# Patient Record
Sex: MALE | Race: WHITE | NOT HISPANIC OR LATINO | Employment: FULL TIME | URBAN - METROPOLITAN AREA
[De-identification: names, ages, dates, MRNs, and addresses within clinical notes are randomized per-mention and may not be internally consistent; named-entity substitution may affect disease eponyms.]

---

## 2022-01-01 ENCOUNTER — APPOINTMENT (OUTPATIENT)
Dept: GENERAL RADIOLOGY | Facility: CLINIC | Age: 72
End: 2022-01-01
Attending: ORTHOPAEDIC SURGERY
Payer: COMMERCIAL

## 2022-01-01 ENCOUNTER — APPOINTMENT (OUTPATIENT)
Dept: CT IMAGING | Facility: CLINIC | Age: 72
End: 2022-01-01
Attending: EMERGENCY MEDICINE
Payer: COMMERCIAL

## 2022-01-01 ENCOUNTER — ANESTHESIA EVENT (OUTPATIENT)
Dept: SURGERY | Facility: CLINIC | Age: 72
End: 2022-01-01
Payer: COMMERCIAL

## 2022-01-01 ENCOUNTER — APPOINTMENT (OUTPATIENT)
Dept: GENERAL RADIOLOGY | Facility: CLINIC | Age: 72
End: 2022-01-01
Attending: EMERGENCY MEDICINE
Payer: COMMERCIAL

## 2022-01-01 ENCOUNTER — APPOINTMENT (OUTPATIENT)
Dept: CARDIOLOGY | Facility: CLINIC | Age: 72
End: 2022-01-01
Attending: INTERNAL MEDICINE
Payer: COMMERCIAL

## 2022-01-01 ENCOUNTER — ANESTHESIA (OUTPATIENT)
Dept: SURGERY | Facility: CLINIC | Age: 72
End: 2022-01-01
Payer: COMMERCIAL

## 2022-01-01 ENCOUNTER — PATIENT OUTREACH (OUTPATIENT)
Dept: CARE COORDINATION | Facility: CLINIC | Age: 72
End: 2022-01-01

## 2022-01-01 ENCOUNTER — APPOINTMENT (OUTPATIENT)
Dept: ULTRASOUND IMAGING | Facility: CLINIC | Age: 72
End: 2022-01-01
Attending: HOSPITALIST
Payer: COMMERCIAL

## 2022-01-01 ENCOUNTER — APPOINTMENT (OUTPATIENT)
Dept: OCCUPATIONAL THERAPY | Facility: CLINIC | Age: 72
End: 2022-01-01
Attending: PHYSICIAN ASSISTANT
Payer: COMMERCIAL

## 2022-01-01 ENCOUNTER — TELEPHONE (OUTPATIENT)
Dept: OTHER | Facility: CLINIC | Age: 72
End: 2022-01-01

## 2022-01-01 ENCOUNTER — APPOINTMENT (OUTPATIENT)
Dept: PHYSICAL THERAPY | Facility: CLINIC | Age: 72
End: 2022-01-01
Attending: PHYSICIAN ASSISTANT
Payer: COMMERCIAL

## 2022-01-01 ENCOUNTER — LAB REQUISITION (OUTPATIENT)
Dept: LAB | Facility: CLINIC | Age: 72
End: 2022-01-01
Payer: COMMERCIAL

## 2022-01-01 ENCOUNTER — APPOINTMENT (OUTPATIENT)
Dept: CT IMAGING | Facility: CLINIC | Age: 72
End: 2022-01-01
Attending: NURSE PRACTITIONER
Payer: COMMERCIAL

## 2022-01-01 ENCOUNTER — APPOINTMENT (OUTPATIENT)
Dept: PHYSICAL THERAPY | Facility: CLINIC | Age: 72
End: 2022-01-01
Payer: COMMERCIAL

## 2022-01-01 ENCOUNTER — APPOINTMENT (OUTPATIENT)
Dept: GENERAL RADIOLOGY | Facility: CLINIC | Age: 72
End: 2022-01-01
Attending: HOSPITALIST
Payer: COMMERCIAL

## 2022-01-01 ENCOUNTER — HOSPITAL ENCOUNTER (INPATIENT)
Facility: CLINIC | Age: 72
LOS: 14 days | Discharge: NURSING, REHAB OR LONG-TERM CARE | End: 2022-08-12
Attending: EMERGENCY MEDICINE | Admitting: INTERNAL MEDICINE
Payer: COMMERCIAL

## 2022-01-01 ENCOUNTER — PATIENT OUTREACH (OUTPATIENT)
Dept: CARE COORDINATION | Facility: CLINIC | Age: 72
End: 2022-01-01
Payer: COMMERCIAL

## 2022-01-01 ENCOUNTER — HOSPITAL ENCOUNTER (INPATIENT)
Facility: CLINIC | Age: 72
LOS: 1 days | End: 2022-09-04
Attending: EMERGENCY MEDICINE | Admitting: STUDENT IN AN ORGANIZED HEALTH CARE EDUCATION/TRAINING PROGRAM
Payer: COMMERCIAL

## 2022-01-01 ENCOUNTER — TRANSITIONAL CARE UNIT VISIT (OUTPATIENT)
Dept: GERIATRICS | Facility: CLINIC | Age: 72
End: 2022-01-01
Payer: COMMERCIAL

## 2022-01-01 ENCOUNTER — APPOINTMENT (OUTPATIENT)
Dept: CT IMAGING | Facility: CLINIC | Age: 72
End: 2022-01-01
Attending: INTERNAL MEDICINE
Payer: COMMERCIAL

## 2022-01-01 ENCOUNTER — HOSPITAL ENCOUNTER (INPATIENT)
Facility: CLINIC | Age: 72
LOS: 4 days | Discharge: HOME | End: 2022-01-27
Attending: EMERGENCY MEDICINE | Admitting: INTERNAL MEDICINE
Payer: COMMERCIAL

## 2022-01-01 ENCOUNTER — HOSPITAL ENCOUNTER (INPATIENT)
Facility: CLINIC | Age: 72
LOS: 1 days | Discharge: HOME | End: 2022-06-19
Attending: EMERGENCY MEDICINE | Admitting: INTERNAL MEDICINE
Payer: COMMERCIAL

## 2022-01-01 ENCOUNTER — DISCHARGE SUMMARY NURSING HOME (OUTPATIENT)
Dept: GERIATRICS | Facility: CLINIC | Age: 72
End: 2022-01-01
Payer: COMMERCIAL

## 2022-01-01 ENCOUNTER — DOCUMENTATION ONLY (OUTPATIENT)
Dept: OTHER | Facility: CLINIC | Age: 72
End: 2022-01-01

## 2022-01-01 ENCOUNTER — APPOINTMENT (OUTPATIENT)
Dept: PHYSICAL THERAPY | Facility: CLINIC | Age: 72
End: 2022-01-01
Attending: INTERNAL MEDICINE
Payer: COMMERCIAL

## 2022-01-01 VITALS
BODY MASS INDEX: 35.03 KG/M2 | DIASTOLIC BLOOD PRESSURE: 46 MMHG | TEMPERATURE: 98.1 F | HEIGHT: 68 IN | WEIGHT: 231.1 LBS | RESPIRATION RATE: 18 BRPM | SYSTOLIC BLOOD PRESSURE: 141 MMHG | OXYGEN SATURATION: 94 % | HEART RATE: 73 BPM

## 2022-01-01 VITALS
BODY MASS INDEX: 30.86 KG/M2 | HEIGHT: 67 IN | TEMPERATURE: 96.9 F | HEART RATE: 66 BPM | RESPIRATION RATE: 16 BRPM | WEIGHT: 196.6 LBS | SYSTOLIC BLOOD PRESSURE: 112 MMHG | DIASTOLIC BLOOD PRESSURE: 50 MMHG | OXYGEN SATURATION: 99 %

## 2022-01-01 VITALS
WEIGHT: 208.1 LBS | SYSTOLIC BLOOD PRESSURE: 131 MMHG | BODY MASS INDEX: 31.54 KG/M2 | TEMPERATURE: 98.2 F | DIASTOLIC BLOOD PRESSURE: 67 MMHG | OXYGEN SATURATION: 92 % | RESPIRATION RATE: 18 BRPM | HEART RATE: 69 BPM | HEIGHT: 68 IN

## 2022-01-01 VITALS
DIASTOLIC BLOOD PRESSURE: 38 MMHG | HEART RATE: 84 BPM | TEMPERATURE: 98.7 F | SYSTOLIC BLOOD PRESSURE: 108 MMHG | WEIGHT: 210.2 LBS | HEIGHT: 68 IN | OXYGEN SATURATION: 95 % | RESPIRATION RATE: 18 BRPM | BODY MASS INDEX: 31.86 KG/M2

## 2022-01-01 VITALS
TEMPERATURE: 98.1 F | OXYGEN SATURATION: 92 % | HEART RATE: 70 BPM | HEIGHT: 68 IN | DIASTOLIC BLOOD PRESSURE: 68 MMHG | BODY MASS INDEX: 31.61 KG/M2 | RESPIRATION RATE: 18 BRPM | WEIGHT: 208.6 LBS | SYSTOLIC BLOOD PRESSURE: 116 MMHG

## 2022-01-01 VITALS
RESPIRATION RATE: 18 BRPM | TEMPERATURE: 97.4 F | BODY MASS INDEX: 32.58 KG/M2 | DIASTOLIC BLOOD PRESSURE: 68 MMHG | SYSTOLIC BLOOD PRESSURE: 122 MMHG | WEIGHT: 207.6 LBS | HEART RATE: 68 BPM | OXYGEN SATURATION: 95 % | HEIGHT: 67 IN

## 2022-01-01 VITALS
TEMPERATURE: 97.2 F | WEIGHT: 207.6 LBS | BODY MASS INDEX: 32.58 KG/M2 | HEART RATE: 78 BPM | OXYGEN SATURATION: 93 % | SYSTOLIC BLOOD PRESSURE: 102 MMHG | DIASTOLIC BLOOD PRESSURE: 59 MMHG | HEIGHT: 67 IN | RESPIRATION RATE: 18 BRPM

## 2022-01-01 VITALS
WEIGHT: 207.6 LBS | OXYGEN SATURATION: 92 % | BODY MASS INDEX: 32.58 KG/M2 | DIASTOLIC BLOOD PRESSURE: 69 MMHG | TEMPERATURE: 97.6 F | SYSTOLIC BLOOD PRESSURE: 134 MMHG | HEIGHT: 67 IN | RESPIRATION RATE: 16 BRPM | HEART RATE: 74 BPM

## 2022-01-01 VITALS
TEMPERATURE: 98.1 F | DIASTOLIC BLOOD PRESSURE: 85 MMHG | BODY MASS INDEX: 30.33 KG/M2 | HEART RATE: 68 BPM | OXYGEN SATURATION: 95 % | SYSTOLIC BLOOD PRESSURE: 134 MMHG | RESPIRATION RATE: 18 BRPM | HEIGHT: 68 IN | WEIGHT: 200.1 LBS

## 2022-01-01 VITALS
BODY MASS INDEX: 28.27 KG/M2 | HEIGHT: 68 IN | TEMPERATURE: 96 F | OXYGEN SATURATION: 79 % | SYSTOLIC BLOOD PRESSURE: 118 MMHG | DIASTOLIC BLOOD PRESSURE: 60 MMHG | WEIGHT: 186.51 LBS

## 2022-01-01 DIAGNOSIS — R78.81 BACTEREMIA: Primary | ICD-10-CM
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
## 2022-01-01 DIAGNOSIS — E87.6 HYPOKALEMIA: ICD-10-CM

## 2022-01-01 DIAGNOSIS — R78.81 BACTEREMIA: ICD-10-CM

## 2022-01-01 DIAGNOSIS — I82.622 DEEP VEIN THROMBOSIS (DVT) OF LEFT UPPER EXTREMITY, UNSPECIFIED CHRONICITY, UNSPECIFIED VEIN (H): ICD-10-CM

## 2022-01-01 DIAGNOSIS — E78.49 OTHER HYPERLIPIDEMIA: ICD-10-CM

## 2022-01-01 DIAGNOSIS — Z86.718 PERSONAL HISTORY OF DVT (DEEP VEIN THROMBOSIS): ICD-10-CM

## 2022-01-01 DIAGNOSIS — I48.19 PERSISTENT ATRIAL FIBRILLATION (H): ICD-10-CM

## 2022-01-01 DIAGNOSIS — I82.A12 ACUTE DEEP VEIN THROMBOSIS (DVT) OF AXILLARY VEIN OF LEFT UPPER EXTREMITY (H): ICD-10-CM

## 2022-01-01 DIAGNOSIS — M62.81 GENERALIZED MUSCLE WEAKNESS: ICD-10-CM

## 2022-01-01 DIAGNOSIS — Z71.89 OTHER SPECIFIED COUNSELING: ICD-10-CM

## 2022-01-01 DIAGNOSIS — D64.9 ANEMIA, UNSPECIFIED TYPE: ICD-10-CM

## 2022-01-01 DIAGNOSIS — I25.118 CORONARY ARTERY DISEASE OF NATIVE ARTERY OF NATIVE HEART WITH STABLE ANGINA PECTORIS (H): ICD-10-CM

## 2022-01-01 DIAGNOSIS — S72.145A CLOSED NONDISPLACED INTERTROCHANTERIC FRACTURE OF LEFT FEMUR, INITIAL ENCOUNTER (H): ICD-10-CM

## 2022-01-01 DIAGNOSIS — B99.9 UNSPECIFIED INFECTIOUS DISEASE: ICD-10-CM

## 2022-01-01 DIAGNOSIS — R33.9 URINARY RETENTION: Primary | ICD-10-CM

## 2022-01-01 DIAGNOSIS — R79.89 ELEVATED TROPONIN: ICD-10-CM

## 2022-01-01 DIAGNOSIS — Z96.642 S/P TOTAL LEFT HIP ARTHROPLASTY: ICD-10-CM

## 2022-01-01 DIAGNOSIS — I50.22 CHRONIC SYSTOLIC (CONGESTIVE) HEART FAILURE (H): ICD-10-CM

## 2022-01-01 DIAGNOSIS — I61.5 VENTRICULAR HEMORRHAGE (H): ICD-10-CM

## 2022-01-01 DIAGNOSIS — I47.29 NSVT (NONSUSTAINED VENTRICULAR TACHYCARDIA) (H): ICD-10-CM

## 2022-01-01 DIAGNOSIS — Z11.1 ENCOUNTER FOR SCREENING FOR RESPIRATORY TUBERCULOSIS: ICD-10-CM

## 2022-01-01 DIAGNOSIS — R33.9 URINARY RETENTION: ICD-10-CM

## 2022-01-01 DIAGNOSIS — R60.0 BILATERAL LEG EDEMA: ICD-10-CM

## 2022-01-01 DIAGNOSIS — D64.9 ANEMIA, UNSPECIFIED: ICD-10-CM

## 2022-01-01 DIAGNOSIS — R57.8 HEMORRHAGIC SHOCK (H): ICD-10-CM

## 2022-01-01 DIAGNOSIS — F39 MOOD DISORDER (H): ICD-10-CM

## 2022-01-01 DIAGNOSIS — I50.32 CHRONIC DIASTOLIC HEART FAILURE (H): ICD-10-CM

## 2022-01-01 DIAGNOSIS — N17.9 ACUTE RENAL FAILURE, UNSPECIFIED ACUTE RENAL FAILURE TYPE (H): ICD-10-CM

## 2022-01-01 DIAGNOSIS — I50.9 HEART FAILURE, UNSPECIFIED (H): ICD-10-CM

## 2022-01-01 DIAGNOSIS — Z71.89 ADVANCED CARE PLANNING/COUNSELING DISCUSSION: ICD-10-CM

## 2022-01-01 DIAGNOSIS — R53.81 PHYSICAL DECONDITIONING: ICD-10-CM

## 2022-01-01 DIAGNOSIS — R19.7 DIARRHEA, UNSPECIFIED TYPE: ICD-10-CM

## 2022-01-01 DIAGNOSIS — R79.89 ELEVATED BRAIN NATRIURETIC PEPTIDE (BNP) LEVEL: ICD-10-CM

## 2022-01-01 DIAGNOSIS — I50.9 CONGESTIVE HEART FAILURE, UNSPECIFIED HF CHRONICITY, UNSPECIFIED HEART FAILURE TYPE (H): Primary | ICD-10-CM

## 2022-01-01 DIAGNOSIS — K92.2 GASTROINTESTINAL HEMORRHAGE, UNSPECIFIED GASTROINTESTINAL HEMORRHAGE TYPE: ICD-10-CM

## 2022-01-01 DIAGNOSIS — J44.9 COPD, SEVERE (H): ICD-10-CM

## 2022-01-01 DIAGNOSIS — B96.89 OTHER SPECIFIED BACTERIAL AGENTS AS THE CAUSE OF DISEASES CLASSIFIED ELSEWHERE: ICD-10-CM

## 2022-01-01 DIAGNOSIS — R41.89 UNRESPONSIVE: ICD-10-CM

## 2022-01-01 DIAGNOSIS — I10 HYPERTENSION, UNSPECIFIED TYPE: ICD-10-CM

## 2022-01-01 DIAGNOSIS — E87.6 HYPOKALEMIA: Primary | ICD-10-CM

## 2022-01-01 DIAGNOSIS — I50.9 OTHER CONGESTIVE HEART FAILURE (H): ICD-10-CM

## 2022-01-01 LAB
ABO/RH(D): NORMAL
ABO/RH(D): NORMAL
ACINETOBACTER SPECIES: NOT DETECTED
ALBUMIN SERPL BCG-MCNC: 3.5 G/DL (ref 3.5–5.2)
ALBUMIN SERPL-MCNC: 2.4 G/DL (ref 3.4–5)
ALBUMIN SERPL-MCNC: 2.7 G/DL (ref 3.4–5)
ALBUMIN UR-MCNC: 10 MG/DL
ALBUMIN UR-MCNC: NEGATIVE MG/DL
ALBUMIN UR-MCNC: NEGATIVE MG/DL
ALP SERPL-CCNC: 151 U/L (ref 40–129)
ALP SERPL-CCNC: 68 U/L (ref 40–150)
ALP SERPL-CCNC: 82 U/L (ref 40–150)
ALT SERPL W P-5'-P-CCNC: 17 U/L (ref 0–70)
ALT SERPL W P-5'-P-CCNC: 18 U/L (ref 0–70)
ALT SERPL W P-5'-P-CCNC: 23 U/L (ref 10–50)
ANION GAP SERPL CALCULATED.3IONS-SCNC: 10 MMOL/L (ref 7–15)
ANION GAP SERPL CALCULATED.3IONS-SCNC: 11 MMOL/L (ref 7–15)
ANION GAP SERPL CALCULATED.3IONS-SCNC: 12 MMOL/L (ref 7–15)
ANION GAP SERPL CALCULATED.3IONS-SCNC: 12 MMOL/L (ref 7–15)
ANION GAP SERPL CALCULATED.3IONS-SCNC: 13 MMOL/L (ref 7–15)
ANION GAP SERPL CALCULATED.3IONS-SCNC: 15 MMOL/L (ref 7–15)
ANION GAP SERPL CALCULATED.3IONS-SCNC: 3 MMOL/L (ref 3–14)
ANION GAP SERPL CALCULATED.3IONS-SCNC: 3 MMOL/L (ref 3–14)
ANION GAP SERPL CALCULATED.3IONS-SCNC: 6 MMOL/L (ref 3–14)
ANION GAP SERPL CALCULATED.3IONS-SCNC: 7 MMOL/L (ref 3–14)
ANION GAP SERPL CALCULATED.3IONS-SCNC: 7 MMOL/L (ref 7–15)
ANION GAP SERPL CALCULATED.3IONS-SCNC: 8 MMOL/L (ref 3–14)
ANION GAP SERPL CALCULATED.3IONS-SCNC: 8 MMOL/L (ref 7–15)
ANION GAP SERPL CALCULATED.3IONS-SCNC: 9 MMOL/L (ref 3–14)
ANION GAP SERPL CALCULATED.3IONS-SCNC: 9 MMOL/L (ref 3–14)
ANION GAP SERPL CALCULATED.3IONS-SCNC: 9 MMOL/L (ref 7–15)
ANTIBODY SCREEN: NEGATIVE
ANTIBODY SCREEN: NEGATIVE
APPEARANCE CSF: ABNORMAL
APPEARANCE UR: CLEAR
APTT PPP: 40 SECONDS (ref 22–38)
AST SERPL W P-5'-P-CCNC: 17 U/L (ref 0–45)
AST SERPL W P-5'-P-CCNC: 21 U/L (ref 0–45)
AST SERPL W P-5'-P-CCNC: 25 U/L (ref 0–45)
AST SERPL W P-5'-P-CCNC: 38 U/L (ref 10–50)
AST SERPL W P-5'-P-CCNC: 39 U/L (ref 0–45)
AST SERPL W P-5'-P-CCNC: 42 U/L (ref 0–45)
ATRIAL RATE - MUSE: 0 BPM
ATRIAL RATE - MUSE: 38 BPM
ATRIAL RATE - MUSE: 39 BPM
ATRIAL RATE - MUSE: 77 BPM
ATRIAL RATE - MUSE: 86 BPM
BACTERIA BLD CULT: ABNORMAL
BACTERIA BLD CULT: NO GROWTH
BACTERIA UR CULT: NO GROWTH
BASE EXCESS BLDA CALC-SCNC: 9.6 MMOL/L (ref -9–1.8)
BASE EXCESS BLDV CALC-SCNC: 1.1 MMOL/L (ref -7.7–1.9)
BASOPHILS # BLD AUTO: 0 10E3/UL (ref 0–0.2)
BASOPHILS # BLD AUTO: 0.1 10E3/UL (ref 0–0.2)
BASOPHILS # BLD AUTO: 0.1 10E3/UL (ref 0–0.2)
BASOPHILS NFR BLD AUTO: 0 %
BASOPHILS NFR BLD AUTO: 1 %
BILIRUB DIRECT SERPL-MCNC: 0.59 MG/DL (ref 0–0.3)
BILIRUB SERPL-MCNC: 0.4 MG/DL (ref 0.2–1.3)
BILIRUB SERPL-MCNC: 0.7 MG/DL (ref 0.2–1.3)
BILIRUB SERPL-MCNC: 1.2 MG/DL
BILIRUB UR QL STRIP: NEGATIVE
BLD PROD TYP BPU: NORMAL
BLOOD COMPONENT TYPE: NORMAL
BUN SERPL-MCNC: 10 MG/DL (ref 8–23)
BUN SERPL-MCNC: 11 MG/DL (ref 7–30)
BUN SERPL-MCNC: 11 MG/DL (ref 8–23)
BUN SERPL-MCNC: 11.4 MG/DL (ref 8–23)
BUN SERPL-MCNC: 12 MG/DL (ref 7–30)
BUN SERPL-MCNC: 13 MG/DL (ref 7–30)
BUN SERPL-MCNC: 13.5 MG/DL (ref 8–23)
BUN SERPL-MCNC: 14 MG/DL (ref 7–30)
BUN SERPL-MCNC: 14 MG/DL (ref 7–30)
BUN SERPL-MCNC: 15.9 MG/DL (ref 8–23)
BUN SERPL-MCNC: 17.8 MG/DL (ref 8–23)
BUN SERPL-MCNC: 18.2 MG/DL (ref 8–23)
BUN SERPL-MCNC: 19.2 MG/DL (ref 8–23)
BUN SERPL-MCNC: 20 MG/DL (ref 7–30)
BUN SERPL-MCNC: 20.5 MG/DL (ref 8–23)
BUN SERPL-MCNC: 21 MG/DL (ref 7–30)
BUN SERPL-MCNC: 21.1 MG/DL (ref 8–23)
BUN SERPL-MCNC: 22 MG/DL (ref 7–30)
BUN SERPL-MCNC: 22.7 MG/DL (ref 8–23)
BUN SERPL-MCNC: 22.7 MG/DL (ref 8–23)
BUN SERPL-MCNC: 27 MG/DL (ref 7–30)
BUN SERPL-MCNC: 29 MG/DL (ref 7–30)
BUN SERPL-MCNC: 29 MG/DL (ref 7–30)
BUN SERPL-MCNC: 33.5 MG/DL (ref 8–23)
BUN SERPL-MCNC: 36.9 MG/DL (ref 8–23)
BUN SERPL-MCNC: 9.2 MG/DL (ref 8–23)
BURR CELLS BLD QL SMEAR: ABNORMAL
C DIFF TOX B STL QL: NEGATIVE
CA-I BLD-MCNC: 5.2 MG/DL (ref 4.4–5.2)
CALCIUM SERPL-MCNC: 10.3 MG/DL (ref 8.5–10.1)
CALCIUM SERPL-MCNC: 7.1 MG/DL (ref 8.5–10.1)
CALCIUM SERPL-MCNC: 8.2 MG/DL (ref 8.5–10.1)
CALCIUM SERPL-MCNC: 8.2 MG/DL (ref 8.5–10.1)
CALCIUM SERPL-MCNC: 8.2 MG/DL (ref 8.8–10.2)
CALCIUM SERPL-MCNC: 8.3 MG/DL (ref 8.5–10.1)
CALCIUM SERPL-MCNC: 8.3 MG/DL (ref 8.8–10.2)
CALCIUM SERPL-MCNC: 8.3 MG/DL (ref 8.8–10.2)
CALCIUM SERPL-MCNC: 8.5 MG/DL (ref 8.8–10.2)
CALCIUM SERPL-MCNC: 8.5 MG/DL (ref 8.8–10.2)
CALCIUM SERPL-MCNC: 8.6 MG/DL (ref 8.5–10.1)
CALCIUM SERPL-MCNC: 8.8 MG/DL (ref 8.8–10.2)
CALCIUM SERPL-MCNC: 9 MG/DL (ref 8.5–10.1)
CALCIUM SERPL-MCNC: 9 MG/DL (ref 8.8–10.2)
CALCIUM SERPL-MCNC: 9.1 MG/DL (ref 8.5–10.1)
CALCIUM SERPL-MCNC: 9.2 MG/DL (ref 8.8–10.2)
CALCIUM SERPL-MCNC: 9.3 MG/DL (ref 8.5–10.1)
CALCIUM SERPL-MCNC: 9.3 MG/DL (ref 8.8–10.2)
CALCIUM SERPL-MCNC: 9.4 MG/DL (ref 8.5–10.1)
CALCIUM SERPL-MCNC: 9.5 MG/DL (ref 8.5–10.1)
CALCIUM SERPL-MCNC: 9.7 MG/DL (ref 8.8–10.2)
CHLORIDE BLD-SCNC: 100 MMOL/L (ref 94–109)
CHLORIDE BLD-SCNC: 102 MMOL/L (ref 94–109)
CHLORIDE BLD-SCNC: 105 MMOL/L (ref 94–109)
CHLORIDE BLD-SCNC: 108 MMOL/L (ref 94–109)
CHLORIDE BLD-SCNC: 121 MMOL/L (ref 94–109)
CHLORIDE BLD-SCNC: 95 MMOL/L (ref 94–109)
CHLORIDE BLD-SCNC: 96 MMOL/L (ref 94–109)
CHLORIDE BLD-SCNC: 99 MMOL/L (ref 94–109)
CHLORIDE BLD-SCNC: 99 MMOL/L (ref 94–109)
CHLORIDE SERPL-SCNC: 100 MMOL/L (ref 98–107)
CHLORIDE SERPL-SCNC: 101 MMOL/L (ref 98–107)
CHLORIDE SERPL-SCNC: 92 MMOL/L (ref 98–107)
CHLORIDE SERPL-SCNC: 93 MMOL/L (ref 98–107)
CHLORIDE SERPL-SCNC: 93 MMOL/L (ref 98–107)
CHLORIDE SERPL-SCNC: 94 MMOL/L (ref 98–107)
CHLORIDE SERPL-SCNC: 95 MMOL/L (ref 98–107)
CHLORIDE SERPL-SCNC: 96 MMOL/L (ref 98–107)
CHLORIDE SERPL-SCNC: 96 MMOL/L (ref 98–107)
CHLORIDE SERPL-SCNC: 97 MMOL/L (ref 98–107)
CHLORIDE SERPL-SCNC: 98 MMOL/L (ref 98–107)
CHLORIDE SERPL-SCNC: 99 MMOL/L (ref 98–107)
CHLORIDE SERPL-SCNC: 99 MMOL/L (ref 98–107)
CITROBACTER SPECIES: NOT DETECTED
CO2 SERPL-SCNC: 22 MMOL/L (ref 20–32)
CO2 SERPL-SCNC: 26 MMOL/L (ref 20–32)
CO2 SERPL-SCNC: 27 MMOL/L (ref 20–32)
CO2 SERPL-SCNC: 28 MMOL/L (ref 20–32)
CO2 SERPL-SCNC: 28 MMOL/L (ref 20–32)
CO2 SERPL-SCNC: 29 MMOL/L (ref 20–32)
CO2 SERPL-SCNC: 30 MMOL/L (ref 20–32)
CO2 SERPL-SCNC: 31 MMOL/L (ref 20–32)
CO2 SERPL-SCNC: 32 MMOL/L (ref 20–32)
CODING SYSTEM: NORMAL
COLOR CSF: ABNORMAL
COLOR UR AUTO: ABNORMAL
COLOR UR AUTO: ABNORMAL
COLOR UR AUTO: YELLOW
CPB POCT: NO
CREAT BLD-MCNC: 1.4 MG/DL (ref 0.7–1.3)
CREAT SERPL-MCNC: 0.48 MG/DL (ref 0.66–1.25)
CREAT SERPL-MCNC: 0.62 MG/DL (ref 0.66–1.25)
CREAT SERPL-MCNC: 0.63 MG/DL (ref 0.66–1.25)
CREAT SERPL-MCNC: 0.65 MG/DL (ref 0.66–1.25)
CREAT SERPL-MCNC: 0.67 MG/DL (ref 0.66–1.25)
CREAT SERPL-MCNC: 0.69 MG/DL (ref 0.67–1.17)
CREAT SERPL-MCNC: 0.69 MG/DL (ref 0.67–1.17)
CREAT SERPL-MCNC: 0.71 MG/DL (ref 0.66–1.25)
CREAT SERPL-MCNC: 0.71 MG/DL (ref 0.66–1.25)
CREAT SERPL-MCNC: 0.71 MG/DL (ref 0.67–1.17)
CREAT SERPL-MCNC: 0.72 MG/DL (ref 0.66–1.25)
CREAT SERPL-MCNC: 0.72 MG/DL (ref 0.66–1.25)
CREAT SERPL-MCNC: 0.74 MG/DL (ref 0.66–1.25)
CREAT SERPL-MCNC: 0.76 MG/DL (ref 0.67–1.17)
CREAT SERPL-MCNC: 0.78 MG/DL (ref 0.66–1.25)
CREAT SERPL-MCNC: 0.78 MG/DL (ref 0.67–1.17)
CREAT SERPL-MCNC: 0.79 MG/DL (ref 0.67–1.17)
CREAT SERPL-MCNC: 0.8 MG/DL (ref 0.66–1.25)
CREAT SERPL-MCNC: 0.8 MG/DL (ref 0.67–1.17)
CREAT SERPL-MCNC: 0.81 MG/DL (ref 0.67–1.17)
CREAT SERPL-MCNC: 0.83 MG/DL (ref 0.67–1.17)
CREAT SERPL-MCNC: 0.85 MG/DL (ref 0.67–1.17)
CREAT SERPL-MCNC: 0.86 MG/DL (ref 0.67–1.17)
CREAT SERPL-MCNC: 0.98 MG/DL (ref 0.67–1.17)
CREAT SERPL-MCNC: 1.03 MG/DL (ref 0.67–1.17)
CREAT SERPL-MCNC: 1.05 MG/DL (ref 0.67–1.17)
CREAT SERPL-MCNC: 1.16 MG/DL (ref 0.66–1.25)
CREAT SERPL-MCNC: 1.24 MG/DL (ref 0.67–1.17)
CREAT SERPL-MCNC: 1.36 MG/DL (ref 0.66–1.25)
CROSSMATCH: NORMAL
CRP SERPL-MCNC: 48.8 MG/L (ref 0–8)
CRP SERPL-MCNC: 82.8 MG/L (ref 0–8)
CRP SERPL-MCNC: 88.4 MG/L (ref 0–8)
CTX-M: NORMAL
DEPRECATED HCO3 PLAS-SCNC: 21 MMOL/L (ref 22–29)
DEPRECATED HCO3 PLAS-SCNC: 24 MMOL/L (ref 22–29)
DEPRECATED HCO3 PLAS-SCNC: 25 MMOL/L (ref 22–29)
DEPRECATED HCO3 PLAS-SCNC: 26 MMOL/L (ref 22–29)
DEPRECATED HCO3 PLAS-SCNC: 26 MMOL/L (ref 22–29)
DEPRECATED HCO3 PLAS-SCNC: 28 MMOL/L (ref 22–29)
DIASTOLIC BLOOD PRESSURE - MUSE: NORMAL MMHG
ENTEROBACTER SPECIES: NOT DETECTED
EOSINOPHIL # BLD AUTO: 0 10E3/UL (ref 0–0.7)
EOSINOPHIL # BLD AUTO: 0.1 10E3/UL (ref 0–0.7)
EOSINOPHIL # BLD AUTO: 0.2 10E3/UL (ref 0–0.7)
EOSINOPHIL # BLD AUTO: 0.3 10E3/UL (ref 0–0.7)
EOSINOPHIL # BLD AUTO: 0.3 10E3/UL (ref 0–0.7)
EOSINOPHIL # BLD AUTO: 0.4 10E3/UL (ref 0–0.7)
EOSINOPHIL # BLD AUTO: 0.4 10E3/UL (ref 0–0.7)
EOSINOPHIL NFR BLD AUTO: 1 %
EOSINOPHIL NFR BLD AUTO: 2 %
EOSINOPHIL NFR BLD AUTO: 2 %
EOSINOPHIL NFR BLD AUTO: 3 %
EOSINOPHIL NFR BLD AUTO: 4 %
EOSINOPHIL NFR BLD AUTO: 6 %
EOSINOPHIL NFR BLD AUTO: 6 %
ERYTHROCYTE [DISTWIDTH] IN BLOOD BY AUTOMATED COUNT: 12.4 % (ref 10–15)
ERYTHROCYTE [DISTWIDTH] IN BLOOD BY AUTOMATED COUNT: 12.4 % (ref 10–15)
ERYTHROCYTE [DISTWIDTH] IN BLOOD BY AUTOMATED COUNT: 12.6 % (ref 10–15)
ERYTHROCYTE [DISTWIDTH] IN BLOOD BY AUTOMATED COUNT: 13.9 % (ref 10–15)
ERYTHROCYTE [DISTWIDTH] IN BLOOD BY AUTOMATED COUNT: 14.1 % (ref 10–15)
ERYTHROCYTE [DISTWIDTH] IN BLOOD BY AUTOMATED COUNT: 14.2 % (ref 10–15)
ERYTHROCYTE [DISTWIDTH] IN BLOOD BY AUTOMATED COUNT: 14.3 % (ref 10–15)
ERYTHROCYTE [DISTWIDTH] IN BLOOD BY AUTOMATED COUNT: 14.4 % (ref 10–15)
ERYTHROCYTE [DISTWIDTH] IN BLOOD BY AUTOMATED COUNT: 14.5 % (ref 10–15)
ERYTHROCYTE [DISTWIDTH] IN BLOOD BY AUTOMATED COUNT: 14.6 % (ref 10–15)
ERYTHROCYTE [DISTWIDTH] IN BLOOD BY AUTOMATED COUNT: 14.7 % (ref 10–15)
ERYTHROCYTE [DISTWIDTH] IN BLOOD BY AUTOMATED COUNT: 14.7 % (ref 10–15)
ERYTHROCYTE [DISTWIDTH] IN BLOOD BY AUTOMATED COUNT: 15.3 % (ref 10–15)
ERYTHROCYTE [SEDIMENTATION RATE] IN BLOOD BY WESTERGREN METHOD: 70 MM/HR (ref 0–20)
ERYTHROCYTE [SEDIMENTATION RATE] IN BLOOD BY WESTERGREN METHOD: 72 MM/HR (ref 0–20)
ERYTHROCYTE [SEDIMENTATION RATE] IN BLOOD BY WESTERGREN METHOD: 73 MM/HR (ref 0–20)
ESCHERICHIA COLI: NOT DETECTED
FIBRINOGEN PPP-MCNC: 581 MG/DL (ref 170–490)
FLUAV RNA SPEC QL NAA+PROBE: NEGATIVE
FLUAV RNA SPEC QL NAA+PROBE: NEGATIVE
FLUBV RNA RESP QL NAA+PROBE: NEGATIVE
FLUBV RNA RESP QL NAA+PROBE: NEGATIVE
GAMMA INTERFERON BACKGROUND BLD IA-ACNC: 0.08 IU/ML
GFR SERPL CREATININE-BSD FRML MDRD: 54 ML/MIN/1.73M2
GFR SERPL CREATININE-BSD FRML MDRD: 56 ML/MIN/1.73M2
GFR SERPL CREATININE-BSD FRML MDRD: 63 ML/MIN/1.73M2
GFR SERPL CREATININE-BSD FRML MDRD: 68 ML/MIN/1.73M2
GFR SERPL CREATININE-BSD FRML MDRD: 77 ML/MIN/1.73M2
GFR SERPL CREATININE-BSD FRML MDRD: 79 ML/MIN/1.73M2
GFR SERPL CREATININE-BSD FRML MDRD: 83 ML/MIN/1.73M2
GFR SERPL CREATININE-BSD FRML MDRD: >90 ML/MIN/1.73M2
GLUCOSE BLD-MCNC: 109 MG/DL (ref 70–99)
GLUCOSE BLD-MCNC: 113 MG/DL (ref 70–99)
GLUCOSE BLD-MCNC: 116 MG/DL (ref 70–99)
GLUCOSE BLD-MCNC: 120 MG/DL (ref 70–99)
GLUCOSE BLD-MCNC: 122 MG/DL (ref 70–99)
GLUCOSE BLD-MCNC: 127 MG/DL (ref 70–99)
GLUCOSE BLD-MCNC: 128 MG/DL (ref 70–99)
GLUCOSE BLD-MCNC: 130 MG/DL (ref 70–99)
GLUCOSE BLD-MCNC: 152 MG/DL (ref 70–99)
GLUCOSE BLD-MCNC: 75 MG/DL (ref 70–99)
GLUCOSE BLD-MCNC: 87 MG/DL (ref 70–99)
GLUCOSE BLD-MCNC: 95 MG/DL (ref 70–99)
GLUCOSE BLDC GLUCOMTR-MCNC: 106 MG/DL (ref 70–99)
GLUCOSE BLDC GLUCOMTR-MCNC: 106 MG/DL (ref 70–99)
GLUCOSE BLDC GLUCOMTR-MCNC: 108 MG/DL (ref 70–99)
GLUCOSE BLDC GLUCOMTR-MCNC: 123 MG/DL (ref 70–99)
GLUCOSE BLDC GLUCOMTR-MCNC: 125 MG/DL (ref 70–99)
GLUCOSE BLDC GLUCOMTR-MCNC: 128 MG/DL (ref 70–99)
GLUCOSE BLDC GLUCOMTR-MCNC: 161 MG/DL (ref 70–99)
GLUCOSE CSF-MCNC: 114 MG/DL (ref 40–70)
GLUCOSE SERPL-MCNC: 102 MG/DL (ref 70–99)
GLUCOSE SERPL-MCNC: 104 MG/DL (ref 70–99)
GLUCOSE SERPL-MCNC: 104 MG/DL (ref 70–99)
GLUCOSE SERPL-MCNC: 105 MG/DL (ref 70–99)
GLUCOSE SERPL-MCNC: 108 MG/DL (ref 70–99)
GLUCOSE SERPL-MCNC: 108 MG/DL (ref 70–99)
GLUCOSE SERPL-MCNC: 110 MG/DL (ref 70–99)
GLUCOSE SERPL-MCNC: 111 MG/DL (ref 70–99)
GLUCOSE SERPL-MCNC: 112 MG/DL (ref 70–99)
GLUCOSE SERPL-MCNC: 122 MG/DL (ref 70–99)
GLUCOSE SERPL-MCNC: 123 MG/DL (ref 70–99)
GLUCOSE SERPL-MCNC: 93 MG/DL (ref 70–99)
GLUCOSE SERPL-MCNC: 97 MG/DL (ref 70–99)
GLUCOSE UR STRIP-MCNC: >=1000 MG/DL
GLUCOSE UR STRIP-MCNC: NEGATIVE MG/DL
GLUCOSE UR STRIP-MCNC: NEGATIVE MG/DL
HBA1C MFR BLD: 5.8 % (ref 0–5.6)
HCO3 BLD-SCNC: 33 MMOL/L (ref 21–28)
HCO3 BLDV-SCNC: 27 MMOL/L (ref 21–28)
HCO3 BLDV-SCNC: 27 MMOL/L (ref 21–28)
HCO3 BLDV-SCNC: 28 MMOL/L (ref 21–28)
HCO3 BLDV-SCNC: 29 MMOL/L (ref 21–28)
HCT VFR BLD AUTO: 26.8 % (ref 40–53)
HCT VFR BLD AUTO: 27.2 % (ref 40–53)
HCT VFR BLD AUTO: 27.8 % (ref 40–53)
HCT VFR BLD AUTO: 27.9 % (ref 40–53)
HCT VFR BLD AUTO: 28.1 % (ref 40–53)
HCT VFR BLD AUTO: 28.9 % (ref 40–53)
HCT VFR BLD AUTO: 29 % (ref 40–53)
HCT VFR BLD AUTO: 29.9 % (ref 40–53)
HCT VFR BLD AUTO: 31.4 % (ref 40–53)
HCT VFR BLD AUTO: 31.5 % (ref 40–53)
HCT VFR BLD AUTO: 31.8 % (ref 40–53)
HCT VFR BLD AUTO: 31.9 % (ref 40–53)
HCT VFR BLD AUTO: 32.3 % (ref 40–53)
HCT VFR BLD AUTO: 32.6 % (ref 40–53)
HCT VFR BLD AUTO: 32.8 % (ref 40–53)
HCT VFR BLD AUTO: 33 % (ref 40–53)
HCT VFR BLD AUTO: 33.1 % (ref 40–53)
HCT VFR BLD AUTO: 33.3 % (ref 40–53)
HCT VFR BLD AUTO: 33.5 % (ref 40–53)
HCT VFR BLD AUTO: 33.5 % (ref 40–53)
HCT VFR BLD AUTO: 33.7 % (ref 40–53)
HCT VFR BLD AUTO: 34.4 % (ref 40–53)
HCT VFR BLD AUTO: 34.8 % (ref 40–53)
HCT VFR BLD AUTO: 35 % (ref 40–53)
HCT VFR BLD AUTO: 42 % (ref 40–53)
HCT VFR BLD AUTO: 42.9 % (ref 40–53)
HCT VFR BLD CALC: 35 % (ref 40–53)
HGB BLD-MCNC: 10 G/DL (ref 13.3–17.7)
HGB BLD-MCNC: 10 G/DL (ref 13.3–17.7)
HGB BLD-MCNC: 10.1 G/DL (ref 13.3–17.7)
HGB BLD-MCNC: 10.1 G/DL (ref 13.3–17.7)
HGB BLD-MCNC: 10.2 G/DL (ref 13.3–17.7)
HGB BLD-MCNC: 10.2 G/DL (ref 13.3–17.7)
HGB BLD-MCNC: 10.3 G/DL (ref 13.3–17.7)
HGB BLD-MCNC: 10.3 G/DL (ref 13.3–17.7)
HGB BLD-MCNC: 10.4 G/DL (ref 13.3–17.7)
HGB BLD-MCNC: 10.5 G/DL (ref 13.3–17.7)
HGB BLD-MCNC: 10.6 G/DL (ref 13.3–17.7)
HGB BLD-MCNC: 10.7 G/DL (ref 13.3–17.7)
HGB BLD-MCNC: 10.8 G/DL (ref 13.3–17.7)
HGB BLD-MCNC: 10.9 G/DL (ref 13.3–17.7)
HGB BLD-MCNC: 11.9 G/DL (ref 13.3–17.7)
HGB BLD-MCNC: 13.8 G/DL (ref 13.3–17.7)
HGB BLD-MCNC: 13.9 G/DL (ref 13.3–17.7)
HGB BLD-MCNC: 8.4 G/DL (ref 13.3–17.7)
HGB BLD-MCNC: 8.7 G/DL (ref 13.3–17.7)
HGB BLD-MCNC: 8.7 G/DL (ref 13.3–17.7)
HGB BLD-MCNC: 8.9 G/DL (ref 13.3–17.7)
HGB BLD-MCNC: 8.9 G/DL (ref 13.3–17.7)
HGB BLD-MCNC: 9.1 G/DL (ref 13.3–17.7)
HGB BLD-MCNC: 9.2 G/DL (ref 13.3–17.7)
HGB BLD-MCNC: 9.4 G/DL (ref 13.3–17.7)
HGB BLD-MCNC: 9.5 G/DL (ref 13.3–17.7)
HGB BLD-MCNC: 9.6 G/DL (ref 13.3–17.7)
HGB BLD-MCNC: 9.6 G/DL (ref 13.3–17.7)
HGB BLD-MCNC: 9.7 G/DL (ref 13.3–17.7)
HGB BLD-MCNC: 9.9 G/DL (ref 13.3–17.7)
HGB BLD-MCNC: 9.9 G/DL (ref 13.3–17.7)
HGB UR QL STRIP: ABNORMAL
HGB UR QL STRIP: NEGATIVE
HGB UR QL STRIP: NEGATIVE
HOLD SPECIMEN: NORMAL
HYALINE CASTS: 17 /LPF
IMM GRANULOCYTES # BLD: 0 10E3/UL
IMM GRANULOCYTES # BLD: 0.1 10E3/UL
IMM GRANULOCYTES NFR BLD: 0 %
IMM GRANULOCYTES NFR BLD: 0 %
IMM GRANULOCYTES NFR BLD: 1 %
IMP: NORMAL
INR PPP: 1.09 (ref 0.85–1.15)
INR PPP: 1.3 (ref 0.85–1.15)
INR PPP: 1.66 (ref 0.85–1.15)
INR PPP: 1.98 (ref 0.85–1.15)
INR PPP: 1.98 (ref 0.85–1.15)
INR PPP: 2.85 (ref 0.85–1.15)
INTERPRETATION ECG - MUSE: NORMAL
ISSUE DATE AND TIME: NORMAL
ISSUE DATE AND TIME: NORMAL
KETONES UR STRIP-MCNC: NEGATIVE MG/DL
KLEBSIELLA OXYTOCA: NOT DETECTED
KLEBSIELLA PNEUMONIAE: NOT DETECTED
KPC: NORMAL
LACTATE BLD-SCNC: 1.1 MMOL/L
LACTATE BLD-SCNC: 3.4 MMOL/L
LACTATE SERPL-SCNC: 0.9 MMOL/L (ref 0.7–2)
LACTATE SERPL-SCNC: 1.5 MMOL/L (ref 0.7–2)
LEUKOCYTE ESTERASE UR QL STRIP: ABNORMAL
LEUKOCYTE ESTERASE UR QL STRIP: NEGATIVE
LEUKOCYTE ESTERASE UR QL STRIP: NEGATIVE
LIPASE SERPL-CCNC: 51 U/L (ref 13–60)
LVEF ECHO: NORMAL
LYMPH ABN NFR CSF MANUAL: NORMAL %
LYMPHOCYTES # BLD AUTO: 0.3 10E3/UL (ref 0.8–5.3)
LYMPHOCYTES # BLD AUTO: 0.5 10E3/UL (ref 0.8–5.3)
LYMPHOCYTES # BLD AUTO: 0.7 10E3/UL (ref 0.8–5.3)
LYMPHOCYTES # BLD AUTO: 0.7 10E3/UL (ref 0.8–5.3)
LYMPHOCYTES # BLD AUTO: 0.8 10E3/UL (ref 0.8–5.3)
LYMPHOCYTES # BLD AUTO: 0.8 10E3/UL (ref 0.8–5.3)
LYMPHOCYTES # BLD AUTO: 0.9 10E3/UL (ref 0.8–5.3)
LYMPHOCYTES # BLD AUTO: 1 10E3/UL (ref 0.8–5.3)
LYMPHOCYTES # BLD AUTO: 1 10E3/UL (ref 0.8–5.3)
LYMPHOCYTES # BLD AUTO: 1.1 10E3/UL (ref 0.8–5.3)
LYMPHOCYTES NFR BLD AUTO: 10 %
LYMPHOCYTES NFR BLD AUTO: 11 %
LYMPHOCYTES NFR BLD AUTO: 11 %
LYMPHOCYTES NFR BLD AUTO: 12 %
LYMPHOCYTES NFR BLD AUTO: 12 %
LYMPHOCYTES NFR BLD AUTO: 16 %
LYMPHOCYTES NFR BLD AUTO: 6 %
LYMPHOCYTES NFR BLD AUTO: 6 %
LYMPHOCYTES NFR BLD AUTO: 8 %
LYMPHOCYTES NFR BLD AUTO: 9 %
M TB IFN-G BLD-IMP: NEGATIVE
M TB IFN-G CD4+ BCKGRND COR BLD-ACNC: 3.22 IU/ML
MAGNESIUM SERPL-MCNC: 1.3 MG/DL (ref 1.6–2.3)
MAGNESIUM SERPL-MCNC: 1.4 MG/DL (ref 1.7–2.3)
MAGNESIUM SERPL-MCNC: 1.6 MG/DL (ref 1.7–2.3)
MAGNESIUM SERPL-MCNC: 1.6 MG/DL (ref 1.7–2.3)
MAGNESIUM SERPL-MCNC: 1.7 MG/DL (ref 1.6–2.3)
MAGNESIUM SERPL-MCNC: 1.8 MG/DL (ref 1.7–2.3)
MAGNESIUM SERPL-MCNC: 1.9 MG/DL (ref 1.7–2.3)
MAGNESIUM SERPL-MCNC: 1.9 MG/DL (ref 1.7–2.3)
MAGNESIUM SERPL-MCNC: 2 MG/DL (ref 1.7–2.3)
MAGNESIUM SERPL-MCNC: 2.1 MG/DL (ref 1.7–2.3)
MAGNESIUM SERPL-MCNC: 2.3 MG/DL (ref 1.7–2.3)
MAGNESIUM SERPL-MCNC: 2.8 MG/DL (ref 1.6–2.3)
MCH RBC QN AUTO: 29.3 PG (ref 26.5–33)
MCH RBC QN AUTO: 29.6 PG (ref 26.5–33)
MCH RBC QN AUTO: 30.3 PG (ref 26.5–33)
MCH RBC QN AUTO: 30.4 PG (ref 26.5–33)
MCH RBC QN AUTO: 30.5 PG (ref 26.5–33)
MCH RBC QN AUTO: 30.6 PG (ref 26.5–33)
MCH RBC QN AUTO: 30.7 PG (ref 26.5–33)
MCH RBC QN AUTO: 31 PG (ref 26.5–33)
MCH RBC QN AUTO: 31.1 PG (ref 26.5–33)
MCH RBC QN AUTO: 31.1 PG (ref 26.5–33)
MCH RBC QN AUTO: 31.3 PG (ref 26.5–33)
MCH RBC QN AUTO: 31.5 PG (ref 26.5–33)
MCH RBC QN AUTO: 31.5 PG (ref 26.5–33)
MCH RBC QN AUTO: 31.6 PG (ref 26.5–33)
MCH RBC QN AUTO: 31.6 PG (ref 26.5–33)
MCH RBC QN AUTO: 31.7 PG (ref 26.5–33)
MCH RBC QN AUTO: 31.7 PG (ref 26.5–33)
MCH RBC QN AUTO: 31.8 PG (ref 26.5–33)
MCH RBC QN AUTO: 31.8 PG (ref 26.5–33)
MCH RBC QN AUTO: 32.4 PG (ref 26.5–33)
MCH RBC QN AUTO: 33 PG (ref 26.5–33)
MCH RBC QN AUTO: 34.3 PG (ref 26.5–33)
MCH RBC QN AUTO: 34.7 PG (ref 26.5–33)
MCH RBC QN AUTO: 34.8 PG (ref 26.5–33)
MCHC RBC AUTO-ENTMCNC: 30.6 G/DL (ref 31.5–36.5)
MCHC RBC AUTO-ENTMCNC: 30.8 G/DL (ref 31.5–36.5)
MCHC RBC AUTO-ENTMCNC: 30.9 G/DL (ref 31.5–36.5)
MCHC RBC AUTO-ENTMCNC: 31 G/DL (ref 31.5–36.5)
MCHC RBC AUTO-ENTMCNC: 31 G/DL (ref 31.5–36.5)
MCHC RBC AUTO-ENTMCNC: 31.1 G/DL (ref 31.5–36.5)
MCHC RBC AUTO-ENTMCNC: 31.1 G/DL (ref 31.5–36.5)
MCHC RBC AUTO-ENTMCNC: 31.4 G/DL (ref 31.5–36.5)
MCHC RBC AUTO-ENTMCNC: 31.6 G/DL (ref 31.5–36.5)
MCHC RBC AUTO-ENTMCNC: 31.6 G/DL (ref 31.5–36.5)
MCHC RBC AUTO-ENTMCNC: 31.7 G/DL (ref 31.5–36.5)
MCHC RBC AUTO-ENTMCNC: 31.8 G/DL (ref 31.5–36.5)
MCHC RBC AUTO-ENTMCNC: 32 G/DL (ref 31.5–36.5)
MCHC RBC AUTO-ENTMCNC: 32.4 G/DL (ref 31.5–36.5)
MCHC RBC AUTO-ENTMCNC: 32.5 G/DL (ref 31.5–36.5)
MCHC RBC AUTO-ENTMCNC: 32.6 G/DL (ref 31.5–36.5)
MCHC RBC AUTO-ENTMCNC: 32.7 G/DL (ref 31.5–36.5)
MCHC RBC AUTO-ENTMCNC: 32.8 G/DL (ref 31.5–36.5)
MCHC RBC AUTO-ENTMCNC: 32.9 G/DL (ref 31.5–36.5)
MCHC RBC AUTO-ENTMCNC: 33.7 G/DL (ref 31.5–36.5)
MCV RBC AUTO: 100 FL (ref 78–100)
MCV RBC AUTO: 101 FL (ref 78–100)
MCV RBC AUTO: 103 FL (ref 78–100)
MCV RBC AUTO: 103 FL (ref 78–100)
MCV RBC AUTO: 105 FL (ref 78–100)
MCV RBC AUTO: 105 FL (ref 78–100)
MCV RBC AUTO: 107 FL (ref 78–100)
MCV RBC AUTO: 91 FL (ref 78–100)
MCV RBC AUTO: 93 FL (ref 78–100)
MCV RBC AUTO: 94 FL (ref 78–100)
MCV RBC AUTO: 95 FL (ref 78–100)
MCV RBC AUTO: 96 FL (ref 78–100)
MCV RBC AUTO: 96 FL (ref 78–100)
MCV RBC AUTO: 97 FL (ref 78–100)
MCV RBC AUTO: 99 FL (ref 78–100)
MITOGEN IGNF BCKGRD COR BLD-ACNC: 0.01 IU/ML
MITOGEN IGNF BCKGRD COR BLD-ACNC: 0.01 IU/ML
MONOCYTES # BLD AUTO: 0.3 10E3/UL (ref 0–1.3)
MONOCYTES # BLD AUTO: 0.7 10E3/UL (ref 0–1.3)
MONOCYTES # BLD AUTO: 0.7 10E3/UL (ref 0–1.3)
MONOCYTES # BLD AUTO: 0.8 10E3/UL (ref 0–1.3)
MONOCYTES # BLD AUTO: 0.9 10E3/UL (ref 0–1.3)
MONOCYTES # BLD AUTO: 1 10E3/UL (ref 0–1.3)
MONOCYTES # BLD AUTO: 1.1 10E3/UL (ref 0–1.3)
MONOCYTES NFR BLD AUTO: 11 %
MONOCYTES NFR BLD AUTO: 11 %
MONOCYTES NFR BLD AUTO: 12 %
MONOCYTES NFR BLD AUTO: 13 %
MONOCYTES NFR BLD AUTO: 16 %
MONOCYTES NFR BLD AUTO: 6 %
MONOCYTES NFR BLD AUTO: 8 %
MONOCYTES NFR BLD AUTO: 9 %
MONOS+MACROS NFR CSF MANUAL: NORMAL %
MUCOUS THREADS #/AREA URNS LPF: PRESENT /LPF
NDM: NORMAL
NEUTROPHILS # BLD AUTO: 3.6 10E3/UL (ref 1.6–8.3)
NEUTROPHILS # BLD AUTO: 4.5 10E3/UL (ref 1.6–8.3)
NEUTROPHILS # BLD AUTO: 4.5 10E3/UL (ref 1.6–8.3)
NEUTROPHILS # BLD AUTO: 4.7 10E3/UL (ref 1.6–8.3)
NEUTROPHILS # BLD AUTO: 5.2 10E3/UL (ref 1.6–8.3)
NEUTROPHILS # BLD AUTO: 5.3 10E3/UL (ref 1.6–8.3)
NEUTROPHILS # BLD AUTO: 6.4 10E3/UL (ref 1.6–8.3)
NEUTROPHILS # BLD AUTO: 6.6 10E3/UL (ref 1.6–8.3)
NEUTROPHILS # BLD AUTO: 8 10E3/UL (ref 1.6–8.3)
NEUTROPHILS # BLD AUTO: 8.5 10E3/UL (ref 1.6–8.3)
NEUTROPHILS NFR BLD AUTO: 66 %
NEUTROPHILS NFR BLD AUTO: 67 %
NEUTROPHILS NFR BLD AUTO: 69 %
NEUTROPHILS NFR BLD AUTO: 73 %
NEUTROPHILS NFR BLD AUTO: 75 %
NEUTROPHILS NFR BLD AUTO: 77 %
NEUTROPHILS NFR BLD AUTO: 79 %
NEUTROPHILS NFR BLD AUTO: 79 %
NEUTROPHILS NFR BLD AUTO: 80 %
NEUTROPHILS NFR BLD AUTO: 85 %
NEUTROPHILS NFR CSF MANUAL: NORMAL %
NITRATE UR QL: NEGATIVE
NRBC # BLD AUTO: 0 10E3/UL
NRBC BLD AUTO-RTO: 0 /100
NT-PROBNP SERPL-MCNC: 4354 PG/ML (ref 0–900)
NT-PROBNP SERPL-MCNC: ABNORMAL PG/ML (ref 0–900)
O2/TOTAL GAS SETTING VFR VENT: 21 %
O2/TOTAL GAS SETTING VFR VENT: 40 %
OXA (DETECTED/NOT DETECTED): NORMAL
OXYHGB MFR BLD: 98 % (ref 92–100)
OXYHGB MFR BLDV: 26 % (ref 70–75)
P AXIS - MUSE: NORMAL DEGREES
PCO2 BLD: 41 MM HG (ref 35–45)
PCO2 BLDV: 42 MM HG (ref 40–50)
PCO2 BLDV: 43 MM HG (ref 40–50)
PCO2 BLDV: 51 MM HG (ref 40–50)
PCO2 BLDV: 51 MM HG (ref 40–50)
PH BLD: 7.52 [PH] (ref 7.35–7.45)
PH BLDV: 7.33 [PH] (ref 7.32–7.43)
PH BLDV: 7.34 [PH] (ref 7.32–7.43)
PH BLDV: 7.43 [PH] (ref 7.32–7.43)
PH BLDV: 7.45 [PH] (ref 7.32–7.43)
PH UR STRIP: 5 [PH] (ref 5–7)
PH UR STRIP: 5.5 [PH] (ref 5–7)
PH UR STRIP: 6.5 [PH] (ref 5–7)
PHOSPHATE SERPL-MCNC: 2.7 MG/DL (ref 2.5–4.5)
PHOSPHATE SERPL-MCNC: 3 MG/DL (ref 2.5–4.5)
PLAT MORPH BLD: ABNORMAL
PLATELET # BLD AUTO: 104 10E3/UL (ref 150–450)
PLATELET # BLD AUTO: 113 10E3/UL (ref 150–450)
PLATELET # BLD AUTO: 115 10E3/UL (ref 150–450)
PLATELET # BLD AUTO: 121 10E3/UL (ref 150–450)
PLATELET # BLD AUTO: 126 10E3/UL (ref 150–450)
PLATELET # BLD AUTO: 141 10E3/UL (ref 150–450)
PLATELET # BLD AUTO: 178 10E3/UL (ref 150–450)
PLATELET # BLD AUTO: 206 10E3/UL (ref 150–450)
PLATELET # BLD AUTO: 214 10E3/UL (ref 150–450)
PLATELET # BLD AUTO: 215 10E3/UL (ref 150–450)
PLATELET # BLD AUTO: 221 10E3/UL (ref 150–450)
PLATELET # BLD AUTO: 229 10E3/UL (ref 150–450)
PLATELET # BLD AUTO: 232 10E3/UL (ref 150–450)
PLATELET # BLD AUTO: 235 10E3/UL (ref 150–450)
PLATELET # BLD AUTO: 237 10E3/UL (ref 150–450)
PLATELET # BLD AUTO: 247 10E3/UL (ref 150–450)
PLATELET # BLD AUTO: 269 10E3/UL (ref 150–450)
PLATELET # BLD AUTO: 271 10E3/UL (ref 150–450)
PLATELET # BLD AUTO: 274 10E3/UL (ref 150–450)
PLATELET # BLD AUTO: 283 10E3/UL (ref 150–450)
PLATELET # BLD AUTO: 291 10E3/UL (ref 150–450)
PLATELET # BLD AUTO: 296 10E3/UL (ref 150–450)
PLATELET # BLD AUTO: 300 10E3/UL (ref 150–450)
PLATELET # BLD AUTO: 66 10E3/UL (ref 150–450)
PLATELET # BLD AUTO: 80 10E3/UL (ref 150–450)
PLATELET # BLD AUTO: 80 10E3/UL (ref 150–450)
PO2 BLD: 125 MM HG (ref 80–105)
PO2 BLDV: 24 MM HG (ref 25–47)
PO2 BLDV: 58 MM HG (ref 25–47)
PO2 BLDV: 61 MM HG (ref 25–47)
PO2 BLDV: <15 MM HG (ref 25–47)
POTASSIUM BLD-SCNC: 2.7 MMOL/L (ref 3.4–5.3)
POTASSIUM BLD-SCNC: 3 MMOL/L (ref 3.4–5.3)
POTASSIUM BLD-SCNC: 3.3 MMOL/L (ref 3.4–5.3)
POTASSIUM BLD-SCNC: 3.4 MMOL/L (ref 3.4–5.3)
POTASSIUM BLD-SCNC: 3.6 MMOL/L (ref 3.4–5.3)
POTASSIUM BLD-SCNC: 3.7 MMOL/L (ref 3.4–5.3)
POTASSIUM BLD-SCNC: 3.7 MMOL/L (ref 3.4–5.3)
POTASSIUM BLD-SCNC: 3.9 MMOL/L (ref 3.4–5.3)
POTASSIUM BLD-SCNC: 4 MMOL/L (ref 3.4–5.3)
POTASSIUM BLD-SCNC: 4.2 MMOL/L (ref 3.4–5.3)
POTASSIUM BLD-SCNC: 4.3 MMOL/L (ref 3.4–5.3)
POTASSIUM BLD-SCNC: 4.3 MMOL/L (ref 3.4–5.3)
POTASSIUM BLD-SCNC: 4.7 MMOL/L (ref 3.4–5.3)
POTASSIUM SERPL-SCNC: 3.3 MMOL/L (ref 3.4–5.3)
POTASSIUM SERPL-SCNC: 3.5 MMOL/L (ref 3.4–5.3)
POTASSIUM SERPL-SCNC: 3.6 MMOL/L (ref 3.4–5.3)
POTASSIUM SERPL-SCNC: 3.6 MMOL/L (ref 3.4–5.3)
POTASSIUM SERPL-SCNC: 3.7 MMOL/L (ref 3.4–5.3)
POTASSIUM SERPL-SCNC: 3.8 MMOL/L (ref 3.4–5.3)
POTASSIUM SERPL-SCNC: 3.9 MMOL/L (ref 3.4–5.3)
POTASSIUM SERPL-SCNC: 4.1 MMOL/L (ref 3.4–5.3)
POTASSIUM SERPL-SCNC: 4.1 MMOL/L (ref 3.4–5.3)
POTASSIUM SERPL-SCNC: 4.2 MMOL/L (ref 3.4–5.3)
POTASSIUM SERPL-SCNC: 4.2 MMOL/L (ref 3.4–5.3)
POTASSIUM SERPL-SCNC: 4.5 MMOL/L (ref 3.4–5.3)
POTASSIUM SERPL-SCNC: 4.6 MMOL/L (ref 3.4–5.3)
POTASSIUM SERPL-SCNC: 4.7 MMOL/L (ref 3.4–5.3)
PR INTERVAL - MUSE: NORMAL MS
PROCALCITONIN SERPL IA-MCNC: 5.04 NG/ML
PROT CSF-MCNC: NORMAL MG/DL
PROT SERPL-MCNC: 5.5 G/DL (ref 6.8–8.8)
PROT SERPL-MCNC: 6 G/DL (ref 6.8–8.8)
PROT SERPL-MCNC: 6.9 G/DL (ref 6.4–8.3)
PROTEUS SPECIES: NOT DETECTED
PSEUDOMONAS AERUGINOSA: NOT DETECTED
QRS DURATION - MUSE: 114 MS
QRS DURATION - MUSE: 116 MS
QRS DURATION - MUSE: 172 MS
QRS DURATION - MUSE: 180 MS
QRS DURATION - MUSE: 216 MS
QT - MUSE: 408 MS
QT - MUSE: 412 MS
QT - MUSE: 474 MS
QT - MUSE: 506 MS
QT - MUSE: 532 MS
QTC - MUSE: 455 MS
QTC - MUSE: 457 MS
QTC - MUSE: 511 MS
QTC - MUSE: 569 MS
QTC - MUSE: 574 MS
QUANTIFERON MITOGEN: 3.3 IU/ML
QUANTIFERON NIL TUBE: 0.08 IU/ML
QUANTIFERON TB1 TUBE: 0.09 IU/ML
QUANTIFERON TB2 TUBE: 0.09
R AXIS - MUSE: -14 DEGREES
R AXIS - MUSE: -7 DEGREES
R AXIS - MUSE: -71 DEGREES
R AXIS - MUSE: 223 DEGREES
R AXIS - MUSE: 236 DEGREES
RADIOLOGIST FLAGS: ABNORMAL
RBC # BLD AUTO: 2.64 10E6/UL (ref 4.4–5.9)
RBC # BLD AUTO: 2.7 10E6/UL (ref 4.4–5.9)
RBC # BLD AUTO: 2.84 10E6/UL (ref 4.4–5.9)
RBC # BLD AUTO: 2.86 10E6/UL (ref 4.4–5.9)
RBC # BLD AUTO: 2.94 10E6/UL (ref 4.4–5.9)
RBC # BLD AUTO: 3 10E6/UL (ref 4.4–5.9)
RBC # BLD AUTO: 3.03 10E6/UL (ref 4.4–5.9)
RBC # BLD AUTO: 3.15 10E6/UL (ref 4.4–5.9)
RBC # BLD AUTO: 3.19 10E6/UL (ref 4.4–5.9)
RBC # BLD AUTO: 3.19 10E6/UL (ref 4.4–5.9)
RBC # BLD AUTO: 3.21 10E6/UL (ref 4.4–5.9)
RBC # BLD AUTO: 3.22 10E6/UL (ref 4.4–5.9)
RBC # BLD AUTO: 3.27 10E6/UL (ref 4.4–5.9)
RBC # BLD AUTO: 3.3 10E6/UL (ref 4.4–5.9)
RBC # BLD AUTO: 3.32 10E6/UL (ref 4.4–5.9)
RBC # BLD AUTO: 3.33 10E6/UL (ref 4.4–5.9)
RBC # BLD AUTO: 3.38 10E6/UL (ref 4.4–5.9)
RBC # BLD AUTO: 3.39 10E6/UL (ref 4.4–5.9)
RBC # BLD AUTO: 3.4 10E6/UL (ref 4.4–5.9)
RBC # BLD AUTO: 3.47 10E6/UL (ref 4.4–5.9)
RBC # BLD AUTO: 3.48 10E6/UL (ref 4.4–5.9)
RBC # BLD AUTO: 3.52 10E6/UL (ref 4.4–5.9)
RBC # BLD AUTO: 4.01 10E6/UL (ref 4.4–5.9)
RBC # BLD AUTO: 4.02 10E6/UL (ref 4.4–5.9)
RBC MORPH BLD: ABNORMAL
RBC URINE: 3 /HPF
RBC URINE: <1 /HPF
RBC URINE: <1 /HPF
RSV RNA SPEC NAA+PROBE: NEGATIVE
RSV RNA SPEC NAA+PROBE: NEGATIVE
SAO2 % BLDV: 90 % (ref 94–100)
SAO2 % BLDV: 92 % (ref 94–100)
SARS-COV-2 RNA RESP QL NAA+PROBE: NEGATIVE
SODIUM BLD-SCNC: 134 MMOL/L (ref 133–144)
SODIUM SERPL-SCNC: 128 MMOL/L (ref 136–145)
SODIUM SERPL-SCNC: 129 MMOL/L (ref 136–145)
SODIUM SERPL-SCNC: 130 MMOL/L (ref 133–144)
SODIUM SERPL-SCNC: 130 MMOL/L (ref 136–145)
SODIUM SERPL-SCNC: 131 MMOL/L (ref 133–144)
SODIUM SERPL-SCNC: 131 MMOL/L (ref 136–145)
SODIUM SERPL-SCNC: 132 MMOL/L (ref 136–145)
SODIUM SERPL-SCNC: 133 MMOL/L (ref 133–144)
SODIUM SERPL-SCNC: 133 MMOL/L (ref 136–145)
SODIUM SERPL-SCNC: 134 MMOL/L (ref 136–145)
SODIUM SERPL-SCNC: 136 MMOL/L (ref 133–144)
SODIUM SERPL-SCNC: 136 MMOL/L (ref 136–145)
SODIUM SERPL-SCNC: 137 MMOL/L (ref 133–144)
SODIUM SERPL-SCNC: 137 MMOL/L (ref 133–144)
SODIUM SERPL-SCNC: 139 MMOL/L (ref 133–144)
SODIUM SERPL-SCNC: 145 MMOL/L (ref 133–144)
SODIUM SERPL-SCNC: 156 MMOL/L (ref 133–144)
SODIUM SERPL-SCNC: 157 MMOL/L (ref 133–144)
SODIUM SERPL-SCNC: 157 MMOL/L (ref 133–144)
SODIUM SERPL-SCNC: 160 MMOL/L (ref 133–144)
SP GR UR STRIP: 1.01 (ref 1–1.03)
SP GR UR STRIP: 1.01 (ref 1–1.03)
SP GR UR STRIP: 1.02 (ref 1–1.03)
SPECIMEN EXPIRATION DATE: NORMAL
SPECIMEN EXPIRATION DATE: NORMAL
SQUAMOUS EPITHELIAL: 1 /HPF
SYSTOLIC BLOOD PRESSURE - MUSE: NORMAL MMHG
T AXIS - MUSE: 200 DEGREES
T AXIS - MUSE: 46 DEGREES
T AXIS - MUSE: 51 DEGREES
T AXIS - MUSE: 73 DEGREES
T AXIS - MUSE: 86 DEGREES
TROPONIN I SERPL HS-MCNC: 23 NG/L
TROPONIN I SERPL HS-MCNC: 60 NG/L
TROPONIN T SERPL HS-MCNC: 27 NG/L
TROPONIN T SERPL HS-MCNC: 28 NG/L
TROPONIN T SERPL HS-MCNC: 33 NG/L
TROPONIN T SERPL HS-MCNC: 34 NG/L
TUBE # CSF: 1
UFH PPP CHRO-ACNC: 0.22 IU/ML
UFH PPP CHRO-ACNC: 0.38 IU/ML
UFH PPP CHRO-ACNC: 0.57 IU/ML
UFH PPP CHRO-ACNC: 0.58 IU/ML
UFH PPP CHRO-ACNC: 0.59 IU/ML
UFH PPP CHRO-ACNC: 0.64 IU/ML
UFH PPP CHRO-ACNC: 0.65 IU/ML
UFH PPP CHRO-ACNC: 0.68 IU/ML
UFH PPP CHRO-ACNC: 0.69 IU/ML
UFH PPP CHRO-ACNC: 0.76 IU/ML
UFH PPP CHRO-ACNC: 0.81 IU/ML
UFH PPP CHRO-ACNC: <0.1 IU/ML
UFH PPP CHRO-ACNC: <0.1 IU/ML
UFH PPP CHRO-ACNC: >1.1 IU/ML
UNIT ABO/RH: NORMAL
UNIT NUMBER: NORMAL
UNIT STATUS: NORMAL
UNIT TYPE ISBT: 6200
UNIT TYPE ISBT: 6200
UNIT TYPE ISBT: 9500
UROBILINOGEN UR STRIP-MCNC: NORMAL MG/DL
VENTRICULAR RATE- MUSE: 70 BPM
VENTRICULAR RATE- MUSE: 70 BPM
VENTRICULAR RATE- MUSE: 74 BPM
VENTRICULAR RATE- MUSE: 75 BPM
VENTRICULAR RATE- MUSE: 76 BPM
VIM: NORMAL
WBC # BLD AUTO: 10 10E3/UL (ref 4–11)
WBC # BLD AUTO: 10.5 10E3/UL (ref 4–11)
WBC # BLD AUTO: 10.6 10E3/UL (ref 4–11)
WBC # BLD AUTO: 11.3 10E3/UL (ref 4–11)
WBC # BLD AUTO: 11.3 10E3/UL (ref 4–11)
WBC # BLD AUTO: 11.8 10E3/UL (ref 4–11)
WBC # BLD AUTO: 11.9 10E3/UL (ref 4–11)
WBC # BLD AUTO: 13.2 10E3/UL (ref 4–11)
WBC # BLD AUTO: 3.2 10E3/UL (ref 4–11)
WBC # BLD AUTO: 4.2 10E3/UL (ref 4–11)
WBC # BLD AUTO: 6.2 10E3/UL (ref 4–11)
WBC # BLD AUTO: 6.5 10E3/UL (ref 4–11)
WBC # BLD AUTO: 6.7 10E3/UL (ref 4–11)
WBC # BLD AUTO: 6.7 10E3/UL (ref 4–11)
WBC # BLD AUTO: 6.8 10E3/UL (ref 4–11)
WBC # BLD AUTO: 6.9 10E3/UL (ref 4–11)
WBC # BLD AUTO: 7 10E3/UL (ref 4–11)
WBC # BLD AUTO: 7.3 10E3/UL (ref 4–11)
WBC # BLD AUTO: 7.4 10E3/UL (ref 4–11)
WBC # BLD AUTO: 7.4 10E3/UL (ref 4–11)
WBC # BLD AUTO: 7.9 10E3/UL (ref 4–11)
WBC # BLD AUTO: 8 10E3/UL (ref 4–11)
WBC # BLD AUTO: 8.2 10E3/UL (ref 4–11)
WBC # BLD AUTO: 8.3 10E3/UL (ref 4–11)
WBC # BLD AUTO: 8.4 10E3/UL (ref 4–11)
WBC # BLD AUTO: 9.2 10E3/UL (ref 4–11)
WBC URINE: 2 /HPF
WBC URINE: 83 /HPF
WBC URINE: <1 /HPF

## 2022-01-01 PROCEDURE — 250N000011 HC RX IP 250 OP 636: Performed by: INTERNAL MEDICINE

## 2022-01-01 PROCEDURE — 99232 SBSQ HOSP IP/OBS MODERATE 35: CPT | Performed by: HOSPITALIST

## 2022-01-01 PROCEDURE — 250N000013 HC RX MED GY IP 250 OP 250 PS 637: Performed by: STUDENT IN AN ORGANIZED HEALTH CARE EDUCATION/TRAINING PROGRAM

## 2022-01-01 PROCEDURE — 97530 THERAPEUTIC ACTIVITIES: CPT | Mod: GP | Performed by: PHYSICAL THERAPIST

## 2022-01-01 PROCEDURE — 94640 AIRWAY INHALATION TREATMENT: CPT

## 2022-01-01 PROCEDURE — 84484 ASSAY OF TROPONIN QUANT: CPT | Performed by: INTERNAL MEDICINE

## 2022-01-01 PROCEDURE — 80048 BASIC METABOLIC PNL TOTAL CA: CPT | Performed by: INTERNAL MEDICINE

## 2022-01-01 PROCEDURE — 250N000011 HC RX IP 250 OP 636: Performed by: HOSPITALIST

## 2022-01-01 PROCEDURE — 250N000013 HC RX MED GY IP 250 OP 250 PS 637: Performed by: INTERNAL MEDICINE

## 2022-01-01 PROCEDURE — 360N000078 HC SURGERY LEVEL 5, PER MIN: Performed by: STUDENT IN AN ORGANIZED HEALTH CARE EDUCATION/TRAINING PROGRAM

## 2022-01-01 PROCEDURE — 36415 COLL VENOUS BLD VENIPUNCTURE: CPT | Performed by: NURSE PRACTITIONER

## 2022-01-01 PROCEDURE — 36569 INSJ PICC 5 YR+ W/O IMAGING: CPT

## 2022-01-01 PROCEDURE — 84100 ASSAY OF PHOSPHORUS: CPT | Performed by: INTERNAL MEDICINE

## 2022-01-01 PROCEDURE — 5A2204Z RESTORATION OF CARDIAC RHYTHM, SINGLE: ICD-10-PCS | Performed by: STUDENT IN AN ORGANIZED HEALTH CARE EDUCATION/TRAINING PROGRAM

## 2022-01-01 PROCEDURE — 85027 COMPLETE CBC AUTOMATED: CPT | Performed by: HOSPITALIST

## 2022-01-01 PROCEDURE — 82945 GLUCOSE OTHER FLUID: CPT | Performed by: STUDENT IN AN ORGANIZED HEALTH CARE EDUCATION/TRAINING PROGRAM

## 2022-01-01 PROCEDURE — 99285 EMERGENCY DEPT VISIT HI MDM: CPT | Mod: 25

## 2022-01-01 PROCEDURE — 99232 SBSQ HOSP IP/OBS MODERATE 35: CPT | Performed by: INTERNAL MEDICINE

## 2022-01-01 PROCEDURE — 93325 DOPPLER ECHO COLOR FLOW MAPG: CPT

## 2022-01-01 PROCEDURE — 85025 COMPLETE CBC W/AUTO DIFF WBC: CPT | Performed by: INTERNAL MEDICINE

## 2022-01-01 PROCEDURE — 85610 PROTHROMBIN TIME: CPT | Performed by: EMERGENCY MEDICINE

## 2022-01-01 PROCEDURE — 85384 FIBRINOGEN ACTIVITY: CPT | Performed by: INTERNAL MEDICINE

## 2022-01-01 PROCEDURE — 250N000013 HC RX MED GY IP 250 OP 250 PS 637: Performed by: NURSE PRACTITIONER

## 2022-01-01 PROCEDURE — 83735 ASSAY OF MAGNESIUM: CPT | Performed by: HOSPITALIST

## 2022-01-01 PROCEDURE — 96375 TX/PRO/DX INJ NEW DRUG ADDON: CPT | Mod: 59

## 2022-01-01 PROCEDURE — 82310 ASSAY OF CALCIUM: CPT | Performed by: INTERNAL MEDICINE

## 2022-01-01 PROCEDURE — 81001 URINALYSIS AUTO W/SCOPE: CPT | Performed by: EMERGENCY MEDICINE

## 2022-01-01 PROCEDURE — 82310 ASSAY OF CALCIUM: CPT | Performed by: HOSPITALIST

## 2022-01-01 PROCEDURE — 99233 SBSQ HOSP IP/OBS HIGH 50: CPT | Performed by: INTERNAL MEDICINE

## 2022-01-01 PROCEDURE — 36415 COLL VENOUS BLD VENIPUNCTURE: CPT | Performed by: INTERNAL MEDICINE

## 2022-01-01 PROCEDURE — 99233 SBSQ HOSP IP/OBS HIGH 50: CPT | Performed by: HOSPITALIST

## 2022-01-01 PROCEDURE — 36415 COLL VENOUS BLD VENIPUNCTURE: CPT | Performed by: STUDENT IN AN ORGANIZED HEALTH CARE EDUCATION/TRAINING PROGRAM

## 2022-01-01 PROCEDURE — 250N000011 HC RX IP 250 OP 636: Performed by: EMERGENCY MEDICINE

## 2022-01-01 PROCEDURE — 36415 COLL VENOUS BLD VENIPUNCTURE: CPT | Performed by: HOSPITALIST

## 2022-01-01 PROCEDURE — 86140 C-REACTIVE PROTEIN: CPT | Performed by: NURSE PRACTITIONER

## 2022-01-01 PROCEDURE — 250N000009 HC RX 250: Performed by: EMERGENCY MEDICINE

## 2022-01-01 PROCEDURE — 999N000157 HC STATISTIC RCP TIME EA 10 MIN

## 2022-01-01 PROCEDURE — 36415 COLL VENOUS BLD VENIPUNCTURE: CPT | Performed by: EMERGENCY MEDICINE

## 2022-01-01 PROCEDURE — 85027 COMPLETE CBC AUTOMATED: CPT | Performed by: INTERNAL MEDICINE

## 2022-01-01 PROCEDURE — 250N000013 HC RX MED GY IP 250 OP 250 PS 637: Performed by: HOSPITALIST

## 2022-01-01 PROCEDURE — 99223 1ST HOSP IP/OBS HIGH 75: CPT | Mod: AI | Performed by: INTERNAL MEDICINE

## 2022-01-01 PROCEDURE — 99238 HOSP IP/OBS DSCHRG MGMT 30/<: CPT | Performed by: INTERNAL MEDICINE

## 2022-01-01 PROCEDURE — 84132 ASSAY OF SERUM POTASSIUM: CPT | Performed by: INTERNAL MEDICINE

## 2022-01-01 PROCEDURE — 258N000003 HC RX IP 258 OP 636: Performed by: SURGERY

## 2022-01-01 PROCEDURE — 120N000001 HC R&B MED SURG/OB

## 2022-01-01 PROCEDURE — 360N000084 HC SURGERY LEVEL 4 W/ FLUORO, PER MIN: Performed by: ORTHOPAEDIC SURGERY

## 2022-01-01 PROCEDURE — 84295 ASSAY OF SERUM SODIUM: CPT | Performed by: INTERNAL MEDICINE

## 2022-01-01 PROCEDURE — 250N000011 HC RX IP 250 OP 636

## 2022-01-01 PROCEDURE — 85025 COMPLETE CBC W/AUTO DIFF WBC: CPT | Performed by: NURSE PRACTITIONER

## 2022-01-01 PROCEDURE — 85027 COMPLETE CBC AUTOMATED: CPT | Performed by: NURSE PRACTITIONER

## 2022-01-01 PROCEDURE — 85025 COMPLETE CBC W/AUTO DIFF WBC: CPT | Performed by: EMERGENCY MEDICINE

## 2022-01-01 PROCEDURE — 99310 SBSQ NF CARE HIGH MDM 45: CPT | Performed by: NURSE PRACTITIONER

## 2022-01-01 PROCEDURE — 84450 TRANSFERASE (AST) (SGOT): CPT | Performed by: NURSE PRACTITIONER

## 2022-01-01 PROCEDURE — P9016 RBC LEUKOCYTES REDUCED: HCPCS

## 2022-01-01 PROCEDURE — 93325 DOPPLER ECHO COLOR FLOW MAPG: CPT | Mod: 26 | Performed by: INTERNAL MEDICINE

## 2022-01-01 PROCEDURE — 99309 SBSQ NF CARE MODERATE MDM 30: CPT | Performed by: NURSE PRACTITIONER

## 2022-01-01 PROCEDURE — 272N000001 HC OR GENERAL SUPPLY STERILE: Performed by: ORTHOPAEDIC SURGERY

## 2022-01-01 PROCEDURE — 82330 ASSAY OF CALCIUM: CPT | Performed by: SURGERY

## 2022-01-01 PROCEDURE — 80053 COMPREHEN METABOLIC PANEL: CPT | Performed by: EMERGENCY MEDICINE

## 2022-01-01 PROCEDURE — 82565 ASSAY OF CREATININE: CPT | Performed by: NURSE PRACTITIONER

## 2022-01-01 PROCEDURE — 85520 HEPARIN ASSAY: CPT | Performed by: STUDENT IN AN ORGANIZED HEALTH CARE EDUCATION/TRAINING PROGRAM

## 2022-01-01 PROCEDURE — 250N000009 HC RX 250: Performed by: INTERNAL MEDICINE

## 2022-01-01 PROCEDURE — 97161 PT EVAL LOW COMPLEX 20 MIN: CPT | Mod: GP

## 2022-01-01 PROCEDURE — 200N000001 HC R&B ICU

## 2022-01-01 PROCEDURE — 258N000003 HC RX IP 258 OP 636

## 2022-01-01 PROCEDURE — 99305 1ST NF CARE MODERATE MDM 35: CPT | Performed by: INTERNAL MEDICINE

## 2022-01-01 PROCEDURE — 250N000011 HC RX IP 250 OP 636: Performed by: PHYSICIAN ASSISTANT

## 2022-01-01 PROCEDURE — 85018 HEMOGLOBIN: CPT | Performed by: INTERNAL MEDICINE

## 2022-01-01 PROCEDURE — 82310 ASSAY OF CALCIUM: CPT | Performed by: EMERGENCY MEDICINE

## 2022-01-01 PROCEDURE — 80048 BASIC METABOLIC PNL TOTAL CA: CPT | Performed by: HOSPITALIST

## 2022-01-01 PROCEDURE — 96361 HYDRATE IV INFUSION ADD-ON: CPT

## 2022-01-01 PROCEDURE — 255N000002 HC RX 255 OP 636: Performed by: INTERNAL MEDICINE

## 2022-01-01 PROCEDURE — 84132 ASSAY OF SERUM POTASSIUM: CPT | Performed by: HOSPITALIST

## 2022-01-01 PROCEDURE — 87493 C DIFF AMPLIFIED PROBE: CPT | Performed by: NURSE PRACTITIONER

## 2022-01-01 PROCEDURE — 87149 DNA/RNA DIRECT PROBE: CPT | Performed by: INTERNAL MEDICINE

## 2022-01-01 PROCEDURE — 85652 RBC SED RATE AUTOMATED: CPT | Performed by: NURSE PRACTITIONER

## 2022-01-01 PROCEDURE — 85014 HEMATOCRIT: CPT | Performed by: HOSPITALIST

## 2022-01-01 PROCEDURE — 93312 ECHO TRANSESOPHAGEAL: CPT | Mod: 26 | Performed by: INTERNAL MEDICINE

## 2022-01-01 PROCEDURE — 89050 BODY FLUID CELL COUNT: CPT | Performed by: STUDENT IN AN ORGANIZED HEALTH CARE EDUCATION/TRAINING PROGRAM

## 2022-01-01 PROCEDURE — 250N000009 HC RX 250

## 2022-01-01 PROCEDURE — 87077 CULTURE AEROBIC IDENTIFY: CPT | Performed by: INTERNAL MEDICINE

## 2022-01-01 PROCEDURE — 93971 EXTREMITY STUDY: CPT | Mod: LT

## 2022-01-01 PROCEDURE — 93010 ELECTROCARDIOGRAM REPORT: CPT | Performed by: INTERNAL MEDICINE

## 2022-01-01 PROCEDURE — 83036 HEMOGLOBIN GLYCOSYLATED A1C: CPT | Performed by: STUDENT IN AN ORGANIZED HEALTH CARE EDUCATION/TRAINING PROGRAM

## 2022-01-01 PROCEDURE — 30283B1 TRANSFUSION OF NONAUTOLOGOUS 4-FACTOR PROTHROMBIN COMPLEX CONCENTRATE INTO VEIN, PERCUTANEOUS APPROACH: ICD-10-PCS | Performed by: EMERGENCY MEDICINE

## 2022-01-01 PROCEDURE — 250N000013 HC RX MED GY IP 250 OP 250 PS 637: Performed by: ANESTHESIOLOGY

## 2022-01-01 PROCEDURE — U0003 INFECTIOUS AGENT DETECTION BY NUCLEIC ACID (DNA OR RNA); SEVERE ACUTE RESPIRATORY SYNDROME CORONAVIRUS 2 (SARS-COV-2) (CORONAVIRUS DISEASE [COVID-19]), AMPLIFIED PROBE TECHNIQUE, MAKING USE OF HIGH THROUGHPUT TECHNOLOGIES AS DESCRIBED BY CMS-2020-01-R: HCPCS | Performed by: EMERGENCY MEDICINE

## 2022-01-01 PROCEDURE — 51702 INSERT TEMP BLADDER CATH: CPT

## 2022-01-01 PROCEDURE — 250N000011 HC RX IP 250 OP 636: Performed by: NURSE PRACTITIONER

## 2022-01-01 PROCEDURE — 96367 TX/PROPH/DG ADDL SEQ IV INF: CPT | Mod: 59

## 2022-01-01 PROCEDURE — 97530 THERAPEUTIC ACTIVITIES: CPT | Mod: GP

## 2022-01-01 PROCEDURE — G1010 CDSM STANSON: HCPCS

## 2022-01-01 PROCEDURE — 96374 THER/PROPH/DIAG INJ IV PUSH: CPT

## 2022-01-01 PROCEDURE — 36416 COLLJ CAPILLARY BLOOD SPEC: CPT | Performed by: PHYSICIAN ASSISTANT

## 2022-01-01 PROCEDURE — C1713 ANCHOR/SCREW BN/BN,TIS/BN: HCPCS | Performed by: ORTHOPAEDIC SURGERY

## 2022-01-01 PROCEDURE — 97165 OT EVAL LOW COMPLEX 30 MIN: CPT | Mod: GO

## 2022-01-01 PROCEDURE — 85018 HEMOGLOBIN: CPT | Performed by: HOSPITALIST

## 2022-01-01 PROCEDURE — 85027 COMPLETE CBC AUTOMATED: CPT | Performed by: STUDENT IN AN ORGANIZED HEALTH CARE EDUCATION/TRAINING PROGRAM

## 2022-01-01 PROCEDURE — 85520 HEPARIN ASSAY: CPT | Performed by: HOSPITALIST

## 2022-01-01 PROCEDURE — 99291 CRITICAL CARE FIRST HOUR: CPT | Performed by: STUDENT IN AN ORGANIZED HEALTH CARE EDUCATION/TRAINING PROGRAM

## 2022-01-01 PROCEDURE — 86901 BLOOD TYPING SEROLOGIC RH(D): CPT | Performed by: EMERGENCY MEDICINE

## 2022-01-01 PROCEDURE — 250N000011 HC RX IP 250 OP 636: Performed by: STUDENT IN AN ORGANIZED HEALTH CARE EDUCATION/TRAINING PROGRAM

## 2022-01-01 PROCEDURE — 97110 THERAPEUTIC EXERCISES: CPT | Mod: GP

## 2022-01-01 PROCEDURE — 94640 AIRWAY INHALATION TREATMENT: CPT | Mod: 76

## 2022-01-01 PROCEDURE — 80048 BASIC METABOLIC PNL TOTAL CA: CPT | Performed by: NURSE PRACTITIONER

## 2022-01-01 PROCEDURE — 370N000017 HC ANESTHESIA TECHNICAL FEE, PER MIN: Performed by: ORTHOPAEDIC SURGERY

## 2022-01-01 PROCEDURE — 99255 IP/OBS CONSLTJ NEW/EST HI 80: CPT | Performed by: INTERNAL MEDICINE

## 2022-01-01 PROCEDURE — 999N000127 HC STATISTIC PERIPHERAL IV START W US GUIDANCE

## 2022-01-01 PROCEDURE — 87040 BLOOD CULTURE FOR BACTERIA: CPT | Performed by: EMERGENCY MEDICINE

## 2022-01-01 PROCEDURE — 82565 ASSAY OF CREATININE: CPT

## 2022-01-01 PROCEDURE — 84157 ASSAY OF PROTEIN OTHER: CPT | Performed by: STUDENT IN AN ORGANIZED HEALTH CARE EDUCATION/TRAINING PROGRAM

## 2022-01-01 PROCEDURE — 71045 X-RAY EXAM CHEST 1 VIEW: CPT

## 2022-01-01 PROCEDURE — 83735 ASSAY OF MAGNESIUM: CPT | Performed by: INTERNAL MEDICINE

## 2022-01-01 PROCEDURE — 93005 ELECTROCARDIOGRAM TRACING: CPT | Mod: 76

## 2022-01-01 PROCEDURE — 73552 X-RAY EXAM OF FEMUR 2/>: CPT | Mod: LT

## 2022-01-01 PROCEDURE — 99291 CRITICAL CARE FIRST HOUR: CPT | Mod: 25

## 2022-01-01 PROCEDURE — 71046 X-RAY EXAM CHEST 2 VIEWS: CPT

## 2022-01-01 PROCEDURE — 272N000450 HC KIT 4FR POWER PICC SINGLE LUMEN

## 2022-01-01 PROCEDURE — 82040 ASSAY OF SERUM ALBUMIN: CPT | Performed by: EMERGENCY MEDICINE

## 2022-01-01 PROCEDURE — 86481 TB AG RESPONSE T-CELL SUSP: CPT | Performed by: NURSE PRACTITIONER

## 2022-01-01 PROCEDURE — P9016 RBC LEUKOCYTES REDUCED: HCPCS | Performed by: EMERGENCY MEDICINE

## 2022-01-01 PROCEDURE — 258N000003 HC RX IP 258 OP 636: Performed by: INTERNAL MEDICINE

## 2022-01-01 PROCEDURE — 250N000012 HC RX MED GY IP 250 OP 636 PS 637: Performed by: STUDENT IN AN ORGANIZED HEALTH CARE EDUCATION/TRAINING PROGRAM

## 2022-01-01 PROCEDURE — 250N000009 HC RX 250: Performed by: NURSE ANESTHETIST, CERTIFIED REGISTERED

## 2022-01-01 PROCEDURE — 93321 DOPPLER ECHO F-UP/LMTD STD: CPT

## 2022-01-01 PROCEDURE — 258N000003 HC RX IP 258 OP 636: Performed by: PHYSICIAN ASSISTANT

## 2022-01-01 PROCEDURE — 5A1945Z RESPIRATORY VENTILATION, 24-96 CONSECUTIVE HOURS: ICD-10-PCS | Performed by: EMERGENCY MEDICINE

## 2022-01-01 PROCEDURE — 85730 THROMBOPLASTIN TIME PARTIAL: CPT | Performed by: EMERGENCY MEDICINE

## 2022-01-01 PROCEDURE — 97116 GAIT TRAINING THERAPY: CPT | Mod: GP

## 2022-01-01 PROCEDURE — 97110 THERAPEUTIC EXERCISES: CPT | Mod: GP | Performed by: PHYSICAL THERAPIST

## 2022-01-01 PROCEDURE — 93320 DOPPLER ECHO COMPLETE: CPT | Mod: 26 | Performed by: INTERNAL MEDICINE

## 2022-01-01 PROCEDURE — C9113 INJ PANTOPRAZOLE SODIUM, VIA: HCPCS | Performed by: STUDENT IN AN ORGANIZED HEALTH CARE EDUCATION/TRAINING PROGRAM

## 2022-01-01 PROCEDURE — 36416 COLLJ CAPILLARY BLOOD SPEC: CPT | Performed by: HOSPITALIST

## 2022-01-01 PROCEDURE — 250N000009 HC RX 250: Performed by: PHYSICIAN ASSISTANT

## 2022-01-01 PROCEDURE — 36430 TRANSFUSION BLD/BLD COMPNT: CPT

## 2022-01-01 PROCEDURE — 250N000015 HC RX FACTOR IP MED 636 OP 636: Performed by: EMERGENCY MEDICINE

## 2022-01-01 PROCEDURE — 85014 HEMATOCRIT: CPT | Performed by: INTERNAL MEDICINE

## 2022-01-01 PROCEDURE — 93306 TTE W/DOPPLER COMPLETE: CPT | Mod: 26 | Performed by: INTERNAL MEDICINE

## 2022-01-01 PROCEDURE — 120N000013 HC R&B IMCU

## 2022-01-01 PROCEDURE — 93005 ELECTROCARDIOGRAM TRACING: CPT

## 2022-01-01 PROCEDURE — 93321 DOPPLER ECHO F-UP/LMTD STD: CPT | Mod: 26 | Performed by: INTERNAL MEDICINE

## 2022-01-01 PROCEDURE — 84484 ASSAY OF TROPONIN QUANT: CPT | Performed by: EMERGENCY MEDICINE

## 2022-01-01 PROCEDURE — 82803 BLOOD GASES ANY COMBINATION: CPT

## 2022-01-01 PROCEDURE — P9604 ONE-WAY ALLOW PRORATED TRIP: HCPCS | Performed by: NURSE PRACTITIONER

## 2022-01-01 PROCEDURE — 87086 URINE CULTURE/COLONY COUNT: CPT | Performed by: EMERGENCY MEDICINE

## 2022-01-01 PROCEDURE — 99232 SBSQ HOSP IP/OBS MODERATE 35: CPT | Mod: 25 | Performed by: INTERNAL MEDICINE

## 2022-01-01 PROCEDURE — 72170 X-RAY EXAM OF PELVIS: CPT

## 2022-01-01 PROCEDURE — 999N000180 XR SURGERY CARM FLUORO LESS THAN 5 MIN: Mod: TC

## 2022-01-01 PROCEDURE — 82805 BLOOD GASES W/O2 SATURATION: CPT | Performed by: EMERGENCY MEDICINE

## 2022-01-01 PROCEDURE — 272N000001 HC OR GENERAL SUPPLY STERILE: Performed by: STUDENT IN AN ORGANIZED HEALTH CARE EDUCATION/TRAINING PROGRAM

## 2022-01-01 PROCEDURE — 250N000013 HC RX MED GY IP 250 OP 250 PS 637: Performed by: PHYSICIAN ASSISTANT

## 2022-01-01 PROCEDURE — 82248 BILIRUBIN DIRECT: CPT | Performed by: EMERGENCY MEDICINE

## 2022-01-01 PROCEDURE — 82947 ASSAY GLUCOSE BLOOD QUANT: CPT | Performed by: INTERNAL MEDICINE

## 2022-01-01 PROCEDURE — 99152 MOD SED SAME PHYS/QHP 5/>YRS: CPT | Performed by: INTERNAL MEDICINE

## 2022-01-01 PROCEDURE — 93308 TTE F-UP OR LMTD: CPT | Mod: 26 | Performed by: INTERNAL MEDICINE

## 2022-01-01 PROCEDURE — 0DJD8ZZ INSPECTION OF LOWER INTESTINAL TRACT, VIA NATURAL OR ARTIFICIAL OPENING ENDOSCOPIC: ICD-10-PCS | Performed by: COLON & RECTAL SURGERY

## 2022-01-01 PROCEDURE — 99291 CRITICAL CARE FIRST HOUR: CPT | Mod: GC | Performed by: STUDENT IN AN ORGANIZED HEALTH CARE EDUCATION/TRAINING PROGRAM

## 2022-01-01 PROCEDURE — 258N000003 HC RX IP 258 OP 636: Performed by: NURSE ANESTHETIST, CERTIFIED REGISTERED

## 2022-01-01 PROCEDURE — 84100 ASSAY OF PHOSPHORUS: CPT | Performed by: SURGERY

## 2022-01-01 PROCEDURE — 250N000011 HC RX IP 250 OP 636: Performed by: SURGERY

## 2022-01-01 PROCEDURE — 99233 SBSQ HOSP IP/OBS HIGH 50: CPT | Mod: 25 | Performed by: INTERNAL MEDICINE

## 2022-01-01 PROCEDURE — 82805 BLOOD GASES W/O2 SATURATION: CPT | Performed by: SURGERY

## 2022-01-01 PROCEDURE — 258N000003 HC RX IP 258 OP 636: Performed by: EMERGENCY MEDICINE

## 2022-01-01 PROCEDURE — 250N000009 HC RX 250: Performed by: STUDENT IN AN ORGANIZED HEALTH CARE EDUCATION/TRAINING PROGRAM

## 2022-01-01 PROCEDURE — 96365 THER/PROPH/DIAG IV INF INIT: CPT | Mod: 59

## 2022-01-01 PROCEDURE — 009630Z DRAINAGE OF CEREBRAL VENTRICLE WITH DRAINAGE DEVICE, PERCUTANEOUS APPROACH: ICD-10-PCS | Performed by: STUDENT IN AN ORGANIZED HEALTH CARE EDUCATION/TRAINING PROGRAM

## 2022-01-01 PROCEDURE — 99316 NF DSCHRG MGMT 30 MIN+: CPT | Performed by: NURSE PRACTITIONER

## 2022-01-01 PROCEDURE — 99207 PR CDG-CODE CATEGORY CHANGED: CPT | Performed by: INTERNAL MEDICINE

## 2022-01-01 PROCEDURE — C9803 HOPD COVID-19 SPEC COLLECT: HCPCS

## 2022-01-01 PROCEDURE — 31500 INSERT EMERGENCY AIRWAY: CPT

## 2022-01-01 PROCEDURE — 84145 PROCALCITONIN (PCT): CPT | Performed by: INTERNAL MEDICINE

## 2022-01-01 PROCEDURE — 74174 CTA ABD&PLVS W/CONTRAST: CPT

## 2022-01-01 PROCEDURE — 3E033XZ INTRODUCTION OF VASOPRESSOR INTO PERIPHERAL VEIN, PERCUTANEOUS APPROACH: ICD-10-PCS | Performed by: STUDENT IN AN ORGANIZED HEALTH CARE EDUCATION/TRAINING PROGRAM

## 2022-01-01 PROCEDURE — 258N000003 HC RX IP 258 OP 636: Performed by: STUDENT IN AN ORGANIZED HEALTH CARE EDUCATION/TRAINING PROGRAM

## 2022-01-01 PROCEDURE — 0QS704Z REPOSITION LEFT UPPER FEMUR WITH INTERNAL FIXATION DEVICE, OPEN APPROACH: ICD-10-PCS | Performed by: ORTHOPAEDIC SURGERY

## 2022-01-01 PROCEDURE — 87040 BLOOD CULTURE FOR BACTERIA: CPT | Performed by: HOSPITALIST

## 2022-01-01 PROCEDURE — 83605 ASSAY OF LACTIC ACID: CPT | Performed by: HOSPITALIST

## 2022-01-01 PROCEDURE — 94003 VENT MGMT INPAT SUBQ DAY: CPT

## 2022-01-01 PROCEDURE — 250N000013 HC RX MED GY IP 250 OP 250 PS 637: Performed by: EMERGENCY MEDICINE

## 2022-01-01 PROCEDURE — 83735 ASSAY OF MAGNESIUM: CPT | Performed by: SURGERY

## 2022-01-01 PROCEDURE — 70498 CT ANGIOGRAPHY NECK: CPT | Mod: MG

## 2022-01-01 PROCEDURE — 99223 1ST HOSP IP/OBS HIGH 75: CPT | Mod: 25 | Performed by: INTERNAL MEDICINE

## 2022-01-01 PROCEDURE — 84295 ASSAY OF SERUM SODIUM: CPT

## 2022-01-01 PROCEDURE — 96368 THER/DIAG CONCURRENT INF: CPT | Mod: 59

## 2022-01-01 PROCEDURE — 82803 BLOOD GASES ANY COMBINATION: CPT | Performed by: EMERGENCY MEDICINE

## 2022-01-01 PROCEDURE — 85520 HEPARIN ASSAY: CPT | Performed by: EMERGENCY MEDICINE

## 2022-01-01 PROCEDURE — 250N000009 HC RX 250: Performed by: ORTHOPAEDIC SURGERY

## 2022-01-01 PROCEDURE — 96366 THER/PROPH/DIAG IV INF ADDON: CPT | Mod: 59

## 2022-01-01 PROCEDURE — 86923 COMPATIBILITY TEST ELECTRIC: CPT | Performed by: EMERGENCY MEDICINE

## 2022-01-01 PROCEDURE — 97535 SELF CARE MNGMENT TRAINING: CPT | Mod: GO

## 2022-01-01 PROCEDURE — 83605 ASSAY OF LACTIC ACID: CPT | Performed by: EMERGENCY MEDICINE

## 2022-01-01 PROCEDURE — U0003 INFECTIOUS AGENT DETECTION BY NUCLEIC ACID (DNA OR RNA); SEVERE ACUTE RESPIRATORY SYNDROME CORONAVIRUS 2 (SARS-COV-2) (CORONAVIRUS DISEASE [COVID-19]), AMPLIFIED PROBE TECHNIQUE, MAKING USE OF HIGH THROUGHPUT TECHNOLOGIES AS DESCRIBED BY CMS-2020-01-R: HCPCS | Performed by: HOSPITALIST

## 2022-01-01 PROCEDURE — 999N000040 HC STATISTIC CONSULT NO CHARGE VASC ACCESS

## 2022-01-01 PROCEDURE — 83690 ASSAY OF LIPASE: CPT | Performed by: EMERGENCY MEDICINE

## 2022-01-01 PROCEDURE — 85610 PROTHROMBIN TIME: CPT | Performed by: INTERNAL MEDICINE

## 2022-01-01 PROCEDURE — 83605 ASSAY OF LACTIC ACID: CPT | Performed by: STUDENT IN AN ORGANIZED HEALTH CARE EDUCATION/TRAINING PROGRAM

## 2022-01-01 PROCEDURE — 96376 TX/PRO/DX INJ SAME DRUG ADON: CPT

## 2022-01-01 PROCEDURE — 999N000009 HC STATISTIC AIRWAY CARE

## 2022-01-01 PROCEDURE — 83880 ASSAY OF NATRIURETIC PEPTIDE: CPT | Performed by: EMERGENCY MEDICINE

## 2022-01-01 PROCEDURE — 85520 HEPARIN ASSAY: CPT | Performed by: INTERNAL MEDICINE

## 2022-01-01 PROCEDURE — 71250 CT THORAX DX C-: CPT

## 2022-01-01 PROCEDURE — 999N000065 XR CHEST PORT 1 VIEW

## 2022-01-01 PROCEDURE — 73502 X-RAY EXAM HIP UNI 2-3 VIEWS: CPT

## 2022-01-01 PROCEDURE — 250N000011 HC RX IP 250 OP 636: Performed by: NURSE ANESTHETIST, CERTIFIED REGISTERED

## 2022-01-01 PROCEDURE — 87635 SARS-COV-2 COVID-19 AMP PRB: CPT | Performed by: EMERGENCY MEDICINE

## 2022-01-01 PROCEDURE — 85018 HEMOGLOBIN: CPT | Performed by: PHYSICIAN ASSISTANT

## 2022-01-01 PROCEDURE — 370N000017 HC ANESTHESIA TECHNICAL FEE, PER MIN: Performed by: STUDENT IN AN ORGANIZED HEALTH CARE EDUCATION/TRAINING PROGRAM

## 2022-01-01 PROCEDURE — 999N000141 HC STATISTIC PRE-PROCEDURE NURSING ASSESSMENT: Performed by: ORTHOPAEDIC SURGERY

## 2022-01-01 PROCEDURE — 710N000009 HC RECOVERY PHASE 1, LEVEL 1, PER MIN: Performed by: ORTHOPAEDIC SURGERY

## 2022-01-01 PROCEDURE — 80048 BASIC METABOLIC PNL TOTAL CA: CPT | Performed by: SURGERY

## 2022-01-01 PROCEDURE — 87637 SARSCOV2&INF A&B&RSV AMP PRB: CPT | Performed by: EMERGENCY MEDICINE

## 2022-01-01 PROCEDURE — 99221 1ST HOSP IP/OBS SF/LOW 40: CPT | Performed by: STUDENT IN AN ORGANIZED HEALTH CARE EDUCATION/TRAINING PROGRAM

## 2022-01-01 PROCEDURE — 99239 HOSP IP/OBS DSCHRG MGMT >30: CPT | Performed by: INTERNAL MEDICINE

## 2022-01-01 PROCEDURE — 86923 COMPATIBILITY TEST ELECTRIC: CPT

## 2022-01-01 PROCEDURE — 99232 SBSQ HOSP IP/OBS MODERATE 35: CPT | Performed by: SPECIALIST

## 2022-01-01 PROCEDURE — 70450 CT HEAD/BRAIN W/O DYE: CPT | Mod: MA

## 2022-01-01 PROCEDURE — 86850 RBC ANTIBODY SCREEN: CPT | Performed by: EMERGENCY MEDICINE

## 2022-01-01 PROCEDURE — 99207 PR CDG-MDM COMPONENT: MEETS LOW - DOWN CODED: CPT | Performed by: INTERNAL MEDICINE

## 2022-01-01 PROCEDURE — 81001 URINALYSIS AUTO W/SCOPE: CPT | Performed by: INTERNAL MEDICINE

## 2022-01-01 PROCEDURE — 36415 COLL VENOUS BLD VENIPUNCTURE: CPT | Performed by: PHYSICIAN ASSISTANT

## 2022-01-01 PROCEDURE — C9113 INJ PANTOPRAZOLE SODIUM, VIA: HCPCS | Performed by: EMERGENCY MEDICINE

## 2022-01-01 PROCEDURE — 250N000009 HC RX 250: Performed by: SURGERY

## 2022-01-01 PROCEDURE — 83735 ASSAY OF MAGNESIUM: CPT | Performed by: EMERGENCY MEDICINE

## 2022-01-01 PROCEDURE — 94002 VENT MGMT INPAT INIT DAY: CPT

## 2022-01-01 PROCEDURE — 999N000208 ECHOCARDIOGRAM COMPLETE

## 2022-01-01 PROCEDURE — 61210 BURR HOLE IMPLT VENTR CATH: CPT | Performed by: STUDENT IN AN ORGANIZED HEALTH CARE EDUCATION/TRAINING PROGRAM

## 2022-01-01 PROCEDURE — 99207 PR NO BILLABLE SERVICE THIS VISIT: CPT | Performed by: HOSPITALIST

## 2022-01-01 DEVICE — IMP SCR SYN CORTEX 4.5X40MM SELF TAP SS 214.840: Type: IMPLANTABLE DEVICE | Site: HIP | Status: FUNCTIONAL

## 2022-01-01 DEVICE — IMP SCR SYN CORTEX 4.5X42MM SELF TAP SS 214.842: Type: IMPLANTABLE DEVICE | Site: HIP | Status: FUNCTIONAL

## 2022-01-01 DEVICE — IMP SCR SYN DHS/DCS LAG 12.7X95MM 1 STEP SS 280.295: Type: IMPLANTABLE DEVICE | Site: HIP | Status: FUNCTIONAL

## 2022-01-01 DEVICE — IMP SCR SYN CORTEX 4.5X46MM SELF TAP SS 214.846: Type: IMPLANTABLE DEVICE | Site: HIP | Status: FUNCTIONAL

## 2022-01-01 DEVICE — IMPLANTABLE DEVICE: Type: IMPLANTABLE DEVICE | Site: HIP | Status: FUNCTIONAL

## 2022-01-01 RX ORDER — ATORVASTATIN CALCIUM 40 MG/1
80 TABLET, FILM COATED ORAL EVERY MORNING
Status: DISCONTINUED | OUTPATIENT
Start: 2022-01-01 | End: 2022-01-01 | Stop reason: HOSPADM

## 2022-01-01 RX ORDER — SODIUM CHLORIDE, SODIUM LACTATE, POTASSIUM CHLORIDE, CALCIUM CHLORIDE 600; 310; 30; 20 MG/100ML; MG/100ML; MG/100ML; MG/100ML
INJECTION, SOLUTION INTRAVENOUS CONTINUOUS
Status: DISCONTINUED | OUTPATIENT
Start: 2022-01-01 | End: 2022-01-01 | Stop reason: HOSPADM

## 2022-01-01 RX ORDER — ONDANSETRON 2 MG/ML
4 INJECTION INTRAMUSCULAR; INTRAVENOUS EVERY 30 MIN PRN
Status: DISCONTINUED | OUTPATIENT
Start: 2022-01-01 | End: 2022-01-01 | Stop reason: HOSPADM

## 2022-01-01 RX ORDER — LISINOPRIL 2.5 MG/1
2.5 TABLET ORAL EVERY MORNING
COMMUNITY

## 2022-01-01 RX ORDER — LIDOCAINE 40 MG/G
CREAM TOPICAL
Status: DISCONTINUED | OUTPATIENT
Start: 2022-01-01 | End: 2022-01-01 | Stop reason: HOSPADM

## 2022-01-01 RX ORDER — POTASSIUM CHLORIDE 1500 MG/1
20 TABLET, EXTENDED RELEASE ORAL ONCE
Status: COMPLETED | OUTPATIENT
Start: 2022-01-01 | End: 2022-01-01

## 2022-01-01 RX ORDER — CEFTRIAXONE 1 G/1
1 INJECTION, POWDER, FOR SOLUTION INTRAMUSCULAR; INTRAVENOUS EVERY 24 HOURS
Status: DISCONTINUED | OUTPATIENT
Start: 2022-01-01 | End: 2022-01-01 | Stop reason: HOSPADM

## 2022-01-01 RX ORDER — CEFTRIAXONE 1 G/1
1 INJECTION, POWDER, FOR SOLUTION INTRAMUSCULAR; INTRAVENOUS ONCE
Status: COMPLETED | OUTPATIENT
Start: 2022-01-01 | End: 2022-01-01

## 2022-01-01 RX ORDER — NALOXONE HYDROCHLORIDE 0.4 MG/ML
0.4 INJECTION, SOLUTION INTRAMUSCULAR; INTRAVENOUS; SUBCUTANEOUS
Status: DISCONTINUED | OUTPATIENT
Start: 2022-01-01 | End: 2022-01-01

## 2022-01-01 RX ORDER — NITROGLYCERIN 0.4 MG/1
0.4 TABLET SUBLINGUAL EVERY 5 MIN PRN
Status: DISCONTINUED | OUTPATIENT
Start: 2022-01-01 | End: 2022-01-01 | Stop reason: HOSPADM

## 2022-01-01 RX ORDER — ACETAMINOPHEN 500 MG
1000 TABLET ORAL EVERY 6 HOURS PRN
COMMUNITY
End: 2022-01-01

## 2022-01-01 RX ORDER — NALOXONE HYDROCHLORIDE 0.4 MG/ML
INJECTION, SOLUTION INTRAMUSCULAR; INTRAVENOUS; SUBCUTANEOUS
Status: DISCONTINUED
Start: 2022-01-01 | End: 2022-01-01 | Stop reason: WASHOUT

## 2022-01-01 RX ORDER — IOPAMIDOL 755 MG/ML
500 INJECTION, SOLUTION INTRAVASCULAR ONCE
Status: COMPLETED | OUTPATIENT
Start: 2022-01-01 | End: 2022-01-01

## 2022-01-01 RX ORDER — 3% SODIUM CHLORIDE 3 G/100ML
INJECTION, SOLUTION INTRAVENOUS CONTINUOUS
Status: DISCONTINUED | OUTPATIENT
Start: 2022-01-01 | End: 2022-01-01

## 2022-01-01 RX ORDER — FUROSEMIDE 10 MG/ML
40 INJECTION INTRAMUSCULAR; INTRAVENOUS ONCE
Status: COMPLETED | OUTPATIENT
Start: 2022-01-01 | End: 2022-01-01

## 2022-01-01 RX ORDER — LIDOCAINE 40 MG/G
CREAM TOPICAL
Status: DISCONTINUED | OUTPATIENT
Start: 2022-01-01 | End: 2022-01-01

## 2022-01-01 RX ORDER — DEXTROSE MONOHYDRATE 25 G/50ML
25-50 INJECTION, SOLUTION INTRAVENOUS
Status: DISCONTINUED | OUTPATIENT
Start: 2022-01-01 | End: 2022-01-01

## 2022-01-01 RX ORDER — BUPIVACAINE HYDROCHLORIDE AND EPINEPHRINE 5; 5 MG/ML; UG/ML
INJECTION, SOLUTION EPIDURAL; INTRACAUDAL; PERINEURAL PRN
Status: DISCONTINUED | OUTPATIENT
Start: 2022-01-01 | End: 2022-01-01 | Stop reason: HOSPADM

## 2022-01-01 RX ORDER — MAGNESIUM SULFATE HEPTAHYDRATE 40 MG/ML
2 INJECTION, SOLUTION INTRAVENOUS ONCE
Status: COMPLETED | OUTPATIENT
Start: 2022-01-01 | End: 2022-01-01

## 2022-01-01 RX ORDER — GLYCOPYRROLATE 0.2 MG/ML
0.2 INJECTION, SOLUTION INTRAMUSCULAR; INTRAVENOUS EVERY 4 HOURS PRN
Status: DISCONTINUED | OUTPATIENT
Start: 2022-01-01 | End: 2022-01-01 | Stop reason: HOSPADM

## 2022-01-01 RX ORDER — MAGNESIUM OXIDE 400 MG/1
400 TABLET ORAL 2 TIMES DAILY
Status: COMPLETED | OUTPATIENT
Start: 2022-01-01 | End: 2022-01-01

## 2022-01-01 RX ORDER — MIDAZOLAM HCL IN 0.9 % NACL/PF 1 MG/ML
1-8 PLASTIC BAG, INJECTION (ML) INTRAVENOUS CONTINUOUS
Status: DISCONTINUED | OUTPATIENT
Start: 2022-01-01 | End: 2022-01-01 | Stop reason: HOSPADM

## 2022-01-01 RX ORDER — LIDOCAINE HYDROCHLORIDE 10 MG/ML
INJECTION, SOLUTION INFILTRATION; PERINEURAL PRN
Status: DISCONTINUED | OUTPATIENT
Start: 2022-01-01 | End: 2022-01-01

## 2022-01-01 RX ORDER — BUMETANIDE 2 MG/1
1 TABLET ORAL EVERY EVENING
COMMUNITY

## 2022-01-01 RX ORDER — OXYCODONE HYDROCHLORIDE 5 MG/1
5 TABLET ORAL EVERY 4 HOURS PRN
Status: DISCONTINUED | OUTPATIENT
Start: 2022-01-01 | End: 2022-01-01 | Stop reason: HOSPADM

## 2022-01-01 RX ORDER — LORAZEPAM 2 MG/ML
1 CONCENTRATE ORAL
Status: DISCONTINUED | OUTPATIENT
Start: 2022-01-01 | End: 2022-01-01 | Stop reason: HOSPADM

## 2022-01-01 RX ORDER — LIDOCAINE HYDROCHLORIDE 20 MG/ML
JELLY TOPICAL
Status: COMPLETED
Start: 2022-01-01 | End: 2022-01-01

## 2022-01-01 RX ORDER — PHENYLEPHRINE HCL IN 0.9% NACL 50MG/250ML
.1-6 PLASTIC BAG, INJECTION (ML) INTRAVENOUS CONTINUOUS
Status: DISCONTINUED | OUTPATIENT
Start: 2022-01-01 | End: 2022-01-01 | Stop reason: HOSPADM

## 2022-01-01 RX ORDER — ETOMIDATE 2 MG/ML
INJECTION INTRAVENOUS PRN
Status: DISCONTINUED | OUTPATIENT
Start: 2022-01-01 | End: 2022-01-01

## 2022-01-01 RX ORDER — TAMSULOSIN HYDROCHLORIDE 0.4 MG/1
0.4 CAPSULE ORAL DAILY
Status: DISCONTINUED | OUTPATIENT
Start: 2022-01-01 | End: 2022-01-01 | Stop reason: HOSPADM

## 2022-01-01 RX ORDER — LORAZEPAM 1 MG/1
1 TABLET ORAL
Status: DISCONTINUED | OUTPATIENT
Start: 2022-01-01 | End: 2022-01-01 | Stop reason: HOSPADM

## 2022-01-01 RX ORDER — ACETAMINOPHEN 325 MG/1
650 TABLET ORAL 2 TIMES DAILY
COMMUNITY

## 2022-01-01 RX ORDER — NALOXONE HYDROCHLORIDE 0.4 MG/ML
0.4 INJECTION, SOLUTION INTRAMUSCULAR; INTRAVENOUS; SUBCUTANEOUS
Status: DISCONTINUED | OUTPATIENT
Start: 2022-01-01 | End: 2022-01-01 | Stop reason: HOSPADM

## 2022-01-01 RX ORDER — METOPROLOL SUCCINATE 100 MG/1
200 TABLET, EXTENDED RELEASE ORAL EVERY MORNING
Status: ON HOLD | COMMUNITY
End: 2022-01-01

## 2022-01-01 RX ORDER — HEPARIN SODIUM 10000 [USP'U]/100ML
0-5000 INJECTION, SOLUTION INTRAVENOUS CONTINUOUS
Status: DISCONTINUED | OUTPATIENT
Start: 2022-01-01 | End: 2022-01-01 | Stop reason: HOSPADM

## 2022-01-01 RX ORDER — CEFAZOLIN SODIUM 2 G/100ML
2 INJECTION, SOLUTION INTRAVENOUS EVERY 8 HOURS
Status: COMPLETED | OUTPATIENT
Start: 2022-01-01 | End: 2022-01-01

## 2022-01-01 RX ORDER — POLYETHYLENE GLYCOL 3350 17 G/17G
17 POWDER, FOR SOLUTION ORAL DAILY
Status: DISCONTINUED | OUTPATIENT
Start: 2022-01-01 | End: 2022-01-01 | Stop reason: HOSPADM

## 2022-01-01 RX ORDER — CEFTRIAXONE 2 G/1
2 INJECTION, POWDER, FOR SOLUTION INTRAMUSCULAR; INTRAVENOUS EVERY 24 HOURS
Status: DISCONTINUED | OUTPATIENT
Start: 2022-01-01 | End: 2022-01-01

## 2022-01-01 RX ORDER — FLUMAZENIL 0.1 MG/ML
0.2 INJECTION, SOLUTION INTRAVENOUS
Status: DISCONTINUED | OUTPATIENT
Start: 2022-01-01 | End: 2022-01-01

## 2022-01-01 RX ORDER — AMOXICILLIN 250 MG
1 CAPSULE ORAL 2 TIMES DAILY
Status: DISCONTINUED | OUTPATIENT
Start: 2022-01-01 | End: 2022-01-01 | Stop reason: HOSPADM

## 2022-01-01 RX ORDER — MAGNESIUM OXIDE 400 MG/1
400 TABLET ORAL EVERY 4 HOURS
Status: COMPLETED | OUTPATIENT
Start: 2022-01-01 | End: 2022-01-01

## 2022-01-01 RX ORDER — AMOXICILLIN 250 MG
1 CAPSULE ORAL 2 TIMES DAILY PRN
Status: DISCONTINUED | OUTPATIENT
Start: 2022-01-01 | End: 2022-01-01 | Stop reason: HOSPADM

## 2022-01-01 RX ORDER — ALBUTEROL SULFATE 90 UG/1
1-2 AEROSOL, METERED RESPIRATORY (INHALATION) EVERY 4 HOURS PRN
Status: DISCONTINUED | OUTPATIENT
Start: 2022-01-01 | End: 2022-01-01 | Stop reason: HOSPADM

## 2022-01-01 RX ORDER — LOPERAMIDE HYDROCHLORIDE 2 MG/1
2-4 TABLET ORAL 4 TIMES DAILY PRN
COMMUNITY

## 2022-01-01 RX ORDER — AMIODARONE HYDROCHLORIDE 200 MG/1
400 TABLET ORAL 2 TIMES DAILY
Status: DISCONTINUED | OUTPATIENT
Start: 2022-01-01 | End: 2022-01-01

## 2022-01-01 RX ORDER — POTASSIUM CHLORIDE 7.45 MG/ML
10 INJECTION INTRAVENOUS ONCE
Status: COMPLETED | OUTPATIENT
Start: 2022-01-01 | End: 2022-01-01

## 2022-01-01 RX ORDER — HYDROMORPHONE HYDROCHLORIDE 1 MG/ML
0.3 INJECTION, SOLUTION INTRAMUSCULAR; INTRAVENOUS; SUBCUTANEOUS EVERY 4 HOURS PRN
Status: DISCONTINUED | OUTPATIENT
Start: 2022-01-01 | End: 2022-01-01

## 2022-01-01 RX ORDER — DOXYCYCLINE 100 MG/10ML
100 INJECTION, POWDER, LYOPHILIZED, FOR SOLUTION INTRAVENOUS EVERY 12 HOURS
Status: DISCONTINUED | OUTPATIENT
Start: 2022-01-01 | End: 2022-01-01

## 2022-01-01 RX ORDER — NALOXONE HYDROCHLORIDE 0.4 MG/ML
0.2 INJECTION, SOLUTION INTRAMUSCULAR; INTRAVENOUS; SUBCUTANEOUS
Status: DISCONTINUED | OUTPATIENT
Start: 2022-01-01 | End: 2022-01-01 | Stop reason: HOSPADM

## 2022-01-01 RX ORDER — POTASSIUM CHLORIDE 1500 MG/1
20 TABLET, EXTENDED RELEASE ORAL ONCE
Status: DISCONTINUED | OUTPATIENT
Start: 2022-01-01 | End: 2022-01-01

## 2022-01-01 RX ORDER — MAGNESIUM HYDROXIDE 1200 MG/15ML
LIQUID ORAL PRN
Status: DISCONTINUED | OUTPATIENT
Start: 2022-01-01 | End: 2022-01-01 | Stop reason: HOSPADM

## 2022-01-01 RX ORDER — CEFAZOLIN SODIUM 1 G/3ML
1 INJECTION, POWDER, FOR SOLUTION INTRAMUSCULAR; INTRAVENOUS EVERY 8 HOURS
Status: DISCONTINUED | OUTPATIENT
Start: 2022-01-01 | End: 2022-01-01 | Stop reason: HOSPADM

## 2022-01-01 RX ORDER — BUMETANIDE 2 MG/1
2 TABLET ORAL
Status: DISCONTINUED | OUTPATIENT
Start: 2022-01-01 | End: 2022-01-01

## 2022-01-01 RX ORDER — DESMOPRESSIN ACETATE 4 UG/ML
1 INJECTION, SOLUTION INTRAVENOUS; SUBCUTANEOUS 2 TIMES DAILY
Status: DISCONTINUED | OUTPATIENT
Start: 2022-01-01 | End: 2022-01-01

## 2022-01-01 RX ORDER — NICOTINE POLACRILEX 4 MG
15-30 LOZENGE BUCCAL
Status: DISCONTINUED | OUTPATIENT
Start: 2022-01-01 | End: 2022-01-01 | Stop reason: HOSPADM

## 2022-01-01 RX ORDER — NALOXONE HYDROCHLORIDE 0.4 MG/ML
0.2 INJECTION, SOLUTION INTRAMUSCULAR; INTRAVENOUS; SUBCUTANEOUS
Status: DISCONTINUED | OUTPATIENT
Start: 2022-01-01 | End: 2022-01-01

## 2022-01-01 RX ORDER — CEFTRIAXONE 1 G/1
2000 INJECTION, POWDER, FOR SOLUTION INTRAMUSCULAR; INTRAVENOUS DAILY
Qty: 600 ML | Refills: 0 | DISCHARGE
Start: 2022-01-01 | End: 2022-01-01

## 2022-01-01 RX ORDER — SODIUM CHLORIDE, SODIUM LACTATE, POTASSIUM CHLORIDE, CALCIUM CHLORIDE 600; 310; 30; 20 MG/100ML; MG/100ML; MG/100ML; MG/100ML
INJECTION, SOLUTION INTRAVENOUS CONTINUOUS PRN
Status: DISCONTINUED | OUTPATIENT
Start: 2022-01-01 | End: 2022-01-01

## 2022-01-01 RX ORDER — LISINOPRIL 2.5 MG/1
2.5 TABLET ORAL EVERY MORNING
Status: DISCONTINUED | OUTPATIENT
Start: 2022-01-01 | End: 2022-01-01 | Stop reason: HOSPADM

## 2022-01-01 RX ORDER — TORSEMIDE 20 MG/1
20 TABLET ORAL
Status: DISCONTINUED | OUTPATIENT
Start: 2022-01-01 | End: 2022-01-01

## 2022-01-01 RX ORDER — OXYCODONE HYDROCHLORIDE 5 MG/1
5 TABLET ORAL EVERY 4 HOURS PRN
Status: DISCONTINUED | OUTPATIENT
Start: 2022-01-01 | End: 2022-01-01

## 2022-01-01 RX ORDER — CEFAZOLIN SODIUM 2 G/100ML
2 INJECTION, SOLUTION INTRAVENOUS
Status: DISCONTINUED | OUTPATIENT
Start: 2022-01-01 | End: 2022-01-01

## 2022-01-01 RX ORDER — METOPROLOL SUCCINATE 25 MG/1
25 TABLET, EXTENDED RELEASE ORAL ONCE
Status: COMPLETED | OUTPATIENT
Start: 2022-01-01 | End: 2022-01-01

## 2022-01-01 RX ORDER — ONDANSETRON 2 MG/ML
4 INJECTION INTRAMUSCULAR; INTRAVENOUS EVERY 6 HOURS PRN
Status: DISCONTINUED | OUTPATIENT
Start: 2022-01-01 | End: 2022-01-01

## 2022-01-01 RX ORDER — HYDROXYZINE HYDROCHLORIDE 10 MG/1
10 TABLET, FILM COATED ORAL EVERY 6 HOURS PRN
Status: DISCONTINUED | OUTPATIENT
Start: 2022-01-01 | End: 2022-01-01 | Stop reason: HOSPADM

## 2022-01-01 RX ORDER — ACETAMINOPHEN 325 MG/1
650 TABLET ORAL EVERY 4 HOURS PRN
Status: DISCONTINUED | OUTPATIENT
Start: 2022-01-01 | End: 2022-01-01 | Stop reason: HOSPADM

## 2022-01-01 RX ORDER — CEFAZOLIN SODIUM/WATER 2 G/20 ML
2 SYRINGE (ML) INTRAVENOUS SEE ADMIN INSTRUCTIONS
Status: DISCONTINUED | OUTPATIENT
Start: 2022-01-01 | End: 2022-01-01 | Stop reason: HOSPADM

## 2022-01-01 RX ORDER — NEOSTIGMINE METHYLSULFATE 1 MG/ML
VIAL (ML) INJECTION PRN
Status: DISCONTINUED | OUTPATIENT
Start: 2022-01-01 | End: 2022-01-01

## 2022-01-01 RX ORDER — ACETAMINOPHEN 500 MG
1000 TABLET ORAL ONCE
Status: COMPLETED | OUTPATIENT
Start: 2022-01-01 | End: 2022-01-01

## 2022-01-01 RX ORDER — NOREPINEPHRINE BITARTRATE 0.02 MG/ML
.01-.6 INJECTION, SOLUTION INTRAVENOUS CONTINUOUS
Status: DISCONTINUED | OUTPATIENT
Start: 2022-01-01 | End: 2022-01-01 | Stop reason: HOSPADM

## 2022-01-01 RX ORDER — LIDOCAINE HYDROCHLORIDE 20 MG/ML
15 SOLUTION OROPHARYNGEAL ONCE
Status: DISCONTINUED | OUTPATIENT
Start: 2022-01-01 | End: 2022-01-01

## 2022-01-01 RX ORDER — BUMETANIDE 2 MG/1
2 TABLET ORAL 2 TIMES DAILY
COMMUNITY

## 2022-01-01 RX ORDER — LORATADINE 10 MG/1
10 TABLET ORAL EVERY MORNING
Status: DISCONTINUED | OUTPATIENT
Start: 2022-01-01 | End: 2022-01-01 | Stop reason: HOSPADM

## 2022-01-01 RX ORDER — HYDROMORPHONE HCL IN WATER/PF 6 MG/30 ML
0.2 PATIENT CONTROLLED ANALGESIA SYRINGE INTRAVENOUS
Status: DISCONTINUED | OUTPATIENT
Start: 2022-01-01 | End: 2022-01-01 | Stop reason: HOSPADM

## 2022-01-01 RX ORDER — FLUMAZENIL 0.1 MG/ML
0.2 INJECTION, SOLUTION INTRAVENOUS
Status: DISCONTINUED | OUTPATIENT
Start: 2022-01-01 | End: 2022-01-01 | Stop reason: HOSPADM

## 2022-01-01 RX ORDER — TORSEMIDE 10 MG/1
10 TABLET ORAL 2 TIMES DAILY
Status: DISCONTINUED | OUTPATIENT
Start: 2022-01-01 | End: 2022-01-01

## 2022-01-01 RX ORDER — MORPHINE SULFATE 2 MG/ML
1-2 INJECTION, SOLUTION INTRAMUSCULAR; INTRAVENOUS
Status: DISCONTINUED | OUTPATIENT
Start: 2022-01-01 | End: 2022-01-01 | Stop reason: HOSPADM

## 2022-01-01 RX ORDER — AMIODARONE HYDROCHLORIDE 200 MG/1
400 TABLET ORAL DAILY
Status: DISCONTINUED | OUTPATIENT
Start: 2022-01-01 | End: 2022-01-01 | Stop reason: HOSPADM

## 2022-01-01 RX ORDER — SODIUM CHLORIDE 9 MG/ML
INJECTION, SOLUTION INTRAVENOUS CONTINUOUS PRN
Status: DISCONTINUED | OUTPATIENT
Start: 2022-01-01 | End: 2022-01-01

## 2022-01-01 RX ORDER — SODIUM CHLORIDE 9 MG/ML
INJECTION, SOLUTION INTRAVENOUS CONTINUOUS
Status: DISCONTINUED | OUTPATIENT
Start: 2022-01-01 | End: 2022-01-01 | Stop reason: HOSPADM

## 2022-01-01 RX ORDER — TAMSULOSIN HYDROCHLORIDE 0.4 MG/1
0.4 CAPSULE ORAL DAILY
DISCHARGE
Start: 2022-01-01

## 2022-01-01 RX ORDER — LISINOPRIL 2.5 MG/1
2.5 TABLET ORAL EVERY MORNING
Status: DISCONTINUED | OUTPATIENT
Start: 2022-01-01 | End: 2022-01-01

## 2022-01-01 RX ORDER — PROPOFOL 10 MG/ML
INJECTION, EMULSION INTRAVENOUS
Status: COMPLETED
Start: 2022-01-01 | End: 2022-01-01

## 2022-01-01 RX ORDER — OXYCODONE HYDROCHLORIDE 5 MG/1
5-10 TABLET ORAL EVERY 4 HOURS PRN
Qty: 10 TABLET | Refills: 0 | Status: SHIPPED | OUTPATIENT
Start: 2022-01-01 | End: 2022-01-01

## 2022-01-01 RX ORDER — ALBUTEROL SULFATE 90 UG/1
1-2 AEROSOL, METERED RESPIRATORY (INHALATION) EVERY 4 HOURS PRN
COMMUNITY

## 2022-01-01 RX ORDER — PROPOFOL 10 MG/ML
INJECTION, EMULSION INTRAVENOUS CONTINUOUS PRN
Status: DISCONTINUED | OUTPATIENT
Start: 2022-01-01 | End: 2022-01-01

## 2022-01-01 RX ORDER — SODIUM CHLORIDE 9 MG/ML
INJECTION, SOLUTION INTRAVENOUS CONTINUOUS
Status: DISCONTINUED | OUTPATIENT
Start: 2022-01-01 | End: 2022-01-01

## 2022-01-01 RX ORDER — DESMOPRESSIN ACETATE 4 UG/ML
1 INJECTION, SOLUTION INTRAVENOUS; SUBCUTANEOUS 2 TIMES DAILY
Status: DISCONTINUED | OUTPATIENT
Start: 2022-01-01 | End: 2022-01-01 | Stop reason: HOSPADM

## 2022-01-01 RX ORDER — ONDANSETRON 2 MG/ML
4 INJECTION INTRAMUSCULAR; INTRAVENOUS EVERY 6 HOURS PRN
Status: DISCONTINUED | OUTPATIENT
Start: 2022-01-01 | End: 2022-01-01 | Stop reason: HOSPADM

## 2022-01-01 RX ORDER — FUROSEMIDE 10 MG/ML
60 INJECTION INTRAMUSCULAR; INTRAVENOUS 3 TIMES DAILY
Status: COMPLETED | OUTPATIENT
Start: 2022-01-01 | End: 2022-01-01

## 2022-01-01 RX ORDER — ASPIRIN 81 MG/1
81 TABLET ORAL DAILY
Status: ON HOLD | COMMUNITY
End: 2022-01-01

## 2022-01-01 RX ORDER — NITROGLYCERIN 0.4 MG/1
0.4 TABLET SUBLINGUAL EVERY 5 MIN PRN
COMMUNITY

## 2022-01-01 RX ORDER — GLYCOPYRROLATE 0.2 MG/ML
INJECTION INTRAMUSCULAR; INTRAVENOUS
Status: COMPLETED
Start: 2022-01-01 | End: 2022-01-01

## 2022-01-01 RX ORDER — METOPROLOL TARTRATE 25 MG/1
25 TABLET, FILM COATED ORAL ONCE
Status: COMPLETED | OUTPATIENT
Start: 2022-01-01 | End: 2022-01-01

## 2022-01-01 RX ORDER — ONDANSETRON 4 MG/1
4 TABLET, ORALLY DISINTEGRATING ORAL EVERY 6 HOURS PRN
Status: DISCONTINUED | OUTPATIENT
Start: 2022-01-01 | End: 2022-01-01 | Stop reason: HOSPADM

## 2022-01-01 RX ORDER — POTASSIUM CHLORIDE 1500 MG/1
40 TABLET, EXTENDED RELEASE ORAL ONCE
Status: COMPLETED | OUTPATIENT
Start: 2022-01-01 | End: 2022-01-01

## 2022-01-01 RX ORDER — TAMSULOSIN HYDROCHLORIDE 0.4 MG/1
0.4 CAPSULE ORAL DAILY
Qty: 7 CAPSULE | Refills: 0 | Status: ON HOLD | OUTPATIENT
Start: 2022-01-01 | End: 2022-01-01

## 2022-01-01 RX ORDER — MAGNESIUM HYDROXIDE/ALUMINUM HYDROXICE/SIMETHICONE 120; 1200; 1200 MG/30ML; MG/30ML; MG/30ML
30 SUSPENSION ORAL EVERY 4 HOURS PRN
Status: DISCONTINUED | OUTPATIENT
Start: 2022-01-01 | End: 2022-01-01 | Stop reason: HOSPADM

## 2022-01-01 RX ORDER — TRANEXAMIC ACID 10 MG/ML
1 INJECTION, SOLUTION INTRAVENOUS ONCE
Status: COMPLETED | OUTPATIENT
Start: 2022-01-01 | End: 2022-01-01

## 2022-01-01 RX ORDER — BISACODYL 10 MG
10 SUPPOSITORY, RECTAL RECTAL DAILY PRN
Status: DISCONTINUED | OUTPATIENT
Start: 2022-01-01 | End: 2022-01-01 | Stop reason: HOSPADM

## 2022-01-01 RX ORDER — ASPIRIN 81 MG/1
81 TABLET ORAL DAILY
Status: DISCONTINUED | OUTPATIENT
Start: 2022-01-01 | End: 2022-01-01 | Stop reason: HOSPADM

## 2022-01-01 RX ORDER — FENTANYL CITRATE 50 UG/ML
INJECTION, SOLUTION INTRAMUSCULAR; INTRAVENOUS PRN
Status: DISCONTINUED | OUTPATIENT
Start: 2022-01-01 | End: 2022-01-01

## 2022-01-01 RX ORDER — BUMETANIDE 2 MG/1
2 TABLET ORAL EVERY MORNING
Status: DISCONTINUED | OUTPATIENT
Start: 2022-01-01 | End: 2022-01-01 | Stop reason: HOSPADM

## 2022-01-01 RX ORDER — DEXTROSE MONOHYDRATE 25 G/50ML
9.5 INJECTION, SOLUTION INTRAVENOUS
Status: DISCONTINUED | OUTPATIENT
Start: 2022-01-01 | End: 2022-01-01

## 2022-01-01 RX ORDER — METOPROLOL TARTRATE 1 MG/ML
1-2 INJECTION, SOLUTION INTRAVENOUS EVERY 5 MIN PRN
Status: DISCONTINUED | OUTPATIENT
Start: 2022-01-01 | End: 2022-01-01 | Stop reason: HOSPADM

## 2022-01-01 RX ORDER — HYDROMORPHONE HYDROCHLORIDE 1 MG/ML
0.5 INJECTION, SOLUTION INTRAMUSCULAR; INTRAVENOUS; SUBCUTANEOUS
Status: COMPLETED | OUTPATIENT
Start: 2022-01-01 | End: 2022-01-01

## 2022-01-01 RX ORDER — ATORVASTATIN CALCIUM 80 MG/1
80 TABLET, FILM COATED ORAL EVERY MORNING
COMMUNITY

## 2022-01-01 RX ORDER — TORSEMIDE 10 MG/1
10 TABLET ORAL DAILY
Status: DISCONTINUED | OUTPATIENT
Start: 2022-01-01 | End: 2022-01-01 | Stop reason: HOSPADM

## 2022-01-01 RX ORDER — CEFTRIAXONE 2 G/1
2 INJECTION, POWDER, FOR SOLUTION INTRAMUSCULAR; INTRAVENOUS EVERY 24 HOURS
Status: DISCONTINUED | OUTPATIENT
Start: 2022-01-01 | End: 2022-01-01 | Stop reason: HOSPADM

## 2022-01-01 RX ORDER — HYDROMORPHONE HCL IN WATER/PF 6 MG/30 ML
0.2 PATIENT CONTROLLED ANALGESIA SYRINGE INTRAVENOUS
Status: COMPLETED | OUTPATIENT
Start: 2022-01-01 | End: 2022-01-01

## 2022-01-01 RX ORDER — DEXAMETHASONE SODIUM PHOSPHATE 4 MG/ML
INJECTION, SOLUTION INTRA-ARTICULAR; INTRALESIONAL; INTRAMUSCULAR; INTRAVENOUS; SOFT TISSUE PRN
Status: DISCONTINUED | OUTPATIENT
Start: 2022-01-01 | End: 2022-01-01

## 2022-01-01 RX ORDER — NOREPINEPHRINE BITARTRATE 0.02 MG/ML
.01-.6 INJECTION, SOLUTION INTRAVENOUS CONTINUOUS
Status: DISCONTINUED | OUTPATIENT
Start: 2022-01-01 | End: 2022-01-01

## 2022-01-01 RX ORDER — BUMETANIDE 0.5 MG/1
1 TABLET ORAL EVERY EVENING
Status: DISCONTINUED | OUTPATIENT
Start: 2022-01-01 | End: 2022-01-01

## 2022-01-01 RX ORDER — AMIODARONE HYDROCHLORIDE 400 MG/1
400 TABLET ORAL DAILY
DISCHARGE
Start: 2022-01-01

## 2022-01-01 RX ORDER — POTASSIUM CHLORIDE 1500 MG/1
20 TABLET, EXTENDED RELEASE ORAL EVERY MORNING
Status: DISCONTINUED | OUTPATIENT
Start: 2022-01-01 | End: 2022-01-01 | Stop reason: HOSPADM

## 2022-01-01 RX ORDER — LIDOCAINE HYDROCHLORIDE 20 MG/ML
SOLUTION OROPHARYNGEAL
Status: COMPLETED
Start: 2022-01-01 | End: 2022-01-01

## 2022-01-01 RX ORDER — ACETAMINOPHEN 325 MG/1
975 TABLET ORAL EVERY 8 HOURS
Status: COMPLETED | OUTPATIENT
Start: 2022-01-01 | End: 2022-01-01

## 2022-01-01 RX ORDER — LIDOCAINE HYDROCHLORIDE 40 MG/ML
1.5 SOLUTION TOPICAL ONCE
Status: DISCONTINUED | OUTPATIENT
Start: 2022-01-01 | End: 2022-01-01

## 2022-01-01 RX ORDER — FLUMAZENIL 0.1 MG/ML
INJECTION, SOLUTION INTRAVENOUS
Status: DISCONTINUED
Start: 2022-01-01 | End: 2022-01-01 | Stop reason: WASHOUT

## 2022-01-01 RX ORDER — ACETAMINOPHEN 650 MG/1
650 SUPPOSITORY RECTAL EVERY 6 HOURS PRN
Status: DISCONTINUED | OUTPATIENT
Start: 2022-01-01 | End: 2022-01-01

## 2022-01-01 RX ORDER — ASPIRIN 325 MG
325 TABLET, DELAYED RELEASE (ENTERIC COATED) ORAL DAILY
Qty: 30 TABLET | Refills: 0 | Status: ON HOLD | OUTPATIENT
Start: 2022-01-01 | End: 2022-01-01

## 2022-01-01 RX ORDER — GLYCOPYRROLATE 0.2 MG/ML
0.1 INJECTION, SOLUTION INTRAMUSCULAR; INTRAVENOUS ONCE
Status: DISCONTINUED | OUTPATIENT
Start: 2022-01-01 | End: 2022-01-01

## 2022-01-01 RX ORDER — MANNITOL 20 G/100ML
100 INJECTION, SOLUTION INTRAVENOUS ONCE
Status: COMPLETED | OUTPATIENT
Start: 2022-01-01 | End: 2022-01-01

## 2022-01-01 RX ORDER — POTASSIUM CHLORIDE 1500 MG/1
30 TABLET, EXTENDED RELEASE ORAL EVERY MORNING
COMMUNITY

## 2022-01-01 RX ORDER — NICOTINE POLACRILEX 4 MG
15-30 LOZENGE BUCCAL
Status: DISCONTINUED | OUTPATIENT
Start: 2022-01-01 | End: 2022-01-01

## 2022-01-01 RX ORDER — ACETAMINOPHEN 500 MG
1000 TABLET ORAL EVERY 6 HOURS PRN
Status: DISCONTINUED | OUTPATIENT
Start: 2022-01-01 | End: 2022-01-01 | Stop reason: HOSPADM

## 2022-01-01 RX ORDER — HYDROMORPHONE HYDROCHLORIDE 1 MG/ML
.5-1 INJECTION, SOLUTION INTRAMUSCULAR; INTRAVENOUS; SUBCUTANEOUS
Status: DISCONTINUED | OUTPATIENT
Start: 2022-01-01 | End: 2022-01-01 | Stop reason: HOSPADM

## 2022-01-01 RX ORDER — FUROSEMIDE 10 MG/ML
60 INJECTION INTRAMUSCULAR; INTRAVENOUS EVERY 8 HOURS
Status: DISCONTINUED | OUTPATIENT
Start: 2022-01-01 | End: 2022-01-01

## 2022-01-01 RX ORDER — ONDANSETRON 4 MG/1
4 TABLET, ORALLY DISINTEGRATING ORAL EVERY 30 MIN PRN
Status: DISCONTINUED | OUTPATIENT
Start: 2022-01-01 | End: 2022-01-01 | Stop reason: HOSPADM

## 2022-01-01 RX ORDER — BUMETANIDE 2 MG/1
2 TABLET ORAL ONCE
Status: COMPLETED | OUTPATIENT
Start: 2022-01-01 | End: 2022-01-01

## 2022-01-01 RX ORDER — METOPROLOL SUCCINATE 100 MG/1
100 TABLET, EXTENDED RELEASE ORAL EVERY MORNING
Status: DISCONTINUED | OUTPATIENT
Start: 2022-01-01 | End: 2022-01-01 | Stop reason: HOSPADM

## 2022-01-01 RX ORDER — AMIODARONE HYDROCHLORIDE 200 MG/1
400 TABLET ORAL 3 TIMES DAILY
Status: DISCONTINUED | OUTPATIENT
Start: 2022-01-01 | End: 2022-01-01

## 2022-01-01 RX ORDER — PROCHLORPERAZINE 25 MG
12.5 SUPPOSITORY, RECTAL RECTAL EVERY 12 HOURS PRN
Status: DISCONTINUED | OUTPATIENT
Start: 2022-01-01 | End: 2022-01-01 | Stop reason: HOSPADM

## 2022-01-01 RX ORDER — FENTANYL CITRATE 50 UG/ML
INJECTION, SOLUTION INTRAMUSCULAR; INTRAVENOUS
Status: DISPENSED
Start: 2022-01-01 | End: 2022-01-01

## 2022-01-01 RX ORDER — ASPIRIN 81 MG/1
81 TABLET ORAL EVERY MORNING
Status: ON HOLD | COMMUNITY
End: 2022-01-01

## 2022-01-01 RX ORDER — BUMETANIDE 2 MG/1
2 TABLET ORAL DAILY
COMMUNITY
End: 2022-01-01

## 2022-01-01 RX ORDER — OXYCODONE HYDROCHLORIDE 5 MG/1
5-10 TABLET ORAL EVERY 4 HOURS PRN
Qty: 45 TABLET | Refills: 0 | Status: SHIPPED | OUTPATIENT
Start: 2022-01-01

## 2022-01-01 RX ORDER — CEFAZOLIN SODIUM/WATER 2 G/20 ML
2 SYRINGE (ML) INTRAVENOUS
Status: COMPLETED | OUTPATIENT
Start: 2022-01-01 | End: 2022-01-01

## 2022-01-01 RX ORDER — HYDROMORPHONE HCL IN WATER/PF 6 MG/30 ML
0.2 PATIENT CONTROLLED ANALGESIA SYRINGE INTRAVENOUS EVERY 5 MIN PRN
Status: DISCONTINUED | OUTPATIENT
Start: 2022-01-01 | End: 2022-01-01 | Stop reason: HOSPADM

## 2022-01-01 RX ORDER — PROCHLORPERAZINE MALEATE 5 MG
5 TABLET ORAL EVERY 6 HOURS PRN
Status: DISCONTINUED | OUTPATIENT
Start: 2022-01-01 | End: 2022-01-01 | Stop reason: HOSPADM

## 2022-01-01 RX ORDER — AMOXICILLIN 250 MG
1 CAPSULE ORAL 2 TIMES DAILY PRN
Qty: 20 TABLET | Refills: 0 | Status: ON HOLD | OUTPATIENT
Start: 2022-01-01 | End: 2022-01-01

## 2022-01-01 RX ORDER — HEPARIN SODIUM 5000 [USP'U]/.5ML
5000 INJECTION, SOLUTION INTRAVENOUS; SUBCUTANEOUS EVERY 12 HOURS
Status: DISCONTINUED | OUTPATIENT
Start: 2022-01-01 | End: 2022-01-01

## 2022-01-01 RX ORDER — DULOXETIN HYDROCHLORIDE 30 MG/1
30 CAPSULE, DELAYED RELEASE ORAL AT BEDTIME
Status: DISCONTINUED | OUTPATIENT
Start: 2022-01-01 | End: 2022-01-01 | Stop reason: HOSPADM

## 2022-01-01 RX ORDER — IOPAMIDOL 755 MG/ML
75 INJECTION, SOLUTION INTRAVASCULAR ONCE
Status: COMPLETED | OUTPATIENT
Start: 2022-01-01 | End: 2022-01-01

## 2022-01-01 RX ORDER — PROPOFOL 10 MG/ML
5-75 INJECTION, EMULSION INTRAVENOUS CONTINUOUS
Status: DISCONTINUED | OUTPATIENT
Start: 2022-01-01 | End: 2022-01-01

## 2022-01-01 RX ORDER — DIAZEPAM 10 MG/2ML
5 INJECTION, SOLUTION INTRAMUSCULAR; INTRAVENOUS EVERY 6 HOURS PRN
Status: DISCONTINUED | OUTPATIENT
Start: 2022-01-01 | End: 2022-01-01 | Stop reason: HOSPADM

## 2022-01-01 RX ORDER — BUMETANIDE 0.5 MG/1
1 TABLET ORAL EVERY EVENING
Status: DISCONTINUED | OUTPATIENT
Start: 2022-01-01 | End: 2022-01-01 | Stop reason: HOSPADM

## 2022-01-01 RX ORDER — CEFTRIAXONE 1 G/1
1 INJECTION, POWDER, FOR SOLUTION INTRAMUSCULAR; INTRAVENOUS EVERY 24 HOURS
Status: DISCONTINUED | OUTPATIENT
Start: 2022-01-01 | End: 2022-01-01

## 2022-01-01 RX ORDER — FENTANYL CITRATE 50 UG/ML
25 INJECTION, SOLUTION INTRAMUSCULAR; INTRAVENOUS
Status: DISCONTINUED | OUTPATIENT
Start: 2022-01-01 | End: 2022-01-01

## 2022-01-01 RX ORDER — ONDANSETRON 4 MG/1
4 TABLET, ORALLY DISINTEGRATING ORAL EVERY 6 HOURS PRN
Status: DISCONTINUED | OUTPATIENT
Start: 2022-01-01 | End: 2022-01-01

## 2022-01-01 RX ORDER — NALOXONE HYDROCHLORIDE 0.4 MG/ML
0.1 INJECTION, SOLUTION INTRAMUSCULAR; INTRAVENOUS; SUBCUTANEOUS
Status: DISCONTINUED | OUTPATIENT
Start: 2022-01-01 | End: 2022-01-01 | Stop reason: HOSPADM

## 2022-01-01 RX ORDER — POLYETHYLENE GLYCOL 3350 17 G/17G
17 POWDER, FOR SOLUTION ORAL DAILY PRN
Status: DISCONTINUED | OUTPATIENT
Start: 2022-01-01 | End: 2022-01-01 | Stop reason: HOSPADM

## 2022-01-01 RX ORDER — GLYCOPYRROLATE 0.2 MG/ML
INJECTION, SOLUTION INTRAMUSCULAR; INTRAVENOUS PRN
Status: DISCONTINUED | OUTPATIENT
Start: 2022-01-01 | End: 2022-01-01

## 2022-01-01 RX ORDER — CHLORHEXIDINE GLUCONATE ORAL RINSE 1.2 MG/ML
15 SOLUTION DENTAL EVERY 12 HOURS
Status: DISCONTINUED | OUTPATIENT
Start: 2022-01-01 | End: 2022-01-01 | Stop reason: HOSPADM

## 2022-01-01 RX ORDER — MAGNESIUM SULFATE HEPTAHYDRATE 40 MG/ML
4 INJECTION, SOLUTION INTRAVENOUS ONCE
Status: COMPLETED | OUTPATIENT
Start: 2022-01-01 | End: 2022-01-01

## 2022-01-01 RX ORDER — METOPROLOL SUCCINATE 100 MG/1
100 TABLET, EXTENDED RELEASE ORAL EVERY MORNING
Start: 2022-01-01

## 2022-01-01 RX ORDER — AMOXICILLIN 250 MG
2 CAPSULE ORAL DAILY PRN
COMMUNITY

## 2022-01-01 RX ORDER — KETOROLAC TROMETHAMINE 15 MG/ML
15 INJECTION, SOLUTION INTRAMUSCULAR; INTRAVENOUS
Status: DISCONTINUED | OUTPATIENT
Start: 2022-01-01 | End: 2022-01-01 | Stop reason: HOSPADM

## 2022-01-01 RX ORDER — AMOXICILLIN 250 MG
2 CAPSULE ORAL 2 TIMES DAILY PRN
Status: DISCONTINUED | OUTPATIENT
Start: 2022-01-01 | End: 2022-01-01 | Stop reason: HOSPADM

## 2022-01-01 RX ORDER — BUMETANIDE 0.5 MG/1
2 TABLET ORAL EVERY MORNING
Status: DISCONTINUED | OUTPATIENT
Start: 2022-01-01 | End: 2022-01-01

## 2022-01-01 RX ORDER — LORATADINE 10 MG/1
10 TABLET ORAL EVERY MORNING
COMMUNITY

## 2022-01-01 RX ORDER — LIDOCAINE 50 MG/G
OINTMENT TOPICAL ONCE
Status: DISCONTINUED | OUTPATIENT
Start: 2022-01-01 | End: 2022-01-01

## 2022-01-01 RX ORDER — FENTANYL CITRATE 50 UG/ML
25 INJECTION, SOLUTION INTRAMUSCULAR; INTRAVENOUS EVERY 5 MIN PRN
Status: DISCONTINUED | OUTPATIENT
Start: 2022-01-01 | End: 2022-01-01 | Stop reason: HOSPADM

## 2022-01-01 RX ORDER — OXYCODONE HYDROCHLORIDE 5 MG/1
5-10 TABLET ORAL EVERY 4 HOURS PRN
Status: DISCONTINUED | OUTPATIENT
Start: 2022-01-01 | End: 2022-01-01 | Stop reason: HOSPADM

## 2022-01-01 RX ORDER — SODIUM CHLORIDE, SODIUM LACTATE, POTASSIUM CHLORIDE, CALCIUM CHLORIDE 600; 310; 30; 20 MG/100ML; MG/100ML; MG/100ML; MG/100ML
INJECTION, SOLUTION INTRAVENOUS CONTINUOUS
Status: DISCONTINUED | OUTPATIENT
Start: 2022-01-01 | End: 2022-01-01

## 2022-01-01 RX ORDER — MAGNESIUM OXIDE 400 MG/1
400 TABLET ORAL EVERY 4 HOURS
Status: DISPENSED | OUTPATIENT
Start: 2022-01-01 | End: 2022-01-01

## 2022-01-01 RX ORDER — NICOTINE POLACRILEX 4 MG
LOZENGE BUCCAL
Status: DISCONTINUED
Start: 2022-01-01 | End: 2022-01-01 | Stop reason: WASHOUT

## 2022-01-01 RX ORDER — MAGNESIUM OXIDE 400 MG/1
400 TABLET ORAL EVERY 4 HOURS
Status: DISCONTINUED | OUTPATIENT
Start: 2022-01-01 | End: 2022-01-01

## 2022-01-01 RX ORDER — HYDROMORPHONE HCL IN WATER/PF 6 MG/30 ML
0.4 PATIENT CONTROLLED ANALGESIA SYRINGE INTRAVENOUS
Status: DISCONTINUED | OUTPATIENT
Start: 2022-01-01 | End: 2022-01-01 | Stop reason: HOSPADM

## 2022-01-01 RX ORDER — DEXTROSE MONOHYDRATE 25 G/50ML
25-50 INJECTION, SOLUTION INTRAVENOUS
Status: DISCONTINUED | OUTPATIENT
Start: 2022-01-01 | End: 2022-01-01 | Stop reason: HOSPADM

## 2022-01-01 RX ORDER — LANOLIN ALCOHOL/MO/W.PET/CERES
3 CREAM (GRAM) TOPICAL
Status: DISCONTINUED | OUTPATIENT
Start: 2022-01-01 | End: 2022-01-01 | Stop reason: HOSPADM

## 2022-01-01 RX ORDER — OXYCODONE HYDROCHLORIDE 5 MG/1
10 TABLET ORAL EVERY 4 HOURS PRN
Status: DISCONTINUED | OUTPATIENT
Start: 2022-01-01 | End: 2022-01-01 | Stop reason: HOSPADM

## 2022-01-01 RX ORDER — BUMETANIDE 0.5 MG/1
2 TABLET ORAL DAILY
Status: DISCONTINUED | OUTPATIENT
Start: 2022-01-01 | End: 2022-01-01

## 2022-01-01 RX ORDER — HYDRALAZINE HYDROCHLORIDE 20 MG/ML
2.5-5 INJECTION INTRAMUSCULAR; INTRAVENOUS EVERY 10 MIN PRN
Status: DISCONTINUED | OUTPATIENT
Start: 2022-01-01 | End: 2022-01-01 | Stop reason: HOSPADM

## 2022-01-01 RX ORDER — METOPROLOL SUCCINATE 50 MG/1
50 TABLET, EXTENDED RELEASE ORAL DAILY
Status: DISCONTINUED | OUTPATIENT
Start: 2022-01-01 | End: 2022-01-01 | Stop reason: HOSPADM

## 2022-01-01 RX ORDER — ACETAMINOPHEN 500 MG
1000 TABLET ORAL 3 TIMES DAILY
Status: DISCONTINUED | OUTPATIENT
Start: 2022-01-01 | End: 2022-01-01

## 2022-01-01 RX ORDER — DULOXETIN HYDROCHLORIDE 30 MG/1
30 CAPSULE, DELAYED RELEASE ORAL AT BEDTIME
COMMUNITY

## 2022-01-01 RX ORDER — ACETAMINOPHEN 325 MG/1
650 TABLET ORAL EVERY 6 HOURS PRN
Status: DISCONTINUED | OUTPATIENT
Start: 2022-01-01 | End: 2022-01-01

## 2022-01-01 RX ORDER — LISINOPRIL 2.5 MG/1
2.5 TABLET ORAL DAILY
Status: DISCONTINUED | OUTPATIENT
Start: 2022-01-01 | End: 2022-01-01 | Stop reason: HOSPADM

## 2022-01-01 RX ORDER — BUMETANIDE 2 MG/1
2 TABLET ORAL DAILY
Status: DISCONTINUED | OUTPATIENT
Start: 2022-01-01 | End: 2022-01-01 | Stop reason: HOSPADM

## 2022-01-01 RX ORDER — OXYCODONE HYDROCHLORIDE 5 MG/1
5-10 TABLET ORAL EVERY 4 HOURS PRN
Qty: 25 TABLET | Refills: 0 | Status: ON HOLD | OUTPATIENT
Start: 2022-01-01 | End: 2022-01-01

## 2022-01-01 RX ADMIN — LIDOCAINE HYDROCHLORIDE 30 ML: 20 SOLUTION ORAL; TOPICAL at 10:53

## 2022-01-01 RX ADMIN — LISINOPRIL 2.5 MG: 2.5 TABLET ORAL at 08:40

## 2022-01-01 RX ADMIN — DOXYCYCLINE 100 MG: 100 INJECTION, POWDER, LYOPHILIZED, FOR SOLUTION INTRAVENOUS at 07:49

## 2022-01-01 RX ADMIN — FUROSEMIDE 60 MG: 10 INJECTION, SOLUTION INTRAMUSCULAR; INTRAVENOUS at 16:14

## 2022-01-01 RX ADMIN — OXYCODONE HYDROCHLORIDE 10 MG: 5 TABLET ORAL at 00:03

## 2022-01-01 RX ADMIN — FUROSEMIDE 60 MG: 10 INJECTION, SOLUTION INTRAMUSCULAR; INTRAVENOUS at 08:29

## 2022-01-01 RX ADMIN — CHLORHEXIDINE GLUCONATE 15 ML: 1.2 SOLUTION ORAL at 19:46

## 2022-01-01 RX ADMIN — TOPICAL ANESTHETIC 1 ML: 200 SPRAY DENTAL; PERIODONTAL at 10:57

## 2022-01-01 RX ADMIN — DULOXETINE HYDROCHLORIDE 30 MG: 30 CAPSULE, DELAYED RELEASE ORAL at 22:28

## 2022-01-01 RX ADMIN — TRANEXAMIC ACID 1 G: 10 INJECTION, SOLUTION INTRAVENOUS at 07:36

## 2022-01-01 RX ADMIN — TAZOBACTAM SODIUM AND PIPERACILLIN SODIUM 3.38 G: 375; 3 INJECTION, SOLUTION INTRAVENOUS at 09:11

## 2022-01-01 RX ADMIN — DOXYCYCLINE 100 MG: 100 INJECTION, POWDER, LYOPHILIZED, FOR SOLUTION INTRAVENOUS at 06:19

## 2022-01-01 RX ADMIN — METOPROLOL SUCCINATE 100 MG: 100 TABLET, EXTENDED RELEASE ORAL at 09:40

## 2022-01-01 RX ADMIN — UMECLIDINIUM BROMIDE AND VILANTEROL TRIFENATATE 1 PUFF: 62.5; 25 POWDER RESPIRATORY (INHALATION) at 07:57

## 2022-01-01 RX ADMIN — Medication 400 MG: at 09:40

## 2022-01-01 RX ADMIN — SACUBITRIL AND VALSARTAN 1 TABLET: 24; 26 TABLET, FILM COATED ORAL at 22:03

## 2022-01-01 RX ADMIN — ASPIRIN 325 MG: 325 TABLET, COATED ORAL at 08:20

## 2022-01-01 RX ADMIN — ATORVASTATIN CALCIUM 80 MG: 40 TABLET, FILM COATED ORAL at 09:36

## 2022-01-01 RX ADMIN — PROPOFOL 15 MCG/KG/MIN: 10 INJECTION, EMULSION INTRAVENOUS at 06:23

## 2022-01-01 RX ADMIN — DULOXETINE HYDROCHLORIDE 30 MG: 30 CAPSULE, DELAYED RELEASE ORAL at 22:04

## 2022-01-01 RX ADMIN — METOPROLOL SUCCINATE 100 MG: 100 TABLET, EXTENDED RELEASE ORAL at 09:10

## 2022-01-01 RX ADMIN — BUMETANIDE 2 MG: 2 TABLET ORAL at 11:23

## 2022-01-01 RX ADMIN — PROPOFOL 50 MCG/KG/MIN: 10 INJECTION, EMULSION INTRAVENOUS at 11:11

## 2022-01-01 RX ADMIN — OXYCODONE HYDROCHLORIDE 5 MG: 5 TABLET ORAL at 03:29

## 2022-01-01 RX ADMIN — ATORVASTATIN CALCIUM 80 MG: 40 TABLET, FILM COATED ORAL at 14:03

## 2022-01-01 RX ADMIN — UMECLIDINIUM BROMIDE AND VILANTEROL TRIFENATATE 1 PUFF: 62.5; 25 POWDER RESPIRATORY (INHALATION) at 08:14

## 2022-01-01 RX ADMIN — TAZOBACTAM SODIUM AND PIPERACILLIN SODIUM 3.38 G: 375; 3 INJECTION, SOLUTION INTRAVENOUS at 05:45

## 2022-01-01 RX ADMIN — AMIODARONE HYDROCHLORIDE 400 MG: 200 TABLET ORAL at 09:12

## 2022-01-01 RX ADMIN — DOXYCYCLINE 100 MG: 100 INJECTION, POWDER, LYOPHILIZED, FOR SOLUTION INTRAVENOUS at 04:26

## 2022-01-01 RX ADMIN — SENNOSIDES AND DOCUSATE SODIUM 1 TABLET: 50; 8.6 TABLET ORAL at 08:05

## 2022-01-01 RX ADMIN — BUMETANIDE 1 MG: 0.5 TABLET ORAL at 20:14

## 2022-01-01 RX ADMIN — ASPIRIN 81 MG: 81 TABLET, COATED ORAL at 09:10

## 2022-01-01 RX ADMIN — TORSEMIDE 10 MG: 10 TABLET ORAL at 16:05

## 2022-01-01 RX ADMIN — MINERAL OIL, PETROLATUM: 425; 573 OINTMENT OPHTHALMIC at 04:48

## 2022-01-01 RX ADMIN — TAZOBACTAM SODIUM AND PIPERACILLIN SODIUM 3.38 G: 375; 3 INJECTION, SOLUTION INTRAVENOUS at 00:59

## 2022-01-01 RX ADMIN — TAZOBACTAM SODIUM AND PIPERACILLIN SODIUM 3.38 G: 375; 3 INJECTION, SOLUTION INTRAVENOUS at 16:05

## 2022-01-01 RX ADMIN — VASOPRESSIN: 20 INJECTION, SOLUTION INTRAVENOUS at 01:32

## 2022-01-01 RX ADMIN — HEPARIN SODIUM 1850 UNITS/HR: 10000 INJECTION, SOLUTION INTRAVENOUS at 16:00

## 2022-01-01 RX ADMIN — BUMETANIDE 2 MG: 2 TABLET ORAL at 16:35

## 2022-01-01 RX ADMIN — ACETAMINOPHEN 1000 MG: 500 TABLET, FILM COATED ORAL at 02:43

## 2022-01-01 RX ADMIN — EMPAGLIFLOZIN 10 MG: 10 TABLET, FILM COATED ORAL at 09:11

## 2022-01-01 RX ADMIN — EMPAGLIFLOZIN 10 MG: 10 TABLET, FILM COATED ORAL at 09:15

## 2022-01-01 RX ADMIN — UMECLIDINIUM BROMIDE AND VILANTEROL TRIFENATATE 1 PUFF: 62.5; 25 POWDER RESPIRATORY (INHALATION) at 09:46

## 2022-01-01 RX ADMIN — TAZOBACTAM SODIUM AND PIPERACILLIN SODIUM 3.38 G: 375; 3 INJECTION, SOLUTION INTRAVENOUS at 22:09

## 2022-01-01 RX ADMIN — METOPROLOL SUCCINATE 100 MG: 100 TABLET, EXTENDED RELEASE ORAL at 09:42

## 2022-01-01 RX ADMIN — POTASSIUM CHLORIDE 20 MEQ: 1500 TABLET, EXTENDED RELEASE ORAL at 08:20

## 2022-01-01 RX ADMIN — DEXAMETHASONE SODIUM PHOSPHATE 4 MG: 4 INJECTION, SOLUTION INTRA-ARTICULAR; INTRALESIONAL; INTRAMUSCULAR; INTRAVENOUS; SOFT TISSUE at 07:41

## 2022-01-01 RX ADMIN — BUMETANIDE 2 MG: 2 TABLET ORAL at 16:09

## 2022-01-01 RX ADMIN — UMECLIDINIUM BROMIDE AND VILANTEROL TRIFENATATE 1 PUFF: 62.5; 25 POWDER RESPIRATORY (INHALATION) at 08:30

## 2022-01-01 RX ADMIN — OXYCODONE HYDROCHLORIDE 10 MG: 5 TABLET ORAL at 02:01

## 2022-01-01 RX ADMIN — ATORVASTATIN CALCIUM 80 MG: 40 TABLET, FILM COATED ORAL at 08:40

## 2022-01-01 RX ADMIN — METOPROLOL SUCCINATE 100 MG: 100 TABLET, EXTENDED RELEASE ORAL at 08:30

## 2022-01-01 RX ADMIN — ACETAMINOPHEN 1000 MG: 500 TABLET, FILM COATED ORAL at 16:22

## 2022-01-01 RX ADMIN — LISINOPRIL 2.5 MG: 2.5 TABLET ORAL at 13:17

## 2022-01-01 RX ADMIN — TAZOBACTAM SODIUM AND PIPERACILLIN SODIUM 3.38 G: 375; 3 INJECTION, SOLUTION INTRAVENOUS at 09:18

## 2022-01-01 RX ADMIN — LIDOCAINE HYDROCHLORIDE 30 ML: 20 SOLUTION OROPHARYNGEAL at 10:53

## 2022-01-01 RX ADMIN — OXYCODONE HYDROCHLORIDE 10 MG: 5 TABLET ORAL at 20:44

## 2022-01-01 RX ADMIN — LORATADINE 10 MG: 10 TABLET ORAL at 09:02

## 2022-01-01 RX ADMIN — ASPIRIN 325 MG: 325 TABLET, COATED ORAL at 08:21

## 2022-01-01 RX ADMIN — AMIODARONE HYDROCHLORIDE 400 MG: 200 TABLET ORAL at 12:28

## 2022-01-01 RX ADMIN — TAZOBACTAM SODIUM AND PIPERACILLIN SODIUM 3.38 G: 375; 3 INJECTION, SOLUTION INTRAVENOUS at 03:29

## 2022-01-01 RX ADMIN — UMECLIDINIUM BROMIDE AND VILANTEROL TRIFENATATE 1 PUFF: 62.5; 25 POWDER RESPIRATORY (INHALATION) at 10:52

## 2022-01-01 RX ADMIN — FENTANYL CITRATE 100 MCG: 50 INJECTION, SOLUTION INTRAMUSCULAR; INTRAVENOUS at 07:40

## 2022-01-01 RX ADMIN — Medication 400 MG: at 09:44

## 2022-01-01 RX ADMIN — AMIODARONE HYDROCHLORIDE 400 MG: 200 TABLET ORAL at 09:11

## 2022-01-01 RX ADMIN — ASPIRIN 325 MG: 325 TABLET, COATED ORAL at 08:04

## 2022-01-01 RX ADMIN — ATORVASTATIN CALCIUM 80 MG: 40 TABLET, FILM COATED ORAL at 09:13

## 2022-01-01 RX ADMIN — HEPARIN SODIUM 1700 UNITS/HR: 10000 INJECTION, SOLUTION INTRAVENOUS at 09:48

## 2022-01-01 RX ADMIN — TAZOBACTAM SODIUM AND PIPERACILLIN SODIUM 3.38 G: 375; 3 INJECTION, SOLUTION INTRAVENOUS at 11:46

## 2022-01-01 RX ADMIN — POTASSIUM CHLORIDE 20 MEQ: 1500 TABLET, EXTENDED RELEASE ORAL at 09:40

## 2022-01-01 RX ADMIN — AMIODARONE HYDROCHLORIDE 400 MG: 200 TABLET ORAL at 08:48

## 2022-01-01 RX ADMIN — OXYCODONE HYDROCHLORIDE 10 MG: 5 TABLET ORAL at 19:16

## 2022-01-01 RX ADMIN — Medication 400 MG: at 16:04

## 2022-01-01 RX ADMIN — SACUBITRIL AND VALSARTAN 1 TABLET: 24; 26 TABLET, FILM COATED ORAL at 08:30

## 2022-01-01 RX ADMIN — HEPARIN SODIUM 1800 UNITS/HR: 10000 INJECTION, SOLUTION INTRAVENOUS at 21:56

## 2022-01-01 RX ADMIN — Medication 0.03 MCG/KG/MIN: at 04:40

## 2022-01-01 RX ADMIN — OXYCODONE HYDROCHLORIDE 10 MG: 5 TABLET ORAL at 00:05

## 2022-01-01 RX ADMIN — ASPIRIN 81 MG: 81 TABLET, COATED ORAL at 08:40

## 2022-01-01 RX ADMIN — TAMSULOSIN HYDROCHLORIDE 0.4 MG: 0.4 CAPSULE ORAL at 13:11

## 2022-01-01 RX ADMIN — ACETAMINOPHEN 1000 MG: 500 TABLET, FILM COATED ORAL at 19:51

## 2022-01-01 RX ADMIN — BUMETANIDE 2 MG: 2 TABLET ORAL at 08:21

## 2022-01-01 RX ADMIN — BUMETANIDE 2 MG: 2 TABLET ORAL at 16:57

## 2022-01-01 RX ADMIN — CEFAZOLIN SODIUM 2 G: 2 INJECTION, SOLUTION INTRAVENOUS at 00:05

## 2022-01-01 RX ADMIN — DOXYCYCLINE 100 MG: 100 INJECTION, POWDER, LYOPHILIZED, FOR SOLUTION INTRAVENOUS at 06:05

## 2022-01-01 RX ADMIN — ASPIRIN 81 MG: 81 TABLET, COATED ORAL at 09:12

## 2022-01-01 RX ADMIN — SODIUM CHLORIDE: 9 INJECTION, SOLUTION INTRAVENOUS at 02:10

## 2022-01-01 RX ADMIN — ATORVASTATIN CALCIUM 80 MG: 40 TABLET, FILM COATED ORAL at 08:29

## 2022-01-01 RX ADMIN — DULOXETINE HYDROCHLORIDE 30 MG: 30 CAPSULE, DELAYED RELEASE ORAL at 21:36

## 2022-01-01 RX ADMIN — LISINOPRIL 2.5 MG: 2.5 TABLET ORAL at 09:16

## 2022-01-01 RX ADMIN — CEFTRIAXONE 2 G: 2 INJECTION, POWDER, FOR SOLUTION INTRAMUSCULAR; INTRAVENOUS at 14:36

## 2022-01-01 RX ADMIN — OXYCODONE HYDROCHLORIDE 10 MG: 5 TABLET ORAL at 09:15

## 2022-01-01 RX ADMIN — ACETAMINOPHEN 975 MG: 325 TABLET, FILM COATED ORAL at 21:20

## 2022-01-01 RX ADMIN — AMIODARONE HYDROCHLORIDE 400 MG: 200 TABLET ORAL at 21:33

## 2022-01-01 RX ADMIN — EMPAGLIFLOZIN 10 MG: 10 TABLET, FILM COATED ORAL at 08:47

## 2022-01-01 RX ADMIN — TAZOBACTAM SODIUM AND PIPERACILLIN SODIUM 3.38 G: 375; 3 INJECTION, SOLUTION INTRAVENOUS at 22:32

## 2022-01-01 RX ADMIN — POTASSIUM CHLORIDE 20 MEQ: 1500 TABLET, EXTENDED RELEASE ORAL at 09:44

## 2022-01-01 RX ADMIN — CEFAZOLIN 1 G: 1 INJECTION, POWDER, FOR SOLUTION INTRAMUSCULAR; INTRAVENOUS at 06:15

## 2022-01-01 RX ADMIN — VASOPRESSIN: 20 INJECTION, SOLUTION INTRAVENOUS at 17:35

## 2022-01-01 RX ADMIN — LIDOCAINE HYDROCHLORIDE: 20 JELLY TOPICAL at 14:54

## 2022-01-01 RX ADMIN — OXYCODONE HYDROCHLORIDE 10 MG: 5 TABLET ORAL at 17:06

## 2022-01-01 RX ADMIN — BUMETANIDE 2 MG: 2 TABLET ORAL at 07:54

## 2022-01-01 RX ADMIN — INSULIN ASPART 1 UNITS: 100 INJECTION, SOLUTION INTRAVENOUS; SUBCUTANEOUS at 15:09

## 2022-01-01 RX ADMIN — AMIODARONE HYDROCHLORIDE 400 MG: 200 TABLET ORAL at 09:42

## 2022-01-01 RX ADMIN — TORSEMIDE 20 MG: 20 TABLET ORAL at 16:21

## 2022-01-01 RX ADMIN — GLYCOPYRROLATE 0.6 MG: 0.2 INJECTION, SOLUTION INTRAMUSCULAR; INTRAVENOUS at 08:53

## 2022-01-01 RX ADMIN — AMIODARONE HYDROCHLORIDE 400 MG: 200 TABLET ORAL at 22:28

## 2022-01-01 RX ADMIN — DOXYCYCLINE 100 MG: 100 INJECTION, POWDER, LYOPHILIZED, FOR SOLUTION INTRAVENOUS at 19:27

## 2022-01-01 RX ADMIN — LORATADINE 10 MG: 10 TABLET ORAL at 14:03

## 2022-01-01 RX ADMIN — OXYCODONE HYDROCHLORIDE 5 MG: 5 TABLET ORAL at 06:20

## 2022-01-01 RX ADMIN — LORATADINE 10 MG: 10 TABLET ORAL at 09:12

## 2022-01-01 RX ADMIN — Medication 400 MG: at 13:47

## 2022-01-01 RX ADMIN — ATORVASTATIN CALCIUM 80 MG: 40 TABLET, FILM COATED ORAL at 09:11

## 2022-01-01 RX ADMIN — TRANEXAMIC ACID 1 G: 10 INJECTION, SOLUTION INTRAVENOUS at 08:42

## 2022-01-01 RX ADMIN — METOPROLOL SUCCINATE 100 MG: 100 TABLET, EXTENDED RELEASE ORAL at 08:29

## 2022-01-01 RX ADMIN — TAZOBACTAM SODIUM AND PIPERACILLIN SODIUM 3.38 G: 375; 3 INJECTION, SOLUTION INTRAVENOUS at 11:05

## 2022-01-01 RX ADMIN — LORATADINE 10 MG: 10 TABLET ORAL at 09:40

## 2022-01-01 RX ADMIN — UMECLIDINIUM BROMIDE AND VILANTEROL TRIFENATATE 1 PUFF: 62.5; 25 POWDER RESPIRATORY (INHALATION) at 10:22

## 2022-01-01 RX ADMIN — FUROSEMIDE 60 MG: 10 INJECTION, SOLUTION INTRAMUSCULAR; INTRAVENOUS at 21:32

## 2022-01-01 RX ADMIN — METOPROLOL SUCCINATE 150 MG: 50 TABLET, EXTENDED RELEASE ORAL at 08:21

## 2022-01-01 RX ADMIN — EMPAGLIFLOZIN 10 MG: 10 TABLET, FILM COATED ORAL at 13:17

## 2022-01-01 RX ADMIN — ACETAMINOPHEN 650 MG: 325 TABLET, FILM COATED ORAL at 12:26

## 2022-01-01 RX ADMIN — METOPROLOL SUCCINATE 100 MG: 100 TABLET, EXTENDED RELEASE ORAL at 09:11

## 2022-01-01 RX ADMIN — OXYCODONE HYDROCHLORIDE 10 MG: 5 TABLET ORAL at 02:00

## 2022-01-01 RX ADMIN — UMECLIDINIUM BROMIDE AND VILANTEROL TRIFENATATE 1 PUFF: 62.5; 25 POWDER RESPIRATORY (INHALATION) at 07:45

## 2022-01-01 RX ADMIN — MAGNESIUM SULFATE HEPTAHYDRATE 2 G: 40 INJECTION, SOLUTION INTRAVENOUS at 09:18

## 2022-01-01 RX ADMIN — DULOXETINE HYDROCHLORIDE 30 MG: 30 CAPSULE, DELAYED RELEASE ORAL at 21:20

## 2022-01-01 RX ADMIN — MAGNESIUM SULFATE HEPTAHYDRATE 2 G: 40 INJECTION, SOLUTION INTRAVENOUS at 07:03

## 2022-01-01 RX ADMIN — CEFAZOLIN 1 G: 1 INJECTION, POWDER, FOR SOLUTION INTRAMUSCULAR; INTRAVENOUS at 15:18

## 2022-01-01 RX ADMIN — AMIODARONE HYDROCHLORIDE 400 MG: 200 TABLET ORAL at 08:31

## 2022-01-01 RX ADMIN — OXYCODONE HYDROCHLORIDE 5 MG: 5 TABLET ORAL at 09:52

## 2022-01-01 RX ADMIN — OXYCODONE HYDROCHLORIDE 10 MG: 5 TABLET ORAL at 13:19

## 2022-01-01 RX ADMIN — ATORVASTATIN CALCIUM 80 MG: 40 TABLET, FILM COATED ORAL at 08:19

## 2022-01-01 RX ADMIN — LORATADINE 10 MG: 10 TABLET ORAL at 09:09

## 2022-01-01 RX ADMIN — LISINOPRIL 2.5 MG: 2.5 TABLET ORAL at 08:47

## 2022-01-01 RX ADMIN — TAMSULOSIN HYDROCHLORIDE 0.4 MG: 0.4 CAPSULE ORAL at 08:39

## 2022-01-01 RX ADMIN — DULOXETINE HYDROCHLORIDE 30 MG: 30 CAPSULE, DELAYED RELEASE ORAL at 21:56

## 2022-01-01 RX ADMIN — TAMSULOSIN HYDROCHLORIDE 0.4 MG: 0.4 CAPSULE ORAL at 09:12

## 2022-01-01 RX ADMIN — AMIODARONE HYDROCHLORIDE 400 MG: 200 TABLET ORAL at 16:31

## 2022-01-01 RX ADMIN — ACETAMINOPHEN 1000 MG: 500 TABLET, FILM COATED ORAL at 23:06

## 2022-01-01 RX ADMIN — LORATADINE 10 MG: 10 TABLET ORAL at 10:01

## 2022-01-01 RX ADMIN — NEOSTIGMINE METHYLSULFATE 3 MG: 1 INJECTION, SOLUTION INTRAVENOUS at 08:53

## 2022-01-01 RX ADMIN — AMIODARONE HYDROCHLORIDE 400 MG: 200 TABLET ORAL at 09:16

## 2022-01-01 RX ADMIN — CEFTRIAXONE 1 G: 2 INJECTION, POWDER, FOR SOLUTION INTRAMUSCULAR; INTRAVENOUS at 08:52

## 2022-01-01 RX ADMIN — ACETAMINOPHEN 1000 MG: 500 TABLET, FILM COATED ORAL at 16:19

## 2022-01-01 RX ADMIN — ACETAMINOPHEN 975 MG: 325 TABLET, FILM COATED ORAL at 06:20

## 2022-01-01 RX ADMIN — METOPROLOL SUCCINATE 25 MG: 25 TABLET, EXTENDED RELEASE ORAL at 10:41

## 2022-01-01 RX ADMIN — OXYCODONE HYDROCHLORIDE 10 MG: 5 TABLET ORAL at 23:38

## 2022-01-01 RX ADMIN — DESMOPRESSIN ACETATE 1 MCG: 4 INJECTION, SOLUTION INTRAVENOUS; SUBCUTANEOUS at 05:26

## 2022-01-01 RX ADMIN — DOXYCYCLINE 100 MG: 100 INJECTION, POWDER, LYOPHILIZED, FOR SOLUTION INTRAVENOUS at 16:31

## 2022-01-01 RX ADMIN — TAZOBACTAM SODIUM AND PIPERACILLIN SODIUM 3.38 G: 375; 3 INJECTION, SOLUTION INTRAVENOUS at 16:19

## 2022-01-01 RX ADMIN — LORATADINE 10 MG: 10 TABLET ORAL at 09:42

## 2022-01-01 RX ADMIN — LISINOPRIL 2.5 MG: 2.5 TABLET ORAL at 14:03

## 2022-01-01 RX ADMIN — TAZOBACTAM SODIUM AND PIPERACILLIN SODIUM 3.38 G: 375; 3 INJECTION, SOLUTION INTRAVENOUS at 04:15

## 2022-01-01 RX ADMIN — IOPAMIDOL 99 ML: 755 INJECTION, SOLUTION INTRAVENOUS at 00:13

## 2022-01-01 RX ADMIN — ACETAMINOPHEN 975 MG: 325 TABLET, FILM COATED ORAL at 13:21

## 2022-01-01 RX ADMIN — POTASSIUM CHLORIDE 10 MEQ: 7.46 INJECTION, SOLUTION INTRAVENOUS at 09:24

## 2022-01-01 RX ADMIN — OXYCODONE HYDROCHLORIDE 10 MG: 5 TABLET ORAL at 09:35

## 2022-01-01 RX ADMIN — ASPIRIN 81 MG: 81 TABLET, COATED ORAL at 09:42

## 2022-01-01 RX ADMIN — BUMETANIDE 2 MG: 2 TABLET ORAL at 11:51

## 2022-01-01 RX ADMIN — SODIUM CHLORIDE, POTASSIUM CHLORIDE, SODIUM LACTATE AND CALCIUM CHLORIDE: 600; 310; 30; 20 INJECTION, SOLUTION INTRAVENOUS at 11:18

## 2022-01-01 RX ADMIN — TAMSULOSIN HYDROCHLORIDE 0.4 MG: 0.4 CAPSULE ORAL at 09:36

## 2022-01-01 RX ADMIN — UMECLIDINIUM BROMIDE AND VILANTEROL TRIFENATATE 1 PUFF: 62.5; 25 POWDER RESPIRATORY (INHALATION) at 08:05

## 2022-01-01 RX ADMIN — LIDOCAINE HYDROCHLORIDE 50 MG: 10 INJECTION, SOLUTION INFILTRATION; PERINEURAL at 07:40

## 2022-01-01 RX ADMIN — METOPROLOL SUCCINATE 100 MG: 100 TABLET, EXTENDED RELEASE ORAL at 09:15

## 2022-01-01 RX ADMIN — ACETAMINOPHEN 1000 MG: 500 TABLET, FILM COATED ORAL at 22:07

## 2022-01-01 RX ADMIN — BUMETANIDE 1 MG: 0.5 TABLET ORAL at 21:54

## 2022-01-01 RX ADMIN — SODIUM CHLORIDE: 9 INJECTION, SOLUTION INTRAVENOUS at 04:45

## 2022-01-01 RX ADMIN — TAZOBACTAM SODIUM AND PIPERACILLIN SODIUM 3.38 G: 375; 3 INJECTION, SOLUTION INTRAVENOUS at 00:22

## 2022-01-01 RX ADMIN — ACETAMINOPHEN 1000 MG: 500 TABLET, FILM COATED ORAL at 21:41

## 2022-01-01 RX ADMIN — TAMSULOSIN HYDROCHLORIDE 0.4 MG: 0.4 CAPSULE ORAL at 11:25

## 2022-01-01 RX ADMIN — SENNOSIDES AND DOCUSATE SODIUM 1 TABLET: 50; 8.6 TABLET ORAL at 19:53

## 2022-01-01 RX ADMIN — POTASSIUM CHLORIDE 20 MEQ: 1500 TABLET, EXTENDED RELEASE ORAL at 16:18

## 2022-01-01 RX ADMIN — Medication 0.1 MCG/KG/MIN: at 11:50

## 2022-01-01 RX ADMIN — AMIODARONE HYDROCHLORIDE 400 MG: 200 TABLET ORAL at 09:36

## 2022-01-01 RX ADMIN — TAMSULOSIN HYDROCHLORIDE 0.4 MG: 0.4 CAPSULE ORAL at 09:42

## 2022-01-01 RX ADMIN — EMPAGLIFLOZIN 10 MG: 10 TABLET, FILM COATED ORAL at 09:36

## 2022-01-01 RX ADMIN — LORATADINE 10 MG: 10 TABLET ORAL at 09:36

## 2022-01-01 RX ADMIN — SODIUM CHLORIDE 500 ML: 9 INJECTION, SOLUTION INTRAVENOUS at 22:36

## 2022-01-01 RX ADMIN — LORATADINE 10 MG: 10 TABLET ORAL at 08:21

## 2022-01-01 RX ADMIN — SODIUM CHLORIDE 1000 ML: 9 INJECTION, SOLUTION INTRAVENOUS at 21:33

## 2022-01-01 RX ADMIN — ACETAMINOPHEN 975 MG: 325 TABLET, FILM COATED ORAL at 13:49

## 2022-01-01 RX ADMIN — TORSEMIDE 20 MG: 20 TABLET ORAL at 09:12

## 2022-01-01 RX ADMIN — BUMETANIDE 2 MG: 2 TABLET ORAL at 08:05

## 2022-01-01 RX ADMIN — CEFTRIAXONE 2 G: 2 INJECTION, POWDER, FOR SOLUTION INTRAMUSCULAR; INTRAVENOUS at 13:41

## 2022-01-01 RX ADMIN — LORATADINE 10 MG: 10 TABLET ORAL at 09:13

## 2022-01-01 RX ADMIN — OXYCODONE HYDROCHLORIDE 10 MG: 5 TABLET ORAL at 14:29

## 2022-01-01 RX ADMIN — TAMSULOSIN HYDROCHLORIDE 0.4 MG: 0.4 CAPSULE ORAL at 08:05

## 2022-01-01 RX ADMIN — OXYCODONE HYDROCHLORIDE 5 MG: 5 TABLET ORAL at 11:53

## 2022-01-01 RX ADMIN — FUROSEMIDE 40 MG: 10 INJECTION, SOLUTION INTRAMUSCULAR; INTRAVENOUS at 16:45

## 2022-01-01 RX ADMIN — CHLORHEXIDINE GLUCONATE 15 ML: 1.2 SOLUTION ORAL at 09:10

## 2022-01-01 RX ADMIN — ATORVASTATIN CALCIUM 80 MG: 40 TABLET, FILM COATED ORAL at 08:20

## 2022-01-01 RX ADMIN — DOXYCYCLINE 100 MG: 100 INJECTION, POWDER, LYOPHILIZED, FOR SOLUTION INTRAVENOUS at 16:42

## 2022-01-01 RX ADMIN — Medication 400 MG: at 11:02

## 2022-01-01 RX ADMIN — Medication 400 MG: at 11:19

## 2022-01-01 RX ADMIN — TAZOBACTAM SODIUM AND PIPERACILLIN SODIUM 3.38 G: 375; 3 INJECTION, SOLUTION INTRAVENOUS at 04:50

## 2022-01-01 RX ADMIN — HYDROXYZINE HYDROCHLORIDE 10 MG: 10 TABLET ORAL at 21:56

## 2022-01-01 RX ADMIN — TAMSULOSIN HYDROCHLORIDE 0.4 MG: 0.4 CAPSULE ORAL at 08:21

## 2022-01-01 RX ADMIN — ROCURONIUM BROMIDE 50 MG: 50 INJECTION, SOLUTION INTRAVENOUS at 11:11

## 2022-01-01 RX ADMIN — TAMSULOSIN HYDROCHLORIDE 0.4 MG: 0.4 CAPSULE ORAL at 08:47

## 2022-01-01 RX ADMIN — NICARDIPINE HYDROCHLORIDE 2.5 MG/HR: 0.2 INJECTION INTRAVENOUS at 07:53

## 2022-01-01 RX ADMIN — AMIODARONE HYDROCHLORIDE 400 MG: 200 TABLET ORAL at 09:13

## 2022-01-01 RX ADMIN — DOXYCYCLINE 100 MG: 100 INJECTION, POWDER, LYOPHILIZED, FOR SOLUTION INTRAVENOUS at 19:06

## 2022-01-01 RX ADMIN — ACETAMINOPHEN 1000 MG: 500 TABLET, FILM COATED ORAL at 23:15

## 2022-01-01 RX ADMIN — RIVAROXABAN 15 MG: 15 TABLET, FILM COATED ORAL at 13:39

## 2022-01-01 RX ADMIN — TAMSULOSIN HYDROCHLORIDE 0.4 MG: 0.4 CAPSULE ORAL at 09:39

## 2022-01-01 RX ADMIN — CEFTRIAXONE SODIUM 1 G: 1 INJECTION, POWDER, FOR SOLUTION INTRAMUSCULAR; INTRAVENOUS at 05:57

## 2022-01-01 RX ADMIN — TAZOBACTAM SODIUM AND PIPERACILLIN SODIUM 3.38 G: 375; 3 INJECTION, SOLUTION INTRAVENOUS at 06:25

## 2022-01-01 RX ADMIN — TORSEMIDE 20 MG: 20 TABLET ORAL at 16:57

## 2022-01-01 RX ADMIN — HUMAN ALBUMIN MICROSPHERES AND PERFLUTREN 3 ML: 10; .22 INJECTION, SOLUTION INTRAVENOUS at 11:29

## 2022-01-01 RX ADMIN — PANTOPRAZOLE SODIUM 40 MG: 40 INJECTION, POWDER, FOR SOLUTION INTRAVENOUS at 06:52

## 2022-01-01 RX ADMIN — OXYCODONE HYDROCHLORIDE 10 MG: 5 TABLET ORAL at 16:58

## 2022-01-01 RX ADMIN — LORATADINE 10 MG: 10 TABLET ORAL at 08:31

## 2022-01-01 RX ADMIN — UMECLIDINIUM BROMIDE AND VILANTEROL TRIFENATATE 1 PUFF: 62.5; 25 POWDER RESPIRATORY (INHALATION) at 07:50

## 2022-01-01 RX ADMIN — LORATADINE 10 MG: 10 TABLET ORAL at 08:05

## 2022-01-01 RX ADMIN — EMPAGLIFLOZIN 10 MG: 10 TABLET, FILM COATED ORAL at 12:29

## 2022-01-01 RX ADMIN — METOPROLOL SUCCINATE 100 MG: 100 TABLET, EXTENDED RELEASE ORAL at 14:03

## 2022-01-01 RX ADMIN — HYDROXYZINE HYDROCHLORIDE 10 MG: 10 TABLET ORAL at 11:51

## 2022-01-01 RX ADMIN — OXYCODONE HYDROCHLORIDE 10 MG: 5 TABLET ORAL at 18:35

## 2022-01-01 RX ADMIN — ASPIRIN 81 MG: 81 TABLET, COATED ORAL at 09:40

## 2022-01-01 RX ADMIN — DULOXETINE HYDROCHLORIDE 30 MG: 30 CAPSULE, DELAYED RELEASE ORAL at 21:44

## 2022-01-01 RX ADMIN — HYDROMORPHONE HYDROCHLORIDE 0.2 MG: 0.2 INJECTION, SOLUTION INTRAMUSCULAR; INTRAVENOUS; SUBCUTANEOUS at 11:56

## 2022-01-01 RX ADMIN — METOPROLOL SUCCINATE 100 MG: 100 TABLET, EXTENDED RELEASE ORAL at 08:47

## 2022-01-01 RX ADMIN — ACETAMINOPHEN 975 MG: 325 TABLET, FILM COATED ORAL at 06:14

## 2022-01-01 RX ADMIN — OXYCODONE HYDROCHLORIDE 5 MG: 5 TABLET ORAL at 09:38

## 2022-01-01 RX ADMIN — DOXYCYCLINE 100 MG: 100 INJECTION, POWDER, LYOPHILIZED, FOR SOLUTION INTRAVENOUS at 16:23

## 2022-01-01 RX ADMIN — SACUBITRIL AND VALSARTAN 1 TABLET: 24; 26 TABLET, FILM COATED ORAL at 09:12

## 2022-01-01 RX ADMIN — LORATADINE 10 MG: 10 TABLET ORAL at 08:48

## 2022-01-01 RX ADMIN — Medication 400 MG: at 02:47

## 2022-01-01 RX ADMIN — TAZOBACTAM SODIUM AND PIPERACILLIN SODIUM 3.38 G: 375; 3 INJECTION, SOLUTION INTRAVENOUS at 16:00

## 2022-01-01 RX ADMIN — TAMSULOSIN HYDROCHLORIDE 0.4 MG: 0.4 CAPSULE ORAL at 09:16

## 2022-01-01 RX ADMIN — TAZOBACTAM SODIUM AND PIPERACILLIN SODIUM 3.38 G: 375; 3 INJECTION, SOLUTION INTRAVENOUS at 18:42

## 2022-01-01 RX ADMIN — AMIODARONE HYDROCHLORIDE 400 MG: 200 TABLET ORAL at 21:59

## 2022-01-01 RX ADMIN — UMECLIDINIUM BROMIDE AND VILANTEROL TRIFENATATE 1 PUFF: 62.5; 25 POWDER RESPIRATORY (INHALATION) at 09:11

## 2022-01-01 RX ADMIN — PANTOPRAZOLE SODIUM 40 MG: 40 INJECTION, POWDER, FOR SOLUTION INTRAVENOUS at 22:37

## 2022-01-01 RX ADMIN — ACETAMINOPHEN 1000 MG: 500 TABLET, FILM COATED ORAL at 03:29

## 2022-01-01 RX ADMIN — ATORVASTATIN CALCIUM 80 MG: 40 TABLET, FILM COATED ORAL at 09:42

## 2022-01-01 RX ADMIN — ACETAMINOPHEN 1000 MG: 500 TABLET, FILM COATED ORAL at 18:08

## 2022-01-01 RX ADMIN — MIDAZOLAM HYDROCHLORIDE 1 MG: 1 INJECTION, SOLUTION INTRAMUSCULAR; INTRAVENOUS at 11:22

## 2022-01-01 RX ADMIN — FUROSEMIDE 40 MG: 10 INJECTION, SOLUTION INTRAMUSCULAR; INTRAVENOUS at 10:58

## 2022-01-01 RX ADMIN — POTASSIUM CHLORIDE 10 MEQ: 7.46 INJECTION, SOLUTION INTRAVENOUS at 10:40

## 2022-01-01 RX ADMIN — POTASSIUM CHLORIDE 20 MEQ: 1500 TABLET, EXTENDED RELEASE ORAL at 16:24

## 2022-01-01 RX ADMIN — ASPIRIN 81 MG: 81 TABLET, COATED ORAL at 08:48

## 2022-01-01 RX ADMIN — MAGNESIUM SULFATE HEPTAHYDRATE 2 G: 40 INJECTION, SOLUTION INTRAVENOUS at 17:10

## 2022-01-01 RX ADMIN — TAZOBACTAM SODIUM AND PIPERACILLIN SODIUM 3.38 G: 375; 3 INJECTION, SOLUTION INTRAVENOUS at 18:35

## 2022-01-01 RX ADMIN — HEPARIN SODIUM 1800 UNITS/HR: 10000 INJECTION, SOLUTION INTRAVENOUS at 18:22

## 2022-01-01 RX ADMIN — TAMSULOSIN HYDROCHLORIDE 0.4 MG: 0.4 CAPSULE ORAL at 09:02

## 2022-01-01 RX ADMIN — DULOXETINE HYDROCHLORIDE 30 MG: 30 CAPSULE, DELAYED RELEASE ORAL at 21:58

## 2022-01-01 RX ADMIN — CEFAZOLIN 1 G: 1 INJECTION, POWDER, FOR SOLUTION INTRAMUSCULAR; INTRAVENOUS at 22:50

## 2022-01-01 RX ADMIN — TORSEMIDE 20 MG: 20 TABLET ORAL at 09:39

## 2022-01-01 RX ADMIN — Medication 2 G: at 07:39

## 2022-01-01 RX ADMIN — TAZOBACTAM SODIUM AND PIPERACILLIN SODIUM 3.38 G: 375; 3 INJECTION, SOLUTION INTRAVENOUS at 16:06

## 2022-01-01 RX ADMIN — POTASSIUM CHLORIDE 40 MEQ: 1500 TABLET, EXTENDED RELEASE ORAL at 09:09

## 2022-01-01 RX ADMIN — TAZOBACTAM SODIUM AND PIPERACILLIN SODIUM 3.38 G: 375; 3 INJECTION, SOLUTION INTRAVENOUS at 22:03

## 2022-01-01 RX ADMIN — GLYCOPYRROLATE 0.2 MG: 0.2 INJECTION, SOLUTION INTRAMUSCULAR; INTRAVENOUS at 07:40

## 2022-01-01 RX ADMIN — DULOXETINE HYDROCHLORIDE 30 MG: 30 CAPSULE, DELAYED RELEASE ORAL at 22:15

## 2022-01-01 RX ADMIN — EMPAGLIFLOZIN 10 MG: 10 TABLET, FILM COATED ORAL at 09:41

## 2022-01-01 RX ADMIN — BUMETANIDE 1 MG: 0.5 TABLET ORAL at 19:47

## 2022-01-01 RX ADMIN — TAZOBACTAM SODIUM AND PIPERACILLIN SODIUM 3.38 G: 375; 3 INJECTION, SOLUTION INTRAVENOUS at 04:20

## 2022-01-01 RX ADMIN — TORSEMIDE 10 MG: 10 TABLET ORAL at 09:36

## 2022-01-01 RX ADMIN — DULOXETINE HYDROCHLORIDE 30 MG: 30 CAPSULE, DELAYED RELEASE ORAL at 21:33

## 2022-01-01 RX ADMIN — LORATADINE 10 MG: 10 TABLET ORAL at 08:47

## 2022-01-01 RX ADMIN — METOPROLOL SUCCINATE 12.5 MG: 25 TABLET, EXTENDED RELEASE ORAL at 10:21

## 2022-01-01 RX ADMIN — EMPAGLIFLOZIN 10 MG: 10 TABLET, FILM COATED ORAL at 08:31

## 2022-01-01 RX ADMIN — SENNOSIDES AND DOCUSATE SODIUM 1 TABLET: 50; 8.6 TABLET ORAL at 08:21

## 2022-01-01 RX ADMIN — ROCURONIUM BROMIDE 50 MG: 50 INJECTION, SOLUTION INTRAVENOUS at 07:41

## 2022-01-01 RX ADMIN — LISINOPRIL 2.5 MG: 2.5 TABLET ORAL at 08:21

## 2022-01-01 RX ADMIN — DULOXETINE HYDROCHLORIDE 30 MG: 30 CAPSULE, DELAYED RELEASE ORAL at 21:59

## 2022-01-01 RX ADMIN — LISINOPRIL 2.5 MG: 2.5 TABLET ORAL at 08:20

## 2022-01-01 RX ADMIN — BUMETANIDE 2 MG: 0.5 TABLET ORAL at 14:03

## 2022-01-01 RX ADMIN — FUROSEMIDE 60 MG: 10 INJECTION, SOLUTION INTRAMUSCULAR; INTRAVENOUS at 16:30

## 2022-01-01 RX ADMIN — OXYCODONE HYDROCHLORIDE 10 MG: 5 TABLET ORAL at 17:00

## 2022-01-01 RX ADMIN — HYDROMORPHONE HYDROCHLORIDE 0.3 MG: 1 INJECTION, SOLUTION INTRAMUSCULAR; INTRAVENOUS; SUBCUTANEOUS at 01:53

## 2022-01-01 RX ADMIN — Medication 400 MG: at 13:49

## 2022-01-01 RX ADMIN — SACUBITRIL AND VALSARTAN 1 TABLET: 24; 26 TABLET, FILM COATED ORAL at 09:43

## 2022-01-01 RX ADMIN — ACETAMINOPHEN 975 MG: 325 TABLET, FILM COATED ORAL at 21:26

## 2022-01-01 RX ADMIN — EMPAGLIFLOZIN 10 MG: 10 TABLET, FILM COATED ORAL at 09:12

## 2022-01-01 RX ADMIN — EMPAGLIFLOZIN 10 MG: 10 TABLET, FILM COATED ORAL at 09:42

## 2022-01-01 RX ADMIN — METOPROLOL SUCCINATE 100 MG: 100 TABLET, EXTENDED RELEASE ORAL at 09:36

## 2022-01-01 RX ADMIN — OXYCODONE HYDROCHLORIDE 10 MG: 5 TABLET ORAL at 14:31

## 2022-01-01 RX ADMIN — SODIUM CHLORIDE, POTASSIUM CHLORIDE, SODIUM LACTATE AND CALCIUM CHLORIDE: 600; 310; 30; 20 INJECTION, SOLUTION INTRAVENOUS at 07:35

## 2022-01-01 RX ADMIN — DULOXETINE HYDROCHLORIDE 30 MG: 30 CAPSULE, DELAYED RELEASE ORAL at 22:11

## 2022-01-01 RX ADMIN — OXYCODONE HYDROCHLORIDE 5 MG: 5 TABLET ORAL at 11:29

## 2022-01-01 RX ADMIN — OXYCODONE HYDROCHLORIDE 10 MG: 5 TABLET ORAL at 00:56

## 2022-01-01 RX ADMIN — FENTANYL CITRATE 50 MCG: 50 INJECTION, SOLUTION INTRAMUSCULAR; INTRAVENOUS at 11:22

## 2022-01-01 RX ADMIN — MORPHINE SULFATE 2 MG: 2 INJECTION, SOLUTION INTRAMUSCULAR; INTRAVENOUS at 13:32

## 2022-01-01 RX ADMIN — ACETAMINOPHEN 1000 MG: 500 TABLET, FILM COATED ORAL at 08:48

## 2022-01-01 RX ADMIN — AMIODARONE HYDROCHLORIDE 400 MG: 200 TABLET ORAL at 16:43

## 2022-01-01 RX ADMIN — OXYCODONE HYDROCHLORIDE 5 MG: 5 TABLET ORAL at 21:35

## 2022-01-01 RX ADMIN — CEFTRIAXONE 2 G: 2 INJECTION, POWDER, FOR SOLUTION INTRAMUSCULAR; INTRAVENOUS at 07:49

## 2022-01-01 RX ADMIN — TAMSULOSIN HYDROCHLORIDE 0.4 MG: 0.4 CAPSULE ORAL at 08:30

## 2022-01-01 RX ADMIN — TAZOBACTAM SODIUM AND PIPERACILLIN SODIUM 3.38 G: 375; 3 INJECTION, SOLUTION INTRAVENOUS at 22:05

## 2022-01-01 RX ADMIN — ATORVASTATIN CALCIUM 80 MG: 40 TABLET, FILM COATED ORAL at 08:05

## 2022-01-01 RX ADMIN — DULOXETINE HYDROCHLORIDE 30 MG: 30 CAPSULE, DELAYED RELEASE ORAL at 21:37

## 2022-01-01 RX ADMIN — HEPARIN SODIUM 1850 UNITS/HR: 10000 INJECTION, SOLUTION INTRAVENOUS at 04:03

## 2022-01-01 RX ADMIN — HEPARIN SODIUM 1700 UNITS/HR: 10000 INJECTION, SOLUTION INTRAVENOUS at 11:28

## 2022-01-01 RX ADMIN — CEFAZOLIN SODIUM 2 G: 2 INJECTION, SOLUTION INTRAVENOUS at 16:26

## 2022-01-01 RX ADMIN — FUROSEMIDE 60 MG: 10 INJECTION, SOLUTION INTRAMUSCULAR; INTRAVENOUS at 08:54

## 2022-01-01 RX ADMIN — DOXYCYCLINE 100 MG: 100 INJECTION, POWDER, LYOPHILIZED, FOR SOLUTION INTRAVENOUS at 08:31

## 2022-01-01 RX ADMIN — Medication 400 MG: at 12:34

## 2022-01-01 RX ADMIN — IOPAMIDOL 75 ML: 755 INJECTION, SOLUTION INTRAVENOUS at 06:18

## 2022-01-01 RX ADMIN — HYDROMORPHONE HYDROCHLORIDE 0.5 MG: 1 INJECTION, SOLUTION INTRAMUSCULAR; INTRAVENOUS; SUBCUTANEOUS at 08:03

## 2022-01-01 RX ADMIN — SODIUM CHLORIDE 73 ML: 9 INJECTION, SOLUTION INTRAVENOUS at 00:13

## 2022-01-01 RX ADMIN — Medication 400 MG: at 11:18

## 2022-01-01 RX ADMIN — HEPARIN SODIUM 1850 UNITS/HR: 10000 INJECTION, SOLUTION INTRAVENOUS at 00:40

## 2022-01-01 RX ADMIN — SACUBITRIL AND VALSARTAN 1 TABLET: 24; 26 TABLET, FILM COATED ORAL at 09:40

## 2022-01-01 RX ADMIN — LISINOPRIL 2.5 MG: 2.5 TABLET ORAL at 09:11

## 2022-01-01 RX ADMIN — ASPIRIN 81 MG: 81 TABLET, COATED ORAL at 13:49

## 2022-01-01 RX ADMIN — PHENYLEPHRINE HYDROCHLORIDE 0.2 MCG/KG/MIN: 10 INJECTION INTRAVENOUS at 12:48

## 2022-01-01 RX ADMIN — GLYCOPYRROLATE 0.1 MG: 0.2 INJECTION, SOLUTION INTRAMUSCULAR; INTRAVENOUS at 10:57

## 2022-01-01 RX ADMIN — MIDAZOLAM 1 MG: 1 INJECTION INTRAMUSCULAR; INTRAVENOUS at 07:35

## 2022-01-01 RX ADMIN — MAGNESIUM SULFATE HEPTAHYDRATE 4 G: 40 INJECTION, SOLUTION INTRAVENOUS at 01:44

## 2022-01-01 RX ADMIN — DULOXETINE HYDROCHLORIDE 30 MG: 30 CAPSULE, DELAYED RELEASE ORAL at 22:05

## 2022-01-01 RX ADMIN — AMIODARONE HYDROCHLORIDE 400 MG: 200 TABLET ORAL at 09:40

## 2022-01-01 RX ADMIN — ACETAMINOPHEN 1000 MG: 500 TABLET, FILM COATED ORAL at 21:12

## 2022-01-01 RX ADMIN — HYDROMORPHONE HYDROCHLORIDE 0.3 MG: 1 INJECTION, SOLUTION INTRAMUSCULAR; INTRAVENOUS; SUBCUTANEOUS at 17:34

## 2022-01-01 RX ADMIN — FUROSEMIDE 40 MG: 10 INJECTION, SOLUTION INTRAMUSCULAR; INTRAVENOUS at 00:55

## 2022-01-01 RX ADMIN — METOPROLOL SUCCINATE 100 MG: 100 TABLET, EXTENDED RELEASE ORAL at 09:12

## 2022-01-01 RX ADMIN — PROTHROMBIN, COAGULATION FACTOR VII HUMAN, COAGULATION FACTOR IX HUMAN, COAGULATION FACTOR X HUMAN, PROTEIN C, PROTEIN S HUMAN, AND WATER 4425 UNITS: KIT at 08:10

## 2022-01-01 RX ADMIN — CEFTRIAXONE 1 G: 1 INJECTION, POWDER, FOR SOLUTION INTRAMUSCULAR; INTRAVENOUS at 05:12

## 2022-01-01 RX ADMIN — ACETAMINOPHEN 1000 MG: 500 TABLET, FILM COATED ORAL at 20:06

## 2022-01-01 RX ADMIN — BUMETANIDE 2 MG: 2 TABLET ORAL at 08:30

## 2022-01-01 RX ADMIN — UMECLIDINIUM BROMIDE AND VILANTEROL TRIFENATATE 1 PUFF: 62.5; 25 POWDER RESPIRATORY (INHALATION) at 07:24

## 2022-01-01 RX ADMIN — PHENYLEPHRINE HYDROCHLORIDE 0.2 MCG/KG/MIN: 10 INJECTION INTRAVENOUS at 08:00

## 2022-01-01 RX ADMIN — ATORVASTATIN CALCIUM 80 MG: 40 TABLET, FILM COATED ORAL at 09:01

## 2022-01-01 RX ADMIN — CEFTRIAXONE 1 G: 1 INJECTION, POWDER, FOR SOLUTION INTRAMUSCULAR; INTRAVENOUS at 05:05

## 2022-01-01 RX ADMIN — ATORVASTATIN CALCIUM 80 MG: 40 TABLET, FILM COATED ORAL at 08:47

## 2022-01-01 RX ADMIN — PHYTONADIONE 10 MG: 10 INJECTION, EMULSION INTRAMUSCULAR; INTRAVENOUS; SUBCUTANEOUS at 10:44

## 2022-01-01 RX ADMIN — DEXTROSE MONOHYDRATE 3 MCG/KG/MIN: 50 INJECTION, SOLUTION INTRAVENOUS at 05:17

## 2022-01-01 RX ADMIN — ATORVASTATIN CALCIUM 80 MG: 40 TABLET, FILM COATED ORAL at 09:15

## 2022-01-01 RX ADMIN — OXYCODONE HYDROCHLORIDE 10 MG: 5 TABLET ORAL at 12:28

## 2022-01-01 RX ADMIN — UMECLIDINIUM BROMIDE AND VILANTEROL TRIFENATATE 1 PUFF: 62.5; 25 POWDER RESPIRATORY (INHALATION) at 08:03

## 2022-01-01 RX ADMIN — HEPARIN SODIUM 1800 UNITS/HR: 10000 INJECTION, SOLUTION INTRAVENOUS at 04:50

## 2022-01-01 RX ADMIN — LISINOPRIL 2.5 MG: 2.5 TABLET ORAL at 09:36

## 2022-01-01 RX ADMIN — LORATADINE 10 MG: 10 TABLET ORAL at 08:29

## 2022-01-01 RX ADMIN — TAZOBACTAM SODIUM AND PIPERACILLIN SODIUM 3.38 G: 375; 3 INJECTION, SOLUTION INTRAVENOUS at 04:00

## 2022-01-01 RX ADMIN — SACUBITRIL AND VALSARTAN 1 TABLET: 24; 26 TABLET, FILM COATED ORAL at 22:11

## 2022-01-01 RX ADMIN — TAZOBACTAM SODIUM AND PIPERACILLIN SODIUM 3.38 G: 375; 3 INJECTION, SOLUTION INTRAVENOUS at 16:21

## 2022-01-01 RX ADMIN — DOXYCYCLINE 100 MG: 100 INJECTION, POWDER, LYOPHILIZED, FOR SOLUTION INTRAVENOUS at 18:56

## 2022-01-01 RX ADMIN — BUMETANIDE 1 MG: 0.5 TABLET ORAL at 19:53

## 2022-01-01 RX ADMIN — GLYCOPYRROLATE 0.2 MG: 0.2 INJECTION, SOLUTION INTRAMUSCULAR; INTRAVENOUS at 13:32

## 2022-01-01 RX ADMIN — HYDROMORPHONE HYDROCHLORIDE 0.3 MG: 1 INJECTION, SOLUTION INTRAMUSCULAR; INTRAVENOUS; SUBCUTANEOUS at 21:13

## 2022-01-01 RX ADMIN — ASPIRIN 81 MG: 81 TABLET, COATED ORAL at 08:31

## 2022-01-01 RX ADMIN — SODIUM CHLORIDE 500 ML: 9 INJECTION, SOLUTION INTRAVENOUS at 23:07

## 2022-01-01 RX ADMIN — OXYCODONE HYDROCHLORIDE 10 MG: 5 TABLET ORAL at 13:47

## 2022-01-01 RX ADMIN — POTASSIUM CHLORIDE 20 MEQ: 1500 TABLET, EXTENDED RELEASE ORAL at 08:05

## 2022-01-01 RX ADMIN — GLYCOPYRROLATE 0.1 MG: 0.2 INJECTION INTRAMUSCULAR; INTRAVENOUS at 10:57

## 2022-01-01 RX ADMIN — OXYCODONE HYDROCHLORIDE 5 MG: 5 TABLET ORAL at 17:06

## 2022-01-01 RX ADMIN — ASPIRIN 81 MG: 81 TABLET, COATED ORAL at 09:11

## 2022-01-01 RX ADMIN — ACETAMINOPHEN 975 MG: 325 TABLET, FILM COATED ORAL at 05:59

## 2022-01-01 RX ADMIN — BUMETANIDE 2 MG: 2 TABLET ORAL at 11:25

## 2022-01-01 RX ADMIN — METOPROLOL SUCCINATE 50 MG: 50 TABLET, EXTENDED RELEASE ORAL at 08:48

## 2022-01-01 RX ADMIN — SACUBITRIL AND VALSARTAN 1 TABLET: 24; 26 TABLET, FILM COATED ORAL at 21:33

## 2022-01-01 RX ADMIN — SODIUM CHLORIDE, POTASSIUM CHLORIDE, SODIUM LACTATE AND CALCIUM CHLORIDE: 600; 310; 30; 20 INJECTION, SOLUTION INTRAVENOUS at 11:09

## 2022-01-01 RX ADMIN — ACETAMINOPHEN 1000 MG: 500 TABLET, FILM COATED ORAL at 08:47

## 2022-01-01 RX ADMIN — METOPROLOL SUCCINATE 100 MG: 100 TABLET, EXTENDED RELEASE ORAL at 09:02

## 2022-01-01 RX ADMIN — DULOXETINE HYDROCHLORIDE 30 MG: 30 CAPSULE, DELAYED RELEASE ORAL at 21:25

## 2022-01-01 RX ADMIN — UMECLIDINIUM BROMIDE AND VILANTEROL TRIFENATATE 1 PUFF: 62.5; 25 POWDER RESPIRATORY (INHALATION) at 08:24

## 2022-01-01 RX ADMIN — LISINOPRIL 2.5 MG: 2.5 TABLET ORAL at 08:05

## 2022-01-01 RX ADMIN — CEFTRIAXONE 1 G: 1 INJECTION, POWDER, FOR SOLUTION INTRAMUSCULAR; INTRAVENOUS at 10:05

## 2022-01-01 RX ADMIN — Medication 400 MG: at 13:11

## 2022-01-01 RX ADMIN — DOXYCYCLINE 100 MG: 100 INJECTION, POWDER, LYOPHILIZED, FOR SOLUTION INTRAVENOUS at 04:12

## 2022-01-01 RX ADMIN — DOXYCYCLINE 100 MG: 100 INJECTION, POWDER, LYOPHILIZED, FOR SOLUTION INTRAVENOUS at 16:49

## 2022-01-01 RX ADMIN — EMPAGLIFLOZIN 10 MG: 10 TABLET, FILM COATED ORAL at 09:01

## 2022-01-01 RX ADMIN — Medication 400 MG: at 14:28

## 2022-01-01 RX ADMIN — HYDROMORPHONE HYDROCHLORIDE 0.5 MG: 1 INJECTION, SOLUTION INTRAMUSCULAR; INTRAVENOUS; SUBCUTANEOUS at 08:13

## 2022-01-01 RX ADMIN — ATORVASTATIN CALCIUM 80 MG: 40 TABLET, FILM COATED ORAL at 10:01

## 2022-01-01 RX ADMIN — ACETAMINOPHEN 975 MG: 325 TABLET, FILM COATED ORAL at 21:55

## 2022-01-01 RX ADMIN — LORATADINE 10 MG: 10 TABLET ORAL at 08:40

## 2022-01-01 RX ADMIN — OXYCODONE HYDROCHLORIDE 10 MG: 5 TABLET ORAL at 16:20

## 2022-01-01 RX ADMIN — SACUBITRIL AND VALSARTAN 1 TABLET: 24; 26 TABLET, FILM COATED ORAL at 21:44

## 2022-01-01 RX ADMIN — MAGNESIUM SULFATE HEPTAHYDRATE 2 G: 40 INJECTION, SOLUTION INTRAVENOUS at 18:32

## 2022-01-01 RX ADMIN — HEPARIN SODIUM 1750 UNITS/HR: 10000 INJECTION, SOLUTION INTRAVENOUS at 20:58

## 2022-01-01 RX ADMIN — AMIODARONE HYDROCHLORIDE 400 MG: 200 TABLET ORAL at 15:18

## 2022-01-01 RX ADMIN — AMIODARONE HYDROCHLORIDE 400 MG: 200 TABLET ORAL at 09:09

## 2022-01-01 RX ADMIN — ASPIRIN 81 MG: 81 TABLET, COATED ORAL at 09:15

## 2022-01-01 RX ADMIN — SODIUM CHLORIDE 90 ML: 900 INJECTION INTRAVENOUS at 06:20

## 2022-01-01 RX ADMIN — DOXYCYCLINE 100 MG: 100 INJECTION, POWDER, LYOPHILIZED, FOR SOLUTION INTRAVENOUS at 04:16

## 2022-01-01 RX ADMIN — TAZOBACTAM SODIUM AND PIPERACILLIN SODIUM 3.38 G: 375; 3 INJECTION, SOLUTION INTRAVENOUS at 12:47

## 2022-01-01 RX ADMIN — Medication 400 MG: at 09:10

## 2022-01-01 RX ADMIN — OXYCODONE HYDROCHLORIDE 5 MG: 5 TABLET ORAL at 19:53

## 2022-01-01 RX ADMIN — DULOXETINE HYDROCHLORIDE 30 MG: 30 CAPSULE, DELAYED RELEASE ORAL at 22:03

## 2022-01-01 RX ADMIN — HYDROMORPHONE HYDROCHLORIDE 0.5 MG: 1 INJECTION, SOLUTION INTRAMUSCULAR; INTRAVENOUS; SUBCUTANEOUS at 13:50

## 2022-01-01 RX ADMIN — DULOXETINE HYDROCHLORIDE 30 MG: 30 CAPSULE, DELAYED RELEASE ORAL at 22:09

## 2022-01-01 RX ADMIN — TAZOBACTAM SODIUM AND PIPERACILLIN SODIUM 3.38 G: 375; 3 INJECTION, SOLUTION INTRAVENOUS at 10:29

## 2022-01-01 RX ADMIN — ACETAMINOPHEN 1000 MG: 500 TABLET, FILM COATED ORAL at 10:01

## 2022-01-01 RX ADMIN — Medication 400 MG: at 22:33

## 2022-01-01 RX ADMIN — METOPROLOL SUCCINATE 100 MG: 100 TABLET, EXTENDED RELEASE ORAL at 08:40

## 2022-01-01 RX ADMIN — ATORVASTATIN CALCIUM 80 MG: 40 TABLET, FILM COATED ORAL at 09:09

## 2022-01-01 RX ADMIN — POTASSIUM CHLORIDE 40 MEQ: 1500 TABLET, EXTENDED RELEASE ORAL at 02:01

## 2022-01-01 RX ADMIN — TAZOBACTAM SODIUM AND PIPERACILLIN SODIUM 3.38 G: 375; 3 INJECTION, SOLUTION INTRAVENOUS at 21:37

## 2022-01-01 RX ADMIN — DULOXETINE HYDROCHLORIDE 30 MG: 30 CAPSULE, DELAYED RELEASE ORAL at 21:13

## 2022-01-01 RX ADMIN — ACETAMINOPHEN 1000 MG: 500 TABLET, FILM COATED ORAL at 18:49

## 2022-01-01 RX ADMIN — AMIODARONE HYDROCHLORIDE 400 MG: 200 TABLET ORAL at 22:11

## 2022-01-01 RX ADMIN — MIDAZOLAM HYDROCHLORIDE 1 MG/HR: 1 INJECTION, SOLUTION INTRAVENOUS at 05:02

## 2022-01-01 RX ADMIN — OXYCODONE HYDROCHLORIDE 10 MG: 5 TABLET ORAL at 08:47

## 2022-01-01 RX ADMIN — MANNITOL 100 G: 20 INJECTION, SOLUTION INTRAVENOUS at 17:00

## 2022-01-01 RX ADMIN — TORSEMIDE 10 MG: 10 TABLET ORAL at 08:47

## 2022-01-01 RX ADMIN — POTASSIUM CHLORIDE 20 MEQ: 1500 TABLET, EXTENDED RELEASE ORAL at 11:19

## 2022-01-01 RX ADMIN — TAZOBACTAM SODIUM AND PIPERACILLIN SODIUM 3.38 G: 375; 3 INJECTION, SOLUTION INTRAVENOUS at 04:48

## 2022-01-01 RX ADMIN — TAZOBACTAM SODIUM AND PIPERACILLIN SODIUM 3.38 G: 375; 3 INJECTION, SOLUTION INTRAVENOUS at 11:14

## 2022-01-01 RX ADMIN — HEPARIN SODIUM 1800 UNITS/HR: 10000 INJECTION, SOLUTION INTRAVENOUS at 17:16

## 2022-01-01 RX ADMIN — POTASSIUM CHLORIDE 20 MEQ: 1500 TABLET, EXTENDED RELEASE ORAL at 16:05

## 2022-01-01 RX ADMIN — ACETAMINOPHEN 975 MG: 325 TABLET, FILM COATED ORAL at 14:31

## 2022-01-01 RX ADMIN — TAMSULOSIN HYDROCHLORIDE 0.4 MG: 0.4 CAPSULE ORAL at 09:11

## 2022-01-01 RX ADMIN — CEFTRIAXONE SODIUM 1 G: 1 INJECTION, POWDER, FOR SOLUTION INTRAMUSCULAR; INTRAVENOUS at 07:40

## 2022-01-01 RX ADMIN — TAZOBACTAM SODIUM AND PIPERACILLIN SODIUM 3.38 G: 375; 3 INJECTION, SOLUTION INTRAVENOUS at 10:40

## 2022-01-01 RX ADMIN — POTASSIUM CHLORIDE 20 MEQ: 1500 TABLET, EXTENDED RELEASE ORAL at 14:51

## 2022-01-01 RX ADMIN — LORATADINE 10 MG: 10 TABLET ORAL at 09:11

## 2022-01-01 RX ADMIN — OXYCODONE HYDROCHLORIDE 10 MG: 5 TABLET ORAL at 09:42

## 2022-01-01 RX ADMIN — SODIUM CHLORIDE 500 ML: 9 INJECTION, SOLUTION INTRAVENOUS at 09:11

## 2022-01-01 RX ADMIN — BUMETANIDE 2 MG: 2 TABLET ORAL at 11:05

## 2022-01-01 RX ADMIN — LORATADINE 10 MG: 10 TABLET ORAL at 09:15

## 2022-01-01 RX ADMIN — POTASSIUM CHLORIDE 40 MEQ: 1500 TABLET, EXTENDED RELEASE ORAL at 11:18

## 2022-01-01 RX ADMIN — UMECLIDINIUM BROMIDE AND VILANTEROL TRIFENATATE 1 PUFF: 62.5; 25 POWDER RESPIRATORY (INHALATION) at 08:09

## 2022-01-01 RX ADMIN — METOPROLOL TARTRATE 25 MG: 25 TABLET, FILM COATED ORAL at 12:41

## 2022-01-01 RX ADMIN — TORSEMIDE 10 MG: 10 TABLET ORAL at 09:15

## 2022-01-01 RX ADMIN — BUMETANIDE 1 MG: 0.5 TABLET ORAL at 18:28

## 2022-01-01 RX ADMIN — ATORVASTATIN CALCIUM 80 MG: 40 TABLET, FILM COATED ORAL at 09:39

## 2022-01-01 RX ADMIN — ASPIRIN 81 MG: 81 TABLET, COATED ORAL at 09:02

## 2022-01-01 RX ADMIN — Medication 400 MG: at 19:53

## 2022-01-01 RX ADMIN — TAZOBACTAM SODIUM AND PIPERACILLIN SODIUM 3.38 G: 375; 3 INJECTION, SOLUTION INTRAVENOUS at 22:15

## 2022-01-01 RX ADMIN — TORSEMIDE 10 MG: 10 TABLET ORAL at 09:11

## 2022-01-01 RX ADMIN — HEPARIN SODIUM 5000 UNITS: 10000 INJECTION, SOLUTION INTRAVENOUS; SUBCUTANEOUS at 19:06

## 2022-01-01 RX ADMIN — HEPARIN SODIUM 1800 UNITS/HR: 10000 INJECTION, SOLUTION INTRAVENOUS at 06:28

## 2022-01-01 RX ADMIN — CEFTRIAXONE SODIUM 1 G: 1 INJECTION, POWDER, FOR SOLUTION INTRAMUSCULAR; INTRAVENOUS at 05:58

## 2022-01-01 RX ADMIN — METOPROLOL SUCCINATE 150 MG: 50 TABLET, EXTENDED RELEASE ORAL at 08:05

## 2022-01-01 RX ADMIN — ETOMIDATE 28 MG: 2 INJECTION INTRAVENOUS at 07:40

## 2022-01-01 RX ADMIN — HYDROMORPHONE HYDROCHLORIDE 0.3 MG: 1 INJECTION, SOLUTION INTRAMUSCULAR; INTRAVENOUS; SUBCUTANEOUS at 05:42

## 2022-01-01 RX ADMIN — ASPIRIN 81 MG: 81 TABLET, COATED ORAL at 09:36

## 2022-01-01 RX ADMIN — CEFTRIAXONE SODIUM 1 G: 1 INJECTION, POWDER, FOR SOLUTION INTRAMUSCULAR; INTRAVENOUS at 05:36

## 2022-01-01 RX ADMIN — TAMSULOSIN HYDROCHLORIDE 0.4 MG: 0.4 CAPSULE ORAL at 09:10

## 2022-01-01 RX ADMIN — AMIODARONE HYDROCHLORIDE 400 MG: 200 TABLET ORAL at 09:00

## 2022-01-01 ASSESSMENT — ACTIVITIES OF DAILY LIVING (ADL)
ADLS_ACUITY_SCORE: 38
ADLS_ACUITY_SCORE: 35
ADLS_ACUITY_SCORE: 30
ADLS_ACUITY_SCORE: 38
ADLS_ACUITY_SCORE: 40
ADLS_ACUITY_SCORE: 36
ADLS_ACUITY_SCORE: 9
ADLS_ACUITY_SCORE: 38
ADLS_ACUITY_SCORE: 9
ADLS_ACUITY_SCORE: 36
ADLS_ACUITY_SCORE: 9
ADLS_ACUITY_SCORE: 34
ADLS_ACUITY_SCORE: 18
ADLS_ACUITY_SCORE: 38
ADLS_ACUITY_SCORE: 24
ADLS_ACUITY_SCORE: 9
ADLS_ACUITY_SCORE: 36
ADLS_ACUITY_SCORE: 38
ADLS_ACUITY_SCORE: 40
ADLS_ACUITY_SCORE: 36
ADLS_ACUITY_SCORE: 36
ADLS_ACUITY_SCORE: 35
ADLS_ACUITY_SCORE: 36
ADLS_ACUITY_SCORE: 34
ADLS_ACUITY_SCORE: 40
ADLS_ACUITY_SCORE: 9
ADLS_ACUITY_SCORE: 40
ADLS_ACUITY_SCORE: 9
ADLS_ACUITY_SCORE: 12
ADLS_ACUITY_SCORE: 9
ADLS_ACUITY_SCORE: 34
ADLS_ACUITY_SCORE: 36
DOING_ERRANDS_INDEPENDENTLY_DIFFICULTY: NO
ADLS_ACUITY_SCORE: 16
ADLS_ACUITY_SCORE: 35
ADLS_ACUITY_SCORE: 36
WEAR_GLASSES_OR_BLIND: YES
ADLS_ACUITY_SCORE: 24
ADLS_ACUITY_SCORE: 12
ADLS_ACUITY_SCORE: 40
ADLS_ACUITY_SCORE: 16
FALL_HISTORY_WITHIN_LAST_SIX_MONTHS: YES
ADLS_ACUITY_SCORE: 9
ADLS_ACUITY_SCORE: 42
ADLS_ACUITY_SCORE: 38
ADLS_ACUITY_SCORE: 36
ADLS_ACUITY_SCORE: 9
ADLS_ACUITY_SCORE: 36
ADLS_ACUITY_SCORE: 34
ADLS_ACUITY_SCORE: 40
ADLS_ACUITY_SCORE: 36
ADLS_ACUITY_SCORE: 18
ADLS_ACUITY_SCORE: 10
ADLS_ACUITY_SCORE: 36
ADLS_ACUITY_SCORE: 35
ADLS_ACUITY_SCORE: 36
ADLS_ACUITY_SCORE: 34
ADLS_ACUITY_SCORE: 16
ADLS_ACUITY_SCORE: 24
ADLS_ACUITY_SCORE: 38
DIFFICULTY_COMMUNICATING: NO
ADLS_ACUITY_SCORE: 24
ADLS_ACUITY_SCORE: 9
DIFFICULTY_EATING/SWALLOWING: NO
ADLS_ACUITY_SCORE: 40
TOILETING_ISSUES: NO
ADLS_ACUITY_SCORE: 36
ADLS_ACUITY_SCORE: 24
ADLS_ACUITY_SCORE: 7
ADLS_ACUITY_SCORE: 38
ADLS_ACUITY_SCORE: 38
DOING_ERRANDS_INDEPENDENTLY_DIFFICULTY: YES
ADLS_ACUITY_SCORE: 40
ADLS_ACUITY_SCORE: 24
ADLS_ACUITY_SCORE: 40
ADLS_ACUITY_SCORE: 53
ADLS_ACUITY_SCORE: 30
ADLS_ACUITY_SCORE: 9
HEARING_DIFFICULTY_OR_DEAF: NO
ADLS_ACUITY_SCORE: 36
ADLS_ACUITY_SCORE: 36
ADLS_ACUITY_SCORE: 40
ADLS_ACUITY_SCORE: 40
ADLS_ACUITY_SCORE: 9
ADLS_ACUITY_SCORE: 18
CONCENTRATING,_REMEMBERING_OR_MAKING_DECISIONS_DIFFICULTY: NO
ADLS_ACUITY_SCORE: 34
ADLS_ACUITY_SCORE: 9
ADLS_ACUITY_SCORE: 40
ADLS_ACUITY_SCORE: 53
ADLS_ACUITY_SCORE: 24
ADLS_ACUITY_SCORE: 40
ADLS_ACUITY_SCORE: 24
ADLS_ACUITY_SCORE: 40
ADLS_ACUITY_SCORE: 30
CONCENTRATING,_REMEMBERING_OR_MAKING_DECISIONS_DIFFICULTY: NO
IADL_COMMENTS: INDP AT BASELINE
ADLS_ACUITY_SCORE: 40
ADLS_ACUITY_SCORE: 9
ADLS_ACUITY_SCORE: 9
ADLS_ACUITY_SCORE: 40
ADLS_ACUITY_SCORE: 20
ADLS_ACUITY_SCORE: 10
VISION_MANAGEMENT: GLASSES
ADLS_ACUITY_SCORE: 40
DIFFICULTY_EATING/SWALLOWING: NO
ADLS_ACUITY_SCORE: 24
ADLS_ACUITY_SCORE: 40
ADLS_ACUITY_SCORE: 36
ADLS_ACUITY_SCORE: 53
ADLS_ACUITY_SCORE: 42
ADLS_ACUITY_SCORE: 40
DRESSING/BATHING_DIFFICULTY: NO
ADLS_ACUITY_SCORE: 9
ADLS_ACUITY_SCORE: 40
ADLS_ACUITY_SCORE: 9
ADLS_ACUITY_SCORE: 36
EQUIPMENT_CURRENTLY_USED_AT_HOME: SHOWER CHAIR;CANE, STRAIGHT
ADLS_ACUITY_SCORE: 38
ADLS_ACUITY_SCORE: 9
ADLS_ACUITY_SCORE: 40
ADLS_ACUITY_SCORE: 36
ADLS_ACUITY_SCORE: 16
ADLS_ACUITY_SCORE: 9
ADLS_ACUITY_SCORE: 40
ADLS_ACUITY_SCORE: 9
ADLS_ACUITY_SCORE: 9
ADLS_ACUITY_SCORE: 42
ADLS_ACUITY_SCORE: 38
ADLS_ACUITY_SCORE: 30
DRESSING/BATHING_DIFFICULTY: NO
FALL_HISTORY_WITHIN_LAST_SIX_MONTHS: YES
ADLS_ACUITY_SCORE: 30
ADLS_ACUITY_SCORE: 9
ADLS_ACUITY_SCORE: 36
ADLS_ACUITY_SCORE: 38
ADLS_ACUITY_SCORE: 9
ADLS_ACUITY_SCORE: 42
ADLS_ACUITY_SCORE: 40
ADLS_ACUITY_SCORE: 42
ADLS_ACUITY_SCORE: 42
ADLS_ACUITY_SCORE: 9
ADLS_ACUITY_SCORE: 40
ADLS_ACUITY_SCORE: 36
ADLS_ACUITY_SCORE: 36
ADLS_ACUITY_SCORE: 9
ADLS_ACUITY_SCORE: 9
ADLS_ACUITY_SCORE: 53
ADLS_ACUITY_SCORE: 36
ADLS_ACUITY_SCORE: 40
ADLS_ACUITY_SCORE: 53
ADLS_ACUITY_SCORE: 24
ADLS_ACUITY_SCORE: 36
ADLS_ACUITY_SCORE: 36
ADLS_ACUITY_SCORE: 24
ADLS_ACUITY_SCORE: 10
ADLS_ACUITY_SCORE: 42
ADLS_ACUITY_SCORE: 40
ADLS_ACUITY_SCORE: 9
ADLS_ACUITY_SCORE: 24
ADLS_ACUITY_SCORE: 38
ADLS_ACUITY_SCORE: 38
ADLS_ACUITY_SCORE: 40
ADLS_ACUITY_SCORE: 10
ADLS_ACUITY_SCORE: 16
DEPENDENT_IADLS:: INDEPENDENT
ADLS_ACUITY_SCORE: 9
ADLS_ACUITY_SCORE: 36
ADLS_ACUITY_SCORE: 9
ADLS_ACUITY_SCORE: 38
ADLS_ACUITY_SCORE: 40
WEAR_GLASSES_OR_BLIND: YES
ADLS_ACUITY_SCORE: 9
ADLS_ACUITY_SCORE: 35
ADLS_ACUITY_SCORE: 40
PATIENT_/_FAMILY_COMMUNICATION_STYLE: SPOKEN LANGUAGE (ENGLISH OR BILINGUAL)
ADLS_ACUITY_SCORE: 16
ADLS_ACUITY_SCORE: 49
ADLS_ACUITY_SCORE: 36
ADLS_ACUITY_SCORE: 40
ADLS_ACUITY_SCORE: 40
ADLS_ACUITY_SCORE: 38
ADLS_ACUITY_SCORE: 40
ADLS_ACUITY_SCORE: 9
ADLS_ACUITY_SCORE: 53
ADLS_ACUITY_SCORE: 36
ADLS_ACUITY_SCORE: 24
ADLS_ACUITY_SCORE: 16
ADLS_ACUITY_SCORE: 9
ADLS_ACUITY_SCORE: 36
ADLS_ACUITY_SCORE: 42
ADLS_ACUITY_SCORE: 9
ADLS_ACUITY_SCORE: 24
ADLS_ACUITY_SCORE: 18
ADLS_ACUITY_SCORE: 9
ADLS_ACUITY_SCORE: 27
ADLS_ACUITY_SCORE: 35
ADLS_ACUITY_SCORE: 40
ADLS_ACUITY_SCORE: 38
ADLS_ACUITY_SCORE: 53
ADLS_ACUITY_SCORE: 24
ADLS_ACUITY_SCORE: 9
ADLS_ACUITY_SCORE: 38
ADLS_ACUITY_SCORE: 9
ADLS_ACUITY_SCORE: 9
ADLS_ACUITY_SCORE: 36
ADLS_ACUITY_SCORE: 24
ADLS_ACUITY_SCORE: 35
ADLS_ACUITY_SCORE: 9
ADLS_ACUITY_SCORE: 9
ADLS_ACUITY_SCORE: 34
ADLS_ACUITY_SCORE: 9
ADLS_ACUITY_SCORE: 9
ADLS_ACUITY_SCORE: 38
ADLS_ACUITY_SCORE: 12
ADLS_ACUITY_SCORE: 40
ADLS_ACUITY_SCORE: 9
ADLS_ACUITY_SCORE: 40
NUMBER_OF_TIMES_PATIENT_HAS_FALLEN_WITHIN_LAST_SIX_MONTHS: 1
ADLS_ACUITY_SCORE: 24
ADLS_ACUITY_SCORE: 36
ADLS_ACUITY_SCORE: 40
ADLS_ACUITY_SCORE: 40
ADLS_ACUITY_SCORE: 42
ADLS_ACUITY_SCORE: 36
ADLS_ACUITY_SCORE: 9
ADLS_ACUITY_SCORE: 9
ADLS_ACUITY_SCORE: 42
ADLS_ACUITY_SCORE: 42
ADLS_ACUITY_SCORE: 38
ADLS_ACUITY_SCORE: 40
ADLS_ACUITY_SCORE: 9
ADLS_ACUITY_SCORE: 27
ADLS_ACUITY_SCORE: 34
ADLS_ACUITY_SCORE: 18
ADLS_ACUITY_SCORE: 34
ADLS_ACUITY_SCORE: 9
ADLS_ACUITY_SCORE: 9
ADLS_ACUITY_SCORE: 38
ADLS_ACUITY_SCORE: 12
ADLS_ACUITY_SCORE: 40
ADLS_ACUITY_SCORE: 16
ADLS_ACUITY_SCORE: 40
ADLS_ACUITY_SCORE: 40
TOILETING_ISSUES: NO
ADLS_ACUITY_SCORE: 38
ADLS_ACUITY_SCORE: 42
WALKING_OR_CLIMBING_STAIRS_DIFFICULTY: NO
ADLS_ACUITY_SCORE: 9
ADLS_ACUITY_SCORE: 9
ADLS_ACUITY_SCORE: 53
ADLS_ACUITY_SCORE: 40
ADLS_ACUITY_SCORE: 34
ADLS_ACUITY_SCORE: 9
ADLS_ACUITY_SCORE: 36
ADLS_ACUITY_SCORE: 40
ADLS_ACUITY_SCORE: 53
ADLS_ACUITY_SCORE: 38
ADLS_ACUITY_SCORE: 36
NUMBER_OF_TIMES_PATIENT_HAS_FALLEN_WITHIN_LAST_SIX_MONTHS: 2
ADLS_ACUITY_SCORE: 9
ADLS_ACUITY_SCORE: 40
ADLS_ACUITY_SCORE: 9
ADLS_ACUITY_SCORE: 40
ADLS_ACUITY_SCORE: 34
ADLS_ACUITY_SCORE: 40
ADLS_ACUITY_SCORE: 53
ADLS_ACUITY_SCORE: 40
ADLS_ACUITY_SCORE: 10
ADLS_ACUITY_SCORE: 38
ADLS_ACUITY_SCORE: 40
ADLS_ACUITY_SCORE: 34
ADLS_ACUITY_SCORE: 38
ADLS_ACUITY_SCORE: 38
WALKING_OR_CLIMBING_STAIRS_DIFFICULTY: NO
ADLS_ACUITY_SCORE: 30
ADLS_ACUITY_SCORE: 9
ADLS_ACUITY_SCORE: 35
ADLS_ACUITY_SCORE: 34
ADLS_ACUITY_SCORE: 34
ADLS_ACUITY_SCORE: 9
ADLS_ACUITY_SCORE: 40
ADLS_ACUITY_SCORE: 30
ADLS_ACUITY_SCORE: 10
ADLS_ACUITY_SCORE: 30
ADLS_ACUITY_SCORE: 38
ADLS_ACUITY_SCORE: 16
ADLS_ACUITY_SCORE: 36
ADLS_ACUITY_SCORE: 36
ADLS_ACUITY_SCORE: 10
ADLS_ACUITY_SCORE: 38
ADLS_ACUITY_SCORE: 9
ADLS_ACUITY_SCORE: 40
ADLS_ACUITY_SCORE: 36
ADLS_ACUITY_SCORE: 27
ADLS_ACUITY_SCORE: 36
ADLS_ACUITY_SCORE: 31
ADLS_ACUITY_SCORE: 18
ADLS_ACUITY_SCORE: 7
ADLS_ACUITY_SCORE: 24
ADLS_ACUITY_SCORE: 36
ADLS_ACUITY_SCORE: 24
ADLS_ACUITY_SCORE: 36
ADLS_ACUITY_SCORE: 7
ADLS_ACUITY_SCORE: 53
ADLS_ACUITY_SCORE: 40
ADLS_ACUITY_SCORE: 27
ADLS_ACUITY_SCORE: 9
ADLS_ACUITY_SCORE: 18
ADLS_ACUITY_SCORE: 12
ADLS_ACUITY_SCORE: 42
ADLS_ACUITY_SCORE: 34

## 2022-01-01 ASSESSMENT — MIFFLIN-ST. JEOR
SCORE: 1648.97
SCORE: 1615.4
SCORE: 1596.35

## 2022-01-01 ASSESSMENT — ENCOUNTER SYMPTOMS
DIARRHEA: 1
DIARRHEA: 1
DYSURIA: 0
VOMITING: 0
SHORTNESS OF BREATH: 0
DIFFICULTY URINATING: 0
DYSRHYTHMIAS: 1
BLOOD IN STOOL: 0
BLOOD IN STOOL: 1
BACK PAIN: 1
FEVER: 1
DYSRHYTHMIAS: 1
VOMITING: 0
FREQUENCY: 0

## 2022-01-01 ASSESSMENT — VISUAL ACUITY
OU: OTHER (SEE COMMENT)

## 2022-01-01 ASSESSMENT — COPD QUESTIONNAIRES
CAT_SEVERITY: SEVERE
COPD: 1
COPD: 1

## 2022-01-01 ASSESSMENT — LIFESTYLE VARIABLES: TOBACCO_USE: 0

## 2022-01-23 PROBLEM — S72.145A CLOSED NONDISPLACED INTERTROCHANTERIC FRACTURE OF LEFT FEMUR, INITIAL ENCOUNTER (H): Status: ACTIVE | Noted: 2022-01-01
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
## 2022-01-23 NOTE — ED NOTES
Bed: ED23  Expected date: 1/23/22  Expected time:   Means of arrival:   Comments:  Teirra Muhammad3 leg pain

## 2022-01-23 NOTE — ED PROVIDER NOTES
History   Chief Complaint:  Hip Pain      HPI   Toney Denton is a 71 year old male with complicated past medical history who presents with hip pain. The patient reports that he has chronic left sided hip pain. He states his pain was exacerbated last night after fall landing on left side. Since fall the patient reports he has been unable to ambulate and weight bear. For pain, the patient reports taking tylenol. He denies hitting his head or experiencing loss of consciousness at time of fall. He denies numbness and weakness. The patient denies cough, nausea, chest pain and abdominal pain as well.     Review of Systems   All other systems reviewed and are negative.      Allergies:  Sulindac   Latex     Medications:  Nitrostat   Zestril   Albuterol   Ellipta   Senokot   Bumex   Lipitor   Toprol-XL   Cymbalta     Past Medical History:     Congestive heart failure   Femoral artery pseudo-aneurysm   Ventricular tachycardia   COPD severe   Carotid artery stenosis   Coagulopathy   CAD  Atrial fibrillation  Hypertension  Hyperlipidemia   Ischemic cardiomyopathy   Severe mitral regurgitation   Obesity  Alcohol abuse   Diabetes    Past Surgical History:    Coronary angiogram   Cataract   CABG   Capsulotomy   Tricuspid valve repair   JEFF clip   Maze procedure   Cardiac pacemaker placement   Biventricular implantable upgrade   Hernia repair with mesh     Family History:    Mother- colon cancer  Father- diabetes   Brother- diabetes     Social History:  The patient presents alone.     Physical Exam     Patient Vitals for the past 24 hrs:   BP Temp Temp src Pulse Resp SpO2 Weight   01/23/22 1113 (!) 153/81 99.6  F (37.6  C) Oral 78 18 97 % 89.4 kg (197 lb)       Physical Exam  General: Resting on the bed.  Head: No obvious trauma to head.  Ears, Nose, Throat:  External ears normal.  Nose normal.    Eyes:  Conjunctivae clear.  Pupils are equal, round, and reactive.   Neck: Normal range of motion.  Neck supple.   CV: Regular rate and  rhythm.  No murmurs.      Respiratory: Effort normal and breath sounds normal.  No wheezing or crackles.   Gastrointestinal: Soft.  No distension. There is no tenderness.  There is no rigidity, no rebound and no guarding.   Musculoskeletal: Left hip with tenderness with logroll, flexion and extension.  Tender over the left lateral hip.  2+ DP pulses.  Sensation intact to light touch.  Dorsi and plantar flexion 5 out of 5.  Neuro: Alert. Moving all extremities appropriately.  Normal speech.  GCS 15.    Skin: Skin is warm and dry.  No rash noted.       Emergency Department Course   EKG:   ECG taken at 1311, ECG read at 1320  Atrial tachycardia   Rate controlled   Incomplete left bundle branch block   Nonspecific ST and T wave abnormality   Rate 74 bpm. ND interval *. QRS duration 116. QT/QTc 412/457. P-R-T axes * -14 51.    ECG taken at 1323, ECG read at 1325  Atrial tachycardia   Rate controlled   Nonspecific ST abnormality  Rate 75 bpm. ND interval *. QRS duration 114. QT/QTc 408/455. P-R-T axes * -7 46.    Imaging:  XR Femur Left 2 Views   Final Result   IMPRESSION:   1.  Oblique nondisplaced fracture of the left intertrochanteric femur.   2.  The remaining left femur is unremarkable.   3.  Advanced left hip degenerative arthrosis.   4.  Advanced right hip degenerative arthrosis.   5.  Atherosclerotic calcification.         XR Pelvis 1/2 Views   Final Result   IMPRESSION:   1.  Oblique nondisplaced fracture of the left intertrochanteric femur.   2.  The remaining left femur is unremarkable.   3.  Advanced left hip degenerative arthrosis.   4.  Advanced right hip degenerative arthrosis.   5.  Atherosclerotic calcification.           Report per radiology    Laboratory:  Labs Ordered and Resulted from Time of ED Arrival to Time of ED Departure   PARTIAL THROMBOPLASTIN TIME - Abnormal       Result Value    aPTT 40 (*)    BASIC METABOLIC PANEL - Abnormal    Sodium 137      Potassium 4.0      Chloride 99      Carbon  Dioxide (CO2) 32      Anion Gap 6      Urea Nitrogen 22      Creatinine 0.74      Calcium 9.3      Glucose 122 (*)     GFR Estimate >90     CBC WITH PLATELETS AND DIFFERENTIAL - Abnormal    WBC Count 8.3      RBC Count 4.02 (*)     Hemoglobin 13.8      Hematocrit 42.0       (*)     MCH 34.3 (*)     MCHC 32.9      RDW 12.4      Platelet Count 115 (*)     % Neutrophils 79      % Lymphocytes 6      % Monocytes 13      % Eosinophils 1      % Basophils 0      % Immature Granulocytes 1      NRBCs per 100 WBC 0      Absolute Neutrophils 6.6      Absolute Lymphocytes 0.5 (*)     Absolute Monocytes 1.0      Absolute Eosinophils 0.1      Absolute Basophils 0.0      Absolute Immature Granulocytes 0.0      Absolute NRBCs 0.0     INR - Normal    INR 1.09     COVID-19 VIRUS (CORONAVIRUS) BY PCR   TYPE AND SCREEN, ADULT    ABO/RH(D) A POS      Antibody Screen Negative      SPECIMEN EXPIRATION DATE [id number]     ABO/RH TYPE AND SCREEN        Procedures    Emergency Department Course:       Reviewed:  I reviewed nursing notes, vitals, past medical history, Care Everywhere and [facility]    Assessments:  1139 I obtained history and examined the patient as noted above.     1341 I rechecked the patient and explained findings.     Consults:  1352  I spoke with Dr. Maher of the Hospitalist service from [facility] regarding patient's presentation, findings, and plan of care.    1354 I spoke with Dr. Arias of orthopedics regarding patient's presentation, findings, and plan of care.     1405  I spoke with Dr. Harry of cardiology regarding patient's presentation, findings, and plan of care.       1413 I spoke with Dr. Arias of orthopedics regarding patient's presentation, findings, and plan of care.     1415  I spoke with Dr. Harry of cardiology regarding patient's presentation, findings, and plan of care.      Interventions:  1156 Dilaudid 0.2 mg IV     1350 Dilaudid 0.5 mg IV     Disposition:  The patient was admitted to the  hospital under the care of Dr. Maher.     Impression & Plan     Medical Decision Makin-year-old male presents with hip pain.  Vital signs are reassuring.  Broad differential was pursued including but not limited to fracture, fall, contusion, dislocation, neurovascular injury, sprain strain, etc. besides the left hip pain no other injuries noted on head to toe examination.  X-ray confirms a oblique nondisplaced left intertrochanteric femur fracture.  Patient is neurovascular intact distal to the fracture.  Basic labs were obtained and orthopedics was consulted.  CBC without leukocytosis or anemia.  BMP without acute electrolyte, metabolic or renal dysfunction.  Patient does not appear to be on anticoagulation.  Preoperative EKG was obtained with a very difficult to ascertain rhythm.  Spoke with cardiology likely atrial tachycardia but rate is controlled.  Patient does have a history of atrial fibrillation.  Patient has a pacemaker ICD as well.  Patient will need cardiac clearance prior to surgical management.  Spoke with hospitalist who graciously accepted.    Diagnosis:    ICD-10-CM    1. Closed nondisplaced intertrochanteric fracture of left femur, initial encounter (H)  S72.145A    2. Fall, initial encounter  W19.XXXA        Scribe Disclosure:  I, Edilia Garrido, am serving as a scribe at 11:12 AM on 2022 to document services personally performed by Felisa Roberson MD based on my observations and the provider's statements to me.            Felisa Roberson MD  [phone]

## 2022-01-23 NOTE — ED NOTES
[facility]  ED Nurse Handoff Report    Toney Denton is a 71 year old male   ED Chief complaint: Groin Pain  . ED Diagnosis:   Final diagnoses:   Closed nondisplaced intertrochanteric fracture of left femur, initial encounter (H)   Fall, initial encounter     Allergies:   Allergies   Allergen Reactions     Spironolactone Unknown     Reported side effects after receiving it after heart surgery, resolved with ablasion       Code Status: Full Code  Activity level - Baseline/Home:  Independent. Activity Level - Current:   Unable to Assess. Lift room needed: No. Bariatric: No   Needed: No   Isolation: No. Infection: Not Applicable.     Vital Signs:   Vitals:    01/23/22 1113   BP: (!) 153/81   Pulse: 78   Resp: 18   Temp: 99.6  F (37.6  C)   TempSrc: Oral   SpO2: 97%   Weight: 89.4 kg (197 lb)       Cardiac Rhythm:  ,      Pain level:    Patient confused: No. Patient Falls Risk: Yes.   Elimination Status: Has voided   Patient Report - Initial Complaint: fall, left groin pain. Focused Assessment: Musculoskeletal - Musculoskeletal WDL: .WDL except; extremity movement; mobility  Extremity Movement: LLE  General Mobility: significantly impaired  Pain Body Location: groin  LLE Extremity Movement: active ROM moderately impaired  LLE Weight-Bearing Status: nonweight bearing  Musculoskeletal Comment: patient has chronic left groin pain, states has been improving, but fell last evening and now unable to move leg or bear weight due to extreme pain in left groin area, patient has significant pain even with palpation.    Tests Performed:   Labs Ordered and Resulted from Time of ED Arrival to Time of ED Departure   PARTIAL THROMBOPLASTIN TIME - Abnormal       Result Value    aPTT 40 (*)    BASIC METABOLIC PANEL - Abnormal    Sodium 137      Potassium 4.0      Chloride 99      Carbon Dioxide (CO2) 32      Anion Gap 6      Urea Nitrogen 22      Creatinine 0.74      Calcium 9.3      Glucose 122 (*)     GFR  Estimate >90     CBC WITH PLATELETS AND DIFFERENTIAL - Abnormal    WBC Count 8.3      RBC Count 4.02 (*)     Hemoglobin 13.8      Hematocrit 42.0       (*)     MCH 34.3 (*)     MCHC 32.9      RDW 12.4      Platelet Count 115 (*)     % Neutrophils 79      % Lymphocytes 6      % Monocytes 13      % Eosinophils 1      % Basophils 0      % Immature Granulocytes 1      NRBCs per 100 WBC 0      Absolute Neutrophils 6.6      Absolute Lymphocytes 0.5 (*)     Absolute Monocytes 1.0      Absolute Eosinophils 0.1      Absolute Basophils 0.0      Absolute Immature Granulocytes 0.0      Absolute NRBCs 0.0     INR - Normal    INR 1.09     COVID-19 VIRUS (CORONAVIRUS) BY PCR   TYPE AND SCREEN, ADULT    ABO/RH(D) A POS      Antibody Screen Negative      SPECIMEN EXPIRATION DATE [id number]     ABO/RH TYPE AND SCREEN     XR Femur Left 2 Views   Final Result   IMPRESSION:   1.  Oblique nondisplaced fracture of the left intertrochanteric femur.   2.  The remaining left femur is unremarkable.   3.  Advanced left hip degenerative arthrosis.   4.  Advanced right hip degenerative arthrosis.   5.  Atherosclerotic calcification.         XR Pelvis 1/2 Views   Final Result   IMPRESSION:   1.  Oblique nondisplaced fracture of the left intertrochanteric femur.   2.  The remaining left femur is unremarkable.   3.  Advanced left hip degenerative arthrosis.   4.  Advanced right hip degenerative arthrosis.   5.  Atherosclerotic calcification.             . Abnormal Results: see EMR.   Treatments provided:   Medications   HYDROmorphone (DILAUDID) injection 0.2 mg (0.2 mg Intravenous Given 1/23/22 1156)   HYDROmorphone (PF) (DILAUDID) injection 0.5 mg (0.5 mg Intravenous Given 1/23/22 1350)       Family Comments: no family present  OBS brochure/video discussed/provided to patient:  N/A  ED Medications:   Medications   HYDROmorphone (DILAUDID) injection 0.2 mg (0.2 mg Intravenous Given 1/23/22 1156)   HYDROmorphone (PF) (DILAUDID) injection  0.5 mg (0.5 mg Intravenous Given 1/23/22 1350)     Drips infusing:  No  For the majority of the shift, the patient's behavior Green. Interventions performed were NA.    Sepsis treatment initiated: No     Patient tested for COVID 19 prior to admission: YES    ED Nurse Name/Phone Number: KERRI HOWE RN,   1:58 PM  .RECEIVING UNIT ED HANDOFF REVIEW    Above ED Nurse Handoff Report was reviewed: Yes  Reviewed by: Brittaney Mcdonald, DAWSON on January 23, 2022 at 4:50 PM

## 2022-01-23 NOTE — H&P
History and Physical     Toney Denton MRN# [id number]   YOB: 1950 Age: 71 year old      Date of Admission:  1/23/2022    Primary care provider: Omar Locke          Assessment and Plan:     Summary of Stay: Toney Denton is a 71 year old male with a history ofischemic CM with most recent EF 25-50 % in 4/2021 with biV pacer, complicated by Vtach and has ICD in place, bioprosthetic mitral valve replacement for severe MR, Tricuspid repair for TR, htn/hlp, severe COPD not on home O2 admitted on 1/23/2022 left sided hip pain after a mechanical fall 2 nights pta and found to have left intertrochanteric femur fx    He states he was just walking down the stairs and maybe did a mis step and fell, but to him it felt like his leg just collapsed out from under him.  His wife and kid helped him up and to bed.  Then yesterday had increasing pain that he just tried to manage.  Today, however, pain became too much so felt like he should come in a get evaluated     At baseline he is independent in all activities, he's tolerated anesthesia without difficulty, has never required a blood transfusion although at his last surgery which was a hernia repair apparently there was an issue with blood loss but he still didn;t require any blood products. He's chronically on asa.  He can walk up 2 flights without stopping to rest without cp or sob.  He has no le edema and denies any orthopnea or PND.  No recent fevers/chills/sweats/sob/cp    Plans are for surgical repair tomorrow      Problem List:   Left IT femur fx  Plan is for surgical repair in the am  -low NA diet now and NPO after MN  -IV hydromorphone and po oxy with sched apap for pain control   -bladder scan and check PVR as at risk for urinary retention given age/bedrest/narcs  -would NOT recommend IVF overnight in light of depressed ventricular function     Pre-Op patient with multiple cardiac risk factors but he appears to be in well compensated heart failure.   No reversible issues identified,    Ischemic CM with EF 25-30 %  Complicated by v tach  htn/hlp  Has biV pacer and ICD in place  Monitor on tele now and 24 hours post op   pta regimen is bumetanide 2 mg am/noon, 1 mg evening, lisinopril 2.5 mg every day, metop xl 150 mg qd  -resumed all but will hold am bumetanide and lisinopril and give later  -resume pta atorvastatin     Bioprosthetic MVR  S/p tricuspid repair  -appears compensated    Thrombocytopenia  Had e/o it last 2/2021 fairly mild.  Currently at 115, no indication for transfusion.  Will need to monitor periodically     COPD resume home inhalers, no e/o exacerbation      COVID 19 negative  S/p 2 shot series with pfizer in 5/2021    DVT Prophylaxis: Pneumatic Compression Devices  Code Status: Full Code  Functional Status: independent at baseline   Shaffer:not currently needed but at risk for retention.  Bladder scan - ck PVR bid and prn   Access:  PIV          We are operating under sub optimal conditions in the setting of a world wide pandemic, hospitals are running at full capacity with limited ICU bed availability. We are providing the best possible patient care with limited resources.           Chief Complaint:   Left hip pain         History of Present Illness:     Toney Denton is a 71 year old male with a history ofischemic CM with most recent EF 25-50 % in 4/2021 with biV pacer, complicated by Vtach and has ICD in place, bioprosthetic mitral valve replacement for severe MR, Tricuspid repair for TR, htn/hlp, severe COPD not on home O2 admitted on 1/23/2022 left sided hip pain after a mechanical fall 2 nights pta and found to have left intertrochanteric femur fx    He states he was just walking down the stairs and maybe did a mis step and fell, but to him it felt like his leg just collapsed out from under him.  His wife and kid helped him up and to bed.  Then yesterday had increasing pain that he just tried to manage.  Today, however, pain became too much so  felt like he should come in a get evaluated     At baseline he is independent in all activities, he's tolerated anesthesia without difficulty, has never required a blood transfusion although at his last surgery which was a hernia repair apparently there was an issue with blood loss but he still didn;t require any blood products. He's chronically on asa.  He can walk up 2 flights without stopping to rest without cp or sob.  He has no le edema and denies any orthopnea or PND.  No recent fevers/chills/sweats/sob/cp    Plans are for surgical repair tomorrow    The history is obtained in discussion with the ER provider Dr Roberson and the patient with good reliability     Epic and Care everywhere were extensively reviewed        Past Medical History:     Past Medical History:   Diagnosis Date     Benign essential hypertension      Chronic atrial fibrillation (H)     s/p ablation/maze procedure/LA clip.  on ASA for stroke ppx     COPD (chronic obstructive pulmonary disease) (H)     severe by PFTs     Hyperlipidemia LDL goal <100      Ischemic cardiomyopathy     EF 25 % by echo 4/2021, with hx of VTach s/p biV pacer and ICD     Mitral valve regurgitation     s/p bioprosthetic MVR     Tricuspid regurgitation     s/p repair             Past Surgical History:     Past Surgical History:   Procedure Laterality Date     CABG GARTH QUAL ACT PERFORM       MITRAL VALVE REPLACEMENT       TRICUSPID VALVULOPLASTY               Social History:     Social History     Tobacco Use     Smoking status: Former Smoker     Types: Cigars     Smokeless tobacco: Not on file   Substance Use Topics     Alcohol use: Yes     Comment: 3 beers (IPA) per day -can go for days without drinking and no hx of w/d             Family History:     Family History   Problem Relation Age of Onset     Diabetes Father      Diabetes Brother             Allergies:     Allergies   Allergen Reactions     Spironolactone Unknown     Reported side effects after receiving it  after heart surgery, resolved with ablasion             Medications:     Prior to Admission medications    Medication Sig Last Dose Taking? Auth Provider   acetaminophen (TYLENOL) 500 MG tablet Take 1,000 mg by mouth every 6 hours as needed for mild pain 1/23/2022 at 0645 Yes Unknown, Entered By History   albuterol (PROAIR HFA/PROVENTIL HFA/VENTOLIN HFA) 108 (90 Base) MCG/ACT inhaler Inhale 1-2 puffs into the lungs every 4 hours as needed for shortness of breath / dyspnea or wheezing Past Week at Unknown time Yes Unknown, Entered By History   aspirin 81 MG EC tablet Take 81 mg by mouth every morning 1/23/2022 at am Yes Unknown, Entered By History   atorvastatin (LIPITOR) 80 MG tablet Take 80 mg by mouth every morning 1/23/2022 at am Yes Unknown, Entered By History   bumetanide (BUMEX) 2 MG tablet Take 2 mg by mouth 2 times daily In the morning and at 12 PM  Yes Unknown, Entered By History   bumetanide (BUMEX) 2 MG tablet Take 1 mg by mouth every evening 1/22/2022 at pm Yes Unknown, Entered By History   DULoxetine (CYMBALTA) 30 MG capsule Take 30 mg by mouth At Bedtime 1/22/2022 at pm Yes Unknown, Entered By History   lisinopril (ZESTRIL) 2.5 MG tablet Take 2.5 mg by mouth every morning 1/23/2022 at am Yes Unknown, Entered By History   loratadine (CLARITIN) 10 MG tablet Take 10 mg by mouth every morning 1/23/2022 at am Yes Unknown, Entered By History   metoprolol succinate ER (TOPROL-XL) 100 MG 24 hr tablet Take 150 mg by mouth every morning 1/23/2022 at am Yes Unknown, Entered By History   nitroGLYcerin (NITROSTAT) 0.4 MG sublingual tablet Place 0.4 mg under the tongue every 5 minutes as needed for chest pain For chest pain place 1 tablet under the tongue every 5 minutes for 3 doses. If symptoms persist 5 minutes after 1st dose call 911.  Yes Unknown, Entered By History   potassium chloride ER (KLOR-CON M) 20 MEQ CR tablet Take 20 mEq by mouth every morning 1/23/2022 at am Yes Unknown, Entered By History    umeclidinium-vilanterol (ANORO ELLIPTA) 62.5-25 MCG/INH oral inhaler Inhale 1 puff into the lungs every morning 1/23/2022 at am Yes Unknown, Entered By History                 Review of Systems:     A Comprehensive greater than 10 system review of systems was carried out.  Pertinent positives and negatives are noted above.  Otherwise negative for contributory information.           Physical Exam:   Blood pressure (!) 153/81, pulse 78, temperature 99.6  F (37.6  C), temperature source Oral, resp. rate 18, weight 89.4 kg (197 lb), SpO2 97 %.  Exam:    General:  Pleasant nad looks stated age  HEENT:  Head nc/at sclera clear PERRL O/P:  Mask in place  Neck is supple  Lungs: cta b nl effort   CV:  RRR no m/r/g no le edema  Abd:  S/nt/nd no r/g  Neuro:  Cn 2-12 grossly intact and zacarias x LLE 2/2 pain   Alert and oriented affect appropriate   Skin:  W/d no c/c               Data:          Lab Results   Component Value Date     01/23/2022    Lab Results   Component Value Date    CHLORIDE 99 01/23/2022    Lab Results   Component Value Date    BUN 22 01/23/2022      Lab Results   Component Value Date    POTASSIUM 4.0 01/23/2022    Lab Results   Component Value Date    CO2 32 01/23/2022    Lab Results   Component Value Date    CR 0.74 01/23/2022        Lab Results   Component Value Date    WBC 8.3 01/23/2022    HGB 13.8 01/23/2022    HCT 42.0 01/23/2022     (H) 01/23/2022     (L) 01/23/2022     Lab Results   Component Value Date     (H) 01/23/2022     Lab Results   Component Value Date    INR 1.09 01/23/2022     EKG is regular, atrial driven rhythm without clear p waves.  No acute or concerning findings to my read       Imaging:     Recent Results (from the past 24 hour(s))   XR Pelvis 1/2 Views    Narrative    EXAM: XR PELVIS 1/2 VIEW, XR FEMUR LEFT 2 VIEW  LOCATION: [facility] [place]  DATE/TIME: 01/23/2022, 12:30 PM    INDICATION: Left hip and femur pain.  COMPARISON: None.       Impression    IMPRESSION:  1.  Oblique nondisplaced fracture of the left intertrochanteric femur.  2.  The remaining left femur is unremarkable.  3.  Advanced left hip degenerative arthrosis.  4.  Advanced right hip degenerative arthrosis.  5.  Atherosclerotic calcification.     XR Femur Left 2 Views    Narrative    EXAM: XR PELVIS 1/2 VIEW, XR FEMUR LEFT 2 VIEW  LOCATION: [facility]  DATE/TIME: 01/23/2022, 12:30 PM    INDICATION: Left hip and femur pain.  COMPARISON: None.      Impression    IMPRESSION:  1.  Oblique nondisplaced fracture of the left intertrochanteric femur.  2.  The remaining left femur is unremarkable.  3.  Advanced left hip degenerative arthrosis.  4.  Advanced right hip degenerative arthrosis.  5.  Atherosclerotic calcification.

## 2022-01-23 NOTE — ED TRIAGE NOTES
Presents via ambulance from his home.  Has chronic left hip pain, has been improving but last evening missed a step and fell, no loss of consciousness.  Now is unable to bear weight on left hip,.

## 2022-01-23 NOTE — PHARMACY-ADMISSION MEDICATION HISTORY
"Admission medication history interview status for this patient is complete. See [facility] admission navigator for allergy information, prior to admission medications and immunization status.     Medication history interview done, indicate source(s): Patient  Medication history resources (including written lists, pill bottles, clinic record): care everywhere  Pharmacy: [facility] [address]    Changes made to PTA medication list:  Added: all medications  Changed: nothing  Reported as Not Taking: nothing  Removed: nothing    Actions taken by pharmacist (provider contacted, etc): called pt     Additional medication history information: Patient reported side effects with spironolactone after he took it post-heart surgery; reaction unknown, but said it \"cleared up\" with the next ablasion. This was added to his allergy list.    Patient takes all medications in the morning except for the duloxetine and bumetanide doses. Patient needs new Rx for nitroglycerin, as he wasn't sure where the bottle was at home.    Medication reconciliation/reorder completed by provider prior to medication history?  N   (Y/N)      Prior to Admission medications    Medication Sig Last Dose Taking? Auth Provider   acetaminophen (TYLENOL) 500 MG tablet Take 1,000 mg by mouth every 6 hours as needed for mild pain 1/23/2022 at 0645 Yes Unknown, Entered By History   albuterol (PROAIR HFA/PROVENTIL HFA/VENTOLIN HFA) 108 (90 Base) MCG/ACT inhaler Inhale 1-2 puffs into the lungs every 4 hours as needed for shortness of breath / dyspnea or wheezing Past Week at Unknown time Yes Unknown, Entered By History   aspirin 81 MG EC tablet Take 81 mg by mouth every morning 1/23/2022 at am Yes Unknown, Entered By History   atorvastatin (LIPITOR) 80 MG tablet Take 80 mg by mouth every morning 1/23/2022 at am Yes Unknown, Entered By History   bumetanide (BUMEX) 2 MG tablet Take 2 mg by mouth 2 times daily In the morning and at 12 PM  Yes Unknown, Entered By " History   bumetanide (BUMEX) 2 MG tablet Take 1 mg by mouth every evening 1/22/2022 at pm Yes Unknown, Entered By History   DULoxetine (CYMBALTA) 30 MG capsule Take 30 mg by mouth At Bedtime 1/22/2022 at pm Yes Unknown, Entered By History   lisinopril (ZESTRIL) 2.5 MG tablet Take 2.5 mg by mouth every morning 1/23/2022 at am Yes Unknown, Entered By History   loratadine (CLARITIN) 10 MG tablet Take 10 mg by mouth every morning 1/23/2022 at am Yes Unknown, Entered By History   metoprolol succinate ER (TOPROL-XL) 100 MG 24 hr tablet Take 150 mg by mouth every morning 1/23/2022 at am Yes Unknown, Entered By History   nitroGLYcerin (NITROSTAT) 0.4 MG sublingual tablet Place 0.4 mg under the tongue every 5 minutes as needed for chest pain For chest pain place 1 tablet under the tongue every 5 minutes for 3 doses. If symptoms persist 5 minutes after 1st dose call 911.  Yes Unknown, Entered By History   potassium chloride ER (KLOR-CON M) 20 MEQ CR tablet Take 20 mEq by mouth every morning 1/23/2022 at am Yes Unknown, Entered By History   umeclidinium-vilanterol (ANORO ELLIPTA) 62.5-25 MCG/INH oral inhaler Inhale 1 puff into the lungs every morning 1/23/2022 at am Yes Unknown, Entered By History

## 2022-01-24 NOTE — ANESTHESIA POSTPROCEDURE EVALUATION
Patient: Toney Denton    Procedure: Procedure(s):  INTERNAL FIXATION LEFT HIP TROCHANTERIC FRACTURE       Diagnosis:Hip fracture (H) [S72.009A]  Diagnosis Additional Information: No value filed.    Anesthesia Type:  General    Note:     Postop Pain Control: Uneventful            Sign Out: Well controlled pain   PONV: No   Neuro/Psych: Uneventful            Sign Out: Acceptable/Baseline neuro status   Airway/Respiratory: Uneventful            Sign Out: AIRWAY IN SITU/Resp. Support   CV/Hemodynamics: Uneventful            Sign Out: Acceptable CV status; No obvious hypovolemia; No obvious fluid overload   Other NRE: NONE   DID A NON-ROUTINE EVENT OCCUR? No           Last vitals:  Vitals Value Taken Time   /82 01/24/22 1005   Temp 98.2  F (36.8  C) 01/24/22 0945   Pulse 84 01/24/22 1006   Resp 21 01/24/22 1006   SpO2 99 % 01/24/22 1006   Vitals shown include unvalidated device data.    Electronically Signed By: Bijan Clifford MD  January 24, 2022  1:20 PM

## 2022-01-24 NOTE — CONSULTS
Consult Date: 2022    ORTHOPEDIC CONSULTATION    REASON FOR CONSULTATION:  I was asked to provide Orthopedic consultation for this 71-year-old male who fell in his home and sustained a left intertrochanteric hip fracture.  There was no syncopal episode.  He is medically complex.  He has multiple cardiac problems.  He is status post mitral valve replacement, status post aortic valve repair, status post pacemaker placement.  He has a history of chronic atrial fibrillation.  He is status post coronary artery bypass graft.  He reports that he has recently seen his cardiologist, and his ejection fraction is improved by 10%.  He denies recent fever, chills, nausea, vomiting, diarrhea, chest pain or shortness of breath.    You can refer to his admission history and physical for the specifics of his past medical history and medications.    PHYSICAL EXAMINATION:  GENERAL:  He is a 71-year-old male who is alert and oriented x 3.  He is cooperative, in no acute distress.  EXTREMITIES:  He is able to flex and extend the ankle and toes bilaterally.  His left leg is held in a shortened position.  LUNGS:  Clear to auscultation bilaterally.   CARDIOVASCULAR:  Regular rate and rhythm.  ABDOMEN:  Benign.    RADIOGRAPHS:  X-rays were reviewed, AP pelvis, and shows a nondisplaced left intertrochanteric hip fracture with arthritic changes of the hip bilaterally.     IMPRESSION:  A 71-year-old male with multiple cardiac problems that are currently stable with a left intertrochanteric hip fracture.  The plan is to proceed with open reduction internal fixation.  I have explained the risks, benefits, and potential complications to the patient, and the plan is to proceed today.    Sharlene Arias MD        D: 2022   T: 2022   MT: TOI    Name:     YURIDIA BAZAN  MRN:      [id number]        Account:      [id number]   :      1950           Consult Date: 2022     Document: [id number]

## 2022-01-24 NOTE — ANESTHESIA CARE TRANSFER NOTE
Patient: Toney Denton    Procedure: Procedure(s):  INTERNAL FIXATION LEFT HIP TROCHANTERIC FRACTURE       Diagnosis: Hip fracture (H) [S72.009A]  Diagnosis Additional Information: No value filed.    Anesthesia Type:   General     Note:    Oropharynx: spontaneously breathing  Level of Consciousness: awake  Oxygen Supplementation: face mask    Independent Airway: airway patency satisfactory and stable  Dentition: dentition unchanged  Vital Signs Stable: post-procedure vital signs reviewed and stable  Report to RN Given: handoff report given  Patient transferred to: PACU  Comments: To PACU, report to RN, oxygen per face mask.        Vitals:  Vitals Value Taken Time   BP     Temp     Pulse     Resp     SpO2         Electronically Signed By: PASHA Lozada CRNA  January 24, 2022  9:07 AM

## 2022-01-24 NOTE — OR NURSING
Pt resting at ease,denies numbness or tingling. Has BiV pacer with ICD,long cardiac Hx but denies chest pain at this time. Handoff report given to OR staff.

## 2022-01-24 NOTE — PROGRESS NOTES
[facility]  Hospitalist Progress Note  Chase Kemp MD 01/24/2022    Reason for Stay (Diagnosis): Mechanical fall, left hip fracture.         Assessment and Plan:      Summary of Stay: Toney Denton is a 71 year old male with past medical history of ischemic cardiomyopathy with EF of 25-30%, status post ICD for V. Tach, bioprosthetic mitral valve replacement, tricuspid valve repair, hypertension, hyperlipidemia, COPD presented with left hip pain after mechanical fall 2 nights prior and found to have left intertrochanteric fracture and admitted on 1/23/2022.    Problem List:   1. Left intertrochanteric femur fracture.  -Patient had mechanical fall 2 days prior to admission,  -Status post open reduction internal fixation 1/24.  -Pain is fairly controlled.  -PT, OT, wound care, DVT prophylaxis per orthopedic service.  -Use muscle relaxants as needed.  -There is most postoperative course so far.  2. Ischemic cardiomyopathy, history of V. tach status post ICD  3. Mitral valve replacement, bioprosthetic and tricuspid repair.  -Echocardiogram done at an outside facility showed EF of 25 to 30%.  -Patient is not decompensated.  -Continue prior to admission medication.  -Continue telemonitoring.  -Patient has ICD placement for V. tach in the setting of ischemic dilated cardiomyopathy.  4. Chronic obstructive pulmonary.  -No sign of exacerbation.  -Continue to monitor oxygenation  5. COVID-19 is negative.  He is vaccinated  6. Mild thrombocytopenia.  Check CBC tomorrow.    DVT Prophylaxis: Defer to orthopedic service.  Code Status: Full Code  Discharge Dispo: Patient is planning to go back home, pending PT eval.  Estimated Disch Date / # of Days until Disch: Expect 2 more days in the hospital.    I discussed with patient the plan of care, all his question concerns addressed.      Interval History (Subjective):      Patient seen and examined, assumed care today, he is back from OR after open reduction and  "internal fixation of left hip intertrochanteric fracture.  Slightly anxious, denied any nausea or vomiting tolerated oral intake.                  Physical Exam:      Last Vital Signs:  BP (!) 145/76 (BP Location: Left arm, Patient Position: Semi-Hillman's)   Pulse 98   Temp 97.5  F (36.4  C) (Temporal)   Resp 13   Ht 1.702 m (5' 7\")   Wt 88.5 kg (195 lb 1.6 oz)   SpO2 99%   BMI 30.56 kg/m      I/O last 3 completed shifts:  In: -   Out: 375 [Urine:375]  Wt Readings from Last 1 Encounters:   01/24/22 88.5 kg (195 lb 1.6 oz)     Current Facility-Administered Medications   Medication     [START ON 1/27/2022] acetaminophen (TYLENOL) tablet 650 mg     acetaminophen (TYLENOL) tablet 975 mg     albuterol (PROVENTIL HFA/VENTOLIN HFA) inhaler     alum & mag hydroxide-simethicone (MAALOX) suspension 30 mL     [START ON 1/25/2022] aspirin (ASA) EC tablet 325 mg     atorvastatin (LIPITOR) tablet 80 mg     benzocaine-menthol (CHLORASEPTIC) 6-10 MG lozenge 1 lozenge     bisacodyl (DULCOLAX) Suppository 10 mg     bumetanide (BUMEX) tablet 1 mg     [START ON 1/26/2022] bumetanide (BUMEX) tablet 2 mg     bumetanide (BUMEX) tablet 2 mg     ceFAZolin (ANCEF) intermittent infusion 2 g in 100 mL dextrose PRE-MIX     DULoxetine (CYMBALTA) DR capsule 30 mg     HYDROmorphone (DILAUDID) injection 0.2 mg    Or     HYDROmorphone (DILAUDID) injection 0.4 mg     hydrOXYzine (ATARAX) tablet 10 mg     lactated ringers infusion     lidocaine (LMX4) cream     lidocaine 1 % 0.1-1 mL     lisinopril (ZESTRIL) tablet 2.5 mg     loratadine (CLARITIN) tablet 10 mg     magnesium hydroxide (MILK OF MAGNESIA) suspension 30 mL     melatonin tablet 3 mg     metoprolol succinate ER (TOPROL-XL) 24 hr tablet 150 mg     naloxone (NARCAN) injection 0.2 mg    Or     naloxone (NARCAN) injection 0.4 mg    Or     naloxone (NARCAN) injection 0.2 mg    Or     naloxone (NARCAN) injection 0.4 mg     nitroGLYcerin (NITROSTAT) sublingual tablet 0.4 mg     ondansetron " (ZOFRAN-ODT) ODT tab 4 mg    Or     ondansetron (ZOFRAN) injection 4 mg     oxyCODONE (ROXICODONE) tablet 5 mg    Or     oxyCODONE (ROXICODONE) tablet 10 mg     [START ON 1/25/2022] polyethylene glycol (MIRALAX) Packet 17 g     potassium chloride ER (KLOR-CON M) CR tablet 20 mEq     prochlorperazine (COMPAZINE) injection 5 mg    Or     prochlorperazine (COMPAZINE) tablet 5 mg     senna-docusate (SENOKOT-S/PERICOLACE) 8.6-50 MG per tablet 1 tablet     sodium chloride (PF) 0.9% PF flush 3 mL     sodium chloride (PF) 0.9% PF flush 3 mL     umeclidinium-vilanterol (ANORO ELLIPTA) 62.5-25 MCG/INH oral inhaler 1 puff       Constitutional: Awake, alert, cooperative, no apparent distress   Respiratory: Clear to auscultation bilaterally, no crackles or wheezing   Cardiovascular: Regular rate and rhythm, normal S1 and S2, and no murmur noted   Abdomen: Normal bowel sounds, soft, non-distended, non-tender   Skin: No rashes, no cyanosis, dry to touch   Neuro: Alert and oriented x3, no weakness, numbness, memory loss   Extremities:  Left thigh grossly swollen, lateral surgical site dressed.   No pedal or pretibial edema.   Other(s):HEENT Pink, nonicteric, moist oral mucosa       All other systems: Negative          Medications:      All current medications were reviewed with changes reflected in problem list.         Data:      All new lab and imaging data was reviewed.   Labs:  Recent Labs   Lab 01/24/22  0557 01/23/22  1306    137   POTASSIUM 3.7 4.0   CHLORIDE 100 99   CO2 30 32   ANIONGAP 6 6   GLC 95 122*   BUN 21 22   CR 0.78 0.74   GFRESTIMATED >90 >90   THOMAS 9.4 9.3     Recent Labs   Lab 01/24/22  0557 01/23/22  1306   WBC 7.9 8.3   HGB 13.9 13.8   HCT 42.9 42.0   * 105*   * 115*     No results for input(s): SED, CRP in the last 168 hours.  Recent Labs   Lab 01/24/22  0557 01/23/22  1306   GLC 95 122*      Imaging:   Results for orders placed or performed during the hospital encounter of 01/23/22   XR  Femur Left 2 Views    Narrative    EXAM: XR PELVIS 1/2 VIEW, XR FEMUR LEFT 2 VIEW  LOCATION: [facility]  DATE/TIME: 01/23/2022, 12:30 PM    INDICATION: Left hip and femur pain.  COMPARISON: None.      Impression    IMPRESSION:  1.  Oblique nondisplaced fracture of the left intertrochanteric femur.  2.  The remaining left femur is unremarkable.  3.  Advanced left hip degenerative arthrosis.  4.  Advanced right hip degenerative arthrosis.  5.  Atherosclerotic calcification.     XR Pelvis 1/2 Views    Narrative    EXAM: XR PELVIS 1/2 VIEW, XR FEMUR LEFT 2 VIEW  LOCATION: [facility]  DATE/TIME: 01/23/2022, 12:30 PM    INDICATION: Left hip and femur pain.  COMPARISON: None.      Impression    IMPRESSION:  1.  Oblique nondisplaced fracture of the left intertrochanteric femur.  2.  The remaining left femur is unremarkable.  3.  Advanced left hip degenerative arthrosis.  4.  Advanced right hip degenerative arthrosis.  5.  Atherosclerotic calcification.     XR Surgery MOHAN L/T 5 Min Fluoro    Narrative    This exam was marked as non-reportable because it will not be read by a   radiologist or a [facility] non-radiologist provider.

## 2022-01-24 NOTE — PROVIDER NOTIFICATION
Web based paged hospitalist  Cardiac tele orders. He is on 6th floor is medical tele  (remote Tele) ok or do you want him on 3rd?

## 2022-01-24 NOTE — BRIEF OP NOTE
[facility]    Brief Operative Note    Pre-operative diagnosis: Hip fracture (H) [S72.009A]  Post-operative diagnosis Same as pre-operative diagnosis    Procedure: Procedure(s):  INTERNAL FIXATION LEFT HIP TROCHANTERIC FRACTURE  Surgeon: Surgeon(s) and Role:     * Sharlene Arias MD - Primary     * Damon Cazares PA-C - Assisting  Anesthesia: General   Estimated Blood Loss: Less than 50 ml    Drains: None  Specimens: * No specimens in log *  Findings:   None.  Complications: None.  Implants:   Implant Name Type Inv. Item Serial No.  Lot No. LRB No. Used Action   IMP SCR SYN CORTEX 4.5X40MM SELF TAP .840 - [id number] Metallic Hardware/Wanda IMP SCR SYN CORTEX 4.5X40MM SELF TAP .840  [employer]AT[employer] [id number] [id number] 2022 Left 1 Implanted   IMP SCR SYN CORTEX 4.5X42MM SELF TAP .842 - [id number] Metallic Hardware/Wanda IMP SCR SYN CORTEX 4.5X42MM SELF TAP .842  [employer] [id number] 22JAN 2022 Left 1 Implanted   IMP SCR SYN CORTEX 4.5X46MM SELF TAP .846 - [id number] Metallic Hardware/Wanda IMP SCR SYN CORTEX 4.5X46MM SELF TAP .846  [employer] [id number] 22JAN 2022 Left 1 Implanted   IMP SCR SYN DHS/DCS LAG 12.7X95MM 1 STEP .295 - [id number] Metallic Hardware/Wanda IMP SCR SYN DHS/DCS LAG 12.7X95MM 1 STEP .295  [employer]STRAT[employer] [id number] 22JAN 2022 Left 1 Implanted   130 degree DHS Plate Metallic Hardware/Wanda   SYNTHES [id number] Left 1 Implanted

## 2022-01-24 NOTE — OP NOTE
Procedure Date: 01/24/2022    PREOPERATIVE DIAGNOSIS:  Left intertrochanteric hip fracture.    POSTOPERATIVE DIAGNOSIS:  Left intertrochanteric hip fracture.    PROCEDURE PERFORMED:  Open reduction and internal fixation of the left hip using Synthes 3-hole 130 degree dynamic compression hip screw.    INDICATIONS FOR PROCEDURE:  The patient is a 71-year-old male who fell and sustained the above-stated injury.  He reports he has had no hip pain prior to this.  However, his x-rays demonstrate significant osteoarthritic changes of both hips.  I discussed treatment options with the patient.  I explained to him the risks, benefits, and potential complications of above-stated procedure.  He wanted to proceed.  Discussion included, but was not specific to infection, vascular, neurologic complications, DVT, nonunion, malunion, further arthritic changes of the hip and the possible need for further revision surgery.  After this discussion, the patient wanted to proceed with surgery.    ANESTHETIC USED:  General.    SURGEON:  Sharlene Arias M.D.    ASSISTANT:  Adan Cazares PA-C    DESCRIPTION OF PROCEDURE:  The patient was taken to the operating room and placed on the operating room table in supine position.  After adequate induction of general anesthetic, the patient was transferred to the fracture table.  Care was taken to pad all bony prominences.  Longitudinal traction was applied to the left leg.  C-arm was brought in.  We surveyed the fracture and the hip in both AP and lateral planes.  We had anatomic reduction.  We then performed sterile prep and drape of the left hip and left lower extremity.  The patient was given 2 grams of Ancef for infection prophylaxis given 1 hour prior to incision.  After sterile prep and drape, C-arm was brought in.  We made a lateral incision and divided the tensor fascia in line with its fibers and retracted and the vastus lateralis anteriorly and did a subperiosteal exposure of the  proximal femur.  Hemostasis was obtained with electrocautery.  I then placed a guidewire in the 2-2 position of the femoral head.  This was verified using the C-arm, both AP and lateral planes.  We then reamed to 95 mm and put in a 95 mm compression screw and a 130-degree 3-hole side plate.  I then drilled and placed the appropriate length screws to fill the plate.  Upon completion of this, a C-arm was brought in and we obtained AP and lateral films, which showed ideal positioning of the compression screw in the 2-2 position and good screw lengths in the plate.  There is no evidence of a fracture extending beyond the fixation.  We then irrigated, repaired the vastus lateralis using 0 Vicryl, repaired the tensor fascia using 0 Vicryl.  The subQ was closed using 2-0 Vicryl and skin was closed with staples.  With each layer of closure, the wound was copiously irrigated with antibiotic saline.  Sterile dressing was applied.  The patient tolerated the procedure.  There were no intraoperative complications.  The patient sent to [facility] in stable condition.    Sharlene Arias MD        D: 2022   T: 2022   MT: HIEN    Name:     YURIDIA BAZAN  MRN:      [id number]        Account:        [id number]   :      1950           Procedure Date: 2022     Document: [id number]

## 2022-01-25 NOTE — PROGRESS NOTES
[facility]  Hospitalist Progress Note  Chase Kemp MD 01/25/2022    Reason for Stay (Diagnosis): Mechanical fall, left hip fracture.         Assessment and Plan:      Summary of Stay: Toney Denton is a 71 year old male with past medical history of ischemic cardiomyopathy with EF of 25-30%, status post ICD for V. Tach, bioprosthetic mitral valve replacement, tricuspid valve repair, hypertension, hyperlipidemia, COPD presented with left hip pain after mechanical fall 2 nights prior and found to have left intertrochanteric fracture and admitted on 1/23/2022.    Problem List:   1. Left intertrochanteric femur fracture.  -Patient had mechanical fall 2 days prior to admission,  -Status post open reduction internal fixation 1/24.  -Pain is fairly controlled.  -PT, OT, wound care, DVT prophylaxis per orthopedic service.  -Use muscle relaxants as needed.  -He has a smooth postoperative course so far.    2. Ischemic cardiomyopathy, history of V. tach status post ICD  3. Mitral valve replacement, bioprosthetic and tricuspid repair.  4. Episode of 9 beats of NSVT  -Echocardiogram done at an outside facility showed EF of 25 to 30%.  -Patient is not decompensated.  -Continue prior to admission medication.  -Continue telemonitoring.  -Patient has ICD placement for V. tach in the setting of ischemic dilated cardiomyopathy.  -Potassium is 4.4, magnesium is 1.7 ordered magnesium oxide.  -Continue telemonitoring    5. Chronic obstructive pulmonary.  -No sign of exacerbation.  -Continue to monitor oxygenation.    6. COVID-19 is negative.  He is vaccinated    7. Mild thrombocytopenia.  Platelet is 113.    8. Acute postop blood loss anemia.  -Hemoglobin was 13.9 yesterday, 10.6.  -Check hemoglobin in the morning for stability.  -No indication for transfer.  -No sign of active bleed  9.     DVT Prophylaxis: Defer to orthopedic service.  Code Status: Full Code  Discharge Dispo: Patient is planning to go back home,  "pending PT eval.  Estimated Disch Date / # of Days until Disch: Expect 2 more days in the hospital.    I discussed with patient the plan of care, all his question concerns addressed.      Interval History (Subjective):      Patient seen and examined, feels better, pain is fairly controlled, working with physical therapy.  There was episode of 9 beats of NSVT last night, patient has ICD.  He was asymptomatic.  He is tolerating oral intake, no new complaint.                  Physical Exam:      Last Vital Signs:  /57   Pulse 88   Temp 97.7  F (36.5  C) (Temporal)   Resp 16   Ht 1.702 m (5' 7\")   Wt 92.5 kg (204 lb)   SpO2 96%   BMI 31.95 kg/m      I/O last 3 completed shifts:  In: 2199 [P.O.:1499; I.V.:700]  Out: 2280 [Urine:2280]  Wt Readings from Last 1 Encounters:   01/25/22 92.5 kg (204 lb)     Current Facility-Administered Medications   Medication     [START ON 1/27/2022] acetaminophen (TYLENOL) tablet 650 mg     acetaminophen (TYLENOL) tablet 975 mg     albuterol (PROVENTIL HFA/VENTOLIN HFA) inhaler     alum & mag hydroxide-simethicone (MAALOX) suspension 30 mL     aspirin (ASA) EC tablet 325 mg     atorvastatin (LIPITOR) tablet 80 mg     benzocaine-menthol (CHLORASEPTIC) 6-10 MG lozenge 1 lozenge     bisacodyl (DULCOLAX) Suppository 10 mg     bumetanide (BUMEX) tablet 1 mg     [START ON 1/26/2022] bumetanide (BUMEX) tablet 2 mg     bumetanide (BUMEX) tablet 2 mg     DULoxetine (CYMBALTA) DR capsule 30 mg     HYDROmorphone (DILAUDID) injection 0.2 mg    Or     HYDROmorphone (DILAUDID) injection 0.4 mg     hydrOXYzine (ATARAX) tablet 10 mg     lactated ringers infusion     lidocaine (LMX4) cream     lidocaine 1 % 0.1-1 mL     lisinopril (ZESTRIL) tablet 2.5 mg     loratadine (CLARITIN) tablet 10 mg     magnesium hydroxide (MILK OF MAGNESIA) suspension 30 mL     magnesium oxide (MAG-OX) tablet 400 mg     melatonin tablet 3 mg     metoprolol succinate ER (TOPROL-XL) 24 hr tablet 150 mg     naloxone " (NARCAN) injection 0.2 mg    Or     naloxone (NARCAN) injection 0.4 mg    Or     naloxone (NARCAN) injection 0.2 mg    Or     naloxone (NARCAN) injection 0.4 mg     nitroGLYcerin (NITROSTAT) sublingual tablet 0.4 mg     ondansetron (ZOFRAN-ODT) ODT tab 4 mg    Or     ondansetron (ZOFRAN) injection 4 mg     oxyCODONE (ROXICODONE) tablet 5 mg    Or     oxyCODONE (ROXICODONE) tablet 10 mg     polyethylene glycol (MIRALAX) Packet 17 g     potassium chloride ER (KLOR-CON M) CR tablet 20 mEq     prochlorperazine (COMPAZINE) injection 5 mg    Or     prochlorperazine (COMPAZINE) tablet 5 mg     senna-docusate (SENOKOT-S/PERICOLACE) 8.6-50 MG per tablet 1 tablet     sodium chloride (PF) 0.9% PF flush 3 mL     tamsulosin (FLOMAX) capsule 0.4 mg     umeclidinium-vilanterol (ANORO ELLIPTA) 62.5-25 MCG/INH oral inhaler 1 puff       Constitutional: Awake, alert, cooperative, no apparent distress   Respiratory: Clear to auscultation bilaterally, no crackles or wheezing   Cardiovascular: Regular rate and rhythm, normal S1 and S2, and no murmur noted   Abdomen: Normal bowel sounds, soft, non-distended, non-tender   Skin: No rashes, no cyanosis, dry to touch   Neuro: Alert and oriented x3, no weakness, numbness, memory loss   Extremities:  Left thigh grossly swollen, lateral surgical site dressed.   No pedal or pretibial edema.   Other(s):HEENT Pink, nonicteric, moist oral mucosa       All other systems: Negative          Medications:      All current medications were reviewed with changes reflected in problem list.         Data:      All new lab and imaging data was reviewed.   Labs:  Recent Labs   Lab 01/25/22  0744 01/24/22  0557 01/23/22  1306   NA  --  136 137   POTASSIUM 4.3 3.7 4.0   CHLORIDE  --  100 99   CO2  --  30 32   ANIONGAP  --  6 6   * 95 122*   BUN  --  21 22   CR  --  0.78 0.74   GFRESTIMATED  --  >90 >90   THOMAS  --  9.4 9.3     Recent Labs   Lab 01/25/22  0744 01/24/22  0557 01/23/22  1306   WBC  --  7.9 8.3    HGB 10.6* 13.9 13.8   HCT  --  42.9 42.0   MCV  --  107* 105*   PLT  --  113* 115*     No results for input(s): SED, CRP in the last 168 hours.  Recent Labs   Lab 01/25/22  0744 01/24/22  0557 01/23/22  1306   * 95 122*      Imaging:   Results for orders placed or performed during the hospital encounter of 01/23/22   XR Femur Left 2 Views    Narrative    EXAM: XR PELVIS 1/2 VIEW, XR FEMUR LEFT 2 VIEW  LOCATION: [facility]  DATE/TIME: 01/23/2022, 12:30 PM    INDICATION: Left hip and femur pain.  COMPARISON: None.      Impression    IMPRESSION:  1.  Oblique nondisplaced fracture of the left intertrochanteric femur.  2.  The remaining left femur is unremarkable.  3.  Advanced left hip degenerative arthrosis.  4.  Advanced right hip degenerative arthrosis.  5.  Atherosclerotic calcification.     XR Pelvis 1/2 Views    Narrative    EXAM: XR PELVIS 1/2 VIEW, XR FEMUR LEFT 2 VIEW  LOCATION: [facility]  DATE/TIME: 01/23/2022, 12:30 PM    INDICATION: Left hip and femur pain.  COMPARISON: None.      Impression    IMPRESSION:  1.  Oblique nondisplaced fracture of the left intertrochanteric femur.  2.  The remaining left femur is unremarkable.  3.  Advanced left hip degenerative arthrosis.  4.  Advanced right hip degenerative arthrosis.  5.  Atherosclerotic calcification.     XR Surgery MOHAN L/T 5 Min Fluoro    Narrative    This exam was marked as non-reportable because it will not be read by a   radiologist or a [place] non-radiologist provider.

## 2022-01-25 NOTE — CONSULTS
Care Management Discharge Note    Discharge Date: 01/26/2022       Discharge Disposition: Home Care    Discharge Services:      Discharge DME:      Discharge Transportation: family or friend will provide    Private pay costs discussed: Not applicable      Education Provided on the Discharge Plan:    Persons Notified of Discharge Plans: pt  Patient/Family in Agreement with the Plan: yes    Handoff Referral Completed: No    Additional Information:  Care Transitions Team: Following for CC, discharge planning, and disposition.    Per [facility] Trauma Liaison Handoff:      Current home care services: None     Family/patient discharge goal: Return home.      Barriers to Discharge:  Medically stable   Discharge Plan of care: Home with HC- PT.      Orders needed for services: Post Op Plan of Care - Home with home car - PT     Weight bearing status and Hip precautions: 50% WB Left LE with walker   Transportation:  Spouse will provide    Home care referral sent to [facility] for home care PT. They are reviewing and checking pts insurance.      Will follow in collaboration with [facility] CM  Lety MALDONADO -[phone] [facility] for discharge planning.     Tessie Crisostomo RN, BSN CTS  Care Coordinator  [facility]   [phone]                      Annabel Crisostomo, RN

## 2022-01-25 NOTE — PROGRESS NOTES
01/25/22 1000   Quick Adds   Type of Visit Initial PT Evaluation   Living Environment   People in home spouse   Current Living Arrangements house   Home Accessibility stairs to enter home;stairs within home   Living Environment Comments Pt lives in a split level home with his significant other (whom he is currently  from); however she can help provide intermittent assist on discharge with houshold tasks. There is 1 small threshold step to enter; then 7 stairs up/down with one hand support. Only shower is on the upper level; tub/shower combo.    Self-Care   Usual Activity Tolerance good   Current Activity Tolerance fair   Equipment Currently Used at Home none   Activity/Exercise/Self-Care Comment Pt does have a FWW and cane at home. Pt was using a cane PTA in the community.    Disability/Function   Fall history within last six months yes   Number of times patient has fallen within last six months 1   General Information   Onset of Illness/Injury or Date of Surgery 01/23/22   Referring Physician Damon Cazares PA-C   Patient/Family Therapy Goals Statement (PT) return home, ASAP   Pertinent History of Current Problem (include personal factors and/or comorbidities that impact the POC)  71 year old male with past medical history of ischemic cardiomyopathy with EF of 25-30%, status post ICD for V. Tach, bioprosthetic mitral valve replacement, tricuspid valve repair, hypertension, hyperlipidemia, COPD presented with left hip pain after mechanical fall 2 nights prior and found to have left intertrochanteric fracture and admitted on 1/23/2022.     Existing Precautions/Restrictions fall   Weight-Bearing Status - LLE partial weight-bearing (% in comments)  (50)   Cognition   Orientation Status (Cognition) oriented x 4   Pain Assessment   Patient Currently in Pain Yes, see Vital Sign flowsheet  (4)   Range of Motion (ROM)   ROM Comment Decreased L LE AROM due to pain, weakness. R LE and B UE AROM WFLs.     Strength   Strength Comments Sufficient for mobility with SBA/CGA.    Transfers   Transfers sit-stand transfer   Sit-Stand Transfer   Sit-Stand [place] (Transfers) contact guard   Gait/Stairs (Locomotion)   Comment (Gait/Stairs) Ambulates with FWW, CGA.    Balance   Balance Comments Requires B UE support on FWW for safe dynamic mobility within precautions.    Clinical Impression   Criteria for Skilled Therapeutic Intervention yes, treatment indicated   PT Diagnosis (PT) Impaired functional mobility   Influenced by the following impairments Pain, decreased L LE AROM/strength, decreased activity tolerance   Functional limitations due to impairments Decreased IND with transfers, gait and stairs   Clinical Presentation Stable/Uncomplicated   Clinical Presentation Rationale Limitations consistent with surgery, limitations, fair social support.    Clinical Decision Making (Complexity) low complexity   Therapy Frequency (PT) Daily   Predicted Duration of Therapy Intervention (days/wks) 3 days   Planned Therapy Interventions (PT) bed mobility training;gait training;patient/family education;transfer training;ROM (range of motion);stair training;strengthening   Risk & Benefits of therapy have been explained evaluation/treatment results reviewed;care plan/treatment goals reviewed;participants voiced agreement with care plan;participants included;patient   PT Discharge Planning    PT Discharge Recommendation (DC Rec) home with assist;home with home care physical therapy   PT Rationale for DC Rec Moving fair. Anticipate safe DC to home when medically stable with FWW for all mobility, Ax1 and crutches on the stairs, assist for household tasks, HHPT/OT. REcommend HH as leaving the house would be a taxing effort. Recommend HHOT if they are not able to provide IP assessment.   PT Brief overview of current status  Ax1, FWW   Total Evaluation Time   Total Evaluation Time (Minutes) 5

## 2022-01-25 NOTE — PROGRESS NOTES
XC    Pt with 9 beat NSVT. Has CMP with ICD. Check K and Mg, replace to >4 and >2.    Garrison Moraes, DO

## 2022-01-25 NOTE — PROGRESS NOTES
Orthopedic Surgery  Toney Denton  2022  Admit Date:  2022  POD: 1 Day Post-Op   Procedure(s):  Open reduction and internal fixation of the left hip using Synthes 3-hole 130 degree dynamic compression hip screw    Alert and oriented.   Patient resting comfortably in chair.    Pain controlled.  Tolerating oral intake.    Denies nausea or vomiting  Denies chest pain or shortness of breath  V-tach episode last night    Vital Sign Ranges  Temperature Temp  Av.6  F (36.4  C)  Min: 97.1  F (36.2  C)  Max: 98  F (36.7  C)   Blood pressure Systolic (24hrs), Av , Min:135 , Max:170        Diastolic (24hrs), Av, Min:72, Max:83      Pulse Pulse  Av.9  Min: 85  Max: 105   Respirations Resp  Av.4  Min: 13  Max: 24   Pulse oximetry SpO2  Av.2 %  Min: 90 %  Max: 100 %       Dressing is intact with 30% drainage  Lateral thigh swelling present.   Minimal erythema of the surrounding skin.   Bilateral calves are soft, non-tender.  Left lower extremity is NVI.  Sensation intact bilateral lower extremities  Patient able to resist dorsi and plantar flexion bilaterally  +Dp pulse    Labs:  Recent Labs   Lab Test 22  0557 22  1306   WBC 7.9 8.3     Recent Labs   Lab Test 22  0744 22  0557 22  1306   HGB 10.6* 13.9 13.8     Recent Labs   Lab Test 22  1306   INR 1.09     Recent Labs   Lab Test 22  0557 22  1306   * 115*       1. PLAN:   Continue ASA for DVT prophylaxis.     Mobilize with PT/OT    50% WB Left LE with walker.     Continue current pain regiment.   Dressings: Keep intact.  Change if >60% saturated or peeling off.    Follow-up: 2 weeks post-op with Dr Arias team    2. Disposition   Anticipate d/c to TCU vs home pending PT progression/recommendation.     Madelyn Mauro PA-C

## 2022-01-25 NOTE — DISCHARGE INSTRUCTIONS
Your home care referral was sent to [facility] for Physical Therapy.  If you haven't heard from them within the next 24-48 hours,  Please call them at [phone].

## 2022-01-25 NOTE — PROGRESS NOTES
Care Transitions Team: Following for CC, discharge planning, and disposition.       Per [facility] Trauma Liaison Handoff:   Writer spoke with Toney via phone introduced myself and explained my role in his plan of care. Discussed therapy recommendations for home with home care PT and he is agreeable. He had no preference for home care agency. Writer would appreciate if care coordination team would send home care referrals to agency's that may be able to provide services.     Living situation:   Support: Spouse     Home environment:   Stairs-had rails? 1 step to enter- split level.    Equipment available : FWW and cane.     Prior Function level:   Ambulation Independent cane   ADL's  Independent     Current home care services: None    Family/patient discharge goal: Return home.     Barriers to Discharge:  Medically stable   Discharge Plan of care: Home with HC- PT.      Orders needed for services: Post Op Plan of Care - Home with home car - PT    Weight bearing status and Hip precautions: 50% WB Left LE with walker   Transportation:  Spouse will provide       Will follow in collaboration with [facility]  Lety Cotto -[phone] [facility] for discharge planning.

## 2022-01-26 NOTE — PROGRESS NOTES
Care Management Discharge Note    Discharge Date: 01/26/2022       Discharge Disposition: Home Care    Discharge Services:      Discharge DME:      Discharge Transportation: family or friend will provide    Private pay costs discussed: Not applicable        Additional Information:  Pt discharging to home with home care PT likely tomorrow. Referral sent to [facility] who will be able to service the pt for home care Physical Therapy. AVS has been update with their contact information, pt also has been updated.     Will need to fax home care orders to [facility] [phone].    Tessie Crisostomo RN, BSN CTS  Care Coordinator  [facility]   [phone]            Annabel Crisostomo, RN

## 2022-01-26 NOTE — PROGRESS NOTES
A/O lung sounds clear, up with assist of one walker and gait.Continues on tele monitor. Tolerating regular diet, passing gas.Doing scheduled tylenol and Oxy for pain. Dressing has moderate dried drainage. Shaffer intact, draining,plan to discharge to home possibly tomorrow.Will continue with POC.

## 2022-01-26 NOTE — PROGRESS NOTES
[facility]    Medicine Progress Note - Hospitalist Service    Date of Admission:  1/23/2022    Assessment & Plan          Summary of Stay: Toney Denton is a 71 year old male with past medical history of ischemic cardiomyopathy with EF of 25-30%, status post ICD for V. Tach, bioprosthetic mitral valve replacement, tricuspid valve repair, hypertension, hyperlipidemia, COPD presented with left hip pain after mechanical fall 2 nights prior and found to have left intertrochanteric fracture and admitted on 1/23/2022.    Problem List:   1. Left intertrochanteric femur fracture.  -Patient had mechanical fall 2 days prior to admission,  -Status post open reduction internal fixation 1/24.  -Pain is fairly controlled.  -PT, OT, wound care, DVT prophylaxis per orthopedic service.  -Use muscle relaxants as needed.  -He has a smooth postoperative course so far.     2. Ischemic cardiomyopathy, history of V. tach status post ICD  3. Mitral valve replacement, bioprosthetic and tricuspid repair.  4. Episode of 9 beats of NSVT  -Echocardiogram done at an outside facility showed EF of 25 to 30%.  -Patient is not decompensated.  -Continue prior to admission medication.  -Continue telemonitoring.  -Patient has ICD placement for V. tach in the setting of ischemic dilated cardiomyopathy.  -Potassium is 4.4, magnesium is 1.7 ordered magnesium oxide.  -Continue telemonitoring     5. Chronic obstructive pulmonary.  -No sign of exacerbation.  -Continue to monitor oxygenation.     6. COVID-19 is negative.  He is vaccinated     7. Mild thrombocytopenia.  Platelet is 113.     8. Acute postop blood loss anemia.  -Hemoglobin was 13.9 yesterday, 9.7.  -Check hemoglobin in the morning for stability.  -No indication for transfer.  -No sign of active bleed    9. Urinary retention.  - started on flomax.  - s/p das, will attempt to remove 1/26 with voiding trial.          Diet: Advance Diet as Tolerated: Regular Diet Adult    DVT  Prophylaxis: Pneumatic Compression Devices  Das Catheter: PRESENT, indication: Retention  Central Lines: None  Cardiac Monitoring: ACTIVE order. Indication: v tach history  Code Status: Full Code      Disposition Plan   Expected Discharge: 01/27/2022     Anticipated discharge location: home with help/services    Delays:          The patient's care was discussed with the Bedside Nurse and Patient.    Bonnie Roberts MD  Hospitalist Service  [facility]  Securely message with the Vocera Web Console (learn more here)  Text page via [employer] Paging/Directory         Clinically Significant Risk Factors Present on Admission             # Obesity: last Body mass index is 30.13 kg/m .      ______________________________________________________________________    Interval History     + das for retention. No chest pain/SOB/fever/chills.    Data reviewed today: I reviewed all medications, new labs and imaging results over the last 24 hours. I personally reviewed no images or EKG's today.    Physical Exam   Vital Signs: Temp: 97.3  F (36.3  C) Temp src: Temporal BP: 110/62 Pulse: 81   Resp: 16 SpO2: 95 % O2 Device: None (Room air)    Weight: 192 lbs 6.4 oz    Gen - AAO x 3 in NAD.  Lungs - CTA B.  Heart - RR,S1+S2 nml, no m/g/r.  Abd - soft, NT, ND, + BS.  Ext - no edema.    Data   Recent Labs   Lab 01/26/22  0557 01/25/22  0744 01/24/22  0557 01/23/22  1306   WBC  --   --  7.9 8.3   HGB 9.7* 10.6* 13.9 13.8   MCV  --   --  107* 105*   PLT  --   --  113* 115*   INR  --   --   --  1.09   NA  --   --  136 137   POTASSIUM  --  4.3 3.7 4.0   CHLORIDE  --   --  100 99   CO2  --   --  30 32   BUN  --   --  21 22   CR  --   --  0.78 0.74   ANIONGAP  --   --  6 6   THOMAS  --   --  9.4 9.3   GLC  --  130* 95 122*     No results found for this or any previous visit (from the past 24 hour(s)).  Medications       acetaminophen  975 mg Oral Q8H     aspirin  325 mg Oral Daily     atorvastatin  80 mg Oral QAM      bumetanide  1 mg Oral QPM     bumetanide  2 mg Oral QAM     bumetanide  2 mg Oral Daily     DULoxetine  30 mg Oral At Bedtime     lisinopril  2.5 mg Oral QAM     loratadine  10 mg Oral QAM     metoprolol succinate ER  150 mg Oral QAM     polyethylene glycol  17 g Oral Daily     potassium chloride ER  20 mEq Oral QAM     senna-docusate  1 tablet Oral BID     tamsulosin  0.4 mg Oral Daily     umeclidinium-vilanterol  1 puff Inhalation QAM      (3) no apparent problem

## 2022-01-26 NOTE — PROGRESS NOTES
Orthopedic Surgery  Toney Denton  2022  Admit Date:  2022  POD: 2 Days Post-Op   Procedure(s):  Open reduction and internal fixation of the left hip using Synthes 3-hole 130 degree dynamic compression hip screw    Alert and oriented.   Patient resting comfortably in chair.    Pain controlled.  Tolerating oral intake.    Denies nausea or vomiting  Denies chest pain or shortness of breath  V-tach episode last night    Vital Sign Ranges  Temperature Temp  Av.6  F (36.4  C)  Min: 97.1  F (36.2  C)  Max: 98  F (36.7  C)   Blood pressure Systolic (24hrs), Av , Min:135 , Max:170        Diastolic (24hrs), Av, Min:72, Max:83      Pulse Pulse  Av.9  Min: 85  Max: 105   Respirations Resp  Av.4  Min: 13  Max: 24   Pulse oximetry SpO2  Av.2 %  Min: 90 %  Max: 100 %       Dressing is intact with 30% drainage  Lateral thigh swelling present.   Minimal erythema of the surrounding skin.   Bilateral calves are soft, non-tender.  Left lower extremity is NVI.  Sensation intact bilateral lower extremities  Patient able to resist dorsi and plantar flexion bilaterally  +Dp pulse    Labs:  Recent Labs   Lab Test 22  0557 22  1306   WBC 7.9 8.3     Recent Labs   Lab Test 22  0744 22  0557 22  1306   HGB 10.6* 13.9 13.8     Recent Labs   Lab Test 22  1306   INR 1.09     Recent Labs   Lab Test 22  0557 22  1306   * 115*       1. PLAN:   Continue ASA for DVT prophylaxis.     Mobilize with PT/OT    50% WB Left LE with walker.     Continue current pain regiment.   Dressings: Keep intact.  Change if >60% saturated or peeling off.    Follow-up: 2 weeks post-op with Dr Arias team    2. Disposition   Anticipate d/c to TCU vs home pending PT progression/recommendation.     Damon Cazares PA-C

## 2022-01-26 NOTE — PROGRESS NOTES
"   01/26/22 0927   Quick Adds   Type of Visit Initial Occupational Therapy Evaluation   Living Environment   People in home spouse   Current Living Arrangements house   Home Accessibility stairs to enter home;stairs within home   Transportation Anticipated family or friend will provide   Living Environment Comments lives with wife though they are currently seperated. she will be able to assist at discharge. tub/shower combo up the stairs. short toilets    Self-Care   Usual Activity Tolerance good   Current Activity Tolerance moderate   Equipment Currently Used at Home walker, rolling  (reacher)   Activity/Exercise/Self-Care Comment indp with ADL at baseline    Instrumental Activities of Daily Living (IADL)   IADL Comments indp at baseline   Disability/Function   Hearing Difficulty or Deaf no   Wear Glasses or Blind yes   Fall history within last six months yes   Number of times patient has fallen within last six months 1   Change in Functional Status Since Onset of Current Illness/Injury yes   General Information   Onset of Illness/Injury or Date of Surgery 01/23/22   Referring Physician Damon Cazares PA-C   Patient/Family Therapy Goal Statement (OT) \"to go home when ready\"   Additional Occupational Profile Info/Pertinent History of Current Problem Toney Denton is a 71 year old male with past medical history of ischemic cardiomyopathy with EF of 25-30%, status post ICD for V. Tach, bioprosthetic mitral valve replacement, tricuspid valve repair, hypertension, hyperlipidemia, COPD presented with left hip pain after mechanical fall 2 nights prior and found to have left intertrochanteric fracture and admitted on 1/23/2022.  now POD 1 Open reduction and internal fixation of the left hip using Synthes 3-hole 130 degree dynamic compression hip screw   Existing Precautions/Restrictions fall   Left Lower Extremity (Weight-bearing Status) partial weight-bearing (PWB)  (50%)   General Observations and Info still with " thiago cath. agreeable to OT   Cognitive Status Examination   Orientation Status orientation to person, place and time   Affect/Mental Status (Cognitive) WFL   Follows Commands follows two-step commands;over 90% accuracy   Cognitive Status Comments no immediate concerns   Visual Perception   Visual Impairment/Limitations WFL   Impact of Vision Impairment on Function (Vision) wears glasses   Pain Assessment   Patient Currently in Pain Yes, see Vital Sign flowsheet  (4)   Integumentary/Edema   Integumentary/Edema Comments WFL   Range of Motion Comprehensive   Comment, General Range of Motion WFL for ADL   Strength Comprehensive (MMT)   Comment, General Manual Muscle Testing (MMT) Assessment WFL for ADL   Bed Mobility   Bed Mobility supine-sit   Supine-Sit White (Bed Mobility) minimum assist (75% patient effort)   Activities of Daily Living   BADL Assessment lower body dressing   Lower Body Dressing Assessment   White Level (Lower Body Dressing) maximum assist (25% patient effort)   Comment (Lower Body Dressing) donning sock no AE   Clinical Impression   Criteria for Skilled Therapeutic Interventions Met (OT) yes;meets criteria;skilled treatment is necessary   OT Diagnosis decreased function in ADL   OT Problem List-Impairments impacting ADL problems related to;activity tolerance impaired;mobility;pain;post-surgical precautions   Assessment of Occupational Performance 3-5 Performance Deficits   Identified Performance Deficits bathing, dressing, transfers, toileting   Planned Therapy Interventions (OT) ADL retraining;IADL retraining;progressive activity/exercise;home program guidelines;transfer training   Clinical Decision Making Complexity (OT) low complexity   Therapy Frequency (OT) 1x eval and treat   Anticipated Equipment Needs Upon Discharge (OT) hip kit;tub bench;raised toilet seat   Risk & Benefits of therapy have been explained care plan/treatment goals reviewed;evaluation/treatment results  reviewed;risks/benefits reviewed;current/potential barriers reviewed;participants voiced agreement with care plan;participants included;patient   Comment-Clinical Impression decreased function in ADL warrants skilled IP OT tx   OT Discharge Planning    OT Discharge Recommendation (DC Rec) Home with assist;home with home care occupational therapy   OT Rationale for DC Rec pt limited by mobility, pain and endurance this date. new hip precautions. anticipate that with skilled IP OT he will be able to return to home with A for ADL and supervision for mobility as needed. rec extended tub bench, raised toilet seat with handles, hip kit. will need HH OT to increase safety at home   Total Evaluation Time (Minutes)   Total Evaluation Time (Minutes) 10

## 2022-01-27 NOTE — PROGRESS NOTES
Care Management Discharge Note    Discharge Date: 01/27/2022       Discharge Disposition: Home Care    Discharge Services:  Home care    Discharge DME:  none    Discharge Transportation: family or friend will provide    Private pay costs discussed: Not applicable      Additional Information:  Pt discharging to home.  HC orders were faxed to [phone] .      Kim Diego RN, BSN, PHN, CCM  Care Coordinator  [facility]  [phone]

## 2022-01-27 NOTE — DISCHARGE SUMMARY
[facility]  Hospitalist Discharge Summary      Date of Admission:  1/23/2022  Date of Discharge:  1/27/2022  Discharging Provider: Bonnie Roberts MD  Discharge Service: Hospitalist Service    Discharge Diagnoses         Follow-ups Needed After Discharge   Follow-up Appointments     Follow-up and recommended labs and tests       Follow up with primary care provider, Kelley Stoddard, within 7 days for   hospital follow- up.  The following labs/tests are recommended: bmp.  Follow up with orthopedics as scheduled.             Unresulted Labs Ordered in the Past 30 Days of this Admission     No orders found from 12/24/2021 to 1/24/2022.          Discharge Disposition   Discharged to home  Condition at discharge: Stable      Hospital Course            Summary of Stay: Toney Denton is a 71 year old male with past medical history of ischemic cardiomyopathy with EF of 25-30%, status post ICD for V. Tach, bioprosthetic mitral valve replacement, tricuspid valve repair, hypertension, hyperlipidemia, COPD presented with left hip pain after mechanical fall 2 nights prior and found to have left intertrochanteric fracture and admitted on 1/23/2022.    Problem List:   1. Left intertrochanteric femur fracture.  -Patient had mechanical fall 2 days prior to admission,  -Status post open reduction internal fixation 1/24.  -Pain is fairly controlled.  -PT, OT, wound care, DVT prophylaxis per orthopedic service.  -Use muscle relaxants as needed.  -He has a smooth postoperative course so far.     2. Ischemic cardiomyopathy, history of V. tach status post ICD  3. Mitral valve replacement, bioprosthetic and tricuspid repair.  4. Episode of 9 beats of NSVT  -Echocardiogram done at an outside facility showed EF of 25 to 30%.  -Patient is not decompensated.  -Continue prior to admission medication.  -Continue telemonitoring.  -Patient has ICD placement for V. tach in the setting of ischemic dilated cardiomyopathy.  -Potassium  is 4.4, magnesium is 1.7 ordered magnesium oxide.  -Continue telemonitoring     5. Chronic obstructive pulmonary.  -No sign of exacerbation.  -Continue to monitor oxygenation.     6. COVID-19 is negative.  He is vaccinated     7. Mild thrombocytopenia.  Platelet is 121.     8. Acute postop blood loss anemia.  -Hemoglobin was 13.9 yesterday, 9.4.  -No indication for transfusion.  -No sign of active bleed    9. Urinary retention.  - started on flomax.  - s/p das, will attempt to remove 1/26 with voiding trial.  - das removed and voiding w/o issues.    10. Hyponatremia.  - f/u with PCP for recheck.  - no confusion, cut down water intake on discharge.  - counseled.           Consultations This Hospital Stay   ORTHOPEDIC SURGERY IP CONSULT  PHYSICAL THERAPY ADULT IP CONSULT  OCCUPATIONAL THERAPY ADULT IP CONSULT  CARE MANAGEMENT / SOCIAL WORK IP CONSULT    Code Status   Full Code    Time Spent on this Encounter   I, Bonnie Roberts MD, personally saw the patient today and spent greater than 30 minutes discharging this patient.       Bonnie Roberts MD  [facility]  [address] [place] [address]  Phone: [phone]  Fax: [phone]  ______________________________________________________________________    Physical Exam   Vital Signs: Temp: 96.9  F (36.1  C) Temp src: Temporal BP: 112/50 Pulse: 66   Resp: 16 SpO2: 99 % O2 Device: None (Room air)    Weight: 196 lbs 9.6 oz    Gen - AAO x 3 in NAD.  Lungs - CTA B.  Heart - RR,S1+S2 nml, no m/g/r.  Abd - soft, NT, ND, + BS.  Ext - no edema.       Primary Care Physician   Kelley Stoddard    Discharge Orders      Home Care PT Referral for Hospital Discharge      Follow-up and recommended labs and tests     Follow up with primary care provider, Kelley Stoddard, within 7 days for hospital follow- up.  The following labs/tests are recommended: bmp.  Follow up with orthopedics as scheduled.     Activity    Your activity upon discharge: as per  orthopedics     Walker DME    : DME Documentation: Describe the reason for need to support medical necessity: Impaired gait status post hip surgery. I, the undersigned, certify that the above prescribed supplies are medically necessary for this patient and is both reasonable and necessary in reference to accepted standards of medical practice in the treatment of this patient's condition and is not prescribed as a convenience.     Crutches Order    DME Documentation:   Describe the reason for need to support medical necessity: Impaired gait.     I, the undersigned, certify that the above prescribed supplies are medically necessary for this patient and is both reasonable and necessary in reference to accepted standards of medical and necessary in reference to accepted standards of medical practice in the treatment of this patient's condition and is not prescribed as a convenience.     Diet    Follow this diet upon discharge: 2g salt       Significant Results and Procedures   Results for orders placed or performed during the hospital encounter of 01/23/22   XR Femur Left 2 Views    Narrative    EXAM: XR PELVIS 1/2 VIEW, XR FEMUR LEFT 2 VIEW  LOCATION: [facility]  DATE/TIME: 01/23/2022, 12:30 PM    INDICATION: Left hip and femur pain.  COMPARISON: None.      Impression    IMPRESSION:  1.  Oblique nondisplaced fracture of the left intertrochanteric femur.  2.  The remaining left femur is unremarkable.  3.  Advanced left hip degenerative arthrosis.  4.  Advanced right hip degenerative arthrosis.  5.  Atherosclerotic calcification.     XR Pelvis 1/2 Views    Narrative    EXAM: XR PELVIS 1/2 VIEW, XR FEMUR LEFT 2 VIEW  LOCATION: [facility]  DATE/TIME: 01/23/2022, 12:30 PM    INDICATION: Left hip and femur pain.  COMPARISON: None.      Impression    IMPRESSION:  1.  Oblique nondisplaced fracture of the left intertrochanteric femur.  2.  The remaining left femur is  unremarkable.  3.  Advanced left hip degenerative arthrosis.  4.  Advanced right hip degenerative arthrosis.  5.  Atherosclerotic calcification.     XR Surgery MOHAN L/T 5 Min Fluoro    Narrative    This exam was marked as non-reportable because it will not be read by a   radiologist or a [place] non-radiologist provider.               Discharge Medications   Current Discharge Medication List      CONTINUE these medications which have NOT CHANGED    Details   acetaminophen (TYLENOL) 500 MG tablet Take 1,000 mg by mouth every 6 hours as needed for mild pain      albuterol (PROAIR HFA/PROVENTIL HFA/VENTOLIN HFA) 108 (90 Base) MCG/ACT inhaler Inhale 1-2 puffs into the lungs every 4 hours as needed for shortness of breath / dyspnea or wheezing      aspirin 81 MG EC tablet Take 81 mg by mouth every morning      atorvastatin (LIPITOR) 80 MG tablet Take 80 mg by mouth every morning      !! bumetanide (BUMEX) 2 MG tablet Take 2 mg by mouth every morning       !! bumetanide (BUMEX) 2 MG tablet Take 1 mg by mouth every evening      !! bumetanide (BUMEX) 2 MG tablet Take 2 mg by mouth daily At noon      DULoxetine (CYMBALTA) 30 MG capsule Take 30 mg by mouth At Bedtime      lisinopril (ZESTRIL) 2.5 MG tablet Take 2.5 mg by mouth every morning      loratadine (CLARITIN) 10 MG tablet Take 10 mg by mouth every morning      metoprolol succinate ER (TOPROL-XL) 100 MG 24 hr tablet Take 150 mg by mouth every morning      nitroGLYcerin (NITROSTAT) 0.4 MG sublingual tablet Place 0.4 mg under the tongue every 5 minutes as needed for chest pain For chest pain place 1 tablet under the tongue every 5 minutes for 3 doses. If symptoms persist 5 minutes after 1st dose call 911.      potassium chloride ER (KLOR-CON M) 20 MEQ CR tablet Take 20 mEq by mouth every morning      umeclidinium-vilanterol (ANORO ELLIPTA) 62.5-25 MCG/INH oral inhaler Inhale 1 puff into the lungs every morning       !! - Potential duplicate medications found. Please  discuss with provider.        Allergies   Allergies   Allergen Reactions     Spironolactone Unknown     Reported side effects after receiving it after heart surgery, resolved with ablasion

## 2022-01-27 NOTE — PLAN OF CARE
"Patient vital signs are at baseline: No,  Reason:  On 1 L nc oxygen, SpO2 95%.  Patient able to ambulate as they were prior to admission or with assist devices provided by therapies during their stay:  No,  Reason:  Pt has stood at the edge of the bed.   Patient MUST void prior to discharge:  No,  Reason:  Bladder scanned for 506 mL. Straight cathed for 505 mL.  Patient able to tolerate oral intake:  Yes  Pain has adequate pain control using Oral analgesics:  Yes    Pt arrived to the unit at 1045 from the PACU. Pain 4/10 initially. PRN atarax given and ice applied. Pain increased to 7/10 . PRN 10 mg oxycodone given. Ice applied. Discontinued LR, saline locked. Stood at the edge of the bed, pt states he feels \"wobbly\". Very motivated to get moving. Will continue to monitor.  "
3pm-11pm RN    Patient VS are at baseline: Yes  Patient able to ambulate as they were prior to admission or with assist devices provided by therapy during their stay: No needs assist of two  Patient must void prior to discharge:Due to void  Patient able to tolerate oral intake: yes  Patient has adequate pain control using oral analgesics: Yes    Patient taking oxycodone, tylenol and atarax for pain stood at bedside, is partial weight bearing on the left. Dressing is CDI with a scant amount of drainage. Patient was straight catheterized for 700ml. CMS intact. Will continue to monitor.  
3pm-11pm RN    Patient VS are at baseline: Yes  Patient able to ambulate as they were prior to admission or with assist devices provided by therapy during their stay: Yes  assist of one using walker and gait belt  Patient must void prior to discharge: No has a das catheter  Patient able to tolerate oral intake: Yes  Patient has adequate pain control using oral analgesics: Yes    Patient has a das catheter due to retention, was started on flomax. Dose of magnesium replacement given, dressing to left hip CDI with scant amount of drainage, CMS intact.  Plan is to discharge home.  
Afeb, vss, cms intact, dressing with moderate amount of dried drainage. Minimal pain with movement, none at rest. Declined any pre analgesics, just took scheduled tylenol. Tele tech noted pt in afib with PVCs and BBB this am, rate is controlled. Pt denied any cardiac symptoms. Shaffer discontinued with 2 doses of flomax on board. Will do a voiding trial again before discharging to home tomorrow. Up with assist of 1 and walker. Had OT today with spouse present and she will get equipment needed for him at home. Plan is to discharge tomorrow.    
Evening RN    Patient vital signs are at baseline: Yes  Patient able to ambulate as they were prior to admission or with assist devices provided by therapies during their stay:  Yes  Patient MUST void prior to discharge:  Yes  Patient able to tolerate oral intake:  Yes  Pain has adequate pain control using Oral analgesics:  Yes    Pt A/O x4.  VSS and afebrile.  Tele - afib repacing at 63.  Pain managed adequately with scheduled PO Tylenol and one dose of PRN PO oxycodone before bed.  CMS intact.  Dressing moderate amount of dried drainage.  Up A1 with walker vs crutches, and gait belt - pt declined oob for this RN.  Voiding - total of approx 525ml this shift, with bedtime PVR of 200ml - continuing to monitor closely, bumex given as scheduled.  Tolerating regular diet well.  Plan is home on discharge.  Will continue to monitor.     
Occupational Therapy Discharge Summary    Reason for therapy discharge:    All goals and outcomes met, no further needs identified.    Progress towards therapy goal(s). See goals on Care Plan in [facility] electronic health record for goal details.  Goals met    Therapy recommendation(s):    Continued therapy is recommended.  Rationale/Recommendations:  increased indp and safety wtih ADL and skilled IP OT tx. all goals met for IP. will benefit from continued tx with HH OT to increase strength and function for daily routines and tasks . taxing effort required in order for pt to leave the home      
Physical Therapy Discharge Summary    Reason for therapy discharge:    Patient/family request discontinuation of services.    Progress towards therapy goal(s). See goals on Care Plan in [facility] electronic health record for goal details.  Goals partially met.  Barriers to achieving goals:   pt request..    Therapy recommendation(s):    Home with FWW, assist on the stairs and HHPT.       
Pt A&O x4, VSS, pain 5/10, give Dilaudid 0.3 mg every 4 hours for pain, using urinal in bed, passing gas, washed before going down to pre-op.  Pre-op team came to get pt at 0600.  CMS intact LLE, no numbness or tingling. Writer did note prolapse colon by anus.      Three inhalers and lidocaine cream removed from pt's belonging and placed in lock box in pr's room.   
Vitals are Temp: 97  F (36.1  C) Temp src: Temporal BP: 98/43 Pulse: 65   Resp: 20 SpO2: 93 %.  Patient is Alert and Oriented x4. CMS intact. They are 1 Assist with Gait Belt and Walker- 50% weight bearing on L leg. Pt is a Regular diet.  They are complaining of 3/10 pain in their L hip, Scheduled Tylenol and PRN oxycodone given for pain. Patient has no IV access.     Aquacell dressing on surgical site has small amount of drainage, otherwise dressing is intact.     Pt plan is to discharge home hopefully today - pt family or friend will provide transport.   
Vitals are Temp: 98  F (36.7  C) Temp src: Temporal BP: 135/82 Pulse: 103   Resp: 20 SpO2: 95 %.  Patient is Alert and Oriented x4. They are 1-2 assist. Pt is a Regular diet.  They are complaining of 8/10 pain in their L hip  Oxycodone given for pain.  Patient has no IV access - removed d/t bleeding     Pt had 9 beats of vtach - physician notified and orders placed.   
Voiding small amounts but retaining urine despite new flomax medication.   Cath'd @ 1500 for 750ml.  Obtained order to leave cath in place.  Graduated from PT and received his crutches.  Declined oxy, rating pain 2-3.  No v-tach on tele.  Magnesium started.  
Dr. Poe

## 2022-01-28 NOTE — PROGRESS NOTES
Clinic Care Coordination Contact  [facility]: Post-Discharge Note  SITUATION                                                      Admission:    Admission Date: 01/23/22   Reason for Admission: Left intertrochanteric femur fracture  Discharge:   Discharge Date: 01/27/22  Discharge Diagnosis: Left intertrochanteric femur fracture    BACKGROUND                                                      71 year old male with past medical history of ischemic cardiomyopathy with EF of 25-30%, status post ICD for V. Tach, bioprosthetic mitral valve replacement, tricuspid valve repair, hypertension, hyperlipidemia, COPD presented with left hip pain after mechanical fall 2 nights prior and found to have left intertrochanteric fracture and admitted on 1/23/2022.    Problem List:   1. Left intertrochanteric femur fracture.  -Patient had mechanical fall 2 days prior to admission,  -Status post open reduction internal fixation 1/24.  -Pain is fairly controlled.  -PT, OT, wound care, DVT prophylaxis per orthopedic service.  -Use muscle relaxants as needed.  -He has a smooth postoperative course so far.     2. Ischemic cardiomyopathy, history of V. tach status post ICD  3. Mitral valve replacement, bioprosthetic and tricuspid repair.  4. Episode of 9 beats of NSVT  -Echocardiogram done at an outside facility showed EF of 25 to 30%.  -Patient is not decompensated.  -Continue prior to admission medication.  -Continue telemonitoring.  -Patient has ICD placement for V. tach in the setting of ischemic dilated cardiomyopathy.  -Potassium is 4.4, magnesium is 1.7 ordered magnesium oxide.  -Continue telemonitoring     5. Chronic obstructive pulmonary.  -No sign of exacerbation.  -Continue to monitor oxygenation.     6. COVID-19 is negative.  He is vaccinated     7. Mild thrombocytopenia.  Platelet is 121.     8. Acute postop blood loss anemia.  -Hemoglobin was 13.9 yesterday, 9.4.  -No indication for transfusion.  -No sign of active  bleed     9. Urinary retention.  - started on flomax.  - s/p das, will attempt to remove 1/26 with voiding trial.  - das removed and voiding w/o issues.     10. Hyponatremia.  - f/u with PCP for recheck.  - no confusion, cut down water intake on discharge.  - counseled.             ASSESSMENT      Enrollment  Primary Care Care Coordination Status: Not a Candidate    Discharge Assessment  How are you doing now that you are home?: Still a little sore  How are your symptoms? (Red Flag symptoms escalate to triage hotline per guidelines): Improved  Do you feel your condition is stable enough to be safe at home until your provider visit?: Yes  Does the patient have their discharge instructions? : Yes  Does the patient have questions regarding their discharge instructions? : No  Were you started on any new medications or were there changes to any of your previous medications? : Yes  Does the patient have all of their medications?: Yes  Do you have questions regarding any of your medications? : No  Do you have all of your needed medical supplies or equipment (DME)?  (i.e. oxygen tank, CPAP, cane, etc.): Yes  Discharge follow-up appointment scheduled within 14 calendar days? : Yes  Discharge Follow Up Appointment Date: 02/07/22  Discharge Follow Up Appointment Scheduled with?: Primary Care Provider    Post-op ([facility] CTA Only)  If the patient had a surgery or procedure, do they have any questions for a nurse?: No (Open reduction and internal fixation of the left hip)            PLAN                                                      Outpatient Plan:  Follow up with primary care provider, Kelley Stoddard, within 7 days for   hospital follow- up.  The following labs/tests are recommended: bmp.  Follow up with orthopedics as scheduled.            No future appointments.      For any urgent concerns, please contact our 24 hour nurse triage line: [phone] ([phone])         Destinee Forte MA

## 2022-06-18 PROBLEM — K92.2 GASTROINTESTINAL HEMORRHAGE, UNSPECIFIED GASTROINTESTINAL HEMORRHAGE TYPE: Status: ACTIVE | Noted: 2022-01-01

## 2022-06-18 PROBLEM — D64.9 ANEMIA, UNSPECIFIED TYPE: Status: ACTIVE | Noted: 2022-01-01

## 2022-06-18 PROBLEM — R57.8 HEMORRHAGIC SHOCK (H): Status: ACTIVE | Noted: 2022-01-01

## 2022-06-18 NOTE — PROGRESS NOTES
Followed up on Mr. Denton who my colleague admitted earlier this AM.  Exam stable.  His hgb has stabilized and no overt major bleeding noted.  He feels hungry.      Plan:  -  Advance diet, CRS felt could start if hgb stable as it appears  -  Continue tele today given arrythmia history.  Interrogating pacemaker.  -  Titrating up metoprolol as able, started with 12.5 mg and tolerated.  Provided another midday dose.  Will hold on more today given SBP remaining low normal range this afternoon.  Will try to increase further tomorrow.  Baseline on 200 mg daily.

## 2022-06-18 NOTE — ED NOTES
[facility]  ED Nurse Handoff Report    Toney Denton is a 71 year old male   ED Chief complaint: Rectal Bleeding and Syncope  . ED Diagnosis:   Final diagnoses:   Gastrointestinal hemorrhage, unspecified gastrointestinal hemorrhage type   Anemia, unspecified type   Hemorrhagic shock (H)     Allergies:   Allergies   Allergen Reactions     Latex      Spironolactone Unknown     Reported side effects after receiving it after heart surgery, resolved with ablasion       Code Status: Full Code  Activity level - Baseline/Home:  Independent. Activity Level - Current:   Stand by Assist. Lift room needed: No. Bariatric: No   Needed: No   Isolation: No. Infection: Not Applicable.     Vital Signs:   Vitals:    06/18/22 0058 06/18/22 0100 06/18/22 0120 06/18/22 0207   BP: 95/63 95/63 102/55 99/56   Pulse: 70 91 70 81   Resp: 20 20 21 14   Temp:    98.4  F (36.9  C)   TempSrc:       SpO2: 95% 97% 99%        Cardiac Rhythm:  ,      Pain level:    Patient confused: No. Patient Falls Risk: Yes.   Elimination Status: Has voided   Patient Report - Initial Complaint: Pt arrives to ED via EMS after rectal bleeding since 0800. Pt had a recent internal hemorrhoids surgery. Pt has had BRB diarrhea every 20-30min since this am. Pt became dizzy and felt dehydrated prior to calling 911. Pt did have a syncopal episode PTA. EMS started 18g PIV in LUE, 500cc IVF given for BP 80s systolic, BP Imporved post IVF. Pt denies pain. . Focused Assessment: GI: bright red rectal bleeding since 0800 this am.   Neuro: syncopal episode prior to arrival  Tests Performed: labs and imaging. Abnormal Results:   Abnormal Labs Resulted from Time of ED Arrival to Time of ED Departure   COMPREHENSIVE METABOLIC PANEL - Abnormal       Result Value    Sodium 133      Potassium 4.7      Chloride 99      Carbon Dioxide (CO2) 26      Anion Gap 8      Urea Nitrogen 29      Creatinine 1.36 (*)     Calcium 8.2 (*)     Glucose 152 (*)     Alkaline  Phosphatase 68      AST 21      ALT 17      Protein Total 5.5 (*)     Albumin 2.7 (*)     Bilirubin Total 0.7      GFR Estimate 56 (*)    INR - Abnormal    INR 1.30 (*)    CBC WITH PLATELETS AND DIFFERENTIAL - Abnormal    WBC Count 10.5      RBC Count 2.64 (*)     Hemoglobin 8.7 (*)     Hematocrit 27.2 (*)      (*)     MCH 33.0      MCHC 32.0      RDW 13.9      Platelet Count 126 (*)     % Neutrophils 80      % Lymphocytes 9      % Monocytes 9      % Eosinophils 1      % Basophils 0      % Immature Granulocytes 1      NRBCs per 100 WBC 0      Absolute Neutrophils 8.5 (*)     Absolute Lymphocytes 1.0      Absolute Monocytes 0.9      Absolute Eosinophils 0.1      Absolute Basophils 0.0      Absolute Immature Granulocytes 0.1      Absolute NRBCs 0.0     ISTAT CREATININE POCT - Abnormal    Creatinine POCT 1.4 (*)     GFR, ESTIMATED POCT 54 (*)    NT PROBNP INPATIENT - Abnormal    N terminal Pro BNP Inpatient 4,354 (*)    BLOOD GAS VENOUS WITH OXYHEMOGLOBIN - Abnormal    pH Venous 7.34      pCO2 Venous 51 (*)     pO2 Venous 24 (*)     Bicarbonate Venous 27      FIO2 21      Oxyhemoglobin Venous 26 (*)     Base Excess/Deficit (+/-) 1.1       XR Chest Port 1 View   Final Result   IMPRESSION: Apical lordotic positioning with lower lung volumes but exam otherwise unchanged. Left hemidiaphragm not well visualized but minimally change. Lungs remain clear. Stable mild cardiomegaly, previous CABG and atrial appendage clipping.    Biventricular pacer leads unchanged.   No acute fractures evident. Degenerative changes left shoulder.      CTA Abdomen Pelvis with Contrast   Final Result   IMPRESSION:   1.  No active GI bleed identified. No bowel inflammatory focus.   2.  Significant stenosis involving the proximal SMA with heavy atherosclerotic plaque. SMA does remain patent. Both the celiac and inferior mesenteric arteries demonstrate no significant stenosis.   3.  Moderate stenoses at origins of main renal arteries.   4.   Moderate diffuse bladder wall thickening, cystitis or chronic bladder outlet obstruction possible.   5.  Lateral right eighth rib fracture indeterminate in age, possibly acute.           Treatments provided: 1 unit of uncrossmatched blood, second unit of blood infusing  Family Comments: wife went home  OBS brochure/video discussed/provided to patient:  N/A  ED Medications:   Medications   pantoprazole (PROTONIX) IV push injection 40 mg (40 mg Intravenous Given 6/17/22 2237)   0.9% sodium chloride BOLUS (0 mLs Intravenous Stopped 6/17/22 2357)   0.9% sodium chloride BOLUS (0 mLs Intravenous Stopped 6/18/22 0110)   acetaminophen (TYLENOL) tablet 1,000 mg (1,000 mg Oral Given 6/17/22 2306)   CT saline flush (73 mLs Intravenous Given 6/18/22 0013)   iopamidol (ISOVUE-370) solution 500 mL (99 mLs Intravenous Given 6/18/22 0013)     Drips infusing:  Yes, blood product  For the majority of the shift, the patient's behavior Green. Interventions performed were n/a.    Sepsis treatment initiated: No     Patient tested for COVID 19 prior to admission: YES    ED Nurse Name/Phone Number: Arlette Milton RN,   2:31 AM    RECEIVING UNIT ED HANDOFF REVIEW    Above ED Nurse Handoff Report was reviewed: Yes  Reviewed by: Camilla Morrison RN on June 18, 2022 at 2:47 AM

## 2022-06-18 NOTE — PHARMACY-ADMISSION MEDICATION HISTORY
Admission medication history interview status for this patient is complete. See [facility] admission navigator for allergy information, prior to admission medications and immunization status.     Medication history interview done, indicate source(s): Patient  Medication history resources (including written lists, pill bottles, clinic record): [facility] and [facility]  Pharmacy: [id number] [id number] AT [place] [address]      Changes made to PTA medication list:  Added: None  Changed: Metoprolol  Reported as Not Taking: None  Removed: Senna-Docusate, Tamsulosin    Actions taken by pharmacist (provider contacted, etc): Sticky note to provider     Additional medication history information:None    Medication reconciliation/reorder completed by provider prior to medication history?  N   (Y/N)       Prior to Admission medications    Medication Sig Last Dose Taking? Auth Provider Long Term End Date   aspirin 81 MG EC tablet Take 81 mg by mouth daily 6/17/2022 at Unknown time Yes Unknown, Entered By History     atorvastatin (LIPITOR) 80 MG tablet Take 80 mg by mouth every morning 6/17/2022 at Unknown time Yes Unknown, Entered By History Yes    bumetanide (BUMEX) 2 MG tablet Take 2 mg by mouth every morning  6/17/2022 at Unknown time Yes Unknown, Entered By History Yes    bumetanide (BUMEX) 2 MG tablet Take 1 mg by mouth every evening Past Week at Unknown time Yes Unknown, Entered By History Yes    bumetanide (BUMEX) 2 MG tablet Take 2 mg by mouth daily At noon 6/17/2022 at Unknown time Yes Reported, Patient Yes    DULoxetine (CYMBALTA) 30 MG capsule Take 30 mg by mouth At Bedtime Past Week at Unknown time Yes Unknown, Entered By History Yes    lisinopril (ZESTRIL) 2.5 MG tablet Take 2.5 mg by mouth every morning 6/17/2022 at Unknown time Yes Unknown, Entered By History Yes    loratadine (CLARITIN) 10 MG tablet Take 10 mg by mouth every morning 6/17/2022 at Unknown time Yes  Unknown, Entered By History     metoprolol succinate ER (TOPROL-XL) 100 MG 24 hr tablet Take 200 mg by mouth every morning 6/17/2022 at Unknown time Yes Unknown, Entered By History Yes    potassium chloride ER (KLOR-CON M) 20 MEQ CR tablet Take 20 mEq by mouth every morning 6/17/2022 at Unknown time Yes Unknown, Entered By History     umeclidinium-vilanterol (ANORO ELLIPTA) 62.5-25 MCG/INH oral inhaler Inhale 1 puff into the lungs every morning 6/17/2022 at Unknown time Yes Unknown, Entered By History     acetaminophen (TYLENOL) 500 MG tablet Take 1,000 mg by mouth every 6 hours as needed for mild pain  at PRN  Unknown, Entered By History     albuterol (PROAIR HFA/PROVENTIL HFA/VENTOLIN HFA) 108 (90 Base) MCG/ACT inhaler Inhale 1-2 puffs into the lungs every 4 hours as needed for shortness of breath / dyspnea or wheezing  at PRN  Unknown, Entered By History Yes    nitroGLYcerin (NITROSTAT) 0.4 MG sublingual tablet Place 0.4 mg under the tongue every 5 minutes as needed for chest pain For chest pain place 1 tablet under the tongue every 5 minutes for 3 doses. If symptoms persist 5 minutes after 1st dose call 911.  at PRN  Unknown, Entered By History Yes    oxyCODONE (ROXICODONE) 5 MG tablet Take 1-2 tablets (5-10 mg) by mouth every 4 hours as needed for moderate to severe pain  at PRN  Madelyn Mauro PA-C

## 2022-06-18 NOTE — ED TRIAGE NOTES
Pt arrives to ED via EMS after rectal bleeding since 0800. Pt had a recent internal hemorrhoids surgery. Pt has had BRB diarrhea every 20-30min since this am. Pt became dizzy and felt dehydrated prior to calling 911. Pt did have a syncopal episode PTA. EMS started 18g PIV in LUE, 500cc IVF given for BP 80s systolic, BP Imporved post IVF. Pt denies pain.

## 2022-06-18 NOTE — PLAN OF CARE
Pacemaker interrogated, report from [employer] on chart. Biventricular paced. No edema. Voiding w/o difficulty. One formed brown BM with scant steaks of blood. Platelets 104. Hgb a.m. 9.1, recheck at 1200 & 1600. If stable, per Colorectal note, may start diet. VSS. Denies dizziness or other symptoms when up SBA ambulating in bill/room/BR/chair. PCDs.

## 2022-06-18 NOTE — PLAN OF CARE
Goal Outcome Evaluation:     VS stable. No complaints of pain. Tele is 100% AV paced with PVC's. Blood sugar level of 106. No stools. Received 2 units of blood. Slept well throughout the night.

## 2022-06-18 NOTE — H&P
[facility]  Hospitalist Admission Note  Name: Toney Denton    MRN: [id number]  YOB: 1950    Age: 71 year old  Date of admission: 6/17/2022  Primary care provider: Kelley Stoddard    Chief Complaint: Bloody diarrhea, syncope    Assessment and Plan:   Acute lower GI bleed  Acute blood loss anemia: History internal hemorrhoids and underwent banding 2 weeks ago.  Presenting with numerous episodes of painless bright red bloody diarrhea every 30 minutes starting at 8 AM.  Has not been constipated and was not straining to have a BM.  No abdominal pain or vomiting and BUN even with his CHRISTA is not elevated so unlikely to be an upper source of bleeding especially given his history.  He is on aspirin, but not anticoagulation.  Hypotensive for EMS and received 500 mL of saline.  Received additional 1 L normal saline and 1 unit RBCs in the ER for ongoing hypotension and hemoglobin of 8.7.  Previous hemoglobin had been 13 in January when he was here for hip fracture and discharged with a hemoglobin of 9.4.  Syncopal episode as below secondary to his acute blood loss.  -Receiving second unit of RBCs in the ER  -Colorectal surgery fellow evaluated patient in the ER.  Anoscopy done which showed a prolapsing right posterior lateral internal/external hemorrhoid without evidence of recent bleeding along with a scar at the base of banding with a small tuft of tissue approximately.  There was dark blood in the rectal vault.  Colorectal surgery plans to reevaluate in the morning and should be notified if he has recurrent bleeding.  Based on reassessment and if he is still bleeding they may recommend GI evaluation, held off on GI consult for now.  -N.p.o.  -CBC with morning lab draw then ordered repeat hemoglobin for noon and 4 PM  -Initial close monitoring under [facility] status    Syncope: While walking to the bathroom to have another episode of bloody diarrhea he had sudden loss of consciousness and woke up on the  floor.  Denies injury.  He did feel lightheaded prior to the fall.  I suspect this is orthostatic hypotension leading to syncope in the setting of acute blood loss.  He does have an ICD in place, does not report it firing.  -Cardiac monitor  -Fall precautions    CHRISTA: Creatinine 1.3 up from baseline of 0.7.  Suspect secondary to acute blood loss and hypotension.  BUN not elevated.  Received 1.5 L IV fluids in total and is receiving blood.  -Holding PTA Bumex and lisinopril initially  -BMP in the morning    Possible UTI: CT of the abdomen pelvis shows diffuse bladder wall thickening concerning for cystitis.  Does not have any symptoms for this, but a urinalysis was drawn and is quite abnormal with 83 WBCs and large leukocyte esterase.  He is afebrile and does not have leukocytosis.  -Urine culture added on  -Given the abnormal imaging findings and urinalysis will give empiric IV ceftriaxone 1 g every 24 hours    CAD, ischemic cardiomyopathy, chronic systolic CHF  HTN, HLD  Bioprosthetic MV: Multiple previous stents.  Underwent one-vessel CABG in 2019 at the same time had bioprosthetic mitral valve replacement for severe mitral regurgitation and tricuspid valve repair.  He has an ischemic cardiomyopathy with most recent EF 25- 30% 4/2021.  He also has a right femoral artery pseudoaneurysm and right ICA stenosis.  CTA of the abdomen pelvis did show significant SMA stenosis.  He is on metoprolol succinate to 1 mg daily, lisinopril 2.5 mg daily, aspirin I believe 81 mg daily, Bumex 2 mg the morning, 2 mg at noon, and 1 mg in the evening, atorvastatin 80 mg at bedtime, and a potassium supplement.  -Hold metoprolol, lisinopril, aspirin for hypertension and bleeding  -Holding Bumex for hypotension initially, but may need this reinitiated during the day given he is receiving 2 units of blood and received 1 L NS  -Resume atorvastatin    History A. Fib  History of V. Tach  Sinus node dysfunction: S/p Maze procedure and atrial  clipping.  S/p BiV pacemaker/ICD placement.  Chronically on aspirin and metoprolol succinate 20 mg daily.  -Hold aspirin and metoprolol for bleeding hypotension    Acute on chronic thrombocytopenia: He had thrombocytopenia in January when he was here for hip fracture.  Platelet count recovered to 243 per outside system.  Now again thrombocytopenia in the low 120s, in part consumption from bleeding.  -CBC in the morning, no indication for transfusion at this time    Severe COPD: Not on any home oxygen.  Resume his Anoro Ellipta and albuterol as needed.    History right hip fracture: Right intertrochanteric hip fracture January 2022 requiring surgery.      DVT Prophylaxis: Pneumatic Compression Devices  Code Status: Full Code  FEN: N.p.o., no IV fluids as he is receiving blood and has systolic CHF  Discharge Dispo: Home  Estimated Disch Date / # of Days until Disch: Admit inpatient under IMC status for close monitoring.  Anticipate at least 2 night hospitalization.      History of Present Illness:  Toney Denton is a 71 year old male with PMH including with history of CAD with previous stents and one-vessel CABG in 2019, ischemic cardiomyopathy with EF 25-30%, s/p bioprosthetic MVR, tricuspid valve repair, A. fib s/p maze procedure and left atrial clipping, sinus node dysfunction and V. tach s/p biventricular PPM/ICD, severe COPD not on home O2, HTN, HLD, anemia, thrombocytopenia, and right intertrochanteric hip fracture in January 2022 who presents with bloody stools and a syncopal episode.  He underwent banding of internal hemorrhoids 2 weeks ago.  He reports being in normal state of health and no issues since the banding procedure until this morning around 8 AM he had a bowel movement of bright red blood.  Since then has been having bright red blood liquid stools up to 1 cup at a time every 30 minutes or so.  Denies any abdominal pain.  No nausea or vomiting.  He has felt lightheaded when standing up later in the  day.  He ended up having a brief syncopal episode and was found unconscious by EMS when they arrived.  He denies any injury or pain following the fall.  He was hypotensive for EMS so he received 500 mL NS bolus in route.  Currently denies any lightheadedness.    History obtained from patient, medical record, and from Dr. Carreno in the emergency department.  Mild hypotension in the ER with blood pressure ranging from 83//44, heart rate in the 70s, temperature 97  F, oxygen 100% on room air.  WBC 10.5, hemoglobin 8.7, platelet count 126.  Creatinine 1.36 up from baseline of 0.7, BUN 29.  Albumin low 2.7.  NT proBNP elevated at 4354.  Troponin normal at 60.  Blood glucose 152.  INR 1.30.  COVID-19 PCR negative.  Chest x-ray negative for any acute pathology.  CTA of the abdomen and pelvis showed significant SMA stenosis and thickening of the bladder wall, but no active bleeding identified. Colorectal surgery fellow evaluated patient in the ER.  Anoscopy done which showed a prolapsing right posterior lateral internal/external hemorrhoid without evidence of recent bleeding along with a scar at the base of banding with a small tuft of tissue approximately.  There was dark blood in the rectal vault.  Colorectal surgery plans to reevaluate in the morning and should be notified if he has recurrent bleeding.  He is receiving 2 units of RBCs in addition to the 1 L normal saline he already received, 40 mg IV Photonix, and 1 g acetaminophen.  Blood pressure appears relatively stable following fluid and 1 unit RBCs so initially will admit inpatient under [facility]MC status for close monitoring as opposed to ICU.  Urinalysis came back abnormal and there was evidence for possible cystitis on CT so he will receive IV ceftriaxone for possible UTI.       Clinically Significant Risk Factors Present on Admission             # Hypoalbuminemia: Albumin = 2.7 g/dL (Ref range: 3.4 - 5.0 g/dL) on admission, will monitor as appropriate   #  Coagulation Defect: INR = 1.30 (Ref range: 0.85 - 1.15) and/or PTT = N/A on admission, will monitor for bleeding  # Platelet Defect: home medication list includes an antiplatelet medication                 Past Medical History reviewed:  Past Medical History:   Diagnosis Date     Benign essential hypertension      Chronic atrial fibrillation (H)     s/p ablation/maze procedure/LA clip.  on ASA for stroke ppx     COPD (chronic obstructive pulmonary disease) (H)     severe by PFTs     Hyperlipidemia LDL goal <100      Ischemic cardiomyopathy     EF 25 % by echo 4/2021, with hx of VTach s/p biV pacer and ICD     Mitral valve regurgitation     s/p bioprosthetic MVR     Tricuspid regurgitation     s/p repair   CAD  Systolic CHF  HLD  Right hip fracture  Anemia  Thrombocytopenia    Past Surgical History reviewed:  Past Surgical History:   Procedure Laterality Date     CABG GARTH QUAL ACT PERFORM       MITRAL VALVE REPLACEMENT       OPEN REDUCTION INTERNAL FIXATION HIP NAILING Left 1/24/2022    Procedure: Open reduction and internal fixation of the left hip using Synthes 3-hole 130 degree dynamic compression hip screw;  Surgeon: Sharlene Arias MD;  Location: [facility] OR     TRICUSPID VALVULOPLASTY       Social History reviewed:  Social History     Tobacco Use     Smoking status: Former Smoker     Types: Cigars     Smokeless tobacco: Never Used   Substance Use Topics     Alcohol use: Yes     Comment: 3 beers (IPA) per day -can go for days without drinking and no hx of w/d     Social History     Social History Narrative     Not on file     Family History reviewed:  Family History   Problem Relation Age of Onset     Diabetes Father      Diabetes Brother      Allergies:  Allergies   Allergen Reactions     Latex      Spironolactone Unknown     Reported side effects after receiving it after heart surgery, resolved with ablasion     Medications:  Prior to Admission medications    Medication Sig Last Dose Taking? Auth Provider Long  Term End Date   acetaminophen (TYLENOL) 500 MG tablet Take 1,000 mg by mouth every 6 hours as needed for mild pain   Unknown, Entered By History     albuterol (PROAIR HFA/PROVENTIL HFA/VENTOLIN HFA) 108 (90 Base) MCG/ACT inhaler Inhale 1-2 puffs into the lungs every 4 hours as needed for shortness of breath / dyspnea or wheezing   Unknown, Entered By History Yes    aspirin (ASA) 81MG EC tablet Take 1 tablet (81 mg) by mouth daily   Madelyn Mauro PA-C     atorvastatin (LIPITOR) 80 MG tablet Take 80 mg by mouth every morning   Unknown, Entered By History Yes    bumetanide (BUMEX) 2 MG tablet Take 2 mg by mouth every morning    Unknown, Entered By History Yes    bumetanide (BUMEX) 2 MG tablet Take 1 mg by mouth every evening   Unknown, Entered By History Yes    bumetanide (BUMEX) 2 MG tablet Take 2 mg by mouth daily At noon   Reported, Patient Yes    DULoxetine (CYMBALTA) 30 MG capsule Take 30 mg by mouth At Bedtime   Unknown, Entered By History Yes    lisinopril (ZESTRIL) 2.5 MG tablet Take 2.5 mg by mouth every morning   Unknown, Entered By History Yes    loratadine (CLARITIN) 10 MG tablet Take 10 mg by mouth every morning   Unknown, Entered By History     metoprolol succinate ER (TOPROL-XL) 100 MG 24 hr tablet Take 200 mg by mouth every morning   Unknown, Entered By History Yes    nitroGLYcerin (NITROSTAT) 0.4 MG sublingual tablet Place 0.4 mg under the tongue every 5 minutes as needed for chest pain For chest pain place 1 tablet under the tongue every 5 minutes for 3 doses. If symptoms persist 5 minutes after 1st dose call 911.   Unknown, Entered By History Yes        Madelyn Mauro PA-C     potassium chloride ER (KLOR-CON M) 20 MEQ CR tablet Take 20 mEq by mouth every morning   Unknown, Entered By History     senna-docusate (SENOKOT-S/PERICOLACE) 8.6-50 MG tablet Take 1 tablet by mouth 2 times daily as needed for constipation   Madelyn Mauro PA-C Parpia, Abdul K, MD, MD      umeclidinium-vilanterol (ANORO ELLIPTA) 62.5-25 MCG/INH oral inhaler Inhale 1 puff into the lungs every morning   Unknown, Entered By History       Review of Systems:  A Comprehensive greater than 10 system review of systems was carried out.  Pertinent positives and negatives are noted above.  Otherwise negative.     Physical Exam:  Blood pressure 95/63, pulse 70, temperature 98.2  F (36.8  C), temperature source Oral, resp. rate 20, SpO2 95 %.  Wt Readings from Last 1 Encounters:   01/27/22 89.2 kg (196 lb 9.6 oz)     Exam:  Constitutional: Awake, NAD   Eyes: sclera white   HEENT: atraumatic, MMM  Respiratory: no respiratory distress, lungs cta bilaterally, no crackles or wheeze  Cardiovascular: RRR.  No murmur  GI: non-tender, not distended, bowel sounds present  Skin: Pale, no rash  Musculoskeletal/extremities: atraumatic, no major deformities.  Trace bilateral lower extremity edema  Neurologic: A&O, speech clear, answering questions appropriately, moves extremities equally  Psychiatric: calm, cooperative, normal affect    Lab and imaging data personally reviewed:  Labs:  Recent Labs   Lab 06/18/22  0121   PHV 7.34   PO2V 24*   PCO2V 51*   HCO3V 27     Recent Labs   Lab 06/17/22 2230   WBC 10.5   HGB 8.7*   HCT 27.2*   *   *     Recent Labs   Lab 06/17/22 2234 06/17/22 2230   NA  --  133   POTASSIUM  --  4.7   CHLORIDE  --  99   CO2  --  26   ANIONGAP  --  8   GLC  --  152*   BUN  --  29   CR 1.4* 1.36*   GFRESTIMATED 54* 56*   THOMAS  --  8.2*   PROTTOTAL  --  5.5*   ALBUMIN  --  2.7*   BILITOTAL  --  0.7   ALKPHOS  --  68   AST  --  21   ALT  --  17     Recent Labs   Lab 06/17/22 2230   NTBNPI 4,354*     Recent Labs   Lab 06/17/22 2230   TROPONINIS 60     Color Urine (no units)   Date Value   06/18/2022 Light Yellow     Appearance Urine (no units)   Date Value   06/18/2022 Clear     Glucose Urine (mg/dL)   Date Value   06/18/2022 Negative     Bilirubin Urine (no units)   Date Value    06/18/2022 Negative     Ketones Urine (mg/dL)   Date Value   06/18/2022 Negative     Specific Gravity Urine (no units)   Date Value   06/18/2022 1.010     pH Urine (no units)   Date Value   06/18/2022 5.0     Protein Albumin Urine (mg/dL)   Date Value   06/18/2022 Negative     Nitrite Urine (no units)   Date Value   06/18/2022 Negative     Leukocyte Esterase Urine (no units)   Date Value   06/18/2022 Large (A)       COVID-19 PCR negative    EKG: V paced rhythm    Imaging:  Recent Results (from the past 24 hour(s))   CTA Abdomen Pelvis with Contrast    Narrative    EXAM: CTA ABDOMEN PELVIS WITH CONTRAST  LOCATION: [facility]  DATE/TIME: 6/18/2022 12:07 AM    INDICATION: GI bleed  COMPARISON: 5/8/2020  TECHNIQUE: CT angiogram abdomen pelvis during arterial phase of injection of IV contrast. 2D and 3D MIP reconstructions were performed by the CT technologist. Dose reduction techniques were used.  CONTRAST: 99 mL [id number]    FINDINGS:  ANGIOGRAM ABDOMEN/PELVIS: Normal caliber abdominal aorta, no dissection. Moderate atherosclerotic plaque. Celiac artery unremarkable.  Superior mesenteric artery demonstrates significant soft and calcific plaque proximally with high-grade narrowing 3 cm from the origin but the vessel remains patent. Normal mesenteric branch pattern.  JENIFER appears normal.  Moderate stenoses at main renal arteries.    LOWER CHEST: Cardiomegaly. Pacemaker present.    HEPATOBILIARY: Normal.    PANCREAS: Normal.    SPLEEN: Normal.    ADRENAL GLANDS: Normal.    KIDNEYS/BLADDER: 1 mm stone lower pole right kidney. Tiny renal cysts no further workup. No hydronephrosis.  There is modest wall thickening involving the urinary bladder    BOWEL: No obstruction or inflammatory change. No focus of active extravasation identified.  There appears to be somewhat vague hyperdense material within rectum or possibly this relates to clot but there is no active extravasation evident    LYMPH NODES:  Normal.    PELVIC ORGANS: Prostate normal in size. No ascites.    MUSCULOSKELETAL: Previous left hip ORIF. Advanced DJD right hip. Degenerative changes of spine. Old right rib fractures and there is also an indeterminate fracture involving the lateral eighth rib which could be acute.      Impression    IMPRESSION:  1.  No active GI bleed identified. No bowel inflammatory focus.  2.  Significant stenosis involving the proximal SMA with heavy atherosclerotic plaque. SMA does remain patent. Both the celiac and inferior mesenteric arteries demonstrate no significant stenosis.  3.  Moderate stenoses at origins of main renal arteries.  4.  Moderate diffuse bladder wall thickening, cystitis or chronic bladder outlet obstruction possible.  5.  Lateral right eighth rib fracture indeterminate in age, possibly acute.   XR Chest Port 1 View    Narrative    EXAM: XR CHEST PORT 1 VIEW  LOCATION: [facility]  DATE/TIME: 6/18/2022 1:18 AM    INDICATION: rib injury  COMPARISON: 5/2/2020      Impression    IMPRESSION: Apical lordotic positioning with lower lung volumes but exam otherwise unchanged. Left hemidiaphragm not well visualized but minimally change. Lungs remain clear. Stable mild cardiomegaly, previous CABG and atrial appendage clipping.   Biventricular pacer leads unchanged.  No acute fractures evident. Degenerative changes left shoulder.       Sergo Barnett MD  Hospitalist  [facility]

## 2022-06-18 NOTE — CONSULTS
Colon and Rectal Surgery Consultation         Toney Denton    MRN# [id number]   YOB: 1950 Age: 71 year old   Date of Admission: 6/17/2022  Date of Consult: 6/18/2022          Assessment and Plan:      Mr. Toney Denton is a 71 year old male with multiple comorbidities including CHF (EF 25-30%), bioprosthetic heart valve, recent hip replacement who underwent right posterior internal hemorrhoid banding 6/2. This was uncomplicated per records. Throughout the day, he has had large volume bright red blood per rectum leading to syncope. He remains mildly hypotensive with CHRISTA and lactic acidosis.    The time course after banding is atypical for post-banding bleed as these typically occur 4-7 days after banding. There is scar at the base of the band with no active bleeding at this time.    Discussed that I cannot rule out this as a source of bleed but we should also keep source throughout the remainder of the GI tract in the differential.    - Appreciate hospitalist management of CHF and CHRISTA  - Continue NPO tonight in case of repeat bleed  - Page Colorectal if recurrent bleeding to evaluate need for emergent EUA  - We will discuss in the morning whether to pursue further GI tract evaluation with scope but no emergent scope is currently indicated.                   Primary Care Physician:      Kelley Stoddard [phone]         Requesting Physician:      Dr. Carreno         Chief Complaint:      Bright red blood per rectum after banding  History is obtained from the patient.         History of Present Illness:      Toney Denton is a 71 year old male with CHF EF 25-30% from ischemic cardiomyopathy, bioprosthetic mitral valve replacement, a fib, ICD in place, anemia and thrombocytopenia, and recent hip fracture who underwent right posterolateral internal hemorrhoid banding on 6/2 and presents today with syncope after large volume rectal bleeding. He has a longstanding history of hemorrhoids with prior uneventful  banding. Due to recurrent prolapsing hemorrhoids and intermittent spotting blood on the toilet paper, he underwent a single right posterolateral hemorrhoidal band in clinic on 6/2/2022. He has done very well after the procedure until this morning. At 8am, he started passing cups of bright red blood and clots per rectum. He called the clinic who arranged for follow-up on Monday. This continued approximately every 20 minutes throughout the day until this evening when he passed out on the toilet. He has been progressively dizzy and lightheaded. He denies shortness of breath or chest pain. He takes only a baby aspirin but no anticoagulation. He does have easy bleeding at baseline. His last colonoscopy was reportedly about a year ago with no notable findings per patient. His last EGD was about 3 years ago with no notable findings per patient. He has never had a previous GI bleed like this.              Past Medical History:        Past Medical History:   Diagnosis Date     Benign essential hypertension      Chronic atrial fibrillation (H)     s/p ablation/maze procedure/LA clip.  on ASA for stroke ppx     COPD (chronic obstructive pulmonary disease) (H)     severe by PFTs     Hyperlipidemia LDL goal <100      Ischemic cardiomyopathy     EF 25 % by echo 4/2021, with hx of VTach s/p biV pacer and ICD     Mitral valve regurgitation     s/p bioprosthetic MVR     Tricuspid regurgitation     s/p repair             Past Surgical History:        Past Surgical History:   Procedure Laterality Date     CABG GARTH QUAL ACT PERFORM       MITRAL VALVE REPLACEMENT       OPEN REDUCTION INTERNAL FIXATION HIP NAILING Left 1/24/2022    Procedure: Open reduction and internal fixation of the left hip using Synthes 3-hole 130 degree dynamic compression hip screw;  Surgeon: Sharlene Arias MD;  Location: [facility] OR     TRICUSPID VALVULOPLASTY                   Home Medications:        Prior to Admission medications    Medication Sig Last Dose  Taking? Auth Provider Long Term End Date   acetaminophen (TYLENOL) 500 MG tablet Take 1,000 mg by mouth every 6 hours as needed for mild pain   Unknown, Entered By History     albuterol (PROAIR HFA/PROVENTIL HFA/VENTOLIN HFA) 108 (90 Base) MCG/ACT inhaler Inhale 1-2 puffs into the lungs every 4 hours as needed for shortness of breath / dyspnea or wheezing   Unknown, Entered By History Yes    aspirin (ASA) 325 MG EC tablet Take 1 tablet (325 mg) by mouth daily   Madelyn Mauro PA-C     atorvastatin (LIPITOR) 80 MG tablet Take 80 mg by mouth every morning   Unknown, Entered By History Yes    bumetanide (BUMEX) 2 MG tablet Take 2 mg by mouth every morning    Unknown, Entered By History Yes    bumetanide (BUMEX) 2 MG tablet Take 1 mg by mouth every evening   Unknown, Entered By History Yes    bumetanide (BUMEX) 2 MG tablet Take 2 mg by mouth daily At noon   Reported, Patient Yes    DULoxetine (CYMBALTA) 30 MG capsule Take 30 mg by mouth At Bedtime   Unknown, Entered By History Yes    lisinopril (ZESTRIL) 2.5 MG tablet Take 2.5 mg by mouth every morning   Unknown, Entered By History Yes    loratadine (CLARITIN) 10 MG tablet Take 10 mg by mouth every morning   Unknown, Entered By History     metoprolol succinate ER (TOPROL-XL) 100 MG 24 hr tablet Take 150 mg by mouth every morning   Unknown, Entered By History Yes    nitroGLYcerin (NITROSTAT) 0.4 MG sublingual tablet Place 0.4 mg under the tongue every 5 minutes as needed for chest pain For chest pain place 1 tablet under the tongue every 5 minutes for 3 doses. If symptoms persist 5 minutes after 1st dose call 911.   Unknown, Entered By History Yes    oxyCODONE (ROXICODONE) 5 MG tablet Take 1-2 tablets (5-10 mg) by mouth every 4 hours as needed for moderate to severe pain   Madelyn Mauro PA-C     potassium chloride ER (KLOR-CON M) 20 MEQ CR tablet Take 20 mEq by mouth every morning   Unknown, Entered By History     senna-docusate (SENOKOT-S/PERICOLACE) 8.6-50 MG  tablet Take 1 tablet by mouth 2 times daily as needed for constipation   Madelyn Mauro PA-C     tamsulosin (FLOMAX) 0.4 MG capsule Take 1 capsule (0.4 mg) by mouth daily   Bonnie Roberts MD, MD     umeclidinium-vilanterol (ANORO ELLIPTA) 62.5-25 MCG/INH oral inhaler Inhale 1 puff into the lungs every morning   Unknown, Entered By History              Current Medications:         Not listed - reviewed anticoagulation with patient as above            Allergies:     Allergies   Allergen Reactions     Latex      Spironolactone Unknown     Reported side effects after receiving it after heart surgery, resolved with ablasion            Social History:        Social History     Tobacco Use     Smoking status: Former Smoker     Types: Cigars     Smokeless tobacco: Never Used   Substance Use Topics     Alcohol use: Yes     Comment: 3 beers (IPA) per day -can go for days without drinking and no hx of w/d             Family History:        Family History   Problem Relation Age of Onset     Diabetes Father      Diabetes Brother              Review of Systems:      The 10 point Review of Systems is negative other than noted in the HPI.            Physical Exam:      Blood pressure 94/60, pulse 71, temperature 98.4  F (36.9  C), resp. rate 22, SpO2 96 %.  There were no vitals filed for this visit.  Vital Sign Ranges  Temperature Temp  Av.9  F (36.6  C)  Min: 97  F (36.1  C)  Max: 98.4  F (36.9  C)   Blood pressure Systolic (24hrs), Av , Min:83 , Max:111        Diastolic (24hrs), Av, Min:42, Max:96      Pulse Pulse  Av.9  Min: 65  Max: 96   Respirations Resp  Av.6  Min: 12  Max: 28   Pulse oximetry SpO2  Av.3 %  Min: 95 %  Max: 100 %       No intake or output data in the 24 hours ending 22 0238    Constitutional: Awake, alert, cooperative, no apparent distress   Psych: Alert, oriented to conversation, no obvious anxiety or depression   Neuro: Cranial nerves 2-12 grossly intact, moves all  four extremities to roll over in bed   Eyes: Conjunctiva anicteric, noninjected   GI: soft, non-distended, non-tender   Rectal: Minimal dried blood external. Prolapsing right posterolateral internal/external hemorrhoid, no evidence of recent bleeding. EMMA with minimal dark blood on finger. Can note scar but no other palpable masses. Performed anoscopy and noted scar at base of banding with small tuft of tissue proximally. Dark blood in the rectal vault suctioned and no further bleeding. No bleeding at site of banding.   Musculoskeletal: No joint swelling, erythema or tenderness.          Data:      All new lab and imaging data was reviewed.   Recent Labs   Lab Test 06/17/22 2230 01/27/22  0708 01/26/22  0557 01/25/22  0744 01/24/22  0557 01/23/22  1306   WBC 10.5 7.3  --   --  7.9 8.3   HGB 8.7* 9.4* 9.7*   < > 13.9 13.8   * 121*  --   --  113* 115*   INR 1.30*  --   --   --   --  1.09    < > = values in this interval not displayed.      Recent Labs   Lab Test 06/17/22 2234 06/17/22 2230 01/27/22 0708 01/25/22  0744 01/24/22  0557   NA  --  133 130*  --  136   POTASSIUM  --  4.7 4.3 4.3 3.7   CHLORIDE  --  99 96  --  100   CO2  --  26 31  --  30   BUN  --  29 27  --  21   CR 1.4* 1.36* 0.71  --  0.78   ANIONGAP  --  8 3  --  6   THOMAS  --  8.2* 9.0  --  9.4   GLC  --  152* 109* 130* 95       Tammy Zafar MD  Colon and Rectal Surgery Fellow  [facility]

## 2022-06-19 NOTE — PROGRESS NOTES
COLON & RECTAL SURGERY  PROGRESS NOTE    June 19, 2022    SUBJECTIVE:  No further rectal bleeding. Hgb stable.     OBJECTIVE:  Temp:  [97.6  F (36.4  C)-98.7  F (37.1  C)] 98.4  F (36.9  C)  Pulse:  [65-85] 83  Resp:  [12-18] 18  BP: (106-141)/(34-67) 132/59  SpO2:  [94 %-100 %] 98 %    Intake/Output Summary (Last 24 hours) at 6/19/2022 1115  Last data filed at 6/19/2022 0900  Gross per 24 hour   Intake 600 ml   Output 1325 ml   Net -725 ml       GENERAL:  Awake, alert, no acute distress  EXTREMITIES: Warm and well perfused, no edema   ABDOMEN:  Soft, non tender, non-distended.     LABS:  Lab Results   Component Value Date    WBC 8.4 06/18/2022     Lab Results   Component Value Date    HGB 8.4 06/19/2022     Lab Results   Component Value Date    HCT 27.8 06/18/2022     Lab Results   Component Value Date     06/18/2022     Last Basic Metabolic Panel:  Lab Results   Component Value Date     06/19/2022      Lab Results   Component Value Date    POTASSIUM 3.7 06/19/2022     Lab Results   Component Value Date    CHLORIDE 102 06/19/2022     Lab Results   Component Value Date    THOMAS 8.2 06/19/2022     Lab Results   Component Value Date    CO2 28 06/19/2022     Lab Results   Component Value Date    BUN 20 06/19/2022     Lab Results   Component Value Date    CR 0.80 06/19/2022     Lab Results   Component Value Date     06/19/2022       ASSESSMENT/PLAN: Toney Denton is a 71 year old male with a history of CHF (EF 25-30%), ischemic cardiomyopathy, mitral valve replacement, atrial fibrillation, ICD placement, anemia and thrombocytopenia who presented with a lower GI bleed, likely due to hemorrhoids. No rectal bleeding for last 24 hours with stable Hgb.     Regular diet.  Hold aspirin for one week.  Ok for discharge from CRS perspective.     For questions/paging, please contact the CRS office at [phone].    Janelle Barron MD  Colorectal Surgery    [facility]  [address]  [address]  [place] [address]  T: [phone]  F: [phone]

## 2022-06-19 NOTE — UTILIZATION REVIEW
[facility]   Admission Status; Secondary Review Determination   Admission date:  6/17/2022 10:13 PM    Under the authority of the Utilization Management Committee, the utilization review process indicated a secondary review on the above patient. The review outcome is based on review of the medical records, discussions with staff, and applying clinical experience noted on the date of the review.     (x) Inpatient Status Appropriate - This patient's medical care is consistent with medical management for inpatient care and reasonable inpatient medical practice.     RATIONALE FOR DETERMINATION   71-year-old male with a history of coronary artery disease, ischemic cardiomyopathy, bioprosthetic mitral valve replacement atrial fibrillation, V. tach with pacemaker and ICD, COPD was admitted on 6/18 with rectal bleeding.  This is likely due to hemorrhoids.  Patient received 2 units of packed red blood cells due to hemoglobin being down about 5 g from baseline.  Patient also had acute kidney injury with creatinine of 1.3 requiring IV fluid administration.  Colorectal surgery was consulted and evaluated the patient.  Patient did have syncope prior to admission.  Patient was initially hypotensive at 92/64.  This patient is complicated, has multiple ongoing issues, is very high risk for rapid clinical deterioration due to GI bleeding and acute anemia in the context of multiple comorbidities, and is appropriate for inpatient hospitalization at the time of this review.  The severity of illness, intensity of service provided, expected LOS and risk for adverse outcome make the care complex, high risk and appropriate for hospital admission.    At the time of admission with the information available to the attending physician more than 2 nights Hospital complex care was anticipated, based on patient risk of adverse outcome if treated as outpatient and complex care required.  Inpatient admission is appropriate based on  the Medicare guidelines.      The information on this document is developed by the utilization review team in order for the business office to ensure compliance. This only denotes the appropriateness of proper admission status and does not reflect the quality of care rendered.   The definitions of Inpatient Status and Observation Status used in making the determination above are those provided in the CMS Coverage Manual, Chapter 1 and Chapter 6, section 70.4.     Sincerely,   Garrison Moraes DO MPH   Physician Advisor  Utilization Review  [facility]

## 2022-06-19 NOTE — PLAN OF CARE
No stools/signs of bleeding. Last BM yesterday x1. Up ambulating in halls SBA walker/gait belt. Tolerates walking on bill and back to room before experiencing some chronic L hip pain. Titrating up metoprolol. BP's q2 hours. Denies light-headed, dizziness even w/systolic 101 @ 1159. Voiding w/o difficulty. PO intake adequate. Continue to monitor.

## 2022-06-19 NOTE — PROGRESS NOTES
Discharge instructions reviewed with the patient. The patient stated full understanding. Discharge via w/c escort to spouse for ride home.

## 2022-06-19 NOTE — PLAN OF CARE
"Goal Outcome Evaluation:          Overall Patient Progress: improving     Blood pressure 126/67, pulse 73, temperature 98.5  F (36.9  C), temperature source Oral, resp. rate 18, height 1.727 m (5' 8\"), weight 96 kg (211 lb 9 oz), SpO2 94 %.    VSS.  Patient A&Ox4 on RA.  Patient denies dizziness and bleeding  HGB at 8.9 with recheck at 0600.  Pt tolerating clear liquid diet.  PIV SL in Left arm.  Biventricular paced on Tele.  Patient had multifocal PVC for 5 beats and also couplet PVCs.  No BM shift.  Tylenol provided for hip pain. Refused compression devices for BLE.  Continue with POC.     "

## 2022-06-19 NOTE — DISCHARGE SUMMARY
[facility]  Discharge Summary  Hospitalist    Date of Admission:  6/17/2022  Date of Discharge:  6/21/2022  Provider:  Satish Montelongo DO, Replaced by [facility]    Discharge Diagnoses   1.  Acute GI Bleed with developing hemorrhagic shock on presentation, likely due to hemorrhoids source   2.  Recent hemorrhoid banding 6/2  3.  Acute blood loss anemia associated with #1  4.  Syncopal episode related to #1    Other medical issues:  Past Medical History:   Diagnosis Date     Benign essential hypertension      Chronic atrial fibrillation (H)     s/p ablation/maze procedure/LA clip.  on ASA for stroke ppx     COPD (chronic obstructive pulmonary disease) (H)     severe by PFTs     Hyperlipidemia LDL goal <100      Ischemic cardiomyopathy     EF 25 % by echo 4/2021, with hx of VTach s/p biV pacer and ICD     Mitral valve regurgitation     s/p bioprosthetic MVR     Tricuspid regurgitation     s/p repair       History of Present Illness   Toney Denton is an 71 year old male who presented with GI bleeding.  Please see the admission history and physical for full details.    Hospital Course   Toney Denton was admitted on 6/17/2022.  The following problems were addressed during his hospitalization:    Mr. Denton presented with recurrent episodes of rectal bleeding.  He had progressive dizziness and ultimately had syncope while passing blood using the toilet.  When he presented he had hypotension and there was concern of hemorrhagic shock.  He received one unit of uncrossed blood urgently in the ED initially and then an additional transfusion.   His history is significant for recent hemorrhoid banding 6/2.   Hemoglobin initially decreased to mid 8 range (after transfusions) and then remained stable there.  His bleeding quickly stopped spontaneously.  Colorectal surgery consulted.  Given his quick improvement they did not feel any operative intervention or examine under anesthesia was required during the hospital stay.  There was  "initially a concern it was not functioning right on tele, however a tele lead was corrected and then it appeared as expected.  His cardiac device was interrogated given the recent events and tele concerns initially and it appeared to be functioning reasonably.  He denied chest pain.  Troponin was negative.  His syncope appeared related to his bleeding/vagal reaction from the description.  His anti-hypertensive's were initially held and as his BP improved gradually added in small increments.  He appeared to tolerate the reduced dose and was up walking without dizziness the day of discharge.  Given his cardiac isseus, I attempted to maintain his medications close to half dose at discharge.  He will be discharged on 100 mg daily metoprolol.  PTA he was on 200 mg.  Once his BP further trends up as his anemia gradually corrects it should be titrated back up to the old PTA dose.  I requested he follow-up with his PCP or cardiologist in the near future to assist with titrating these up.  His diuretics also resumed by discharge.  His ASA was stopped.  Colorectal surgery felt it could be considered for resumption in another week.  On day of discharge he was tolerating a diet and feeling much better.      Significant Results and Procedures   See below    Pending Results     Unresulted Labs Ordered in the Past 30 Days of this Admission     Date and Time Order Name Status Description    6/18/2022 12:54 AM Blood Culture Peripheral Blood Preliminary     6/17/2022 10:27 PM Prepare red blood cells (unit) Preliminary     6/17/2022 10:27 PM Prepare red blood cells (unit) Preliminary           Code Status   Full Code       Primary Care Physician   Kelley Stodadrd    Blood pressure (!) 108/38, pulse 84, temperature 98.7  F (37.1  C), temperature source Oral, resp. rate 18, height 1.727 m (5' 8\"), weight 95.3 kg (210 lb 3.2 oz), SpO2 95 %.    Alert, oriented, heart regular, lungs clear, abdomen non-tender    Discharge Disposition "   Discharged to home    Consultations This Hospital Stay   COLORECTAL SURGERY IP CONSULT    Time Spent on this Encounter   I, Satish Montelongo DO, personally saw the patient today and spent greater than 30 minutes discharging this patient.    Discharge Orders      Reason for your hospital stay    Rectal bleeding     Follow-up and recommended labs and tests     Follow-up with primary provider/primary clinic in 3-5 days.  Recommend hemoglobin lab check and blood pressure recheck at that visit.  (If your blood pressure trending up they can assist in deciding when to increase your metoprolol back to your prior before this hospital stay dose)     Activity    Your activity upon discharge: activity as tolerated     When to contact your care team    Call if questions.  Notify provider/seek evaluation if fevers, new bleeding, worsening dizziness or passing out, worsening swelling or shortness of breath, other new medical concerns.     Brief Discharge Instructions    Colorectal surgery recommends holding your aspirin for a week.  If you are doing well with no bleeding, you can resume your 81 mg daily aspirin in 1 week.     Diet    Follow this diet upon discharge:  No added salt/heart healthy regular diet     Discharge Medications   Current Discharge Medication List      CONTINUE these medications which have CHANGED    Details   metoprolol succinate ER (TOPROL XL) 100 MG 24 hr tablet Take 1 tablet (100 mg) by mouth every morning (Not a new prescription, this is a change of dose)    Associated Diagnoses: Congestive heart failure, unspecified HF chronicity, unspecified heart failure type (H)         CONTINUE these medications which have NOT CHANGED    Details   atorvastatin (LIPITOR) 80 MG tablet Take 80 mg by mouth every morning      !! bumetanide (BUMEX) 2 MG tablet Take 2 mg by mouth every morning       !! bumetanide (BUMEX) 2 MG tablet Take 1 mg by mouth every evening      !! bumetanide (BUMEX) 2 MG tablet Take 2 mg by mouth  daily At noon      DULoxetine (CYMBALTA) 30 MG capsule Take 30 mg by mouth At Bedtime      lisinopril (ZESTRIL) 2.5 MG tablet Take 2.5 mg by mouth every morning      loratadine (CLARITIN) 10 MG tablet Take 10 mg by mouth every morning      potassium chloride ER (KLOR-CON M) 20 MEQ CR tablet Take 20 mEq by mouth every morning      umeclidinium-vilanterol (ANORO ELLIPTA) 62.5-25 MCG/INH oral inhaler Inhale 1 puff into the lungs every morning      acetaminophen (TYLENOL) 500 MG tablet Take 1,000 mg by mouth every 6 hours as needed for mild pain      albuterol (PROAIR HFA/PROVENTIL HFA/VENTOLIN HFA) 108 (90 Base) MCG/ACT inhaler Inhale 1-2 puffs into the lungs every 4 hours as needed for shortness of breath / dyspnea or wheezing      nitroGLYcerin (NITROSTAT) 0.4 MG sublingual tablet Place 0.4 mg under the tongue every 5 minutes as needed for chest pain For chest pain place 1 tablet under the tongue every 5 minutes for 3 doses. If symptoms persist 5 minutes after 1st dose call 911.      oxyCODONE (ROXICODONE) 5 MG tablet Take 1-2 tablets (5-10 mg) by mouth every 4 hours as needed for moderate to severe pain  Qty: 25 tablet, Refills: 0    Associated Diagnoses: Closed nondisplaced intertrochanteric fracture of left femur, initial encounter (H)       !! - Potential duplicate medications found. Please discuss with provider.      STOP taking these medications       aspirin 81 MG EC tablet Comments:   Reason for Stopping:               Allergies   Allergies   Allergen Reactions     Latex      Spironolactone Unknown     Reported side effects after receiving it after heart surgery, resolved with ablasion     Data   Recent Labs   Lab 06/19/22  0623 06/18/22  1528 06/18/22  1152 06/18/22  0725 06/17/22  2230   WBC  --   --   --  8.4 10.5   HGB 8.4* 8.9* 9.2* 9.1* 8.7*   HCT  --   --   --  27.8* 27.2*   MCV  --   --   --  97 103*   PLT  --   --   --  104* 126*     Recent Labs   Lab 06/19/22  0623 06/18/22  0725 06/18/22  0508  06/17/22 2234 06/17/22  2230    136  --   --  133   POTASSIUM 3.7 4.2  --   --  4.7   CHLORIDE 102 105  --   --  99   CO2 28 28  --   --  26   ANIONGAP 6 3  --   --  8   * 116* 106*  --  152*   BUN 20 29  --   --  29   CR 0.80 1.16  --  1.4* 1.36*   GFRESTIMATED >90 68  --  54* 56*   THOMAS 8.2* 8.3*  --   --  8.2*   PROTTOTAL  --   --   --   --  5.5*   ALBUMIN  --   --   --   --  2.7*   BILITOTAL  --   --   --   --  0.7   ALKPHOS  --   --   --   --  68   AST  --   --   --   --  21   ALT  --   --   --   --  17     Recent Labs   Lab 06/17/22 2230   INR 1.30*     Recent Labs   Lab 06/17/22 2230   TROPONINIS 60       Results for orders placed or performed during the hospital encounter of 06/17/22   CTA Abdomen Pelvis with Contrast    Narrative    EXAM: CTA ABDOMEN PELVIS WITH CONTRAST  LOCATION: [facility]  DATE/TIME: 6/18/2022 12:07 AM    INDICATION: GI bleed  COMPARISON: 5/8/2020  TECHNIQUE: CT angiogram abdomen pelvis during arterial phase of injection of IV contrast. 2D and 3D MIP reconstructions were performed by the CT technologist. Dose reduction techniques were used.  CONTRAST: 99 mL [id number]-[id number]    FINDINGS:  ANGIOGRAM ABDOMEN/PELVIS: Normal caliber abdominal aorta, no dissection. Moderate atherosclerotic plaque. Celiac artery unremarkable.  Superior mesenteric artery demonstrates significant soft and calcific plaque proximally with high-grade narrowing 3 cm from the origin but the vessel remains patent. Normal mesenteric branch pattern.  JENIFER appears normal.  Moderate stenoses at main renal arteries.    LOWER CHEST: Cardiomegaly. Pacemaker present.    HEPATOBILIARY: Normal.    PANCREAS: Normal.    SPLEEN: Normal.    ADRENAL GLANDS: Normal.    KIDNEYS/BLADDER: 1 mm stone lower pole right kidney. Tiny renal cysts no further workup. No hydronephrosis.  There is modest wall thickening involving the urinary bladder    BOWEL: No obstruction or inflammatory change. No focus of active  extravasation identified.  There appears to be somewhat vague hyperdense material within rectum or possibly this relates to clot but there is no active extravasation evident    LYMPH NODES: Normal.    PELVIC ORGANS: Prostate normal in size. No ascites.    MUSCULOSKELETAL: Previous left hip ORIF. Advanced DJD right hip. Degenerative changes of spine. Old right rib fractures and there is also an indeterminate fracture involving the lateral eighth rib which could be acute.      Impression    IMPRESSION:  1.  No active GI bleed identified. No bowel inflammatory focus.  2.  Significant stenosis involving the proximal SMA with heavy atherosclerotic plaque. SMA does remain patent. Both the celiac and inferior mesenteric arteries demonstrate no significant stenosis.  3.  Moderate stenoses at origins of main renal arteries.  4.  Moderate diffuse bladder wall thickening, cystitis or chronic bladder outlet obstruction possible.  5.  Lateral right eighth rib fracture indeterminate in age, possibly acute.   XR Chest Port 1 View    Narrative    EXAM: XR CHEST PORT 1 VIEW  LOCATION: [facility]  DATE/TIME: 6/18/2022 1:18 AM    INDICATION: rib injury  COMPARISON: 5/2/2020      Impression    IMPRESSION: Apical lordotic positioning with lower lung volumes but exam otherwise unchanged. Left hemidiaphragm not well visualized but minimally change. Lungs remain clear. Stable mild cardiomegaly, previous CABG and atrial appendage clipping.   Biventricular pacer leads unchanged.  No acute fractures evident. Degenerative changes left shoulder.

## 2022-06-19 NOTE — PROVIDER NOTIFICATION
Dr Montelongo called - pt ready for discharge. Send any RX to [facility] & [address] [place] [id number].

## 2022-06-19 NOTE — PROGRESS NOTES
Mr. Denton feels well, no new complaints.  No bleeding this AM and on follow-up he continues doing well this afternoon.  CRS feels he can d/c with ASA on hold 1 week.  I increased metoprolol further today.  I will plan d/c on half dose 100 mg daily metoprolol + resumption of his bumex and very low dose lisinopril.

## 2022-06-19 NOTE — PLAN OF CARE
[id number]    Pt alert and oriented x4. Up SBA. Resting well overnight. IV saline locked. No stools during this shift. Tele: Biventricular paced. Tele called with a couple instances of 5 sec runs of PVC's (orders say to page if >10 beats of v-tach). Will continue to monitor.

## 2022-06-20 NOTE — PROGRESS NOTES
Clinic Care Coordination Contact  [facility]: Post-Discharge Note  SITUATION                                                      Admission:    Admission Date: 06/17/22   Reason for Admission: Rectal Bleeding and Syncope  Discharge:   Discharge Date: 06/19/22  Discharge Diagnosis: Rectal bleeding    BACKGROUND                                                      Per hospital discharge summary and inpatient provider notes:  71-year-old gentleman with a history of CHF (EF 25-30%), ischemic cardiomyopathy, mitral valve replacement, atrial fibrillation, ICD placement, anemia and thrombocytopenia who presented with a lower GI bleed.  He underwent hemorrhoid banding procedure on 6/2, and developed bleeding yesterday morning.  He had ongoing bleeding throughout the day, dizziness and lightheadedness and was found to be hypotensive.  He was brought to the ER for evaluation.  Hemoglobin was 8.7 on admission.  CT angiography demonstrated no active extravasation.  He was transfused 2 units of blood and admitted for observation.  His last colonoscopy was approximately 1 year ago.  He reports no further bleeding since admission.         ASSESSMENT      Enrollment  Primary Care Care Coordination Status: Not a Candidate    Discharge Assessment  How are you doing now that you are home?: Doing good  How are your symptoms? (Red Flag symptoms escalate to triage hotline per guidelines): Improved  Do you feel your condition is stable enough to be safe at home until your provider visit?: Yes  Does the patient have their discharge instructions? : Yes  Does the patient have questions regarding their discharge instructions? : No  Were you started on any new medications or were there changes to any of your previous medications? : No  Does the patient have all of their medications?: Yes  Do you have questions regarding any of your medications? : No  Do you have all of your needed medical supplies or equipment (DME)?  (i.e. oxygen tank,  CPAP, cane, etc.): Yes  Discharge follow-up appointment scheduled within 14 calendar days? : No  Is patient agreeable to assistance with scheduling? : No (he will call, CTA encouraged him to call asap)        PLAN                                                      Outpatient Plan:  Follow-up with primary provider/primary clinic in 3-5 days.  Recommend hemoglobin lab check and blood pressure recheck at that visit.  (If your blood pressure trending up they can assist in deciding when to increase your metoprolol back to your prior before this hospital stay dose)    No future appointments.      For any urgent concerns, please contact our 24 hour nurse triage line: [phone] ([phone])         Destinee Forte MA

## 2022-07-29 PROBLEM — R79.89 ELEVATED TROPONIN: Status: ACTIVE | Noted: 2022-01-01

## 2022-07-29 PROBLEM — N17.9 ACUTE RENAL FAILURE, UNSPECIFIED ACUTE RENAL FAILURE TYPE (H): Status: ACTIVE | Noted: 2022-01-01

## 2022-07-29 PROBLEM — R60.0 BILATERAL LEG EDEMA: Status: ACTIVE | Noted: 2022-01-01

## 2022-07-29 PROBLEM — R79.89 ELEVATED BRAIN NATRIURETIC PEPTIDE (BNP) LEVEL: Status: ACTIVE | Noted: 2022-01-01

## 2022-07-30 NOTE — ED PROVIDER NOTES
History   Chief Complaint:  Generalized Weakness         The history is provided by the patient and a significant other.      Toney Denton is a 71 year old male with history of CV surgery, pacer, COPD, hypertension, and diabetes who presents with generalized weakness. He worked on 4-5 days ago but left work early due to not feeling well. Started Tuesday resolved yesterday with loose stools, no blood, but now wife noticed last 2 days He needs help when walking and usually uses a walker but has digressed over the past week. He had a fever 4 days ago of 101 since resolved. Negative COVID test for the past 3 days. Denies blood in stool, vomiting, chest pain, shortness of breath, syncope, or changes in urine. Noticed worsening BLE edema for this week as well.     Review of Systems   Constitutional: Positive for fever (since resolved).   Respiratory: Negative for shortness of breath.    Cardiovascular: Positive for leg swelling. Negative for chest pain.   Gastrointestinal: Positive for diarrhea. Negative for blood in stool and vomiting.   Genitourinary: Negative for difficulty urinating, dysuria, enuresis, frequency and urgency.   Musculoskeletal: Positive for back pain.   Neurological: Negative for syncope.   All other systems reviewed and are negative.    Allergies:  Spironolactone  Sulindac  Latex    Medications:  Cymbalta  Anoro ellipta  Proair HFA  Lipitor  Bumex  Zestril  Nitrostat  Roxicodone    Past Medical History:     Closed nondisplaced intertrochanteric fracture of left femur   Hemorrhagic shock  Gastrointestinal hemorrhage  Anemia  CAD  Chronic congestive heart failure  Biventricular implantable sufzcuzhkvqx-zmzabumrmpxtj-rxehpub  Femoral astery pseudo-aneurysm  CHRISTA  Ventricular tachycardia  Cardiac pacemaker  Sinus node dysfunction  COPD  Coagulopathy  Postoperative anemia  Thrombocytopenia  Tricuspid regurgiation  CAD  Bilateral pseudophakia  Obesity  Persistent atrial fibrillation  Ischemic  cardiomyopathy  Severe mitral regurgitation  Hypertension  Hyperlipidemia  Prediabetes  Cigar smoker  Alcohol abuse  DM    Past Surgical History:    CABG GARTH QUAL ACT perform  Mitral valve replacement  Open reduction internal fixation hip nailing left  Tricuspid valvuloplasty   CV coronary angiogram poss PCI  Cataract removal with IOL bilateral  Colonoscopy  YAG capsulotomy  EP study /Ablation  Tricuspid valve replacement  JEFF clip  MAZE procedure  Cardiac pacemaker placement  Biventricular implantable cardioverter defib upgrade from bivp  Laparoscopic left inguinal hernia repair with mesh    Family History:    Diabetes    Social History:  The patient presents to the ED with significant other    Physical Exam     Patient Vitals for the past 24 hrs:   BP Temp Temp src Pulse Resp SpO2   07/30/22 0115 110/64 -- -- 71 -- 99 %   07/30/22 0055 110/77 -- -- 67 -- 98 %   07/30/22 0000 127/75 -- -- 86 20 99 %   07/29/22 1921 129/71 98.1  F (36.7  C) Oral 86 20 99 %       Physical Exam  Constitutional: Patient is well appearing. No distress.  Eyes: Conjunctivae are normal. No scleral icterus.   Cardiovascular: Normal rate, regular rhythm, normal heart sounds and intact distal perfusion.   Pulmonary/Chest: Breath sounds normal. No respiratory distress.  Abdominal: Soft. Bowel sounds are normal. No distension. No tenderness. No rebound or guarding.   Musculoskeletal: Normal range of motion. Bilateral pitting lower extremity edema or tenderness.   Neurological: Alert and orientated to person, place, and time. No observable focal neuro deficit  Skin: Warm and dry. No rash noted. Not diaphoretic.     Emergency Department Course   *ECGs unreliable  ECG  ECG taken at 0004  Wide QRS rhythm. Left axis deviation. Nonspecific intraventricular block.    Rate 70 bpm. TX interval * ms. QRS duration 172 ms. QT/QTc 474/511 ms. P-R-T axes * -71 73.   ECG  ECG taken at 0049  Ventricular-paed rhythm with frequent AV dual-paced complexes and  with occasional and consecutive premature ventricular complexed.  Rate 75 bpm. WA interval * ms. QRS duration 196 ms. QT/QTc 500/558 ms. P-R-T axes * 234 72.     Imaging:  XR Chest 2 Views   Final Result   IMPRESSION: Stable chest with again seen loss of the normally seen left hemidiaphragm most typical for underlying infiltrate/atelectasis in the left lower lobe. Prior postoperative change of sternotomy. Transvenous wires are in stable position. No active    CHF/volume overload identified.      Chest CT w/o contrast    (Results Pending)     Report per radiology    Laboratory:  Labs Ordered and Resulted from Time of ED Arrival to Time of ED Departure   BASIC METABOLIC PANEL - Abnormal       Result Value    Creatinine 1.24 (*)     Sodium 133 (*)     Potassium 4.2      Urea Nitrogen 36.9 (*)     Chloride 96 (*)     Carbon Dioxide (CO2) 26      Anion Gap 11      Glucose 123 (*)     GFR Estimate 63      Calcium 9.7     CBC WITH PLATELETS AND DIFFERENTIAL - Abnormal    WBC Count 4.2      RBC Count 3.33 (*)     Hemoglobin 10.6 (*)     Hematocrit 33.5 (*)      (*)     MCH 31.8      MCHC 31.6      RDW 14.2      Platelet Count 80 (*)     % Neutrophils 85      % Lymphocytes 6      % Monocytes 6      % Eosinophils 1      % Basophils 1      % Immature Granulocytes 1      NRBCs per 100 WBC 0      Absolute Neutrophils 3.6      Absolute Lymphocytes 0.3 (*)     Absolute Monocytes 0.3      Absolute Eosinophils 0.0      Absolute Basophils 0.0      Absolute Immature Granulocytes 0.1      Absolute NRBCs 0.0     HEPATIC FUNCTION PANEL - Abnormal    Protein Total 6.9      Albumin 3.5      Bilirubin Total 1.2      Alkaline Phosphatase 151 (*)     AST 38      ALT 23      Bilirubin Direct 0.59 (*)    TROPONIN T, HIGH SENSITIVITY - Abnormal    Troponin T, High Sensitivity 34 (*)    NT PROBNP INPATIENT - Abnormal    N terminal Pro BNP Inpatient 11,024 (*)    LIPASE - Normal    Lipase 51     INFLUENZA A/B & SARS-COV2 PCR MULTIPLEX -  Normal    Influenza A PCR Negative      Influenza B PCR Negative      RSV PCR Negative      SARS CoV2 PCR Negative     TROPONIN T, HIGH SENSITIVITY        Emergency Department Course:         Reviewed:  I reviewed nursing notes, vitals, past medical history and Care Everywhere    Assessments:  2235 I obtained history and examined the patient as noted above.   2350 I rechecked the patient and explained findings.     Consults:  2349 I spoke with Dr. Roberts, Hospitalist, regarding the patient.    Interventions:  2133 NS 1,000 mL IV  0055 Lasix 40 mg IV    Disposition:  The patient was admitted to the hospital under the care of Dr. Roberts.     Impression & Plan       Medical Decision Making:  Toney Denton is a 71 year old male who presents for evaluation of generalized weakness post diarrhea and inability to ambulate with BLE edema. Signs and symptoms are consistent with bilateral leg edema, elevated BNP, acute renal failure, generalized muscle weakness, and elevated troponin. He is on diuretics at home and given lasix here.  Did not give aspirin as recent history of life threatening GI bleed and no EKG injury changes.  Suspect may have some component of very high BNP and slight renal failure. He was observed here in emergency department and had no progression of symptoms and in fact was feeling improved.  The patient was admitted under the care of Dr. Roberts.    Diagnosis:    ICD-10-CM    1. Elevated brain natriuretic peptide (BNP) level  R79.89    2. Acute renal failure, unspecified acute renal failure type (H)  N17.9    3. Bilateral leg edema  R60.0    4. Elevated troponin  R77.8    5. Generalized muscle weakness  M62.81        Scribe Disclosure:  I, Carrie Decker, am serving as a scribe at 10:16 PM on 7/29/2022 to document services personally performed by Pablo Noguera MD based on my observations and the provider's statements to me.          Pablo Noguera MD  07/30/22 0139

## 2022-07-30 NOTE — PROVIDER NOTIFICATION
Provider Notified : CT is wandering if you still need CT without contrast on this patient? if you need it, a new order need to be placed per CT.

## 2022-07-30 NOTE — ED TRIAGE NOTES
Pt c/o generalized weakness for over 3 days pta. Pt states had diarrhea from 07/24 to 07/27. Pt states that he believes he is dehydrated. Family also expresses concern for anemia with recent history of GI bleeding. ABCs intact GCS 15     Triage Assessment     Row Name 07/29/22 1921       Triage Assessment (Adult)    Airway WDL WDL       Respiratory WDL    Respiratory WDL WDL       Skin Circulation/Temperature WDL    Skin Circulation/Temperature WDL WDL       Cardiac WDL    Cardiac WDL WDL       Peripheral/Neurovascular WDL    Peripheral Neurovascular WDL WDL       Cognitive/Neuro/Behavioral WDL    Cognitive/Neuro/Behavioral WDL WDL

## 2022-07-30 NOTE — ED NOTES
[facility]  ED Nurse Handoff Report    Toney Denton is a 71 year old male   ED Chief complaint: Generalized Weakness  . ED Diagnosis:   Final diagnoses:   Elevated brain natriuretic peptide (BNP) level   Acute renal failure, unspecified acute renal failure type (H)   Bilateral leg edema   Elevated troponin   Generalized muscle weakness     Allergies:   Allergies   Allergen Reactions     Spironolactone Unknown     Reported side effects after receiving it after heart surgery, resolved with ablasion     Sulindac Diarrhea, Muscle Pain (Myalgia) and Nausea and Vomiting     Felt like beaten up      Latex Rash     Gets red on direct contact       Code Status: Full Code  Activity level - Baseline/Home:  Independent. Activity Level - Current:   Assist X 1. Lift room needed: No. Bariatric: No   Needed: No   Isolation: No. Infection: Not Applicable.     Vital Signs:   Vitals:    07/29/22 1921   BP: 129/71   Pulse: 86   Resp: 20   Temp: 98.1  F (36.7  C)   TempSrc: Oral   SpO2: 99%       Cardiac Rhythm:  ,      Pain level:    Patient confused: No. Patient Falls Risk: Yes.   Elimination Status: Has voided   Patient Report - Initial Complaint: Gen weakness. Focused Assessment:    Pt c/o generalized weakness for over 3 days pta. Pt states had diarrhea from 07/24 to 07/27. Pt states that he believes he is dehydrated. Family also expresses concern for anemia with recent history of GI bleeding. ABCs intact GCS 15  Tests Performed:   XR Chest 2 Views   Final Result   IMPRESSION: Stable chest with again seen loss of the normally seen left hemidiaphragm most typical for underlying infiltrate/atelectasis in the left lower lobe. Prior postoperative change of sternotomy. Transvenous wires are in stable position. No active    CHF/volume overload identified.      Chest CT w/o contrast    (Results Pending)     . Abnormal Results:   Labs Ordered and Resulted from Time of ED Arrival to Time of ED Departure   BASIC  METABOLIC PANEL - Abnormal       Result Value    Creatinine 1.24 (*)     Sodium 133 (*)     Potassium 4.2      Urea Nitrogen 36.9 (*)     Chloride 96 (*)     Carbon Dioxide (CO2) 26      Anion Gap 11      Glucose 123 (*)     GFR Estimate 63      Calcium 9.7     CBC WITH PLATELETS AND DIFFERENTIAL - Abnormal    WBC Count 4.2      RBC Count 3.33 (*)     Hemoglobin 10.6 (*)     Hematocrit 33.5 (*)      (*)     MCH 31.8      MCHC 31.6      RDW 14.2      Platelet Count 80 (*)     % Neutrophils 85      % Lymphocytes 6      % Monocytes 6      % Eosinophils 1      % Basophils 1      % Immature Granulocytes 1      NRBCs per 100 WBC 0      Absolute Neutrophils 3.6      Absolute Lymphocytes 0.3 (*)     Absolute Monocytes 0.3      Absolute Eosinophils 0.0      Absolute Basophils 0.0      Absolute Immature Granulocytes 0.1      Absolute NRBCs 0.0     HEPATIC FUNCTION PANEL - Abnormal    Protein Total 6.9      Albumin 3.5      Bilirubin Total 1.2      Alkaline Phosphatase 151 (*)     AST 38      ALT 23      Bilirubin Direct 0.59 (*)    TROPONIN T, HIGH SENSITIVITY - Abnormal    Troponin T, High Sensitivity 34 (*)    NT PROBNP INPATIENT - Abnormal    N terminal Pro BNP Inpatient 11,024 (*)    LIPASE - Normal    Lipase 51     INFLUENZA A/B & SARS-COV2 PCR MULTIPLEX - Normal    Influenza A PCR Negative      Influenza B PCR Negative      RSV PCR Negative      SARS CoV2 PCR Negative     TROPONIN T, HIGH SENSITIVITY     .   Treatments provided: IV lasix  Family Comments: None at bedside.  OBS brochure/video discussed/provided to patient:  No  ED Medications:   Medications   furosemide (LASIX) injection 40 mg (has no administration in time range)   0.9% sodium chloride BOLUS (0 mLs Intravenous Stopped 7/29/22 6090)     Drips infusing:  No  For the majority of the shift, the patient's behavior Green. Interventions performed were NA.    Sepsis treatment initiated: No     Patient tested for COVID 19 prior to admission: YES    ED  Nurse Name/Phone Number: Tamir Aparicio RN,   12:47 AM  RECEIVING UNIT ED HANDOFF REVIEW    Above ED Nurse Handoff Report was reviewed: Yes  Reviewed by: Pamela Newman RN on July 30, 2022 at 1:08 AM

## 2022-07-30 NOTE — PLAN OF CARE
Goal Outcome Evaluation:  A&OX4. LS diminished. VSS  Oxygen 98z5 on room air. Critcal result on procalcitonin 5.04. Md notified. Started Rocephin and Doxycline. Troponin elevated. Low fat/NA diet.  Up with assist of 2 on mechanical lift. Need to collect UA sample. Not able to collect during this shift did not void.  Continue to monitor,

## 2022-07-30 NOTE — PROGRESS NOTES
Given markedly elevated procal and CXR showing possible LLL infiltrate versus atelectasis. CT chest is pending. Will start IV abx which can be stopped if CT is not suggestive of PNA.

## 2022-07-30 NOTE — PLAN OF CARE
"Vss ex elevated BP this am, no co cp/sob, back pain relieved with PRN tylenol. Tele AV paced BBB, VT/multifocal PVC runs noted on tele, md aware.  Up with Ax2+lift, PT ordered, falls risk, bed alarm on for safety, forgetful, strict I and O, IV lasix, IVF dc'd, BLE edema noted, turns as pt will allow.  See results for details on labs.  Scattered bruising noted as well as blanchable pink areas to body, see skin assessment for full details. Continue poc and monitoring.         Problem: Plan of Care - These are the overarching goals to be used throughout the patient stay.    Goal: Plan of Care Review/Shift Note  Description: The Plan of Care Review/Shift note should be completed every shift.  The Outcome Evaluation is a brief statement about your assessment that the patient is improving, declining, or no change.  This information will be displayed automatically on your shift note.  Outcome: Ongoing, Not Progressing  Goal: Patient-Specific Goal (Individualized)  Description: You can add care plan individualizations to a care plan. Examples of Individualization might be:  \"Parent requests to be called daily at 9am for status\", \"I have a hard time hearing out of my right ear\", or \"Do not touch me to wake me up as it startles me\".  Outcome: Ongoing, Not Progressing  Goal: Absence of Hospital-Acquired Illness or Injury  Outcome: Ongoing, Not Progressing  Intervention: Identify and Manage Fall Risk  Recent Flowsheet Documentation  Taken 7/30/2022 1402 by Daisy Orta, RN  Safety Promotion/Fall Prevention:    bed alarm on    safety round/check completed  Taken 7/30/2022 1336 by Daisy Orta, RN  Safety Promotion/Fall Prevention:    bed alarm on    safety round/check completed  Taken 7/30/2022 1225 by Daisy Orta, RN  Safety Promotion/Fall Prevention:    bed alarm on    safety round/check completed  Taken 7/30/2022 1101 by Daisy Orta, RN  Safety Promotion/Fall Prevention:    bed alarm on    safety round/check " completed  Taken 7/30/2022 1002 by Daisy Orta RN  Safety Promotion/Fall Prevention:    safety round/check completed    bed alarm on  Taken 7/30/2022 0921 by Daisy Orta RN  Safety Promotion/Fall Prevention:    fall prevention program maintained    treat underlying cause    treat reversible contributory factors    supervised activity    safety round/check completed    room organization consistent    room door open    patient and family education    nonskid shoes/slippers when out of bed    lighting adjusted    increase visualization of patient    increased rounding and observation    clutter free environment maintained    activity supervised    assistive device/personal items within reach    bed alarm on  Taken 7/30/2022 0818 by Daisy Orta RN  Safety Promotion/Fall Prevention:    bed alarm on    safety round/check completed  Taken 7/30/2022 0726 by Daisy Orta RN  Safety Promotion/Fall Prevention:    bed alarm on    safety round/check completed  Intervention: Prevent Skin Injury  Recent Flowsheet Documentation  Taken 7/30/2022 0921 by Daisy Orta RN  Body Position: (with encouragement)    position changed independently    other (see comments)    weight shifting  Intervention: Prevent and Manage VTE (Venous Thromboembolism) Risk  Recent Flowsheet Documentation  Taken 7/30/2022 0921 by Daisy Orta RN  VTE Prevention/Management: SCDs (sequential compression devices) off  Activity Management:    activity adjusted per tolerance    activity encouraged  Goal: Optimal Comfort and Wellbeing  Outcome: Ongoing, Not Progressing  Intervention: Monitor Pain and Promote Comfort  Recent Flowsheet Documentation  Taken 7/30/2022 1224 by Daisy Orta RN  Pain Management Interventions:    medication (see MAR)    care clustered    distraction    emotional support    quiet environment facilitated  Goal: Readiness for Transition of Care  Outcome: Ongoing, Not Progressing     Problem: Adjustment to  Illness (Heart Failure)  Goal: Optimal Coping  Outcome: Ongoing, Not Progressing     Problem: Cardiac Output Decreased (Heart Failure)  Goal: Optimal Cardiac Output  Outcome: Ongoing, Not Progressing     Problem: Dysrhythmia (Heart Failure)  Goal: Stable Heart Rate and Rhythm  Outcome: Ongoing, Not Progressing     Problem: Fluid Imbalance (Heart Failure)  Goal: Fluid Balance  Outcome: Ongoing, Not Progressing     Problem: Functional Ability Impaired (Heart Failure)  Goal: Optimal Functional Ability  Outcome: Ongoing, Not Progressing  Intervention: Optimize Functional Ability  Recent Flowsheet Documentation  Taken 7/30/2022 0921 by Daisy Orta RN  Activity Management:    activity adjusted per tolerance    activity encouraged     Problem: Oral Intake Inadequate (Heart Failure)  Goal: Optimal Nutrition Intake  Outcome: Ongoing, Not Progressing     Problem: Respiratory Compromise (Heart Failure)  Goal: Effective Oxygenation and Ventilation  Outcome: Ongoing, Not Progressing  Intervention: Promote Airway Secretion Clearance  Recent Flowsheet Documentation  Taken 7/30/2022 0921 by Daisy Orta RN  Cough And Deep Breathing: done with encouragement     Problem: Sleep Disordered Breathing (Heart Failure)  Goal: Effective Breathing Pattern During Sleep  Outcome: Ongoing, Not Progressing

## 2022-07-30 NOTE — PROGRESS NOTES
No charge note      71 year old male with history of CV surgery, pacer, COPD, hypertension, and diabetes who presents with generalized weakness. He worked on 4-5 days ago but left work early due to not feeling well. Started Tuesday resolved yesterday with loose stools, no blood, but now wife noticed last 2 days He needs help when walking and usually uses a walker but has digressed over the past week. He had a fever 4 days ago of 101 since resolved. Negative COVID test for the past 3 days. Denies blood in stool, vomiting, chest pain, shortness of breath, syncope, or changes in urine. Noticed worsening BLE edema for this week as well.     H&P dictated by Dr. Roberts unfortunately still pending    1. Community-acquired pneumonia procalcitonin significantly elevated patient has fever and consolidation on imaging  2. Acute systolic congestive heart failure, known ejection fraction 25 to 30% at an echo done in the [facility] last year  3. Extensive coronary artery disease prior CABG  4. Status post biventricular pacemaker with ICD for sick sinus syndrome and systolic heart failure  5. COPD  6. History of atrial fibrillation  7. Severe mitral regurgitation  8. Mild acute kidney injury  9. Deconditioning  10. History of ventricular tachycardia    Continue oral Bumex, received 40 mg IV Lasix yesterday in the emergency room repeat 40 mg twice daily in addition to oral Bumex he is on at home  Treat pneumonia with Rocephin and doxycycline  Consider repeating echocardiogram on Monday  Strict input output  Daily weight  Physical therapy consultation  CBC BMP in the morning

## 2022-07-30 NOTE — PHARMACY-ADMISSION MEDICATION HISTORY
Admission medication history interview status for this patient is complete. See [facility] admission navigator for allergy information, prior to admission medications and immunization status.     Medication history interview done, indicate source(s): Patient  Medication history resources (including written lists, pill bottles, clinic record):[employer]  Pharmacy: [facility] [place]    Changes made to PTA medication list:  Added: none  Changed: none  Reported as Not Taking: Cymbalta  Removed: none    Actions taken by pharmacist (provider contacted, etc):None     Additional medication history information:None    Medication reconciliation/reorder completed by provider prior to medication history?  Y   (Y/N)          Prior to Admission medications    Medication Sig Last Dose Taking? Auth Provider Long Term End Date   acetaminophen (TYLENOL) 500 MG tablet Take 1,000 mg by mouth every 6 hours as needed for mild pain 7/29/2022 at Unknown time Yes Unknown, Entered By History     albuterol (PROAIR HFA/PROVENTIL HFA/VENTOLIN HFA) 108 (90 Base) MCG/ACT inhaler Inhale 1-2 puffs into the lungs every 4 hours as needed for shortness of breath / dyspnea or wheezing 7/29/2022 at Unknown time Yes Unknown, Entered By History Yes    atorvastatin (LIPITOR) 80 MG tablet Take 80 mg by mouth every morning 7/29/2022 at Unknown time Yes Unknown, Entered By History Yes    bumetanide (BUMEX) 2 MG tablet Take 2 mg by mouth every morning  7/29/2022 at Unknown time Yes Unknown, Entered By History Yes    bumetanide (BUMEX) 2 MG tablet Take 1 mg by mouth every evening 7/29/2022 at Unknown time Yes Unknown, Entered By History Yes    bumetanide (BUMEX) 2 MG tablet Take 2 mg by mouth daily At noon 7/29/2022 at Unknown time Yes Reported, Patient Yes    lisinopril (ZESTRIL) 2.5 MG tablet Take 2.5 mg by mouth every morning 7/29/2022 at Unknown time Yes Unknown, Entered By History Yes    loratadine (CLARITIN) 10 MG tablet Take 10 mg by mouth every morning  7/29/2022 at Unknown time Yes Unknown, Entered By History     metoprolol succinate ER (TOPROL XL) 100 MG 24 hr tablet Take 1 tablet (100 mg) by mouth every morning (Not a new prescription, this is a change of dose) 7/29/2022 at Unknown time Yes Satish Montelongo, DO Yes    oxyCODONE (ROXICODONE) 5 MG tablet Take 1-2 tablets (5-10 mg) by mouth every 4 hours as needed for moderate to severe pain More than a month at Unknown time Yes Madelyn Mauro, PANicoleC     potassium chloride ER (KLOR-CON M) 20 MEQ CR tablet Take 20 mEq by mouth every morning 7/29/2022 at Unknown time Yes Unknown, Entered By History     umeclidinium-vilanterol (ANORO ELLIPTA) 62.5-25 MCG/INH oral inhaler Inhale 1 puff into the lungs every morning 7/29/2022 at Unknown time Yes Unknown, Entered By History     DULoxetine (CYMBALTA) 30 MG capsule Take 30 mg by mouth At Bedtime  Patient not taking: Reported on 7/30/2022 Not Taking at Unknown time  Unknown, Entered By History Yes    nitroGLYcerin (NITROSTAT) 0.4 MG sublingual tablet Place 0.4 mg under the tongue every 5 minutes as needed for chest pain For chest pain place 1 tablet under the tongue every 5 minutes for 3 doses. If symptoms persist 5 minutes after 1st dose call 911.   Unknown, Entered By History Yes

## 2022-07-30 NOTE — PROVIDER NOTIFICATION
Provider Notified : Just to clarify, Patient received IV lasix and still has order for IV fluid 50 ml/hr? Do you want this fluid to be given?

## 2022-07-31 NOTE — PROVIDER NOTIFICATION
Spoke with dr flores regarding replacement protocol of mag. Per protocol recheck is for 8/1 but was checked again this am for 1.6. MD states ok to just replace again and recheck in the am.  Will order.

## 2022-07-31 NOTE — PROVIDER NOTIFICATION
PATY paged dr flores: bladder scan PVR was 685ml, straight cathed for 700ml. this is the second time in less than 4 hours. next time do you want me to just place a das? or touch base with you again later and see? please advise, thanks.      12:42 PM  Order received for Flomax and das placement. Spoke with Harinder OSMAN who agrees to perform these tasks at 1330.

## 2022-07-31 NOTE — PLAN OF CARE
VSS, A/O x 4, LS clear on RA. Denies pain/sob. L PIV SL, IV doxycycline and IV Lasix given. Noted red blanchable rash and edema BLE , continue to monitor. Strict I/O but pt is incontinent, had 1400 mL measured output this shift. Had 8 beat run of V tach, provider notified and ordered K+ and mag labs. Magnesium resulted 1.6 gave PO replacement. K+ recheck in AM.  Assist of 2 with lift. Continue to monitor.

## 2022-07-31 NOTE — PLAN OF CARE
Goal Outcome Evaluation:    Plan of Care Reviewed With: patient     Pt. A&Ox4, on RA with sat's around 96%. Lung sounds clear, Tele AV paced, with runs of Vtach. MD paged. Up with lift, Pt. Stated that he felt like he could not empty his bladder this morning. Bladder scanned for 532, emptied for 650. Pt. Denies any needs at this time. Will continue with POC.

## 2022-07-31 NOTE — PLAN OF CARE
"Vss ex temp this afternoon was 99.9, no co cp/sob/pain. Tele AV paced BBB PVC with frequent runs of VT in the 5-12 beat range noted on tele, md aware. Mag and K+ replaced this shift with rechecks ordered for am. Plts up to 80 today, phos 3.0,  Up with Ax2+lift, PT seen, falls risk, bed alarm on for safety, forgetful, strict I and O, IV lasix (bumex on hold), PATRICK x2 continues, BLE and scrotal edema noted, turns as pt will allow.  Scattered bruising noted as well as blanchable pink areas to body, see skin assessment for full details. Checked PVR this am and straight cathed for 700ml, did get order for flomax and das this afternoon (urine is blood tinged, likely from insertion trauma), monitor for continued bleeding as aspirin was also restarted later this afternoon.  Continue poc and monitoring.            Problem: Plan of Care - These are the overarching goals to be used throughout the patient stay.    Goal: Plan of Care Review/Shift Note  Description: The Plan of Care Review/Shift note should be completed every shift.  The Outcome Evaluation is a brief statement about your assessment that the patient is improving, declining, or no change.  This information will be displayed automatically on your shift note.  Outcome: Ongoing, Not Progressing  Goal: Patient-Specific Goal (Individualized)  Description: You can add care plan individualizations to a care plan. Examples of Individualization might be:  \"Parent requests to be called daily at 9am for status\", \"I have a hard time hearing out of my right ear\", or \"Do not touch me to wake me up as it startles me\".  Outcome: Ongoing, Not Progressing  Goal: Absence of Hospital-Acquired Illness or Injury  Outcome: Ongoing, Not Progressing  Intervention: Identify and Manage Fall Risk  Recent Flowsheet Documentation  Taken 7/31/2022 1113 by Daisy Orta, RN  Safety Promotion/Fall Prevention:    bed alarm on    safety round/check completed  Taken 7/31/2022 1026 by Marivel, " Daisy ENCARNACION RN  Safety Promotion/Fall Prevention:    bed alarm on    safety round/check completed  Taken 7/31/2022 0936 by Daisy Orta RN  Safety Promotion/Fall Prevention:    bed alarm on    safety round/check completed  Taken 7/31/2022 0836 by Daisy Otra RN  Safety Promotion/Fall Prevention:    fall prevention program maintained    treat underlying cause    treat reversible contributory factors    supervised activity    safety round/check completed    room organization consistent    room near nurse's station    room door open    patient and family education    nonskid shoes/slippers when out of bed    lighting adjusted    increase visualization of patient    increased rounding and observation    clutter free environment maintained    activity supervised    assistive device/personal items within reach    bed alarm on  Taken 7/31/2022 0740 by Daisy Orta RN  Safety Promotion/Fall Prevention:    bed alarm on    safety round/check completed  Intervention: Prevent Skin Injury  Recent Flowsheet Documentation  Taken 7/31/2022 0836 by Daisy Orta RN  Body Position: (with encouragement)    position changed independently    other (see comments)    weight shifting  Intervention: Prevent and Manage VTE (Venous Thromboembolism) Risk  Recent Flowsheet Documentation  Taken 7/31/2022 0836 by Daisy Orta RN  VTE Prevention/Management: SCDs (sequential compression devices) off  Activity Management:    activity adjusted per tolerance    activity encouraged  Goal: Optimal Comfort and Wellbeing  Outcome: Ongoing, Not Progressing  Goal: Readiness for Transition of Care  Outcome: Ongoing, Not Progressing     Problem: Adjustment to Illness (Heart Failure)  Goal: Optimal Coping  Outcome: Ongoing, Not Progressing     Problem: Cardiac Output Decreased (Heart Failure)  Goal: Optimal Cardiac Output  Outcome: Ongoing, Not Progressing     Problem: Dysrhythmia (Heart Failure)  Goal: Stable Heart Rate and  Rhythm  Outcome: Ongoing, Not Progressing     Problem: Fluid Imbalance (Heart Failure)  Goal: Fluid Balance  Outcome: Ongoing, Not Progressing     Problem: Functional Ability Impaired (Heart Failure)  Goal: Optimal Functional Ability  Outcome: Ongoing, Not Progressing  Intervention: Optimize Functional Ability  Recent Flowsheet Documentation  Taken 7/31/2022 0836 by Daisy Orta, RN  Activity Management:    activity adjusted per tolerance    activity encouraged     Problem: Oral Intake Inadequate (Heart Failure)  Goal: Optimal Nutrition Intake  Outcome: Ongoing, Not Progressing     Problem: Respiratory Compromise (Heart Failure)  Goal: Effective Oxygenation and Ventilation  Outcome: Ongoing, Not Progressing  Intervention: Promote Airway Secretion Clearance  Recent Flowsheet Documentation  Taken 7/31/2022 0836 by Daisy Orta, RN  Cough And Deep Breathing: done with encouragement     Problem: Sleep Disordered Breathing (Heart Failure)  Goal: Effective Breathing Pattern During Sleep  Outcome: Ongoing, Not Progressing

## 2022-07-31 NOTE — PROGRESS NOTES
[facility]    Medicine Progress Note - Hospitalist Service    Date of Admission:  7/29/2022    Assessment & Plan          Toney Denton is a 71 year old male with history of tricuspid valve repair, bioprosthetic mitral valve replacement for regurgitation, coronary artery disease, ischemic cardiomyopathy (EF 25% in 4/21), pacemaker placement in 7/19, biventricular implantable anrbknrkupdg-nciucfcwzsvex-zkjxrba from BiV-P in 5/20, atrial fibrillation (s/p ablation/maze procedure), severe COPD, hypertension, dyslipidemia, and borderline diabetes. He presented to the [facility] ED on 7/29/22 for evaluation of generalized weakness, fever, and edema. ED work up showed stable vital signs.  Laboratory evaluation showed sodium 133, chloride 96, BUN 37, creatinine 1.24, BNP 11,024, troponin 34, procalcitonin 5.04, white blood cells 3.2, hemoglobin 10.6, platelets 66, unremarkable urinalysis, and negative testing for COVID-19/influenza/RSV.  Chest x-ray suggested possible left lower lung infiltrate and volume overload.  CT of chest showed right lower lung patchy consolidation and groundglass opacities consistent with atelectasis versus infection.  Trace bilateral pleural effusions were noted.  Hepatic steatosis was noted.  Toney was admitted to the hospital with pneumonia and congestive heart failure.  He was treated with IV Rocephin and IV doxycycline for pneumonia and IV Lasix for congestive heart failure.  Echocardiogram was obtained because patient has documented history of systolic heart failure due to ischemic cardiomyopathy with ejection fraction of 25% in 4/21.  Hospital course was complicated by urine retention for which Flomax was started and Shaffer catheter was placed.    Problem list:    Community-acquired pneumonia  Leukopenia, resolved  -Continue IV Rocephin and IV doxycycline.  Consider changing to oral antibiotics tomorrow  -Afebrile  -Leukopenia has resolved    Acute exacerbation of chronic  systolic congestive heart failure  -Echocardiogram in 4/21 showed ejection fraction of 25%  -Prior to admission was taking Bumex 2 mg twice daily and 1 mg at night.  Hold these.  Diurese with Lasix 60 mg IV every 8 hours  -Daily basic metabolic panel, daily weights, and intake/output.    Urine retention  -Patient has had to be straight cathed twice for bladder scan 700 mL  -Place Shaffer catheter  -Start Flomax 400 mcg daily  -Consider voiding trial later in hospital stay versus discharge home with catheter in place and outpatient urology follow-up (depends on duration of hospital stay)    Acute kidney injury  -Possibly due to urine retention and/or congestive heart failure  -Monitor renal function with diuresis and Shaffer catheter placement  -Renal function is already improved.    Intermittent runs of ventricular tachycardia  -Replace magnesium  -Continue prior to admission beta-blocker  -Seem to be improving.  If persists or worsens consider amiodarone drip and cardiology consult  -Continue telemetry for now    Thrombocytopenia  -Possibly related to pneumonia  -Continue to monitor    Coronary artery disease  Ischemic cardiomyopathy  History of bioprosthetic mitral valve replacement  History of tricuspid valve repair  History of pacemaker placement with upgrade to biventricular cardioverter- defibrillator  History of atrial fibrillation with ablation/maze procedure  Dyslipidemia  -Not chronically on anticoagulation  -Continue prior to admission Lipitor, lisinopril, and metoprolol.  Note, lisinopril does not appear on her prior to admission medication list but does not appear in care everywhere.  -Hold prior to admission Bumex with IV Lasix being given.    COPD, without acute exacerbation  -Continue prior to admission albuterol and Anoro Ellipta inhalers.       Diet: Combination Diet Low Saturated Fat Na <2400mg Diet, No Caffeine Diet    DVT Prophylaxis: Pneumatic Compression Devices; consider adding Lovenox if  "thrombocytopenia proves  Shaffer Catheter: Not present  Central Lines: None  Cardiac Monitoring: ACTIVE order. Indication: QTc prolonging medication (48 hours)  Code Status: Full Code      Disposition Plan           The patient's care was discussed with the Bedside Nurse and Patient.    Anil Ye MD  Hospitalist Service  [facility]  Securely message with the Vocera Web Console (learn more here)  Text page via [employer] Paging/Directory         Clinically Significant Risk Factors Present on Admission               # Obesity: Estimated body mass index is 35.03 kg/m  as calculated from the following:    Height as of this encounter: 1.715 m (5' 7.5\").    Weight as of this encounter: 103 kg (227 lb).        ______________________________________________________________________    Interval History   Still some shortness of breath.  Some runs of ventricular tachycardia earlier today without symptoms.  No chest pain or palpitations.  No abdominal pain, nausea, vomiting, diarrhea, or dysuria.    Data reviewed today: I reviewed all medications, new labs and imaging results over the last 24 hours. I personally reviewed no images or EKG's today.    Physical Exam   Vital Signs: Temp: 99.9  F (37.7  C) Temp src: Oral BP: 134/67 Pulse: 74   Resp: 24 SpO2: 97 % O2 Device: None (Room air)    Weight: 227 lbs 0 oz  GENERAL:  Comfortable. Cooperative.  PSYCH: pleasant, oriented, No acute distress.  EYES: PERRLA, Normal conjunctiva.  HEART:  Regular rate and rhythm. No JVD. Pulses normal. No edema.  LUNGS:  Clear to auscultation, normal Respiratory effort.  ABDOMEN:  Soft, no hepatosplenomegaly, normal bowel sounds.  EXTREMETIES: No clubbing, cyanosis or ischemia  SKIN:  Dry to touch, No rash.      Data   Recent Labs   Lab 07/31/22  0721 07/30/22 2135 07/30/22  0629 07/29/22 2131   WBC 6.2  --  3.2* 4.2   HGB 10.1*  --  10.6* 10.6*     --  105* 101*   PLT 80*  --  66* 80*   *  --  136 133* "   POTASSIUM 3.5 4.6 4.1 4.2   CHLORIDE 96*  --  100 96*   CO2 25  --  21* 26   BUN 22.7  --  33.5* 36.9*   CR 0.79  --  1.05 1.24*   ANIONGAP 13  --  15 11   THOMAS 9.2  --  9.2 9.7   *  --  111* 123*   ALBUMIN  --   --   --  3.5   PROTTOTAL  --   --   --  6.9   BILITOTAL  --   --   --  1.2   ALKPHOS  --   --   --  151*   ALT  --   --   --  23   AST  --   --   --  38   LIPASE  --   --   --  51

## 2022-07-31 NOTE — PROGRESS NOTES
07/31/22 1000   Quick Adds   Type of Visit Initial PT Evaluation   Living Environment   People in Home spouse   Current Living Arrangements house   Home Accessibility stairs to enter home;stairs within home   Number of Stairs, Main Entrance 1   Stair Railings, Main Entrance none   Number of Stairs, Within Home, Primary seven   Stair Railings, Within Home, Primary railings on both sides of stairs   Transportation Anticipated family or friend will provide   Living Environment Comments Pt lives in split level house with wife at baseline. Recently started using [place] [facility] [place] at [place] but not at work; works as .   Self-Care   Usual Activity Tolerance good   Current Activity Tolerance fair   Regular Exercise No   Equipment Currently Used at Home walker, standard;shower chair;raised toilet seat;cane, straight;grab bar, toilet   Fall history within last six months yes   Number of times patient has fallen within last six months 1   Activity/Exercise/Self-Care Comment IND with ADLs and mobility at baseline; all mobility and stairs becoming more difficult over past week. Tub shower with bench, no grab bars in shower. Has toilet riser with handles.   General Information   Onset of Illness/Injury or Date of Surgery 07/29/22   Referring Physician Fuentes Baca MD   Patient/Family Therapy Goals Statement (PT) return to normal   Pertinent History of Current Problem (include personal factors and/or comorbidities that impact the POC) 71 year old male with history of CV surgery, pacer, COPD, hypertension, and diabetes who presents with generalized weakness. He worked on 4-5 days ago but left work early due to not feeling well. Started Tuesday resolved yesterday with loose stools, no blood, but now wife noticed last 2 days He needs help when walking and usually uses a walker but has digressed over the past week. He had a fever 4 days ago of 101 since resolved. Negative COVID test for the past 3 days.  Denies blood in stool, vomiting, chest pain, shortness of breath, syncope, or changes in urine. Noticed worsening BLE edema for this week as well.   Existing Precautions/Restrictions fall   Cognition   Affect/Mental Status (Cognition) WFL   Orientation Status (Cognition) oriented x 4   Follows Commands (Cognition) WFL   Pain Assessment   Patient Currently in Pain Yes, see Vital Sign flowsheet   Integumentary/Edema   Integumentary/Edema no deficits were identifed   Posture    Posture Comments unable to assess in supine   Range of Motion (ROM)   Range of Motion ROM deficits secondary to pain;ROM deficits secondary to weakness   ROM Comment B hip/knee ROM significantly limited by pain/weakness   Strength (Manual Muscle Testing)   Strength Comments unable to SLR bilaterally, unable to initiate weight bearing d/t significant pain   Bed Mobility   Comment, (Bed Mobility) attempted rolling with max A; unable to complete d/t significant pain   Transfers   Comment, (Transfers) unable to initiate d/t severe pain   Gait/Stairs (Locomotion)   Comment, (Gait/Stairs) unable to initiate d/t severe pain   Balance   Balance Comments unable to assess   Clinical Impression   Criteria for Skilled Therapeutic Intervention Yes, treatment indicated   PT Diagnosis (PT) impaired functional mobility   Influenced by the following impairments pain, weakness   Functional limitations due to impairments difficulty with bed mobility, transfers, ambulation, stairs   Clinical Presentation (PT Evaluation Complexity) Stable/Uncomplicated   Clinical Presentation Rationale clinical judgement   Clinical Decision Making (Complexity) low complexity   Planned Therapy Interventions (PT) balance training;bed mobility training;gait training;home exercise program;neuromuscular re-education;patient/family education;ROM (range of motion);stair training;strengthening;transfer training;progressive activity/exercise;risk factor education;home program guidelines    Anticipated Equipment Needs at Discharge (PT) other (see comments)  (TBD)   Risk & Benefits of therapy have been explained evaluation/treatment results reviewed;care plan/treatment goals reviewed;risks/benefits reviewed;current/potential barriers reviewed;participants voiced agreement with care plan;participants included;patient   PT Discharge Planning   PT Discharge Recommendation (DC Rec) Transitional Care Facility   PT Rationale for DC Rec Patient is currently significantly below baseline functional mobility. Patient is ambulatory and independent at baseline and working. Patient currently unable to tolerate even bed mobility due to severe pain and weakness. Recommending TCU for progression of functional mobility due to inability to complete mobility needed for home at this time.   PT Brief overview of current status lift dependent   Plan of Care Review   Plan of Care Reviewed With patient   Physical Therapy Goals   PT Frequency 5x/week   PT Predicted Duration/Target Date for Goal Attainment 08/05/22   PT Goals Bed Mobility;Transfers;Gait;Stairs   PT: Bed Mobility Supervision/stand-by assist;Supine to/from sit   PT: Transfers Supervision/stand-by assist;Sit to/from stand;Assistive device   PT: Gait Supervision/stand-by assist;Assistive device;Greater than 200 feet   PT: Stairs Supervision/stand-by assist;Assistive device;7 stairs;Rail on left

## 2022-07-31 NOTE — PROVIDER NOTIFICATION
Pt. has had x5 runs of V-Tach since about 655am, 10-12 beat runs. Pt. denies any chest pain or SOB. Labs just drawn. Straight cath was being done while having V-Tach runs. MD paged.       7:52 AM  This RN took the MD callback regarding the above by DAWSON Combs and there are no new orders at this time. Watch for labs, pt already has a AICD in place, may give metoprolol early. Currently asymptomatic. KRISTEN, RN

## 2022-08-01 NOTE — CONSULTS
[facility]  Cardiology Consultation     Date of Admission:  7/29/2022    Primary Care Physician   Kelley Stoddard    Reason for Consult   Reason for consult: I was asked to evaluate this patient for for ventricular tachycardia    History of Present Illness   Toney Denton is a 71 year old gentleman who usually follows through the [employer] system.  He has a past medical history significant for coronary artery disease, mitral valve replacement with a bioprosthetic valve, tricuspid valve repair, permanent atrial fibrillation status post Maze procedure and left atrial clip, V. tach storm requiring ablation twice in the past, BiV defibrillator, COPD, rheumatoid arthritis, prednisone induced diabetes mellitus, hypertension, hyperlipidemia, anemia, thrombocytopenia, dilated cardiomyopathy, carotid artery stenosis, left open hip reduction and internal fixation earlier this year,  recent GI bleed complicated by hemorrhagic shock now hospitalized with fever, diarrhea and peripheral edema.    Cardiac history dates back to July 2019 when he underwent single-vessel coronary artery bypass grafting with a radial artery to an obtuse marginal, tricuspid valve repair, mitral valve replacement with a bioprosthetic [place] valve, Maze procedure and left atrial appendage clip.    He is undergone VT ablation in April 2020 and repeat ablation in February 2021 due to V. tach storm.  He has a Medtronic CRT defibrillator placed May 2020.  Per [facility] notes VT ablation was performed in April 2020 after failing amiodarone and lidocaine infusions for V. tach.  Again in 2021 due to recurrent V. tach failing amiodarone therapy he underwent scar homogenization of inferior lateral scar.    Is now hospitalized with pneumonia,  heart failure and diarrhea.  Patient's fevers and diarrhea have resolved.  On telemetry he has had multiple salvos of ventricular tachycardia 1 episode on telemetry is at least 38 beats they have not capture  the beginning or termination.  Patient thinks his peripheral edema has improved since admission he is on Lasix 60 mg IV 3 times daily with I's and O's demonstrating down 800 cc however weight is up.  He denies chest discomfort shortness of breath orthopnea or PND.  He is unaware of his V. Tach.    Patient is not on Entresto and does not remember being tried on Entresto.  He states he did not tolerate spironolactone.  Does not recall being tried on Jardiance.  He is on metoprolol.    BNP is 11,024.  Platelet count of 66,000.  Troponin T's were positive at 33, 28 and 27.  Hemoglobin is 10.1.  Sodium is 131 with a potassium of 4.7.  EKG demonstrates a BiV paced rhythm.    Care everywhere shows a complete echocardiogram in April 2021 with severe left ventricular dilatation.  Ejection fraction 25 to 30%.  Mean gradient across his mitral valve 3 mmHg.  While performing tricuspid valve with trivial TR.  Moderately decreased right ventricular function.  Pulmonary pressures estimated to be 41 mmHg plus the radial pressure.  IVC was described as normal.    Assessment & Plan   Toney Denton is a 71 year old male who was admitted on 7/29/2022.  With pneumonia probable right and left heart heart failure now with multiple salvos of nonsustained ventricular tachycardia.    I will start patient on oral amiodarone to try to get control of his ventricular tachycardia.  If this should fail he may need to return to [facility] for consideration of another VT ablation.  Patient is also hypomagnesemic and this is being repleted.  As low as 1.4 now 2.3.  Potassium is 4.7.    Dilated cardiomyopathy I will repeat his echocardiogram, reevaluate his mitral and tricuspid valves.  Evaluate his pulmonary hypertension, right ventricular function and left ventricular function.    Acute systolic congestive heart failure on chronic congestive heart failure.  Continue IV diuretics.  Patient has not tolerated spironolactone in the past.  He is  "not on Entresto but appears to have adequate blood pressure.  I will discontinue lisinopril in anticipation of introducing Entresto.  I will start Jardiance.    Patient without chest pain syndrome mildly elevated troponin size suspect secondary to his decompensated status.  I suspect this is a type II non-ST segment elevation myocardial infarction    Clinically Significant Risk Factors Present on Admission            # Obesity: Estimated body mass index is 35.26 kg/m  as calculated from the following:    Height as of this encounter: 1.715 m (5' 7.5\").    Weight as of this encounter: 103.6 kg (228 lb 8 oz).      Cardiovascular: Cardiac Arrhythmia: Atrial fibrillation: Permanent  Ventricular tachycardia   Non-Rheumatic Valve Disease: Mitral valve insufficiency  Tricuspid valve insufficiency            Hematology/Oncology: Thrombocytopenia Including Purpuric, HIT, & Other Platelet Defects: Thrombocytopenia, unspecified            Pulmonology: Pulmonary Heart Disease (Pulmonary hypertension or Cor pulmonale): Pulmonary hypertension, unspecified    Systemic: Chronic Fatigue and Other Debilities: Other reduced mobility           Over 2 hours was spent in reviewing records, examining patient and discussing issues with family.    Endy Turner MD, MD    Patient Active Problem List   Diagnosis     Fall, initial encounter     Closed nondisplaced intertrochanteric fracture of left femur, initial encounter (H)     Hemorrhagic shock (H)     Gastrointestinal hemorrhage, unspecified gastrointestinal hemorrhage type     Anemia, unspecified type     Elevated troponin     Bilateral leg edema     Elevated brain natriuretic peptide (BNP) level     Acute renal failure, unspecified acute renal failure type (H)       Past Medical History   I have reviewed this patient's medical history and updated it with pertinent information if needed.   Past Medical History:   Diagnosis Date     Benign essential hypertension      Chronic atrial " fibrillation (H)     s/p ablation/maze procedure/LA clip.  on ASA for stroke ppx     COPD (chronic obstructive pulmonary disease) (H)     severe by PFTs     Hyperlipidemia LDL goal <100      Ischemic cardiomyopathy     EF 25 % by echo 4/2021, with hx of VTach s/p biV pacer and ICD     Mitral valve regurgitation     s/p bioprosthetic MVR     Tricuspid regurgitation     s/p repair       Past Surgical History   I have reviewed this patient's surgical history and updated it with pertinent information if needed.  Past Surgical History:   Procedure Laterality Date     CABG GARTH QUAL ACT PERFORM       MITRAL VALVE REPLACEMENT       OPEN REDUCTION INTERNAL FIXATION HIP NAILING Left 1/24/2022    Procedure: Open reduction and internal fixation of the left hip using Synthes 3-hole 130 degree dynamic compression hip screw;  Surgeon: Sharlene Arias MD;  Location: [facility] OR     TRICUSPID VALVULOPLASTY         Prior to Admission Medications   Prior to Admission Medications   Prescriptions Last Dose Informant Patient Reported? Taking?   DULoxetine (CYMBALTA) 30 MG capsule Not Taking at Unknown time  Yes No   Sig: Take 30 mg by mouth At Bedtime   Patient not taking: Reported on 7/30/2022   acetaminophen (TYLENOL) 500 MG tablet 7/29/2022 at Unknown time  Yes Yes   Sig: Take 1,000 mg by mouth every 6 hours as needed for mild pain   albuterol (PROAIR HFA/PROVENTIL HFA/VENTOLIN HFA) 108 (90 Base) MCG/ACT inhaler 7/29/2022 at Unknown time  Yes Yes   Sig: Inhale 1-2 puffs into the lungs every 4 hours as needed for shortness of breath / dyspnea or wheezing   atorvastatin (LIPITOR) 80 MG tablet 7/29/2022 at Unknown time  Yes Yes   Sig: Take 80 mg by mouth every morning   bumetanide (BUMEX) 2 MG tablet 7/29/2022 at Unknown time  Yes Yes   Sig: Take 2 mg by mouth every morning    bumetanide (BUMEX) 2 MG tablet 7/29/2022 at Unknown time  Yes Yes   Sig: Take 1 mg by mouth every evening   bumetanide (BUMEX) 2 MG tablet 7/29/2022 at Unknown time   Yes Yes   Sig: Take 2 mg by mouth daily At noon   lisinopril (ZESTRIL) 2.5 MG tablet 7/29/2022 at Unknown time  Yes Yes   Sig: Take 2.5 mg by mouth every morning   loratadine (CLARITIN) 10 MG tablet 7/29/2022 at Unknown time  Yes Yes   Sig: Take 10 mg by mouth every morning   metoprolol succinate ER (TOPROL XL) 100 MG 24 hr tablet 7/29/2022 at Unknown time  No Yes   Sig: Take 1 tablet (100 mg) by mouth every morning (Not a new prescription, this is a change of dose)   nitroGLYcerin (NITROSTAT) 0.4 MG sublingual tablet   Yes No   Sig: Place 0.4 mg under the tongue every 5 minutes as needed for chest pain For chest pain place 1 tablet under the tongue every 5 minutes for 3 doses. If symptoms persist 5 minutes after 1st dose call 911.   oxyCODONE (ROXICODONE) 5 MG tablet More than a month at Unknown time  No Yes   Sig: Take 1-2 tablets (5-10 mg) by mouth every 4 hours as needed for moderate to severe pain   potassium chloride ER (KLOR-CON M) 20 MEQ CR tablet 7/29/2022 at Unknown time  Yes Yes   Sig: Take 20 mEq by mouth every morning   umeclidinium-vilanterol (ANORO ELLIPTA) 62.5-25 MCG/INH oral inhaler 7/29/2022 at Unknown time  Yes Yes   Sig: Inhale 1 puff into the lungs every morning      Facility-Administered Medications: None     Current Facility-Administered Medications   Medication Dose Route Frequency     amiodarone  400 mg Oral TID     aspirin  81 mg Oral Daily     atorvastatin  80 mg Oral QAM     [Held by provider] bumetanide  1 mg Oral QPM     [Held by provider] bumetanide  2 mg Oral QAM     [Held by provider] bumetanide  2 mg Oral Daily     cefTRIAXone  1 g Intravenous Q24H     doxycycline (VIBRAMYCIN) IV  100 mg Intravenous Q12H     DULoxetine  30 mg Oral At Bedtime     empagliflozin  10 mg Oral Daily     loratadine  10 mg Oral QAM     metoprolol succinate ER  100 mg Oral QAM     sodium chloride (PF)  3 mL Intracatheter Q8H     tamsulosin  0.4 mg Oral Daily     umeclidinium-vilanterol  1 puff Inhalation  "QAM     Current Facility-Administered Medications   Medication Last Rate     Allergies   Allergies   Allergen Reactions     Spironolactone Unknown     Reported side effects after receiving it after heart surgery, resolved with ablasion     Sulindac Diarrhea, Muscle Pain (Myalgia) and Nausea and Vomiting     Felt like beaten up      Latex Rash     Gets red on direct contact       Social History    reports that he has quit smoking. His smoking use included cigars. He has never used smokeless tobacco. He reports current alcohol use.    Family History   Family History   Problem Relation Age of Onset     Diabetes Father      Diabetes Brother        Review of Systems   The comprehensive 10 point Review of Systems is negative other than noted in the HPI or here.     Physical Exam   Vital Signs with Ranges  Temp:  [97.6  F (36.4  C)-100.1  F (37.8  C)] 97.6  F (36.4  C)  Pulse:  [74-97] 96  Resp:  [18-24] 18  BP: (103-155)/(60-73) 103/65  SpO2:  [93 %-97 %] 93 %  Wt Readings from Last 4 Encounters:   08/01/22 103.6 kg (228 lb 8 oz)   06/19/22 95.3 kg (210 lb 3.2 oz)   01/27/22 89.2 kg (196 lb 9.6 oz)     I/O last 3 completed shifts:  In: 2120 [P.O.:2120]  Out: 4225 [Urine:4225]      Vitals: /65 (BP Location: Right arm, Patient Position: Left side)   Pulse 96   Temp 97.6  F (36.4  C) (Oral)   Resp 18   Ht 1.715 m (5' 7.5\")   Wt 103.6 kg (228 lb 8 oz)   SpO2 93%   BMI 35.26 kg/m      Patient is comfortable and in no apparent distress. Awake, alert and oriented ×3, pale, ill-appearing uses walker for ambulation  Pupils are equal round and reactive.  Sclerae anicteric conjunctiva is noninjected  Neck is supple there are no carotid bruits.  Patient appears to have a venous distention to his jaw.  Respiratory rales in right base.    MS There is no kyphosis or scoliosis  Cardiac exam reveals a regular rate and rhythm I hear no murmur, rub or gallop.  GI is soft nontender with normoactive bowel sounds.  Extremities are " trace to 1+ edema.  There are 2+ pulses.  Neurologic exam is nonfocal.  Psych. anxious  Skin is pale and mottled.      No lab results found in last 7 days.    Invalid input(s): TROPONINIES    Recent Labs   Lab 08/01/22  0616 08/01/22  0019 07/31/22 0721 07/30/22 2135 07/30/22 0629 07/29/22 2131   WBC  --   --  6.2  --  3.2* 4.2   HGB  --   --  10.1*  --  10.6* 10.6*   MCV  --   --  100  --  105* 101*   PLT  --   --  80*  --  66* 80*   *  --  134*  --  136 133*   POTASSIUM 4.7 3.5 3.5   < > 4.1 4.2   CHLORIDE 95*  --  96*  --  100 96*   CO2 28  --  25  --  21* 26   BUN 22.7  --  22.7  --  33.5* 36.9*   CR 0.80  --  0.79  --  1.05 1.24*   GFRESTIMATED >90  --  >90  --  77 63   ANIONGAP 8  --  13  --  15 11   THOMAS 9.2  --  9.2  --  9.2 9.7   *  --  108*  --  111* 123*   ALBUMIN  --   --   --   --   --  3.5   PROTTOTAL  --   --   --   --   --  6.9   BILITOTAL  --   --   --   --   --  1.2   ALKPHOS  --   --   --   --   --  151*   ALT  --   --   --   --   --  23   AST  --   --   --   --   --  38   LIPASE  --   --   --   --   --  51    < > = values in this interval not displayed.     No results for input(s): CHOL, HDL, LDL, TRIG, CHOLHDLRATIO in the last 37583 hours.  Recent Labs   Lab 07/31/22 0721 07/30/22 0629 07/29/22 2131   WBC 6.2 3.2* 4.2   HGB 10.1* 10.6* 10.6*   HCT 31.9* 34.4* 33.5*    105* 101*   PLT 80* 66* 80*     No results for input(s): PH, PHV, PO2, PO2V, SAT, PCO2, PCO2V, HCO3, HCO3V in the last 168 hours.  Recent Labs   Lab 07/29/22  2131   NTBNPI 11,024*     No results for input(s): DD in the last 168 hours.  No results for input(s): SED, CRP in the last 168 hours.  Recent Labs   Lab 07/31/22  0721 07/30/22  0629 07/29/22  2131   PLT 80* 66* 80*     No results for input(s): TSH in the last 168 hours.  Recent Labs   Lab 07/30/22  1103   COLOR Yellow   APPEARANCE Clear   URINEGLC Negative   URINEBILI Negative   URINEKETONE Negative   SG 1.016   UBLD Negative   URINEPH 5.5    PROTEIN 10 *   NITRITE Negative   LEUKEST Negative   RBCU <1   WBCU 2       Imaging:  No results found for this or any previous visit (from the past 48 hour(s)).    Echo:  No results found for this or any previous visit (from the past 4320 hour(s)).

## 2022-08-01 NOTE — PROVIDER NOTIFICATION
JASMINI paged dr flores: pt is having more runs VT 17-20ish beats, asymptomatic, electrolytes are fine. will give metoprolol now.       8:56 AM  Cardiology consult placed.

## 2022-08-01 NOTE — CONSULTS
Care Management Initial Consult    General Information  Assessment completed with: Patient, Spouse or significant other,    Type of CM/SW Visit: Initial Assessment    Primary Care Provider verified and updated as needed:     Readmission within the last 30 days: no previous admission in last 30 days      Reason for Consult: care coordination/care conference, discharge planning       Communication Assessment  Patient's communication style: spoken language (English or Bilingual)    Hearing Difficulty or Deaf: no   Wear Glasses or Blind: yes    Cognitive  Cognitive/Neuro/Behavioral: .WDL except  Level of Consciousness: lethargic, alert, other (see comments) (states he is sleepy, wakes/participates during cares carries conversation but then closes his eyes again)  Arousal Level: opens eyes spontaneously, arouses to touch/gentle shaking, arouses to voice  Orientation: oriented x 4  Mood/Behavior: calm, cooperative  Best Language: 0 - No aphasia  Speech: clear    Living Environment:   People in home: spouse     Current living Arrangements: house      Able to return to prior arrangements: no       Family/Social Support:  Care provided by: self  Provides care for: no one  Marital Status:   Wife          Description of Support System: Supportive, Involved    Support Assessment: Adequate family and caregiver support    Current Resources:   Patient receiving home care services: No     Community Resources: None  Equipment currently used at home: walker, standard  Supplies currently used at home: None    Employment/Financial:  Employment Status: retired             Lifestyle & Psychosocial Needs:  Social Determinants of Health     Tobacco Use: Medium Risk     Smoking Tobacco Use: Former Smoker     Smokeless Tobacco Use: Never Used   Alcohol Use: Not on file   Financial Resource Strain: Not on file   Food Insecurity: Not on file   Transportation Needs: Not on file   Physical Activity: Not on file   Stress: Not on file    Social Connections: Not on file   Intimate Partner Violence: Not on file   Depression: Not on file   Housing Stability: Not on file       Functional Status:  Prior to admission patient needed assistance:   Dependent ADLs:: Ambulation-walker  Dependent IADLs:: Independent  Assesssment of Functional Status: Not at baseline with ADL Functioning, Not at baseline with mobility    Mental Health Status:  Mental Health Status: No Current Concerns         Additional Information:    Care Management has been consulted for discharge planning. PT/OT are following the patient in hospital with recommendations for TCU on discharge. Met with Pt and spouse to discuss discharging planning and TCU recommendations. He is agreeable to a TCU plan of care for discharge. TCU packet was given and discussed.   Pt/family was provided with the Medicare Compare list for SNF.  Discussed associated Medicare star ratings to assist with choice for referrals/discharge planning Yes  Education was given to pt/family that star ratings are updated/maintained by Medicare and can be reviewed by visiting www.medicare.gov Yes     Facility Choices: [facility]  Private/Shared room: Shared  Transportation: Wife  Vaccination status: Vaccinated and boosted x1  Insurance plan/need for Auth: Yes - Pt has BCBS    CM will continue to follow for discharge planning to TCU and follow up if additional TCU choices are needed.     Roseann Kennedy

## 2022-08-01 NOTE — PLAN OF CARE
VSS, A/O x 4 but is lethargic and only wakes for cares provided. Denies pain/sob. Had das cathter placed during previous shift for urine retention, 1700 mL out put during this shift. K+ and mag protocols with AM recheck scheduled. Noted edema in extremities and scrotum. PIV in L SL. IV doxycycline given. Assist of 2 with lift. Continue to monitor.

## 2022-08-01 NOTE — H&P
Admitted: 07/29/2022    CHIEF COMPLAINT:  Generalized weakness, dizziness.    HISTORY OF PRESENT ILLNESS:  History was obtained from the patient.  This is a 71-year-old white male with multiple medical issues to include a history of coronary artery disease with previous stents and 1-vessel CABG in 2019 with bioprosthetic mild mitral valve and tricuspid valve repair, atrial fibrillation status post maze procedure and left atrial clipping, sinus node dysfunction with ventricular tachycardia status post biventricular pacemaker and ICD, severe COPD not on home oxygen, hypertension, hyperlipidemia, anemia, thrombocytopenia, ischemic cardiomyopathy with an EF of 25% to 30%, who was actually recently admitted to [facility] between 06/17/2022 and 06/21/2022 for acute gastrointestinal bleed with hemorrhagic shock due to hemorrhoids.  The patient states that he has been feeling dizzy and unsteady on his feet for at least the past 4-5 days.  He did have a fever of 101.7 on Tuesday and subsequent diarrhea, which has gotten better.  He denies any abdominal pain.  He denies any nausea or vomiting.  He denies any chest pain or shortness of breath.  He tested himself for COVID, which was negative.  He thinks he may have had the flu or influenza.  In the ER over here, he was seen by Dr. Pablo Noguera.  I discussed care with him.  I am asked to admit him for further evaluation.    PAST MEDICAL HISTORY:  Ischemic cardiomyopathy with an EF of 25% to 30%, coronary artery disease status post CABG as well as bioprosthetic mitral valve and tricuspid valve repair, atrial fibrillation status post maze procedure, severe COPD, ventricular tachycardia status post biventricular pacemaker and ICD, hypertension, hyperlipidemia, thrombocytopenia.    PAST SURGICAL HISTORY:  Significant for bypass surgery, tricuspid valvuloplasty, mitral valve replacement, left open hip reduction and internal fixation.    SOCIAL HISTORY:  He does not smoke.   Alcohol -- He drinks 3 beers a day; however, he can go on for a couple of days without drinking and not experiencing any withdrawal symptoms.    FAMILY HISTORY:  Significant for diabetes in his father.    ALLERGIES:  LATEX AND ALDACTONE.    HOME MEDICATIONS:  List awaits reconciliation, but includes:    1.  Aspirin.  2.  Lipitor.  3.  Bumex.  4.  Cymbalta.  5.  Lisinopril.  6.  Claritin.  7.  Metoprolol.  8.  Nitroglycerin.  9.  Potassium.  10.  Ellipta.    REVIEW OF SYSTEMS:  As mentioned in the HPI.  All other systems are reviewed and deemed unremarkable and negative.    PHYSICAL EXAMINATION:    VITAL SIGNS:  His temperature is 98.1, pulse 86, blood pressure is 129/71, respiratory rate 20, and O2 saturation is 99% on room air.  GENERAL:  He is alert, awake, oriented, coherent, appears chronically ill, in no acute distress.  HEENT:  Pupils equal, round, react to light.  LUNGS:  Clear to auscultation bilaterally.  HEART:  Regular rate.  S1, S2 normal.  No murmurs or gallops.  ABDOMEN:  Obese, soft, nontender, nondistended with hypoactive bowel sounds.  EXTREMITIES:  There is 2+ edema.  SKIN:  He has erythematous rash, which is on his chest as well as his extremities, which is chronic for him.  NEUROLOGICAL:  He is globally weak but cranial nerves II-XII are grossly within normal limits.  His sensation is grossly within normal limits.    LABORATORY DATA:  Obtained included a sodium of 133, potassium 4.2, chloride 96, bicarbonate 26, BUN 37, creatinine 1.24, GFR of 63, alkaline phosphatase 51, direct bilirubin of 0.5, glucose 123.  His BNP is 11,024.  His troponin is 34.  On his CBC, his white cell count is 4.2.  His hemoglobin is 10.6, which is up from 8.4 on 06/19/2022, hematocrit 33.5, platelet count of 80.  His differential is grossly within normal limits other than decreased lymphocyte count of 0.3.  FAST COVID test was negative.  Influenza screen was negative.  Chest x-ray, 2 views, shows stable chest with  elevated left hemidiaphragm typical for underlying infiltrate or atelectasis of the left lower lobe.  No active CHF or volume overload identified.  EKG shows a paced rhythm at 70 beats per minute.    IMPRESSION AND PLAN:    1.  Acute renal failure, likely prerenal in the setting of recent fever and diarrheal illness, which has resolved:  Diuretics were recently increased and then taken down.  We will hold his diuretics for now.  We will hydrate him carefully given his history of congestive heart failure.  2.  Hypertension:  He will need resumption of his home medications.  3.  Chronic obstructive pulmonary disease:  He will need resumption of his home medications.     CODE STATUS:  Full code.    He will be admitted as an inpatient.  His troponin is noted to be elevated; however, in the absence of chest pain and acute EKG changes, we will monitor -- likely due to demand ischemia.    Bonnie Roberts MD        D: 2022   T: 2022   MT: [facility]    Name:     YURIDIA BAZAN  MRN:      [id number]        Account:     [id number]   :      1950           Admitted:    2022       Document: [id number]    cc:  Kelley Stoddard MD

## 2022-08-01 NOTE — PLAN OF CARE
Goal Outcome Evaluation:    Patient alert and oriented, forgetful. PRN tylenol for headache with relief. PRN oxycodone for hip pain with relief of pain. Frequent runs of vtach, blood labs drawn. 1x dose PO potassium, IV magnesium replaced per protocol. Re-check for electrolytes scheduled for 0500. IV abx. Afebrile this shift. RA. Lung sounds clear. Tele: AV-paced PVC;s, frequent runs of vtach, md aware. Will continue to monitor per plan care.     Plan of Care Reviewed With: patient

## 2022-08-01 NOTE — PROGRESS NOTES
Provider paged: Patient with 17 beat run v-tach. Tele reports consistently back and forth sinus to runs of v-tach. Please review strips, advise. Will be replacing mag per protocol, thanks!    New orders received for blood labs, 1x dose PO potassium and iv magnesium per protocol.

## 2022-08-01 NOTE — PROGRESS NOTES
[facility]    Medicine Progress Note - Hospitalist Service    Date of Admission:  7/29/2022    Assessment & Plan                     Toney Denton is a 71 year old male with history of tricuspid valve repair, bioprosthetic mitral valve replacement for regurgitation, coronary artery disease, ischemic cardiomyopathy (EF 25% in 4/21), pacemaker placement in 7/19, biventricular implantable ivspmupwpowu-qwrtfljubgukd-gdrxnce from BiV-P in 5/20, atrial fibrillation (s/p ablation/maze procedure), severe COPD, hypertension, dyslipidemia, and borderline diabetes. He presented to the [facility] ED on 7/29/22 for evaluation of generalized weakness, fever, and edema. ED work up showed stable vital signs.  Laboratory evaluation showed sodium 133, chloride 96, BUN 37, creatinine 1.24, BNP 11,024, troponin 34, procalcitonin 5.04, white blood cells 3.2, hemoglobin 10.6, platelets 66, unremarkable urinalysis, and negative testing for COVID-19/influenza/RSV.  Chest x-ray suggested possible left lower lung infiltrate and volume overload.  CT of chest showed right lower lung patchy consolidation and groundglass opacities consistent with atelectasis versus infection.  Trace bilateral pleural effusions were noted.  Hepatic steatosis was noted.  Toney was admitted to the hospital with pneumonia and congestive heart failure.  He was treated with IV Rocephin and IV doxycycline for pneumonia and IV Lasix for congestive heart failure.  Echocardiogram was not obtained initially because patient has documented history of systolic heart failure due to ischemic cardiomyopathy with ejection fraction of 25% in 4/21.  Hospital course was complicated by urine retention for which Flomax was started and Shaffer catheter was placed. Hospital course was complicated by recurrent episodes of ventricular tachycardia. Magnesium was intermittently low and replaced with persistence of intermittent ventricular tachycardia. Cardiology was consulted  and started oral amiodarone. Echo was repeated and showed EF 20-25% with global diffuse hypokinesis (worse in inferior wall segments) and flattened septum consistent with right ventricular pressure/volume overload. Bioprosthetic mitral valve and annuloplasty ring in tricuspid position were noted to be intact.     Problem list:    Community-acquired pneumonia  Leukopenia, resolved  -Continue IV Rocephin and IV doxycycline.    -Temp 100 today  -Leukopenia has resolved    Acute exacerbation of chronic systolic congestive heart failure  -Echocardiogram in 4/21 showed ejection fraction of 25%; echo 8/1 showed EF 20-25%  -Prior to admission was taking Bumex 2 mg twice daily and 1 mg at night.  Hold these.  -Continue to diurese with Lasix 60 mg IV every 8 hours (down 2.1 liters yesterday and .360 liters today)  -Daily basic metabolic panel, daily weights, and intake/output.    Urine retention  -Patient had to be straight cathed twice for bladder scan 700 mL  -Shaffer catheter was placed  -Continue Flomax 400 mcg daily (started 7/31/22)  -Consider voiding trial later in hospital stay versus discharge home with catheter in place and outpatient urology follow-up (depends on duration of hospital stay)    Acute kidney injury  -Possibly due to urine retention and/or congestive heart failure  -Monitor renal function with diuresis and Shaffer catheter placement  -Renal function has normalized    Intermittent runs of ventricular tachycardia  -Replace magnesium as needed  -Continue prior to admission beta-blocker  -Cardiology consulted 8/1/22 and Amiodarone was started. Continue to monitor. If Vtach persists Toney might need to transfer to [facility] where he gets his cardiac care for repeat VT ablation.     Thrombocytopenia  -Possibly related to pneumonia  -Continue to monitor    Coronary artery disease  Ischemic cardiomyopathy  History of bioprosthetic mitral valve replacement  History of tricuspid valve repair  History of  "pacemaker placement with upgrade to biventricular cardioverter- defibrillator  History of atrial fibrillation with ablation/maze procedure  Dyslipidemia  -Not chronically on anticoagulation  -Continue prior to admission Lipitor, lisinopril, and metoprolol.  Note, lisinopril does not appear on her prior to admission medication list but does not appear in care everywhere.  -Hold prior to admission Bumex with IV Lasix being given.    COPD, without acute exacerbation  -Continue prior to admission albuterol and Anoro Ellipta inhalers.         Diet: Combination Diet Low Saturated Fat Na <2400mg Diet, No Caffeine Diet    DVT Prophylaxis: Pneumatic Compression Devices  Shaffer Catheter: PRESENT, indication: Retention  Central Lines: None  Cardiac Monitoring: ACTIVE order. Indication: Tachyarrhythmias, acute (48 hours)  Code Status: Full Code      Disposition Plan      Expected Discharge Date: 08/03/2022,  3:00 PM    Destination: inpatient rehabilitation facility  Discharge Comments: TCU referrals sent        The patient's care was discussed with the Bedside Nurse, Patient and Dr. Turner.    Anil Ye MD  Hospitalist Service  [facility]  Securely message with the Vocera Web Console (learn more here)  Text page via [employer] Paging/Directory         Clinically Significant Risk Factors Present on Admission               # Obesity: Estimated body mass index is 35.26 kg/m  as calculated from the following:    Height as of this encounter: 1.715 m (5' 7.5\").    Weight as of this encounter: 103.6 kg (228 lb 8 oz).        ______________________________________________________________________    Interval History   No new problems. Still with asymptomatic Vtach. Diuresing well. Feels less short of breath. Still with low grade fevers .     Data reviewed today: I reviewed all medications, new labs and imaging results over the last 24 hours. I personally reviewed no images or EKG's today.    Physical Exam "   Vital Signs: Temp: 100.4  F (38  C) Temp src: Oral BP: 101/61 Pulse: 99   Resp: 18 SpO2: 96 % O2 Device: None (Room air)    Weight: 228 lbs 8 oz  GENERAL:  Comfortable. Cooperative.  PSYCH: pleasant, oriented, No acute distress.  EYES: PERRLA, Normal conjunctiva.  HEART:  Regular rate and rhythm. No JVD. Pulses normal. 1-2 LE edema.  LUNGS:  Clear to auscultation, normal Respiratory effort.  ABDOMEN:  Soft, no hepatosplenomegaly, normal bowel sounds.  EXTREMETIES: No clubbing, cyanosis or ischemia  SKIN:  Dry to touch, No rash.      Data   Recent Labs   Lab 08/01/22  0616 08/01/22  0019 07/31/22  0721 07/30/22 2135 07/30/22 0629 07/29/22 2131   WBC  --   --  6.2  --  3.2* 4.2   HGB  --   --  10.1*  --  10.6* 10.6*   MCV  --   --  100  --  105* 101*   PLT  --   --  80*  --  66* 80*   *  --  134*  --  136 133*   POTASSIUM 4.7 3.5 3.5   < > 4.1 4.2   CHLORIDE 95*  --  96*  --  100 96*   CO2 28  --  25  --  21* 26   BUN 22.7  --  22.7  --  33.5* 36.9*   CR 0.80  --  0.79  --  1.05 1.24*   ANIONGAP 8  --  13  --  15 11   THOMAS 9.2  --  9.2  --  9.2 9.7   *  --  108*  --  111* 123*   ALBUMIN  --   --   --   --   --  3.5   PROTTOTAL  --   --   --   --   --  6.9   BILITOTAL  --   --   --   --   --  1.2   ALKPHOS  --   --   --   --   --  151*   ALT  --   --   --   --   --  23   AST  --   --   --   --   --  38   LIPASE  --   --   --   --   --  51    < > = values in this interval not displayed.

## 2022-08-01 NOTE — PROGRESS NOTES
XC    Paged re vtach episodes, asymptomatic.  Repeat Mag was 1.4, K 3.5  4gm IV mag, repeat afterwards. Also give 40mEq of KCl

## 2022-08-01 NOTE — PLAN OF CARE
Vss, no co cp/sob/pain. Tele AV paced BBB with frequent long runs of VT in the 20 beat range noted on tele, md aware. Mag and K+ were replaced on previous shift with rechecks now ordered for am. Up with Ax2+lift, PT following, falls risk, bed alarm on for safety, forgetful, strict I and O, PATRICK x2 continues, BLE and scrotal edema noted, turns as pt will allow.  Scattered bruising noted as well as blanchable pink areas to body seemingly improved, see skin assessment for full details. Shaffer in place, flomax given, adequate output.  Cardiology consulted, started on amio po, stop lisinopril, device check, echo with EF only 20-25%. Pt had lunch but this RN did not see the tray before it was removed from the room. Continue poc and monitoring.       Heart Failure Care Map  GOALS TO BE MET BEFORE DISCHARGE:    1. Decrease congestion and/or edema with diuretic therapy to achieve near optimal volume status.     Dyspnea improved: Yes, satisfactory for discharge.   Edema improved: Yes, satisfactory for discharge.        Net I/O and Weights since admission:   07/02 1500 - 08/01 1459  In: 5588.1 [P.O.:5040; I.V.:548.1]  Out: 6375 [Urine:6375]  Net: -786.9     Vitals:    07/30/22 0136 07/31/22 0031 08/01/22 0611   Weight: 102.7 kg (226 lb 6.4 oz) 103 kg (227 lb) 103.6 kg (228 lb 8 oz)       2.  O2 sats > 90% on room air, or at prior home O2 therapy level.      Able to wean O2 this shift to keep sats above 90%?: Yes, satisfactory for discharge.   Does patient use Home O2? No          Current oxygenation status:   SpO2: 96 %     O2 Device: None (Room air),      3.  Tolerates ambulation and mobility near baseline.     Ambulation: No, further care required to meet this goal. Please explain lift   Times patient ambulated with staff this shift: 0    Please review the Heart Failure Care Map for additional HF goal outcomes.    Daisy Orta RN  8/1/2022        Problem: Plan of Care - These are the overarching goals to be used throughout  "the patient stay.    Goal: Plan of Care Review/Shift Note  Description: The Plan of Care Review/Shift note should be completed every shift.  The Outcome Evaluation is a brief statement about your assessment that the patient is improving, declining, or no change.  This information will be displayed automatically on your shift note.  Outcome: Ongoing, Not Progressing  Goal: Patient-Specific Goal (Individualized)  Description: You can add care plan individualizations to a care plan. Examples of Individualization might be:  \"Parent requests to be called daily at 9am for status\", \"I have a hard time hearing out of my right ear\", or \"Do not touch me to wake me up as it startles me\".  Outcome: Ongoing, Not Progressing  Goal: Absence of Hospital-Acquired Illness or Injury  Outcome: Ongoing, Not Progressing  Intervention: Identify and Manage Fall Risk  Recent Flowsheet Documentation  Taken 8/1/2022 1325 by Daisy Orta RN  Safety Promotion/Fall Prevention:    bed alarm on    safety round/check completed  Taken 8/1/2022 1227 by Daisy Orta RN  Safety Promotion/Fall Prevention:    bed alarm on    safety round/check completed  Taken 8/1/2022 1126 by Daisy Orta RN  Safety Promotion/Fall Prevention:    bed alarm on    safety round/check completed  Taken 8/1/2022 1005 by Daisy Orta RN  Safety Promotion/Fall Prevention:    bed alarm on    safety round/check completed  Taken 8/1/2022 0901 by Daisy Orta RN  Safety Promotion/Fall Prevention:    bed alarm on    safety round/check completed  Taken 8/1/2022 0846 by Daisy Orta, RN  Safety Promotion/Fall Prevention:    fall prevention program maintained    treat underlying cause    treat reversible contributory factors    supervised activity    safety round/check completed    room organization consistent    room door open    patient and family education    nonskid shoes/slippers when out of bed    lighting adjusted    increase visualization of " patient    increased rounding and observation    clutter free environment maintained    activity supervised    assistive device/personal items within reach    bed alarm on    mobility aid in reach  Taken 8/1/2022 0740 by Daisy Orta RN  Safety Promotion/Fall Prevention:    bed alarm on    safety round/check completed  Intervention: Prevent Skin Injury  Recent Flowsheet Documentation  Taken 8/1/2022 0846 by Daisy Orta RN  Body Position: (with encouragement)    position changed independently    other (see comments)    weight shifting    left    side-lying  Intervention: Prevent and Manage VTE (Venous Thromboembolism) Risk  Recent Flowsheet Documentation  Taken 8/1/2022 1005 by Daisy Orta RN  VTE Prevention/Management: SCDs (sequential compression devices) on  Taken 8/1/2022 0846 by Daisy Orta RN  VTE Prevention/Management: (delegated to NA to place on pt)    SCDs (sequential compression devices) off    other (see comments)  Activity Management:    activity adjusted per tolerance    activity encouraged  Goal: Optimal Comfort and Wellbeing  Outcome: Ongoing, Not Progressing  Goal: Readiness for Transition of Care  Outcome: Ongoing, Not Progressing     Problem: Adjustment to Illness (Heart Failure)  Goal: Optimal Coping  Outcome: Ongoing, Not Progressing     Problem: Cardiac Output Decreased (Heart Failure)  Goal: Optimal Cardiac Output  Outcome: Ongoing, Not Progressing     Problem: Dysrhythmia (Heart Failure)  Goal: Stable Heart Rate and Rhythm  Outcome: Ongoing, Not Progressing     Problem: Fluid Imbalance (Heart Failure)  Goal: Fluid Balance  Outcome: Ongoing, Not Progressing     Problem: Functional Ability Impaired (Heart Failure)  Goal: Optimal Functional Ability  Outcome: Ongoing, Not Progressing  Intervention: Optimize Functional Ability  Recent Flowsheet Documentation  Taken 8/1/2022 0846 by Daisy Orta RN  Activity Management:    activity adjusted per tolerance    activity  encouraged     Problem: Oral Intake Inadequate (Heart Failure)  Goal: Optimal Nutrition Intake  Outcome: Ongoing, Not Progressing     Problem: Respiratory Compromise (Heart Failure)  Goal: Effective Oxygenation and Ventilation  Outcome: Ongoing, Not Progressing  Intervention: Promote Airway Secretion Clearance  Recent Flowsheet Documentation  Taken 8/1/2022 0846 by Daisy Orta RN  Cough And Deep Breathing: done with encouragement     Problem: Sleep Disordered Breathing (Heart Failure)  Goal: Effective Breathing Pattern During Sleep  Outcome: Ongoing, Not Progressing

## 2022-08-02 NOTE — PROGRESS NOTES
Care Management Follow Up    Length of Stay (days): 4    Expected Discharge Date: 08/03/2022     Concerns to be Addressed: discharge planning     Patient plan of care discussed at interdisciplinary rounds: Yes    Anticipated Discharge Disposition: Transitional Care     Anticipated Discharge Services: None  Anticipated Discharge DME: None    Patient/family educated on Medicare website which has current facility and service quality ratings: yes  Education Provided on the Discharge Plan:    Patient/Family in Agreement with the Plan: yes    Referrals Placed by CM/SW: Post Acute Facilities  Private pay costs discussed: Not applicable    Additional Information:  CM met with pt at the bedside for more TCU Choices as [facility] has declines. CM also tried to call pts wife, Brianda, no answer. Pt agreeable to send referral to [facility]. Will get more choices from spouse when she returns to hospital afternoon.    CM will continue to follow with discharge planning.    Tessie Crisostomo RN, BSN CTS  Care Coordinator  [facility]   [phone]    Update 1344: met with pts spouse at the bedside for more TCU choices. [facility], [facility] and [facility] referrals have been sent. Hopeful for discharge on Thursday if medically ready.

## 2022-08-02 NOTE — PLAN OF CARE
"Goal Outcome Evaluation:    Plan of Care Reviewed With: patient   Patient alert & oriented x4, c/o frustration with being hospitalized again but also somewhat anxious that he \"gets it right this time\".  Unable to weight bear due to (L) hip pain and needing lift to go from bed to chair. Up in chair with legs elevated after being medicated for pain with Oxycodone.  Stated does not get complete relief with the oxy but refused additional Tylenol.  C/o generalized weakness all over. See flowsheets.                "

## 2022-08-02 NOTE — PROVIDER NOTIFICATION
DATE:  8/1/2022   TIME OF RECEIPT FROM LAB:  1944  LAB TEST:  Blood Culture R hand  LAB VALUE:  Positive for gram negative bacilli  RESULTS GIVEN WITH READ-BACK TO (PROVIDER):  Bonnie Roberts MD, MD  TIME LAB VALUE REPORTED TO PROVIDER:   1944

## 2022-08-02 NOTE — PROGRESS NOTES
[facility]    Hospitalist Progress Note      Assessment & Plan   Toney Denton is a 71 year old male with history of tricuspid valve repair, bioprosthetic mitral valve replacement for regurgitation, coronary artery disease, ischemic cardiomyopathy (EF 25% in 4/21), pacemaker placement in 7/19, biventricular implantable zzxhilprjdkr-osvtyunxcczmu-[id number] from BiV-P in 5/20, atrial fibrillation (s/p ablation/maze procedure), severe COPD, hypertension, dyslipidemia, and borderline diabetes who presented on 7/29 for weakness, fever and edema.    #Gram negative bacteremia, CAP: Pt presented with SOB, fever, weakness.  Initial imaging remarkable for CT of chest showed right lower lung patchy consolidation and groundglass opacities consistent with atelectasis versus infection w trace bilateral effusions.  Laboratory evaluation showed sodium 133, chloride 96, BUN 37, creatinine 1.24, BNP 11,024, troponin 34, procalcitonin 5.04, white blood cells 3.2, hemoglobin 10.6, platelets 66, unremarkable urinalysis, and negative testing for COVID-19/influenza/RSV.    -Blood culture from admission now positive for gram negative bacilli; speciation in process.  Repeat blood cultures on 8/2  -Pt was started on ceftriaxone and doxycycline. He has improved.  Will continue present therapy but increased dose of ceftriaxone to 2 g daily.     #Acute on chronic HFrEF. Ischemic CM. CAD s/p CABG, stent to RCA into proximal PDA. History of bioprosthetic mitral valve replacement. History of tricuspid valve repair. History of pacemaker placement with upgrade to biventricular cardioverter- defibrillator. History of atrial fibrillation with ablation/maze procedure.: TTE obtained on 08/01 showed EF 20-25% with global diffuse hypokinesis.  Flattened septum c/w RV pressure/volume overload. Bioprosthetic mitral valve noted w annuloplasty ring in tricuspid position.     -Cardiology consulted.  -Pt was previously on furosemide 60 mg TID  which was discontinued on 7/31 evening.  Will restart on 8/2.    -Cardiology planning to initiate Entresto.  Continue metoprolol, Jardiance, aspirin and statin    #Acute kidney injury: Possibly due to urine retention and/or congestive heart failure  -Monitor renal function with diuresis and Shaffer catheter placement    #Urinary retention: Patient had to be straight cathed multiple times.  Shaffer catheter was placed and Flomax was started.  Continue Shaffer catheter.  Will consider voiding trial later in week.      #Intermittent runs of ventricular tachycardia: Replace electrolytes as needed.  Continue prior to admission beta-blocker  -Cardiology consulted 8/1/22 and Amiodarone was started. Continue to monitor. If Vtach persists Toney might need to transfer to [facility] where he gets his cardiac care for repeat VT ablation.      #Thrombocytopenia: Possibly related to infection.  Improving.     #COPD, without acute exacerbation: Continue prior to admission albuterol and Anoro Ellipta inhalers.    #Generalized weakness: Patient has had generalized weakness prior to admission.  He had previously been using a walker and then graduated to a cane.  His wife had noted that he had increased weakness.  -We will have PT assess the patient.    DVT Prophylaxis: Pneumatic Compression Devices  Code Status: Full Code  Dispo: Likely a couple days.  Await speciation from blood cultures.  Improvement in respiratory status. PT assessment.  Expect will need TCU.    Carlos Hernandez MD    Interval History   Assumed care.  No events.  Patient notes he has general weakness.  He has not been able to ambulate as he usually does.  He tells me that at baseline he has not been using a walker or cane.  He denies any chest pain.  Breathing is stable today.  No abdominal pain.  No nausea vomiting.  He tells me that he has chronic left hip pain which is relatively stable but worse when he moves.    -Data reviewed today: I reviewed all new labs and  imaging results over the last 24 hours.     Physical Exam   Temp: 98.4  F (36.9  C) Temp src: Oral BP: 120/72 Pulse: 58   Resp: 19 SpO2: 97 % O2 Device: None (Room air)    Vitals:    07/31/22 0031 08/01/22 0611 08/02/22 0645   Weight: 103 kg (227 lb) 103.6 kg (228 lb 8 oz) 104.7 kg (230 lb 12.8 oz)     Vital Signs with Ranges  Temp:  [97.6  F (36.4  C)-100.4  F (38  C)] 98.4  F (36.9  C)  Pulse:  [58-99] 58  Resp:  [18-20] 19  BP: (101-130)/(50-76) 120/72  SpO2:  [91 %-97 %] 97 %  I/O last 3 completed shifts:  In: 980 [P.O.:980]  Out: 2750 [Urine:2750]    Constitutional: Deconditioned.  Older than stated age.  HEENT: Normocephalic. MMM, No elevation of JVD noted.   Respiratory: Nl WOB, crackles at the bases bilateral.  No wheezes  Cardiovascular: Regular, +systolic murmur noted.   GI: BS+, NT, ND  Skin/Integumen: WWP, no rash.  Mild bilateral edema noted.  Left hip with previous incision noted.  Not warm or tender to touch.  Passive range of motion intact.  Neuro: CNII-XII intact. Moves all extremities. No tremor. A&Ox3.    Medications       amiodarone  400 mg Oral TID     aspirin  81 mg Oral Daily     atorvastatin  80 mg Oral QAM     [Held by provider] bumetanide  1 mg Oral QPM     [Held by provider] bumetanide  2 mg Oral QAM     [Held by provider] bumetanide  2 mg Oral Daily     cefTRIAXone  1 g Intravenous Q24H     doxycycline (VIBRAMYCIN) IV  100 mg Intravenous Q12H     DULoxetine  30 mg Oral At Bedtime     empagliflozin  10 mg Oral Daily     loratadine  10 mg Oral QAM     metoprolol succinate ER  100 mg Oral QAM     sodium chloride (PF)  3 mL Intracatheter Q8H     tamsulosin  0.4 mg Oral Daily     umeclidinium-vilanterol  1 puff Inhalation QAM       Data   Recent Labs   Lab 08/01/22  0616 08/01/22  0019 07/31/22  0721 07/30/22 2135 07/30/22 0629 07/29/22 2131   WBC  --   --  6.2  --  3.2* 4.2   HGB  --   --  10.1*  --  10.6* 10.6*   MCV  --   --  100  --  105* 101*   PLT  --   --  80*  --  66* 80*   *   --  134*  --  136 133*   POTASSIUM 4.7 3.5 3.5   < > 4.1 4.2   CHLORIDE 95*  --  96*  --  100 96*   CO2 28  --  25  --  21* 26   BUN 22.7  --  22.7  --  33.5* 36.9*   CR 0.80  --  0.79  --  1.05 1.24*   ANIONGAP 8  --  13  --  15 11   THOMAS 9.2  --  9.2  --  9.2 9.7   *  --  108*  --  111* 123*   ALBUMIN  --   --   --   --   --  3.5   PROTTOTAL  --   --   --   --   --  6.9   BILITOTAL  --   --   --   --   --  1.2   ALKPHOS  --   --   --   --   --  151*   ALT  --   --   --   --   --  23   AST  --   --   --   --   --  38   LIPASE  --   --   --   --   --  51    < > = values in this interval not displayed.       Recent Results (from the past 24 hour(s))   Echocardiogram Complete   Result Value    LVEF  20-25%    Narrative    [id number]  [id number]  [id number]  [id number]^JOHANN^QIAN^ALEXANDRA     [facility]  Echocardiography Laboratory  [address] [place] [address]     Name: YURIDIA BAZAN  MRN: [id number]  : 1950  Study Date: 2022 10:56 AM  Age: 71 yrs  Gender: Male  Patient [facility]  Reason For Study: Cardiomyopathy  Ordering Physician: QIAN WILLS  Performed By: Juliette Cazares     BSA: 2.1 m2  Height: 67 in  Weight: 228 lb  HR: 96  BP: 103/65 mmHg  ______________________________________________________________________________  Procedure  Complete Portable Echo Adult. Optison (NDC #[id number]) given intravenously.  ______________________________________________________________________________  Interpretation Summary     The left ventricle is severely dilated. LVIDd: 7.0 cm  Left ventricular systolic function is severely reduced. The visual ejection  fraction is 20-25%.  Global diffuse hypokinesis, worse in the inferior wall segments. Flattened  septum is consistent with RV pressure/volume overload.  Right ventricle is not well visualized, however global systolic function is  probably moderate-severely reduced. There is a pacemaker lead in the right  ventricle.  There  is a bioprosthetic mitral valve. The prosthetic mitral valve is well-  seated. Elevated mean gradient 8 mmHg at 97 bpm. There is no mitral  regurgitation noted.  An annuloplasty ring is noted in the tricuspid position. Mild (1+) tricuspid  regurgitation.  The right ventricular systolic pressure is approximated at 41mmHg plus the  right atrial pressure.  Dilation of the inferior vena cava is present with abnormal respiratory  variation in diameter.  The ascending aorta is Mildly dilated. Max diameter of the visualized portion  3.9 cm.     There are no prior studies available for comparison.  ______________________________________________________________________________  Left Ventricle  The left ventricle is severely dilated. LVIDd: 7.0 cm. There is severe  eccentric left ventricular hypertrophy. Left ventricular systolic function is  severely reduced. The visual ejection fraction is 20-25%. Left ventricular  diastolic function is not assessable. Flattened septum is consistent with RV  pressure/volume overload. Global diffuse hypokinesis, worse in the inferior  wall segments.     Right Ventricle  The right ventricle is not well visualized. Right ventricle is not well  visualized, however global systolic function is probably moderate-severely  reduced. There is a pacemaker lead in the right ventricle.     Atria  The left atrium is severely dilated. The right atrium is severely dilated.     Mitral Valve  There is no mitral regurgitation noted. Mean gradient 8 mmHg at 97 bpm. There  is a bioprosthetic mitral valve. The prosthetic mitral valve is well-seated.     Tricuspid Valve  There is mild (1+) tricuspid regurgitation. The right ventricular systolic  pressure is approximated at 41mmHg plus the right atrial pressure. An  annuloplasty ring is noted in the tricuspid position.     Aortic Valve  The aortic valve is trileaflet with aortic valve sclerosis. No aortic  regurgitation is present. No hemodynamically significant  valvular aortic  stenosis.     Pulmonic Valve  The pulmonic valve is not well seen, but is grossly normal.     Vessels  The aortic root is normal size. The ascending aorta is Mildly dilated. Max  diameter of the visualized portion 3.9 cm. Dilation of the inferior vena cava  is present with abnormal respiratory variation in diameter.     ______________________________________________________________________________  MMode/2D Measurements & Calculations  IVSd: 1.1 cm  LVIDd: 7.0 cm  LVIDs: 6.1 cm  LVPWd: 1.1 cm  FS: 12.8 %  LV mass(C)d: 352.9 grams  LV mass(C)dI: 165.1 grams/m2  Ao root diam: 3.4 cm  asc Aorta Diam: 3.9 cm     LVOT diam: 2.1 cm  LVOT area: 3.5 cm2  LA Volume (BP): 121.0 ml  LA Volume Index (BP): 56.5 ml/m2  RWT: 0.31     Doppler Measurements & Calculations  MV E max norma: 179.0 cm/sec  MV max PG: 15.8 mmHg  MV mean P.5 mmHg  MV V2 VTI: 38.0 cm  MVA(VTI): 2.0 cm2  MV dec time: 0.27 sec  LV V1 max P.4 mmHg  LV V1 max: 126.0 cm/sec  LV V1 VTI: 21.6 cm     SV(LVOT): 75.0 ml  SI(LVOT): 35.1 ml/m2  TR max norma: 320.5 cm/sec  TR max P.4 mmHg     ______________________________________________________________________________  Report approved by: Natalia Baron 2022 12:01 PM

## 2022-08-02 NOTE — PLAN OF CARE
Goal Outcome Evaluation:    Temp: 99.4  F (37.4  C) Temp src: Oral BP: 116/50 Pulse: 96   Resp: 20 SpO2: 91 % O2 Device: None (Room air)       A.O4. lethargic. Up lift. Shaffer in place, 700mL output. Temp spiked to 100.2. PRN tylenol given. 8/10 back and hip pain, oxy given x1. BC back positive for gram - bacilli,doc notified see note. Tele demand A, demand biventricular paced.  K mag am draw. PIV SL L. [employer] device check fax received, placed in paper chart. TCU discharge in 1-2 days.

## 2022-08-02 NOTE — PROGRESS NOTES
Cross cover    Called with report that the patient's blood culture 1/2 returned with GNR.  Unclear at this point whether that is a lactose  or not.  Patient is currently on routine treatment for community-acquired pneumonia with ceftriaxone and doxycycline.  If this turns out to be a nonlactose , we will need to empirically start the patient on Zosyn to cover for possible Pseudomonas.    For now, based on the patient's apparent clinical stability, we will continue with current therapy.    BUDDY

## 2022-08-02 NOTE — PLAN OF CARE
Assume care at 2300: A/Ox4. VSS on RA. Ax2 with lift. C/O left hip pain, PRN oxy given with relief. Shaffer intact, draining w/ good UOP. On Iv abx & lasix. Ax2 pivots to BSC. Regular diet. PIV L, saline locked. TCU discharge in 1-2 days.     Safety checks completed and call light within reach. Continue POC

## 2022-08-02 NOTE — PROGRESS NOTES
[facility]  Cardiology Progress Note    Date of Service (when I saw the patient): 08/02/2022     Assessment & Plan   Toney Denton is a 71 year old male who was admitted on 7/29/2022. He carries a PMH significant for CAD s/p single vessel CABG ( radial graft to OM), MVR, Tricuspid valve repair, JEFF clip w/ MAZE procedure, VT s/p ablation x2 (4/2020, 2/2021), CRT-D (5/2020), COPD, RA, Diabetes, hypertension, hyperlipidemia, anemia, thrombocytopenia, dilated cardiomyopathy, carotid disease, GI bleed, and orthopedic surgeries.     1.  : Ventricular Tachycardia s/p ablation in 2020 and 2021/ CRT-D   - Several VT episodes yesterday w/ longest 38 beats. Started on 400mg TID Amiodarone with episodes less frequent and in short duration. HR borderline in the 90's  - PTA Metoprolol   - Magnesium 2.0 s/p replacement ( 1.6, 1.4 on admission)  potassium 3.7, keep > 4.0    2.  : CAD s/p CABG ( radial graft to OM), stent to RCA into proximal PDA.  - Mild/ flat troponin elevation 34,33,28,27  - Coronary angiogram (4/2020 at [facility]) showed mild 30% stenosis of the distal LM, proximal/mid LAD, severe diffuse disease in the proximally and then  in the mid/distal segments, patent graft to OM2 that backfills a few smaller OMs. Stents from proximal RCA to distal and into proximal PDA w/ mild restenosis in the mid segment.   - Aspirin, statin, and metoprolol    3.  : Acute on chronic systolic heart failure/ Ischemic Cardiomyopathy   - NT pro BNP > 11,000  - Echocardiogram showed severely dilated LV 7.0 cm, severely reduced EF 20-25% ( 25-30% on previous study 4/2021 [facility]) Global diffuse hypokinesis worse in the inferior wall segment. Flattened septum, consistent with RV pressure/volume overload, moderately-severely reduced RV function. RV systolic pressure elevated at 41 mmHg + RA pressure.   - Diuresing on IV lasix 60 mg every 8 hr, net neg 1.7 L, Wt up 2 lbs.   - Creatinine 0.71, BUN 17.8, sodium 130    4. MVR  - Prosthetic valve well seated, Mean gradient 8 mmHg, no MR   5. TVR - Mild TR   6. Hypertension - Well controlled   7. Hyperlipidemia - On statin   8. Anemia - Hgb 10.4, Plt 141     Plan   1. VT episodes less frequent/ short duration after initiating Amiodarone. HR borderline upper 90's. Continue Amiodarone 400mg TID x 7 days, then 400mg daily x 7 days, then 200mg daily.   2. Keep Mag > 2.0 and K > 4.0, replace per protocol     3. Echocardiogram showed severely dilated LV, severely reduced EF 20-25, flattened septum consistent with RV pressure/volume overload, mod/severely reduced RV function.   - Continue IV diuresis likely for 1-2 days, net neg 1.7 L. Weights unreliable d/t non standing weights. 230 today, dry weight ? 195   4. Plan to initiate Entresto after 36 hr washout of lisinopril ( last dose 8/1)  5. Continue metoprolol, Jardiance, aspirin, and statin.   6. Strict I/O's, low sodium diet, daily weight ( standing if able)   7. 50 oz fluid restriction  8. CORE/ EP follow up with [place] at discharge.       PASHA Prieto CNP  Text Page  (M-F, 7:30 am - 4:00 pm)    Interval History   Up in chair w/ complaints of generalized weakness. Denies chest pain, shortness of breath, palpitations, orthopnea, presyncope and mild leg edema. Decreased breath sounds bilaterally. Diuresing on IV lasix. VT episodes less frequent on Amiodarone. Vitals and labs stable. Plan to initiate Entresto likely tomorrow.     Physical Exam   Temp: 98.4  F (36.9  C) Temp src: Oral BP: 120/72 Pulse: 58   Resp: 19 SpO2: 97 % O2 Device: None (Room air)    Vitals:    07/31/22 0031 08/01/22 0611 08/02/22 0645   Weight: 103 kg (227 lb) 103.6 kg (228 lb 8 oz) 104.7 kg (230 lb 12.8 oz)     Vital Signs with Ranges  Temp:  [98  F (36.7  C)-100.4  F (38  C)] 98.4  F (36.9  C)  Pulse:  [58-99] 58  Resp:  [18-20] 19  BP: (101-130)/(50-76) 120/72  SpO2:  [91 %-97 %] 97 %  I/O last 3 completed shifts:  In: 980 [P.O.:980]  Out: 8211  [Urine:2750]    GEN:  In general, this is a obese male.   HEENT:  Pupils equal, round. Sclerae nonicteric. Clear oropharynx. Mucous membranes moist.  NECK: Supple, no masses appreciated. Trachea midline. Difficult to assess JVD   C/V:  Regular rate and rhythm, episodes of VT, asymptomatic. Systolic murmur, no rub or gallop.  RESP: Respirations are unlabored. Decreased breath sounds bilaterally.   GI: Obese Abdomen soft, nontender, nondistended.   EXTREM: Trace bilateral ankle edema. No cyanosis or clubbing.   NEURO: Alert and oriented, cooperative.  No obvious focal deficits.   PSYCH: Normal affect.  SKIN: Warm and dry. No rashes or petechiae appreciated.       Medications       amiodarone  400 mg Oral TID     aspirin  81 mg Oral Daily     atorvastatin  80 mg Oral QAM     [Held by provider] bumetanide  1 mg Oral QPM     [Held by provider] bumetanide  2 mg Oral QAM     [Held by provider] bumetanide  2 mg Oral Daily     cefTRIAXone  1 g Intravenous Once     [START ON 8/3/2022] cefTRIAXone  2 g Intravenous Q24H     doxycycline (VIBRAMYCIN) IV  100 mg Intravenous Q12H     DULoxetine  30 mg Oral At Bedtime     empagliflozin  10 mg Oral Daily     furosemide  60 mg Intravenous Q8H     loratadine  10 mg Oral QAM     magnesium oxide  400 mg Oral Q4H     metoprolol succinate ER  100 mg Oral QAM     potassium chloride  20 mEq Oral Once     sodium chloride (PF)  3 mL Intracatheter Q8H     tamsulosin  0.4 mg Oral Daily     umeclidinium-vilanterol  1 puff Inhalation QAM       Data   Reviewed       I spent > 45 minutes face-to-face and/or coordinating care. Over 50% of our time on the unit was spent counseling the patient and/or coordinating care     PASHA Prieto CNP 8/2/2022

## 2022-08-03 NOTE — PLAN OF CARE
Vss, no co cp/sob, oxy given x2 for back/knee/shoulder pain with reduction. Tele AV paced BBB PVC. Mag replaced and K+ WDL with both rechecks now ordered for am, Na 130. Up with Ax2+lift, PT following pt up to EOB, falls risk, bed alarm on for safety, forgetful, strict I and O, BLE and scrotal edema noted, turns as pt will allow. Pelvic/hip xray completed, see results for details. Scattered bruising noted, see skin assessment for full details. Shaffer in place, flomax given, adequate output.  Cardiology following see note for details, restarted on bumex, amio po decreased to BID, start entrestro. ID consulted, changed rocephin to zosyn, continue doxy IV. Continue poc and monitoring.      Heart Failure Care Map  GOALS TO BE MET BEFORE DISCHARGE:    1. Decrease congestion and/or edema with diuretic therapy to achieve near optimal volume status.     Dyspnea improved: Yes, satisfactory for discharge.   Edema improved: No, further care required to meet this goal. Please explain ble 2+ persists        Net I/O and Weights since admission:   07/04 1500 - 08/03 1459  In: 7287.1 [P.O.:6520; I.V.:767.1]  Out: 69035 [Urine:94443]  Net: -6412.9     Vitals:    07/30/22 0136 07/31/22 0031 08/01/22 0611 08/02/22 0645   Weight: 102.7 kg (226 lb 6.4 oz) 103 kg (227 lb) 103.6 kg (228 lb 8 oz) 104.7 kg (230 lb 12.8 oz)    08/03/22 0426   Weight: 103.7 kg (228 lb 9.6 oz)       2.  O2 sats > 90% on room air, or at prior home O2 therapy level.      Able to wean O2 this shift to keep sats above 90%?: Yes, satisfactory for discharge.   Does patient use Home O2? No          Current oxygenation status:   SpO2: 95 %     O2 Device: None (Room air),      3.  Tolerates ambulation and mobility near baseline.     Ambulation: No, further care required to meet this goal. Please explain up with Ax2 to EOB or lift, PT following.   Times patient ambulated with staff this shift: 0    Please review the Heart Failure Care Map for additional HF goal  "outcomes.    Daisy Orta RN  8/3/2022          Problem: Plan of Care - These are the overarching goals to be used throughout the patient stay.    Goal: Plan of Care Review/Shift Note  Description: The Plan of Care Review/Shift note should be completed every shift.  The Outcome Evaluation is a brief statement about your assessment that the patient is improving, declining, or no change.  This information will be displayed automatically on your shift note.  Outcome: Ongoing, Not Progressing  Goal: Patient-Specific Goal (Individualized)  Description: You can add care plan individualizations to a care plan. Examples of Individualization might be:  \"Parent requests to be called daily at 9am for status\", \"I have a hard time hearing out of my right ear\", or \"Do not touch me to wake me up as it startles me\".  Outcome: Ongoing, Not Progressing  Goal: Absence of Hospital-Acquired Illness or Injury  Outcome: Ongoing, Not Progressing  Intervention: Identify and Manage Fall Risk  Recent Flowsheet Documentation  Taken 8/3/2022 1251 by Daisy Orta RN  Safety Promotion/Fall Prevention:    bed alarm on    safety round/check completed  Taken 8/3/2022 1107 by Daisy Orta RN  Safety Promotion/Fall Prevention:    bed alarm on    safety round/check completed  Taken 8/3/2022 1100 by Daisy Orta RN  Safety Promotion/Fall Prevention:    bed alarm on    safety round/check completed  Taken 8/3/2022 1024 by Daisy Orta RN  Safety Promotion/Fall Prevention: safety round/check completed  Taken 8/3/2022 1000 by Daisy Orta RN  Safety Promotion/Fall Prevention:    bed alarm on    safety round/check completed  Taken 8/3/2022 0948 by Daisy Orta RN  Safety Promotion/Fall Prevention:    fall prevention program maintained    treat underlying cause    treat reversible contributory factors    supervised activity    safety round/check completed    room organization consistent    room door open    patient and family " education    nonskid shoes/slippers when out of bed    mobility aid in reach    lighting adjusted    increase visualization of patient    increased rounding and observation    clutter free environment maintained    activity supervised    assistive device/personal items within reach    bed alarm on  Taken 8/3/2022 0801 by Daisy Orta RN  Safety Promotion/Fall Prevention:    bed alarm on    safety round/check completed  Taken 8/3/2022 0755 by Daisy Orta RN  Safety Promotion/Fall Prevention:    bed alarm on    safety round/check completed  Intervention: Prevent Skin Injury  Recent Flowsheet Documentation  Taken 8/3/2022 1024 by Daisy Orta RN  Body Position: (lifted to cart)    weight shifting    other (see comments)  Taken 8/3/2022 0948 by Daisy Orta RN  Body Position: (with encouragement)    position changed independently    other (see comments)    weight shifting    left    side-lying  Intervention: Prevent and Manage VTE (Venous Thromboembolism) Risk  Recent Flowsheet Documentation  Taken 8/3/2022 1107 by Daisy Orta RN  Activity Management: (sitting EOB) other (see comments)  Taken 8/3/2022 0948 by Daisy Orta RN  VTE Prevention/Management: SCDs (sequential compression devices) on  Activity Management:    activity adjusted per tolerance    activity encouraged  Goal: Optimal Comfort and Wellbeing  Outcome: Ongoing, Not Progressing  Intervention: Monitor Pain and Promote Comfort  Recent Flowsheet Documentation  Taken 8/3/2022 1134 by Daisy Orta RN  Pain Management Interventions:    emotional support    distraction    declines    care clustered    environmental changes    pain management plan reviewed with patient/caregiver    quiet environment facilitated    repositioned  Taken 8/3/2022 0937 by Daisy Orta RN  Pain Management Interventions: medication (see MAR)  Goal: Readiness for Transition of Care  Outcome: Ongoing, Not Progressing     Problem: Adjustment to  Illness (Heart Failure)  Goal: Optimal Coping  Outcome: Ongoing, Not Progressing     Problem: Cardiac Output Decreased (Heart Failure)  Goal: Optimal Cardiac Output  Outcome: Ongoing, Not Progressing     Problem: Dysrhythmia (Heart Failure)  Goal: Stable Heart Rate and Rhythm  Outcome: Ongoing, Not Progressing     Problem: Fluid Imbalance (Heart Failure)  Goal: Fluid Balance  Outcome: Ongoing, Not Progressing     Problem: Functional Ability Impaired (Heart Failure)  Goal: Optimal Functional Ability  Outcome: Ongoing, Not Progressing  Intervention: Optimize Functional Ability  Recent Flowsheet Documentation  Taken 8/3/2022 1107 by Daisy Orta, RN  Activity Management: (sitting EOB) other (see comments)  Taken 8/3/2022 0948 by Daisy Orta, RN  Activity Management:    activity adjusted per tolerance    activity encouraged     Problem: Oral Intake Inadequate (Heart Failure)  Goal: Optimal Nutrition Intake  Outcome: Ongoing, Not Progressing     Problem: Respiratory Compromise (Heart Failure)  Goal: Effective Oxygenation and Ventilation  Outcome: Ongoing, Not Progressing  Intervention: Promote Airway Secretion Clearance  Recent Flowsheet Documentation  Taken 8/3/2022 0948 by Daisy Orta RN  Cough And Deep Breathing: done with encouragement     Problem: Sleep Disordered Breathing (Heart Failure)  Goal: Effective Breathing Pattern During Sleep  Outcome: Ongoing, Not Progressing

## 2022-08-03 NOTE — CARE PLAN
Nursing [id number]  Patient had better pain control coverage. Visiting with son then napping this margaux. Poor appetite. Decline to order then wife arrived and concerned that he did not order. Box lunch given and he has taken a few bites. Good urine output from das cath. Mg and K+ replaced with recheck in am.

## 2022-08-03 NOTE — PLAN OF CARE
A/Ox4. VSS on RA. Denies SOB. Ax2 with lift. Prn oxy given 1x for pain with relief. Shaffer draining w/ good UOP. +2 edema. IV Abx. Regular diet, although pt with poor appetite. Discharge plan/date: TCU tbd     Safety checks completed and call light within reach. Continue POC

## 2022-08-03 NOTE — CONSULTS
Patient has [employer]BS through an employer.    Entresto: $35/mo.  Jardiance: $35/mo.    Trinidad Hamm  Pharmacy Technician/Liaison, Discharge Pharmacy   [phone]  [email or web address]

## 2022-08-03 NOTE — PROGRESS NOTES
[facility]    Hospitalist Progress Note      Assessment & Plan   Toney Denton is a 71 year old male with history of tricuspid valve repair, bioprosthetic mitral valve replacement for regurgitation, coronary artery disease, ischemic cardiomyopathy (EF 25% in 4/21), pacemaker placement in 7/19, biventricular implantable nxcxjksafdta-prbpjrjuhctoe-trtzker from BiV-P in 5/20, atrial fibrillation (s/p ablation/maze procedure), severe COPD, hypertension, dyslipidemia, and borderline diabetes who presented on 7/29 for weakness, fever and edema.     #Gram negative bacteremia, CAP: Pt presented with SOB, fever, weakness.  Initial imaging remarkable for CT of chest showed right lower lung patchy consolidation and groundglass opacities consistent with atelectasis versus infection w trace bilateral effusions.  Laboratory evaluation showed sodium 133, chloride 96, BUN 37, creatinine 1.24, BNP 11,024, troponin 34, procalcitonin 5.04, white blood cells 3.2, hemoglobin 10.6, platelets 66, unremarkable urinalysis, and negative testing for COVID-19/influenza/RSV.    -Blood culture from admission now positive for gram negative bacilli; speciation in process.  Repeated blood cultures on 8/2  -Pt was started on ceftriaxone and doxycycline. He has improved.  Will continue present therapy but increased dose of ceftriaxone to 2 g daily.  -ID consulted      #Acute on chronic HFrEF. Ischemic CM. CAD s/p CABG, stent to RCA into proximal PDA. History of bioprosthetic mitral valve replacement. History of tricuspid valve repair. History of pacemaker placement with upgrade to biventricular cardioverter- defibrillator. History of atrial fibrillation with ablation/maze procedure.: TTE obtained on 08/01 showed EF 20-25% with global diffuse hypokinesis.  Flattened septum c/w RV pressure/volume overload. Bioprosthetic mitral valve noted w annuloplasty ring in tricuspid position.     -Cardiology consulted.  -Pt was previously on  furosemide 60 mg TID.  He has been transitioned to Entresto on 8/3.   -Continue metoprolol, Jardiance, aspirin and statin    #Intermittent runs of ventricular tachycardia: Replace electrolytes as needed.  Continue prior to admission beta-blocker  -Cardiology consulted 8/1/22 and Amiodarone was started. Continue to monitor. If Vtach persists Toney might need to transfer to [facility] where he gets his cardiac care for repeat VT ablation.      #Acute kidney injury: Possibly due to urine retention and/or congestive heart failure  -Monitor renal function with diuresis and Shaffer catheter placement     #Urinary retention: Patient had to be straight cathed multiple times.  Shaffer catheter was placed and Flomax was started.  Continue Shaffer catheter.  Will consider voiding trial later in week.      #Thrombocytopenia: Possibly related to infection.  Improving.     #COPD, without acute exacerbation: Continue prior to admission albuterol and Anoro Ellipta inhalers.     #Generalized weakness, left hip pain: Patient has had generalized weakness prior to admission.    He had a left intertrochanteric femur fracture with ORIF in January 2022.  He had previously been using a walker and then graduated to a cane.  His wife had noted that he had increased weakness.  -We will obtain an x-ray of the left hip on 8/3.  PT has assessed the patient.  Will require TCU.  Discussed with patient and wife at bedside.     DVT Prophylaxis: Pneumatic Compression Devices  Code Status: Full Code  Dispo: Likely a couple days.  Await speciation from blood cultures.  Improvement in respiratory status. PT assessment.  Expect will need TCU.    Carlos Hernandez MD    Interval History   No events.  Worked with PT about yesterday.  Still not able to ambulate as usual.  Notes he has left hip pain.  No warmth or swelling of the area.  He denies any fevers chills.  No chest pain.  No shortness of breath.    -Data reviewed today: I reviewed all new labs and  imaging results over the last 24 hours.     Physical Exam   Temp: 97.9  F (36.6  C) Temp src: Oral BP: 124/62 Pulse: 85   Resp: 18 SpO2: 99 % O2 Device: None (Room air)    Vitals:    08/01/22 0611 08/02/22 0645 08/03/22 0426   Weight: 103.6 kg (228 lb 8 oz) 104.7 kg (230 lb 12.8 oz) 103.7 kg (228 lb 9.6 oz)     Vital Signs with Ranges  Temp:  [97.9  F (36.6  C)-99.3  F (37.4  C)] 97.9  F (36.6  C)  Pulse:  [73-85] 85  Resp:  [16-24] 18  BP: (101-141)/(40-75) 124/62  SpO2:  [90 %-99 %] 99 %  I/O last 3 completed shifts:  In: 500 [P.O.:500]  Out: 4575 [Urine:4575]    Constitutional: Deconditioned.  Older than stated age.  HEENT: Normocephalic. MMM, No elevation of JVD noted.   Respiratory: Nl WOB, crackles at the bases bilateral.  No wheezes  Cardiovascular: Regular, +systolic murmur noted.   GI: BS+, NT, ND  Skin/Integumen: WWP, no rash.  Mild bilateral edema noted.  Left hip with previous incision noted.  Not warm or tender to touch.  Passive range of motion intact.  Neuro: CNII-XII intact. Moves all extremities. No tremor. A&Ox3.    Medications       amiodarone  400 mg Oral BID     aspirin  81 mg Oral Daily     atorvastatin  80 mg Oral QAM     [Held by provider] bumetanide  1 mg Oral QPM     [Held by provider] bumetanide  2 mg Oral QAM     [Held by provider] bumetanide  2 mg Oral Daily     cefTRIAXone  2 g Intravenous Q24H     doxycycline (VIBRAMYCIN) IV  100 mg Intravenous Q12H     DULoxetine  30 mg Oral At Bedtime     empagliflozin  10 mg Oral Daily     loratadine  10 mg Oral QAM     magnesium oxide  400 mg Oral Q4H     metoprolol succinate ER  100 mg Oral QAM     sacubitril-valsartan  1 tablet Oral BID     sodium chloride (PF)  3 mL Intracatheter Q8H     tamsulosin  0.4 mg Oral Daily     umeclidinium-vilanterol  1 puff Inhalation QAM       Data   Recent Labs   Lab 08/03/22  0751 08/02/22  0724 08/01/22  0616 08/01/22  0019 07/31/22  0721 07/30/22  0629 07/29/22  2131   WBC 13.2* 10.6  --   --  6.2   < > 4.2    HGB 10.7* 10.4*  --   --  10.1*   < > 10.6*   MCV 97 100  --   --  100   < > 101*    141*  --   --  80*   < > 80*   * 130* 131*  --  134*   < > 133*   POTASSIUM 4.2 3.7 4.7   < > 3.5   < > 4.2   CHLORIDE 93* 92* 95*  --  96*   < > 96*   CO2 28 28 28  --  25   < > 26   BUN 15.9 17.8 22.7  --  22.7   < > 36.9*   CR 0.69 0.71 0.80  --  0.79   < > 1.24*   ANIONGAP 9 10 8  --  13   < > 11   THOMAS 9.3 9.2 9.2  --  9.2   < > 9.7   * 105* 122*  --  108*   < > 123*   ALBUMIN  --   --   --   --   --   --  3.5   PROTTOTAL  --   --   --   --   --   --  6.9   BILITOTAL  --   --   --   --   --   --  1.2   ALKPHOS  --   --   --   --   --   --  151*   ALT  --   --   --   --   --   --  23   AST  --   --   --   --   --   --  38   LIPASE  --   --   --   --   --   --  51    < > = values in this interval not displayed.       No results found for this or any previous visit (from the past 24 hour(s)).

## 2022-08-03 NOTE — PROGRESS NOTES
[facility]  Cardiology Progress Note    Date of Service (when I saw the patient): 08/03/2022     Assessment & Plan   Toney Denton is a 71 year old male who was admitted on 7/29/2022. He carries a PMH significant for CAD s/p single vessel CABG ( radial graft to OM), MVR, Tricuspid valve repair, JEFF clip w/ MAZE procedure, VT s/p ablation x2 (4/2020, 2/2021), CRT-D (5/2020), COPD, RA, Diabetes, hypertension, hyperlipidemia, anemia, thrombocytopenia, dilated cardiomyopathy, carotid disease, GI bleed, and orthopedic surgeries.     1.  : Ventricular Tachycardia s/p ablation in 2020 and 2021/ CRT-D   - Several VT episodes on 8/1 w/ longest 38 beats. Started on 400mg TID Amiodarone with episodes less frequent and in short duration.   - Decrease Amiodarone to 400 mg BID today.   - PTA Metoprolol   - Magnesium 2.0  Keep > 2.0 ( 1.6, 1.4 on admission)  potassium 4.2, keep > 4.0    2.  : CAD s/p CABG ( radial graft to OM), stent to RCA into proximal PDA.  - Mild/ flat troponin elevation 34,33,28,27  - Coronary angiogram (4/2020 at [facility]) showed mild 30% stenosis of the distal LM, proximal/mid LAD, severe diffuse disease in the proximally and then  in the mid/distal segments, patent graft to OM2 that backfills a few smaller OMs. Stents from proximal RCA to distal and into proximal PDA w/ mild restenosis in the mid segment.   - Aspirin, statin, and metoprolol    3.  : Acute on chronic systolic heart failure/ Ischemic Cardiomyopathy   - NT pro BNP > 11,000  - Echocardiogram showed severely dilated LV 7.0 cm, severely reduced EF 20-25% ( 25-30% on previous study 4/2021 [facility]) Global diffuse hypokinesis worse in the inferior wall segment. Flattened septum, consistent with RV pressure/volume overload, moderately-severely reduced RV function. RV systolic pressure elevated at 41 mmHg + RA pressure.   - Diuresed well on IV lasix, net neg 4 L overnight (6L since admission)   - Hold lasix for addition of  Entresto today.   - Continue Jardiance and metoprolol..    4. MVR - Prosthetic valve well seated, Mean gradient 8 mmHg, no MR   5. TVR - Mild TR   6. Hypertension - Well controlled   7. Hyperlipidemia - On statin   8. Anemia - Hgb 10.7, Plt 178    Plan   1. Last VT episode > 24 hrs ago, 6 beats. Decrease Amiodarone to  400mg BID x 7 days, then 400mg daily x 7 days, then 200mg daily.   2. Keep Mag > 2.0 and K > 4.0, replace per protocol     3. Hold lasix.  Net neg 4 L, Wt 228 ( ? Accuracy )  Dry weight ? 195   4  Start Entresto 24-26 mg BID. BMP in AM   5. Continue metoprolol, Jardiance, aspirin, and statin.   6. Strict I/O's, low sodium diet, daily weight ( standing if able)   7. 50 oz fluid restriction  8. CORE/ EP follow up with [facility] after discharge.     PASHA Prieto CNP  Text Page  (M-F, 7:30 am - 4:00 pm)    Interval History   Feeling better today, less weak. Denies chest pain, shortness of breath, palpitations, orthopnea, presyncope and mild leg edema. Significant diuresis on IV lasix. Hold Lasix. Start Entresto today.  1 episode of VT in 24 hrs ( 6 beats). Amiodarone decreased to BID.  Vitals and labs stable. Anticipate discharge to TCU tomorrow    Physical Exam   Temp: 97.9  F (36.6  C) Temp src: Oral BP: 134/40 Pulse: 85   Resp: 18 SpO2: 99 % O2 Device: None (Room air)    Vitals:    08/01/22 0611 08/02/22 0645 08/03/22 0426   Weight: 103.6 kg (228 lb 8 oz) 104.7 kg (230 lb 12.8 oz) 103.7 kg (228 lb 9.6 oz)     Vital Signs with Ranges  Temp:  [97.9  F (36.6  C)-99.3  F (37.4  C)] 97.9  F (36.6  C)  Pulse:  [73-85] 85  Resp:  [16-24] 18  BP: (101-141)/(40-75) 134/40  SpO2:  [90 %-99 %] 99 %  I/O last 3 completed shifts:  In: 500 [P.O.:500]  Out: 4575 [Urine:4575]    GEN:  In general, this is a obese male.   HEENT:  Pupils equal, round. Sclerae nonicteric. Clear oropharynx. Mucous membranes moist.  NECK: Supple, no masses appreciated. Trachea midline. Difficult to assess JVD   C/V:  Regular rate and  rhythm, episodes of VT, asymptomatic. Systolic murmur, no rub or gallop.  RESP: Respirations are unlabored. Decreased breath sounds bilaterally.   GI: Obese Abdomen soft, nontender, nondistended.   EXTREM: Trace bilateral ankle edema. No cyanosis or clubbing.   NEURO: Alert and oriented, cooperative.  No obvious focal deficits.   PSYCH: Normal affect.  SKIN: Warm and dry. No rashes or petechiae appreciated.       Medications       amiodarone  400 mg Oral BID     aspirin  81 mg Oral Daily     atorvastatin  80 mg Oral QAM     [Held by provider] bumetanide  1 mg Oral QPM     [Held by provider] bumetanide  2 mg Oral QAM     [Held by provider] bumetanide  2 mg Oral Daily     cefTRIAXone  2 g Intravenous Q24H     doxycycline (VIBRAMYCIN) IV  100 mg Intravenous Q12H     DULoxetine  30 mg Oral At Bedtime     empagliflozin  10 mg Oral Daily     loratadine  10 mg Oral QAM     metoprolol succinate ER  100 mg Oral QAM     sacubitril-valsartan  1 tablet Oral BID     sodium chloride (PF)  3 mL Intracatheter Q8H     tamsulosin  0.4 mg Oral Daily     umeclidinium-vilanterol  1 puff Inhalation QAM       Data   Reviewed       I spent > 30 minutes face-to-face and/or coordinating care. Over 50% of our time on the unit was spent counseling the patient and/or coordinating care     PASHA Prieto CNP 8/3/2022

## 2022-08-03 NOTE — CONSULTS
ID consult dictated IMP 1 72 yo male vague ill presentation, complex med hx and multiple recent issues, dx ? Pneumonia based on CT and some resp sxs, but BC + not ID but likely anaerobe, is improved wo coverinmg this and no clr source ? Aspiration, ? abd    REc to zosyn, await cx ID if anaerobe and completely better likely po course and follow closely, if not anaerobe depends on ID as to concern/response

## 2022-08-03 NOTE — CONSULTS
Consult Date: 08/03/2022    INFECTIOUS DISEASE CONSULTATION    LOCATION:  Room 337.    REFERRING PHYSICIAN:  Dr. Hernandez.    IMPRESSION:    1.  A 71-year-old male, admitted with vague acute illness, including shaking chills, fever, malaise, some increased respiratory distress, diagnosis made of probable pneumonia based on the imaging and the clinical picture, but not entirely clear, not hypoxic, currently does have underlying lung disease, he has a positive blood culture for a gram-negative ayush that preliminarily appears to be an anaerobe, but being difficult to identify, probably a true bacteremia given anaerobe.  Source is unclear.  Conceivably aspiration and lungs, conceivably abdomen, but little to suggest that, is clinically improved without even covering anaerobes.  2.  Chronic obstructive lung disease, some flare-up and worsening, although not even on oxygen currently.  3.  Coronary artery disease with prior stents.  4.  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  5.  History of atrial fibrillation and intermittent ventricular tachy dysrhythmias, has a pacemaker and an AICD in place.  6.  Prior history of GI bleeding.  7.  Acute renal failure, resolved.  8.  Urinary retention with no signs of UTI.    RECOMMENDATIONS:   Somewhat unclear what to make of this, but clinically seems improved without particularly covering anaerobes.  No focal symptoms to suggest a hidden source, will convert to Zosyn for now.  If it turns out it is an anaerobe and he still clinically well, probably a course of Augmentin alone is reasonable, even though it is not clear what we are treating.  Low threshold for further workup if needed.  Lab is not completely certain this is an anaerobe yet; if it turns out to be something different, even conceivably a major issue here, given the multiple artificial material.    HISTORY OF PRESENT ILLNESS:  This 71-year-old male is seen in consultation due to bacteremia.  The patient  has a history of multiple chronic medical problems and multiple recent hospitalizations and workup.  He has had some GI bleeding issues and multiple cardiac issues, has had prior bioprosthetic valve, has a pacemaker and AICD in place.  He came in with acute feverish feelings, chills, sweats and maybe somewhat worse respiratory symptoms.  At admission, focus was on the respiratory situation, as he had infiltrates on imaging.  He was started on ceftriaxone and doxycycline and progressively has seemingly improved.  He has not developed any other localizing symptoms and temperature has been down.  He did have admission blood culture that is growing a gram-negative ayush that lab thinks now is probably an anaerobe.    PAST MEDICAL HISTORY:  Long and complicated medical history.  Prior history of GI bleeding, history of acute renal failure this admission and some chronic kidney disease, history of multiple cardiac issues, including a bioprosthetic mitral valve and a tricuspid valve repair, history of atrial fibrillation with a ventricular dysrhythmias and a pacemaker and AICD in place, history of coronary artery disease with stents, chronic lung disease, not requiring oxygen chronically.    ALLERGIES:  NO ANTIBIOTIC ALLERGIES.    MEDICATIONS:  As listed.    SOCIAL AND FAMILY HISTORY:  No recent travels or exposures.  No one else he has been around has been ill.  No COVID-19 exposures of note.    MEDICATIONS:  As listed.    REVIEW OF SYSTEMS:  Largely as listed above.  Some respiratory symptoms initially, they are largely back to normal.  He feels like he is almost at his baseline, does not feel entirely great, somewhat weak.  No major fevers, chills or sweats now occurring.    PHYSICAL EXAMINATION:    GENERAL:  The patient appears his stated age, looks somewhat chronically ill, but not particularly acutely ill.  Mentation is fairly intact.  VITAL SIGNS:  Temperature is down.  HEENT:  No thrush or intraoral lesion.  Pupils  reactive.  NECK:  Supple and nontender.  HEART:  Slight murmur.  LUNGS:  Clear at the upper lung fields, but crackles and rhonchi in the lower lung fields.  ABDOMEN:  Maybe slightly distended, but nontender.  EXTREMITIES:  Several skin changes, but nothing looks like an acute major problem.    LABORATORY DATA:  One blood culture growing a gram-negative ayush, not yet identified, but probably an anaerobe.      CT scan of the chest suggested infiltrates.    Thank you very much for the consultation.  I will follow the patient along with you.    Archie Baca MD        D: 2022   T: 2022   MT: AMRY    Name:     YURIDIA BAZAN  MRN:      [id number]        Account:      [id number]   :      1950           Consult Date: 2022     Document: [id number]

## 2022-08-04 NOTE — CARE PLAN
RN- ([id number])  Neuro- A&O, forgetful  Activity- Declines to turn. Up with 2 & Arlette stedy when OOB.   Pain- Denies  Respiratory- Lungs dim, crackles in bases.   Cardiac- AV paced.   Musculoskeletal- Gen weakness   GI/- Shaffer intact- adequate yellow urine out.   Skin- Declined to turn and do skin assessment.   Bed alarm in place. No clear discharge plan at this time. Continue current POC.

## 2022-08-04 NOTE — PROGRESS NOTES
Care Management Follow Up    Length of Stay (days): 6    Expected Discharge Date: 08/06/2022     Concerns to be Addressed: discharge planning     Patient plan of care discussed at interdisciplinary rounds: Yes    Anticipated Discharge Disposition: Transitional Care     Anticipated Discharge Services: None  Anticipated Discharge DME: None    Patient/family educated on Medicare website which has current facility and service quality ratings: yes  Education Provided on the Discharge Plan:    Patient/Family in Agreement with the Plan: yes    Referrals Placed by CM/SW: Post Acute Facilities  Private pay costs discussed: transportation costs    Additional Information:  Pt accepted to [facility]CU at discharge. Hopeful for Saturday discharge. CM updated pt and left message for spouse on accepting facility. Pt will likely need wheelchair transport. CM will continue to follow with discharge planning.    Tessie Crisostomo RN, BSN CTS  Care Coordinator  [facility]   [phone]

## 2022-08-04 NOTE — PROGRESS NOTES
[facility]    Hospitalist Progress Note      Assessment & Plan   Toney Denton is a 71 year old male with history of tricuspid valve repair, bioprosthetic mitral valve replacement for regurgitation, coronary artery disease, ischemic cardiomyopathy (EF 25% in 4/21), pacemaker placement in 7/19, biventricular implantable xcnceiiywece-[id number]-[id number] from BiV-P in 5/20, atrial fibrillation (s/p ablation/maze procedure), severe COPD, hypertension, dyslipidemia, and borderline diabetes who presented on 7/29 for weakness, fever and edema.     #Gram negative bacteremia, CAP: Pt presented with SOB, fever, weakness.  Initial imaging remarkable for CT of chest showed right lower lung patchy consolidation and groundglass opacities consistent with atelectasis versus infection w trace bilateral effusions.  Laboratory evaluation showed sodium 133, chloride 96, BUN 37, creatinine 1.24, BNP 11,024, troponin 34, procalcitonin 5.04, white blood cells 3.2, hemoglobin 10.6, platelets 66, unremarkable urinalysis, and negative testing for COVID-19/influenza/RSV.    -Blood culture from admission now positive for gram negative bacilli; speciation in process.  Repeated blood cultures on 8/2 are still negative.  -Pt was started on ceftriaxone and doxycycline. He has improved.    -ID consulted.  He was transitioned to Zosyn therapy on 8/3 for anaerobic coverage.  -We will need to follow-up speciation to determine any further work-up given hardware he has in place including in the hip, bioprosthetic mitral valve, pacemaker and ICD.     #Acute on chronic HFrEF. Ischemic CM. CAD s/p CABG, stent to RCA into proximal PDA. History of bioprosthetic mitral valve replacement. History of tricuspid valve repair. History of pacemaker placement with upgrade to biventricular cardioverter- defibrillator. History of atrial fibrillation with ablation/maze procedure.: TTE obtained on 08/01 showed EF 20-25% with global diffuse  hypokinesis.  Flattened septum c/w RV pressure/volume overload. Bioprosthetic mitral valve noted w annuloplasty ring in tricuspid position.     -Cardiology consulted.  -Pt was previously on furosemide 60 mg TID.  He has been transitioned to Entresto on 8/3.  Bumex 2 mg twice daily added on 8/3.  -Continue metoprolol, Jardiance, aspirin and statin     #Intermittent runs of ventricular tachycardia: Replace electrolytes as needed.  Continue prior to admission beta-blocker.  Amiodarone added as below.  Improved  -Cardiology consulted 8/1/22 and Amiodarone was started. Continue to monitor. If Vtach persists Toney might need to transfer to [facility] where he gets his cardiac care for repeat VT ablation.      #Acute kidney injury: Possibly due to urine retention and/or congestive heart failure  -Monitor renal function with diuresis and Shaffer catheter placement     #Urinary retention: Patient had to be straight cathed multiple times.  Shaffer catheter was placed and Flomax was started.  Continue Shaffer catheter.  Will consider voiding trial later in week.      #Thrombocytopenia: Possibly related to infection.  Improving.     #COPD, without acute exacerbation: Continue prior to admission albuterol and Anoro Ellipta inhalers.     #Generalized weakness, left hip pain: Patient has had generalized weakness prior to admission.    He had a left intertrochanteric femur fracture with ORIF in January 2022.  He had previously been using a walker and then graduated to a cane.  His wife had noted that he had increased weakness.  X-ray of the left hip obtained at that shows no malposition of hardware but severe osteoarthritis bilaterally.     DVT Prophylaxis: Pneumatic Compression Devices  Code Status: Full Code  Dispo: Awaiting final plans for abx. Awaiting speciation on blood cultures and further w/u pending results. Likely a couple days.     Carlos Hernandez MD    Interval History   No events.  No fevers or chills.  Still feels  generally fatigued but not worse than yesterday.  No chest pain or shortness of breath.  No worsening left hip pain.  No nausea vomiting.  No abdominal pain or nausea vomiting.    -Data reviewed today: I reviewed all new labs and imaging results over the last 24 hours.    Physical Exam   Temp: 97.9  F (36.6  C) Temp src: Oral BP: 118/64 Pulse: 71   Resp: 18 SpO2: 93 % O2 Device: None (Room air)    Vitals:    08/02/22 0645 08/03/22 0426 08/04/22 0435   Weight: 104.7 kg (230 lb 12.8 oz) 103.7 kg (228 lb 9.6 oz) 104.1 kg (229 lb 6.4 oz)     Vital Signs with Ranges  Temp:  [97.6  F (36.4  C)-99  F (37.2  C)] 97.9  F (36.6  C)  Pulse:  [65-82] 71  Resp:  [16-20] 18  BP: ()/(48-79) 118/64  SpO2:  [92 %-95 %] 93 %  I/O last 3 completed shifts:  In: 219 [I.V.:219]  Out: 2600 [Urine:2600]    Constitutional: Deconditioned.  Older than stated age.  HEENT: Normocephalic. MMM, No elevation of JVD noted.   Respiratory: Nl WOB, crackles at the bases bilateral.  No wheezes  Cardiovascular: Regular, +systolic murmur noted.   GI: BS+, NT, ND  Skin/Integumen: WWP, no rash.  Mild bilateral edema noted.  Left hip with previous incision noted.  Not warm or tender to touch.  Passive range of motion intact.  Neuro: CNII-XII intact. Moves all extremities. No tremor. A&Ox3.    Medications       amiodarone  400 mg Oral BID     aspirin  81 mg Oral Daily     atorvastatin  80 mg Oral QAM     [Held by provider] bumetanide  1 mg Oral QPM     bumetanide  2 mg Oral BID     [Held by provider] bumetanide  2 mg Oral QAM     [Held by provider] bumetanide  2 mg Oral Daily     doxycycline (VIBRAMYCIN) IV  100 mg Intravenous Q12H     DULoxetine  30 mg Oral At Bedtime     empagliflozin  10 mg Oral Daily     loratadine  10 mg Oral QAM     metoprolol succinate ER  100 mg Oral QAM     piperacillin-tazobactam  3.375 g Intravenous Q6H     sacubitril-valsartan  1 tablet Oral BID     sodium chloride (PF)  3 mL Intracatheter Q8H     tamsulosin  0.4 mg Oral  Daily     umeclidinium-vilanterol  1 puff Inhalation QAM       Data   Recent Labs   Lab 08/04/22  0623 08/03/22  0751 08/02/22  0724 07/30/22  0629 07/29/22  2131   WBC 11.3* 13.2* 10.6   < > 4.2   HGB 10.7* 10.7* 10.4*   < > 10.6*   MCV 97 97 100   < > 101*    178 141*   < > 80*   * 130* 130*   < > 133*   POTASSIUM 3.6 4.2 3.7   < > 4.2   CHLORIDE 95* 93* 92*   < > 96*   CO2 24 28 28   < > 26   BUN 18.2 15.9 17.8   < > 36.9*   CR 0.76 0.69 0.71   < > 1.24*   ANIONGAP 12 9 10   < > 11   THOMAS 8.3* 9.3 9.2   < > 9.7   * 102* 105*   < > 123*   ALBUMIN  --   --   --   --  3.5   PROTTOTAL  --   --   --   --  6.9   BILITOTAL  --   --   --   --  1.2   ALKPHOS  --   --   --   --  151*   ALT  --   --   --   --  23   AST  --   --   --   --  38   LIPASE  --   --   --   --  51    < > = values in this interval not displayed.       Recent Results (from the past 24 hour(s))   XR Pelvis w Hip Left 1 View    Narrative    PELVIS AND HIP LEFT ONE VIEW August 3, 2022 10:55 AM     INDICATION: Left-sided hip pain.     COMPARISON: 1/23/2022.      Impression    IMPRESSION:  1.  Open reduction internal fixation left femur intertrochanteric  fracture with dynamic femoral neck screw and lateral plate-screw  fixation. Orthopedic hardware is intact. The fracture line is not  visible, consistent with osseous healing. No new or additional  fracture is evident.  2.  Advanced left hip degenerative arthrosis, unchanged.  3.  Advanced right hip degenerative arthrosis, unchanged.  4.  Degenerative changes in the lower lumbar spine.    ROSANNA RANGEL MD         SYSTEM ID:  [id number]

## 2022-08-04 NOTE — PROGRESS NOTES
SPIRITUAL HEALTH SERVICES Progress Note    Med Surg 3    I saw pt Toney Denton per length of stay at . Toney was oriented to [facility]. He was not interested at this time and declined a visit.    Toney was informed how to request a  visit. This author and other chaplains remain available per pt/family request.    Dale Valladares MDIV  Intern   Phone: [phone]

## 2022-08-04 NOTE — PLAN OF CARE
Temp: 99.1  F (37.3  C) Temp src: Oral BP: 112/57 Pulse: 70   Resp: 18 SpO2: 95 % O2 Device: None (Room air)       Orientation:  x4  VS: stable   Pain:  hip, knee, shoulder, oxycodone PRN given   Tele:  SR, BBB, occasional PVC's with pacemaker dual paced   Activity:  in bed this shift. Refused to get up in chair   Resp:   LS clear, no cough. Some bluish discoloration after turning pt for BM clean up, recovered quickly without O2 supplement needed   GI:  4 small smears and one large BM this shift   : das in place, patent, great urine output   Notable Labs:  Na 131, Hb 10.7  Skin:  Hemorid, some bleeding when cleaning, barrier lotion applied    Lines: PIV SL, patent   Plan:   Discharge hopefully Saturday to TCU with possible wheel chair transport

## 2022-08-04 NOTE — PROGRESS NOTES
[facility]  Cardiology Progress Note    Date of Service (when I saw the patient): 08/04/2022     Assessment & Plan   Toney Denton is a 71 year old male who was admitted on 7/29/2022. He carries a PMH significant for CAD s/p single vessel CABG ( radial graft to OM), MVR, Tricuspid valve repair, JEFF clip w/ MAZE procedure, VT s/p ablation x2 (4/2020, 2/2021), CRT-D (5/2020), COPD, RA, Diabetes, hypertension, hyperlipidemia, anemia, thrombocytopenia, dilated cardiomyopathy, carotid disease, GI bleed, and orthopedic surgeries.     1.  : Ventricular Tachycardia s/p ablation in 2020 and 2021/ CRT-D   - Several VT episodes on 8/1 w/ longest 38 beats. Started 400mg TID Amiodarone with w/good response. Amiodarone decreased to 400 mg BID 8/4, longest VT run 4 beats.   - PTA Metoprolol   - Magnesium 2.0  Keep > 2.0 ( 1.6, 1.4 on admission)  potassium 4.2, keep > 4.0  - ICD interrogation showed 90% V-pacing, 1 failed therapy of VT/VF, 2 treated VT/VF episodes detected during AT/AF    2.  : CAD s/p CABG ( radial graft to OM), stent to RCA into proximal PDA.  - Mild/ flat troponin elevation 34,33,28,27  - Coronary angiogram (4/2020 at [facility]) showed mild 30% stenosis of the distal LM, proximal/mid LAD, severe diffuse disease in the proximally and then  in the mid/distal segments, patent graft to OM2 that backfills a few smaller OMs. Stents from proximal RCA to distal and into proximal PDA w/ mild restenosis in the mid segment.   - Aspirin, statin, and metoprolol    3.  : Acute on chronic systolic heart failure/ Ischemic Cardiomyopathy   - NT pro BNP > 11,000  - Echocardiogram showed severely dilated LV 7.0 cm, severely reduced EF 20-25% ( 25-30% on previous study 4/2021 [facility]) Global diffuse hypokinesis worse in the inferior wall segment. Flattened septum, consistent with RV pressure/volume overload, moderately-severely reduced RV function. RV systolic pressure elevated at 41 mmHg + RA pressure.   -  Diuresed 6 L since admission on IV lasix.  - Weight 229 ( non standing, ? Accuracy)   - PO Bumex 2 mg BID started 8/3  - Continue Jardiance, metoprolol and Entresto  - CT chest concerning for infectious process. ID consulted, recommended Zosyn.     4. MVR - Prosthetic valve well seated, Mean gradient 8 mmHg, no MR   5. TVR - Mild TR   6. Hypertension - Well controlled   7. Hyperlipidemia - On statin   8. Anemia - Hgb 10.7, Plt 178    Plan   1. 4 beat NSVT 8/3.  Continue Amiodarone 400mg BID until discharge, then 400  mg daily x 14 days, then 200mg daily.   2. Keep Mag > 2.0 and K > 4.0, replace per protocol     3. Diuresed 6 L. Euvolemic upon exam.   4. Continue Bumex, metoprolol, Jardiance, Entresto, aspirin, and statin.   5. Strict I/O's, low sodium diet, daily weight ( standing if able), 50 oz fluid restriction  6. CORE/ EP follow up with [facility] after discharge.     PASHA Prieto CNP  Text Page  (M-F, 7:30 am - 4:00 pm)    Interval History   Feeling better today, primary complaint is back pain. Denies chest pain, shortness of breath, palpitations, orthopnea, presyncope and mild leg edema. 4 beat run of NSVT in 24 hrs. Continue Amiodarone BID until discharge, then daily.  Vitals and labs stable. Awaiting discharge to TCU.     Physical Exam   Temp: 97.6  F (36.4  C) Temp src: Oral BP: 118/64 Pulse: 71   Resp: 18 SpO2: 93 % O2 Device: None (Room air)    Vitals:    08/02/22 0645 08/03/22 0426 08/04/22 0435   Weight: 104.7 kg (230 lb 12.8 oz) 103.7 kg (228 lb 9.6 oz) 104.1 kg (229 lb 6.4 oz)     Vital Signs with Ranges  Temp:  [97.6  F (36.4  C)-99  F (37.2  C)] 97.6  F (36.4  C)  Pulse:  [65-82] 71  Resp:  [16-20] 18  BP: ()/(48-79) 118/64  SpO2:  [92 %-95 %] 93 %  I/O last 3 completed shifts:  In: 219 [I.V.:219]  Out: 2600 [Urine:2600]    GEN:  In general, this is a obese male.   HEENT:  Pupils equal, round. Sclerae nonicteric. Clear oropharynx. Mucous membranes moist.  NECK: Supple, no masses  appreciated. Trachea midline. Difficult to assess JVD   C/V:  Regular rate and rhythm, episodes of VT, asymptomatic. Systolic murmur, no rub or gallop.  RESP: Respirations are unlabored. Decreased breath sounds bilaterally.   GI: Obese Abdomen soft, nontender, nondistended.   EXTREM: Trace bilateral ankle edema. No cyanosis or clubbing.   NEURO: Alert and oriented, cooperative.  No obvious focal deficits.   PSYCH: Normal affect.  SKIN: Warm and dry. No rashes or petechiae appreciated.       Medications       amiodarone  400 mg Oral BID     aspirin  81 mg Oral Daily     atorvastatin  80 mg Oral QAM     [Held by provider] bumetanide  1 mg Oral QPM     bumetanide  2 mg Oral BID     [Held by provider] bumetanide  2 mg Oral QAM     [Held by provider] bumetanide  2 mg Oral Daily     doxycycline (VIBRAMYCIN) IV  100 mg Intravenous Q12H     DULoxetine  30 mg Oral At Bedtime     empagliflozin  10 mg Oral Daily     loratadine  10 mg Oral QAM     metoprolol succinate ER  100 mg Oral QAM     piperacillin-tazobactam  3.375 g Intravenous Q6H     sacubitril-valsartan  1 tablet Oral BID     sodium chloride (PF)  3 mL Intracatheter Q8H     tamsulosin  0.4 mg Oral Daily     umeclidinium-vilanterol  1 puff Inhalation QAM       Data   Reviewed       I spent > 30 minutes face-to-face and/or coordinating care. Over 50% of our time on the unit was spent counseling the patient and/or coordinating care     PASHA Prieto CNP 8/4/2022

## 2022-08-04 NOTE — PLAN OF CARE
[id number]: Lethargic. VSS on RA. Assist of 2, lift. Tele. Denies pain. LS clear. Shaffer patent. IV SL. Intermittent IV abx. Cardiac diet, tolerating. Previous IV site to LUE infiltrated. LUE with edema and redness, elevated and cold pack applied. Nursing will continue to monitor.

## 2022-08-04 NOTE — PROGRESS NOTES
[facility]  Infectious Disease Progress Note          Assessment and Plan:   IMPRESSION:    1.  A 71-year-old male, admitted with vague acute illness, including shaking chills, fever, malaise, some increased respiratory distress, diagnosis made of probable pneumonia based on the imaging and the clinical picture, but not entirely clear, not hypoxic, currently does have underlying lung disease, he has a positive blood culture for a gram-negative ayush that preliminarily appears to be an anaerobe, but being difficult to identify, probably a true bacteremia given anaerobe.  Source is unclear.  Conceivably aspiration and lungs, conceivably abdomen, but little to suggest that, is clinically improved without even covering anaerobes.  2.  Chronic obstructive lung disease, some flare-up and worsening, although not even on oxygen currently.  3.  Coronary artery disease with prior stents.  4.  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  5.  History of atrial fibrillation and intermittent ventricular tachy dysrhythmias, has a pacemaker and an AICD in place.  6.  Prior history of GI bleeding.  7.  Acute renal failure, resolved.  8.  Urinary retention with no signs of UTI.     RECOMMENDATIONS:  1 Still  Somewhat unclear what to make of this, but clinically seems improved without particularly covering anaerobes.  No focal symptoms to suggest a hidden source, will convert to Zosyn for now.  If it turns out it is an anaerobe and he still clinically well, probably a course of Augmentin alone is reasonable, even though it is not clear what we are treating.  Low threshold for further workup if needed.  Lab is not completely certain this is an anaerobe yet; if it turns out to be something different, even conceivably a major issue here, given the multiple artificial material.  Now 2nd cx also + so need micro to decide action here           Interval History:   no new complaints and doing well; no  "cp, sob, n/v/d, or abd pain. T down  2nd cx + no ID yet              Medications:       amiodarone  400 mg Oral BID     aspirin  81 mg Oral Daily     atorvastatin  80 mg Oral QAM     bumetanide  2 mg Oral BID     doxycycline (VIBRAMYCIN) IV  100 mg Intravenous Q12H     DULoxetine  30 mg Oral At Bedtime     empagliflozin  10 mg Oral Daily     loratadine  10 mg Oral QAM     metoprolol succinate ER  100 mg Oral QAM     piperacillin-tazobactam  3.375 g Intravenous Q6H     sacubitril-valsartan  1 tablet Oral BID     sodium chloride (PF)  3 mL Intracatheter Q8H     tamsulosin  0.4 mg Oral Daily     umeclidinium-vilanterol  1 puff Inhalation QAM                  Physical Exam:   Blood pressure 112/57, pulse 70, temperature 99.1  F (37.3  C), temperature source Oral, resp. rate 18, height 1.715 m (5' 7.5\"), weight 104.1 kg (229 lb 6.4 oz), SpO2 95 %.  Wt Readings from Last 2 Encounters:   08/04/22 104.1 kg (229 lb 6.4 oz)   06/19/22 95.3 kg (210 lb 3.2 oz)     Vital Signs with Ranges  Temp:  [97.6  F (36.4  C)-99.1  F (37.3  C)] 99.1  F (37.3  C)  Pulse:  [65-73] 70  Resp:  [16-20] 18  BP: ()/(48-64) 112/57  SpO2:  [92 %-95 %] 95 %    Constitutional: Awake, alert, cooperative, no apparent distress looks well chronic ill appearance   Lungs: Clear to auscultation bilaterally, no crackles or wheezing   Cardiovascular: Regular rate and rhythm, normal S1 and S2, and no murmur noted   Abdomen: Normal bowel sounds, soft, non-distended, non-tender   Skin: No rashes, no cyanosis, no edema   Other:           Data:   All microbiology laboratory data reviewed.  Recent Labs   Lab Test 08/04/22  0623 08/03/22  0751 08/02/22  0724   WBC 11.3* 13.2* 10.6   HGB 10.7* 10.7* 10.4*   HCT 33.0* 32.8* 33.1*   MCV 97 97 100    178 141*     Recent Labs   Lab Test 08/04/22  0623 08/03/22  0751 08/02/22  0724   CR 0.76 0.69 0.71     No lab results found.  No lab results found.    Invalid input(s): POP    "

## 2022-08-04 NOTE — CONSULTS
"BRIEF NUTRITION ASSESSMENT      REASON FOR ASSESSMENT:  Toney Denton is a 71 year old male seen by Registered Dietitian for LOS.    NUTRITION HISTORY:  Severe COPD, hypertension, dyslipidemia, and borderline diabetes who presented on 7/29 for weakness, fever and edema.    CURRENT DIET AND INTAKE:  Diet:  Orders Placed This Encounter      Combination Diet Low Saturated Fat Na <2400mg Diet, No Caffeine Diet    - meal intake recordings inconsistent although appears to be eating >75% past few days, ordering meals TID.  No abdominal pain or nausea vomiting  - patient still edematous, on bumex    ANTHROPOMETRICS:  Height: 5' 7.5\"  Weight:  229 lbs 6.4 oz  Body mass index is 35.4 kg/m .   Weight Status: Obesity Grade II BMI 35-39.9  Weight History: 19# weight gain in 1.5 months likely r/t edema, stable since admission  - suspect dry wt. Closer to 210#   08/04/22 0435 104.1 kg (229 lb 6.4 oz)   08/03/22 0426 103.7 kg (228 lb 9.6 oz)   08/02/22 0645 104.7 kg (230 lb 12.8 oz)   08/01/22 0611 103.6 kg (228 lb 8 oz)   07/31/22 0031 103 kg (227 lb)   07/30/22 0136 102.7 kg (226 lb 6.4 oz)     Wt Readings from Last 10 Encounters:   08/04/22 104.1 kg (229 lb 6.4 oz)   06/19/22 95.3 kg (210 lb 3.2 oz)   01/27/22 89.2 kg (196 lb 9.6 oz)     LABS:  Labs noted  Labs:  Electrolytes  Potassium (mmol/L)   Date Value   08/04/2022 3.6   08/03/2022 4.2   08/02/2022 3.7   06/19/2022 3.7   06/18/2022 4.2   06/17/2022 4.7     Phosphorus (mg/dL)   Date Value   07/31/2022 3.0    Blood Glucose  Glucose (mg/dL)   Date Value   08/04/2022 112 (H)   08/03/2022 102 (H)   08/02/2022 105 (H)   08/01/2022 122 (H)   07/31/2022 108 (H)   06/19/2022 120 (H)   06/18/2022 116 (H)   06/17/2022 152 (H)   01/27/2022 109 (H)   01/25/2022 130 (H)     GLUCOSE BY METER POCT (mg/dL)   Date Value   06/18/2022 106 (H)    Inflammatory Markers  WBC Count (10e3/uL)   Date Value   08/04/2022 11.3 (H)   08/03/2022 13.2 (H)   08/02/2022 10.6     Albumin (g/dL)   Date Value "   07/29/2022 3.5   06/17/2022 2.7 (L)      Magnesium (mg/dL)   Date Value   08/04/2022 1.9   08/03/2022 2.0   08/02/2022 2.0     Sodium (mmol/L)   Date Value   08/04/2022 131 (L)   08/03/2022 130 (L)   08/02/2022 130 (L)    Renal  Urea Nitrogen (mg/dL)   Date Value   08/04/2022 18.2   08/03/2022 15.9   08/02/2022 17.8   06/19/2022 20   06/18/2022 29   06/17/2022 29     Creatinine (mg/dL)   Date Value   08/04/2022 0.76   08/03/2022 0.69   08/02/2022 0.71     Additional  Ketones Urine (mg/dL)   Date Value   07/30/2022 Negative        MALNUTRITION:  Visual Nutrition Focused Physical Assessment (NFPA) not completed due to patient availability.  Patient does not meet two of the following criteria necessary for diagnosing malnutrition.     % Weight Loss:  None noted  % Intake:  No decreased intake noted  Subcutaneous Fat Loss:  Unable to assess  Muscle Loss:  Unable to assess  Fluid Retention: yes, per chart    NUTRITION INTERVENTION:  Nutrition Diagnosis:  Predicted inadequate intakes related to illness, weakness, hospitalization.    Implementation:  Nutrition Education: Per Provider order if indicated  Ordered PRN Glucerna and [id number]    FOLLOW UP/MONITORING:   Will re-evaluate in 7 - 10 days, or sooner, if re-consulted.    Tiana Shane RDN, LD  Clinical Dietitian

## 2022-08-05 NOTE — PROVIDER NOTIFICATION
Mayte CANAS pt stated he told you about his left arm that is swollen and painful. Just checking if you are aware of it. fingertips turn bluish, hand is cool to touch. He refused to move it. Please check in with PM RN if needed. Thanks    Pt re-seen and evaluated.  Left hand lower arm with increased swelling compared to right hand. He has bilateral faint radial pulses. Left hand is warm to touch. Sensation intact.  He has had multiple PIV in that arm. Suspect superificial phlebitis superimposed on volume overload vs possible DVT.      Will order ultrasound of LUE to r/o DVT.  Try to keep extremities elevated above level of heart.  Can utilize heat packs. If changes in color of extremity or worsening pain would contact on call physician.  I have let cross cover physician know.  If positive for DVT, would start heparin gtt as history of GIB in recent past (June 2022) and will need to monitor closely.        Patient has been on SCD's for ppx, given relative immobility will start SQH for ppx.     Carlos Hernandez MD

## 2022-08-05 NOTE — PLAN OF CARE
A/Ox4; can be forgetful. VSS on RA. Ax2 with turns and repositions; lift to transfer. Prn oxy given per pt's request for back and left hip pain. Left hand tenderness w/ +3 edema noted; elevated and gel pack applied. IV abx and po bumex. Shaffer intact, draining w/ good UOP. +2/+3 LE edema, PCD on. Discharge to TCU. SW following      Safety checks completed and call light within reach. Continue POC

## 2022-08-05 NOTE — PROGRESS NOTES
Care Management Follow Up    Length of Stay (days): 7    Expected Discharge Date: 08/09/2022     Concerns to be Addressed: discharge planning     Patient plan of care discussed at interdisciplinary rounds: Yes    Anticipated Discharge Disposition: Transitional Care     Anticipated Discharge Services: None  Anticipated Discharge DME: None    Patient/family educated on Medicare website which has current facility and service quality ratings: yes  Education Provided on the Discharge Plan:    Patient/Family in Agreement with the Plan: yes    Referrals Placed by CM/SW: Post Acute Facilities  Private pay costs discussed: Not applicable    Additional Information:  Pt not discharge ready today, updated [facility] [place] admissions,Pt needing TTE today and ADEN on Monday according to chart review. [facility] will keep pt on their radar. Will update when discharge date is better known.    Tessie Crisostomo RN, BSN CTS  Care Coordinator  [facility]   [phone]

## 2022-08-05 NOTE — PLAN OF CARE
Temp: 99  F (37.2  C) Temp src: Oral BP: 102/57 Pulse: 71   Resp: 20 SpO2: 94 % O2 Device: None (Room air)       Orientation:  x4  VS: stable   Pain:  in back and shoulder. Oxycodone PRN given   Tele:  Dual paced, SR BBB  Activity:  used lift to sit in chair for a short while, otherwise in bed  Resp:  No SOB, LS anterior clear   GI:  no BM this shift. Ate his meals   : das patent, good urine output   Notable Labs:  K: 3.5 replaced orally, Mag 1.8 replaced orally per protocol, recheck tomorrow morning   Skin:  left arm is swollen, pt refused to move it, painful.   Lines: PIV patent. Pt needs to keep arm straight for it to run  Other: had Ecco today, results pending   Plan:   continue with plan of care

## 2022-08-05 NOTE — PLAN OF CARE
"VSS on RA. A/O x4, forgetful. Assist of 2 w/ lift. Pt has 8-9/10 pain in neck/shoulders/back, oxy and tylenol given per MAR, ice pack applied -- adequate relief per pt. Shaffer in place, good output. No BM this shift. Pt declined some repos this shift. Mild edema in BLEs, BL hips. Moderate, worsening edema in L hand & fingers. Denies CP, SOB at this time. Denies N/V/D. Continue monitoring, POC.       Problem: Plan of Care - These are the overarching goals to be used throughout the patient stay.    Goal: Plan of Care Review/Shift Note  Description: The Plan of Care Review/Shift note should be completed every shift.  The Outcome Evaluation is a brief statement about your assessment that the patient is improving, declining, or no change.  This information will be displayed automatically on your shift note.  Outcome: Ongoing, Not Progressing  Goal: Patient-Specific Goal (Individualized)  Description: You can add care plan individualizations to a care plan. Examples of Individualization might be:  \"Parent requests to be called daily at 9am for status\", \"I have a hard time hearing out of my right ear\", or \"Do not touch me to wake me up as it startles me\".  Outcome: Ongoing, Not Progressing  Goal: Absence of Hospital-Acquired Illness or Injury  Outcome: Ongoing, Not Progressing  Intervention: Identify and Manage Fall Risk  Recent Flowsheet Documentation  Taken 8/4/2022 1701 by Zoe López RN  Safety Promotion/Fall Prevention:    activity supervised    assistive device/personal items within reach    bed alarm on    clutter free environment maintained    fall prevention program maintained    increased rounding and observation    patient and family education    room door open    room organization consistent    safety round/check completed    supervised activity  Intervention: Prevent Skin Injury  Recent Flowsheet Documentation  Taken 8/4/2022 1701 by Zoe López, RN  Body Position:    supine, head elevated    " supine, legs elevated  Intervention: Prevent and Manage VTE (Venous Thromboembolism) Risk  Recent Flowsheet Documentation  Taken 8/4/2022 1701 by Zoe López RN  VTE Prevention/Management: SCDs (sequential compression devices) on  Activity Management: activity adjusted per tolerance  Intervention: Prevent Infection  Recent Flowsheet Documentation  Taken 8/4/2022 1701 by Zoe López RN  Infection Prevention:    hand hygiene promoted    equipment surfaces disinfected    rest/sleep promoted    personal protective equipment utilized  Goal: Optimal Comfort and Wellbeing  Outcome: Ongoing, Not Progressing  Goal: Readiness for Transition of Care  Outcome: Ongoing, Not Progressing     Problem: Adjustment to Illness (Heart Failure)  Goal: Optimal Coping  Outcome: Ongoing, Not Progressing     Problem: Cardiac Output Decreased (Heart Failure)  Goal: Optimal Cardiac Output  Outcome: Ongoing, Not Progressing     Problem: Dysrhythmia (Heart Failure)  Goal: Stable Heart Rate and Rhythm  Outcome: Ongoing, Not Progressing     Problem: Fluid Imbalance (Heart Failure)  Goal: Fluid Balance  Outcome: Ongoing, Not Progressing     Problem: Functional Ability Impaired (Heart Failure)  Goal: Optimal Functional Ability  Outcome: Ongoing, Not Progressing  Intervention: Optimize Functional Ability  Recent Flowsheet Documentation  Taken 8/4/2022 1701 by Zoe López RN  Activity Management: activity adjusted per tolerance     Problem: Oral Intake Inadequate (Heart Failure)  Goal: Optimal Nutrition Intake  Outcome: Ongoing, Not Progressing     Problem: Respiratory Compromise (Heart Failure)  Goal: Effective Oxygenation and Ventilation  Outcome: Ongoing, Not Progressing  Intervention: Promote Airway Secretion Clearance  Recent Flowsheet Documentation  Taken 8/4/2022 1701 by Zoe López RN  Cough And Deep Breathing: done with encouragement     Problem: Sleep Disordered Breathing (Heart Failure)  Goal: Effective Breathing  Pattern During Sleep  Outcome: Ongoing, Not Progressing

## 2022-08-05 NOTE — PROGRESS NOTES
[facility]  Cardiology Progress Note    Date of Service (when I saw the patient): 08/05/2022     Assessment & Plan   Toney Denton is a 71 year old male who was admitted on 7/29/2022. He carries a PMH significant for CAD s/p single vessel CABG ( radial graft to OM), MVR, Tricuspid valve repair, JEFF clip w/ MAZE procedure, VT s/p ablation x2 (4/2020, 2/2021), CRT-D (5/2020), COPD, RA, Diabetes, hypertension, hyperlipidemia, anemia, thrombocytopenia, dilated cardiomyopathy, carotid disease, GI bleed, and orthopedic surgeries.     1.  : Ventricular Tachycardia s/p ablation in 2020 and 2021/ CRT-D   - Several VT episodes on 8/1 w/ longest 38 beats. Started 400mg TID Amiodarone with w/good response. Amiodarone decreased to 400 mg BID 8/4, longest VT run 4 beats. Decrease to 400 mg daily today   - PTA Metoprolol   - Magnesium 2.0  Keep > 2.0 ( 1.6, 1.4 on admission)  potassium 4.2, keep > 4.0  - ICD interrogation showed 90% V-pacing, 1 failed therapy of VT/VF, 2 treated VT/VF episodes detected during AT/AF    2.  : CAD s/p CABG ( radial graft to OM), stent to RCA into proximal PDA.  - Mild/ flat troponin elevation 34,33,28,27  - Coronary angiogram (4/2020 at [facility]) showed mild 30% stenosis of the distal LM, proximal/mid LAD, severe diffuse disease in the proximally and then  in the mid/distal segments, patent graft to OM2 that backfills a few smaller OMs. Stents from proximal RCA to distal and into proximal PDA w/ mild restenosis in the mid segment.   - Aspirin, statin, and metoprolol    3.  : Acute on chronic systolic heart failure/ Ischemic Cardiomyopathy   - NT pro BNP > 11,000  - Echocardiogram showed severely dilated LV 7.0 cm, severely reduced EF 20-25% ( 25-30% on previous study 4/2021 [facility]) Global diffuse hypokinesis worse in the inferior wall segment. Flattened septum, consistent with RV pressure/volume overload, moderately-severely reduced RV function. RV systolic pressure elevated at  41 mmHg + RA pressure.   - Diuresed 1.5 L, overnight, 11 L since admission. Mild hyponatremia 128. Switch Bumex to Torsemide today.   - Weight 230 ( non standing, ? Accuracy)   - Continue Jardiance, metoprolol and Entresto    4. MVR - Prosthetic valve well seated, Mean gradient 8 mmHg, no MR   5. TVR - Mild TR   6. Hypertension - Well controlled   7. Hyperlipidemia - On statin   8. Anemia - Hgb 10.7, Plt 178    Plan   1. No recurrent VT, Decrease Amiodarone to 400 mg daily x 14 days, then 200mg daily.   2. Net neg 1.5 L overnight. Switch Bumex to Torsemide 20 mg BID.   4. Continue metoprolol, Jardiance, Entresto, aspirin, and statin.   5. Low sodium diet, 50 oz fluid restriction, daily wt.   6. CORE/ EP follow up with [facility] after discharge.   7. Anticipated discharge to TCU tomorrow.     PASHA Prieto CNP  Text Page  (M-F, 7:30 am - 4:00 pm)    Interval History   Tired and weak.  Denies chest pain, shortness of breath, palpitations, orthopnea, presyncope. Euvolemic upon exam. Left hand swelling. No VT on Tele. Amiodarone reduced to 400 mg daily.  Vitals stable.  Awaiting discharge to TCU tomorrow.     Physical Exam   Temp: 97.6  F (36.4  C) Temp src: Oral BP: 106/49 Pulse: 70   Resp: 20 SpO2: 94 % O2 Device: None (Room air)    Vitals:    08/03/22 0426 08/04/22 0435 08/05/22 0700   Weight: 103.7 kg (228 lb 9.6 oz) 104.1 kg (229 lb 6.4 oz) 104.6 kg (230 lb 8 oz)     Vital Signs with Ranges  Temp:  [97.6  F (36.4  C)-99.1  F (37.3  C)] 97.6  F (36.4  C)  Pulse:  [70-74] 70  Resp:  [18-20] 20  BP: ()/(47-57) 106/49  SpO2:  [92 %-95 %] 94 %  I/O last 3 completed shifts:  In: 690 [P.O.:690]  Out: 2250 [Urine:2250]    GEN:  In general, this is a obese male.   HEENT:  Pupils equal, round. Sclerae nonicteric. Clear oropharynx. Mucous membranes moist.  NECK: Supple, no masses appreciated. Trachea midline. Difficult to assess JVD   C/V:  Regular rate and rhythm,  Systolic murmur, no rub or gallop.  RESP:  Respirations are unlabored. Decreased breath sounds bilaterally.   GI: Obese Abdomen soft, nontender, nondistended.   EXTREM: Trace pedal edema. No cyanosis or clubbing.   NEURO: Alert and oriented, cooperative.  No obvious focal deficits.   PSYCH: Normal affect.  SKIN: Warm and dry. No rashes or petechiae appreciated.       Medications       amiodarone  400 mg Oral Daily     aspirin  81 mg Oral Daily     atorvastatin  80 mg Oral QAM     doxycycline (VIBRAMYCIN) IV  100 mg Intravenous Q12H     DULoxetine  30 mg Oral At Bedtime     empagliflozin  10 mg Oral Daily     loratadine  10 mg Oral QAM     magnesium oxide  400 mg Oral Q4H     metoprolol succinate ER  100 mg Oral QAM     piperacillin-tazobactam  3.375 g Intravenous Q6H     sacubitril-valsartan  1 tablet Oral BID     sodium chloride (PF)  3 mL Intracatheter Q8H     tamsulosin  0.4 mg Oral Daily     torsemide  20 mg Oral BID     umeclidinium-vilanterol  1 puff Inhalation QAM       Data   Reviewed       I spent > 30 minutes face-to-face and/or coordinating care. Over 50% of our time on the unit was spent counseling the patient and/or coordinating care     Tory Hooker, PASHA CNP 8/5/2022

## 2022-08-05 NOTE — PROGRESS NOTES
[facility]  Infectious Disease Progress Note          Assessment and Plan:   IMPRESSION:    1.  A 71-year-old male, admitted with vague acute illness, including shaking chills, fever, malaise, some increased respiratory distress, diagnosis made of probable pneumonia based on the imaging and the clinical picture, but not entirely clear, not hypoxic, currently does have underlying lung disease, he has a positive blood culture for a gram-negative ayush that preliminarily appears to be an anaerobe, but being difficult to identify, probably a true bacteremia given anaerobe.  Source is unclear.  Conceivably aspiration and lungs, conceivably abdomen, but little to suggest that, is clinically improved without even covering anaerobes.  2.  Chronic obstructive lung disease, some flare-up and worsening, although not even on oxygen currently.  3.  Coronary artery disease with prior stents.  4.  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  5.  History of atrial fibrillation and intermittent ventricular tachy dysrhythmias, has a pacemaker and an AICD in place.  6.  Prior history of GI bleeding.  7.  Acute renal failure, resolved.  8.  Urinary retention with no signs of UTI.     RECOMMENDATIONS:  1 BC has defined the issue, BC is capnoctophyga, as this implies does have a dog BUT no bite/imjury etc thus the bacteremia id unexplained and high level concern valve\AICD Follow-up cx neg  Zosyn for now will get sens soon  Discussed with cards needs ADEN and likely either way extended IV           Interval History:   no new complaints and doing well; no cp, sob, n/v/d, or abd pain. T down  2nd cx + and cx is capnocytophyga !!  Does have dog but no source of entry              Medications:       amiodarone  400 mg Oral Daily     aspirin  81 mg Oral Daily     atorvastatin  80 mg Oral QAM     doxycycline (VIBRAMYCIN) IV  100 mg Intravenous Q12H     DULoxetine  30 mg Oral At Bedtime     empagliflozin   "10 mg Oral Daily     loratadine  10 mg Oral QAM     metoprolol succinate ER  100 mg Oral QAM     piperacillin-tazobactam  3.375 g Intravenous Q6H     sacubitril-valsartan  1 tablet Oral BID     sodium chloride (PF)  3 mL Intracatheter Q8H     tamsulosin  0.4 mg Oral Daily     torsemide  20 mg Oral BID     umeclidinium-vilanterol  1 puff Inhalation QAM                  Physical Exam:   Blood pressure 103/48, pulse 69, temperature 97.7  F (36.5  C), temperature source Oral, resp. rate 22, height 1.715 m (5' 7.5\"), weight 104.6 kg (230 lb 8 oz), SpO2 95 %.  Wt Readings from Last 2 Encounters:   08/05/22 104.6 kg (230 lb 8 oz)   06/19/22 95.3 kg (210 lb 3.2 oz)     Vital Signs with Ranges  Temp:  [97.6  F (36.4  C)-98.9  F (37.2  C)] 97.7  F (36.5  C)  Pulse:  [69-74] 69  Resp:  [18-22] 22  BP: ()/(47-51) 103/48  SpO2:  [92 %-95 %] 95 %    Constitutional: Awake, alert, cooperative, no apparent distress looks well chronic ill appearance   Lungs: Clear to auscultation bilaterally, no crackles or wheezing   Cardiovascular: Regular rate and rhythm, normal S1 and S2, and no murmur noted   Abdomen: Normal bowel sounds, soft, non-distended, non-tender   Skin: No rashes, no cyanosis, no edema   Other:           Data:   All microbiology laboratory data reviewed.  Recent Labs   Lab Test 08/05/22  0724 08/04/22  0623 08/03/22  0751   WBC 11.3* 11.3* 13.2*   HGB 10.7* 10.7* 10.7*   HCT 33.7* 33.0* 32.8*   MCV 99 97 97    206 178     Recent Labs   Lab Test 08/05/22  0724 08/04/22  0623 08/03/22  0751   CR 0.86 0.76 0.69     No lab results found.  No lab results found.    Invalid input(s):     "

## 2022-08-05 NOTE — PROGRESS NOTES
[facility]    Hospitalist Progress Note      Assessment & Plan   Toney Denton is a 71 year old male with history of tricuspid valve repair, bioprosthetic mitral valve replacement for regurgitation, coronary artery disease, ischemic cardiomyopathy (EF 25% in 4/21), pacemaker placement in 7/19, biventricular implantable aqvbpkfkucrs-hkwzlwzoddtux-shhayzu from BiV-P in 5/20, atrial fibrillation (s/p ablation/maze procedure), severe COPD, hypertension, dyslipidemia, and borderline diabetes who presented on 7/29 for weakness, fever and edema.     #Capnocytophaga bacteremia: Pt presented with SOB, fever, weakness.  Initial imaging remarkable for CT of chest showed right lower lung patchy consolidation and groundglass opacities consistent with atelectasis versus infection w trace bilateral effusions.  Laboratory evaluation showed sodium 133, chloride 96, BUN 37, creatinine 1.24, BNP 11,024, troponin 34, procalcitonin 5.04, white blood cells 3.2, hemoglobin 10.6, platelets 66, unremarkable urinalysis, and negative testing for COVID-19/influenza/RSV.    -Blood culture from admission now positive for gram negative bacilli; speciation showed capnocytophaga which is associated with mouths of dogs and cats.   -Repeated blood cultures on 8/2 are still negative.  -Pt was started on ceftriaxone and doxycycline. He has improved.  ID consulted.  He was transitioned to Zosyn therapy on 8/3 for anaerobic coverage.  -Will reportedly need prolonged course of IV abx. Long line per infectious disease  -Per ID, will need ADEN performed for evaluation of endocarditis and pacemaker/ICD.  Planning on limited TTE today and ADEN on Monday.     #Acute on chronic HFrEF. Ischemic CM. CAD s/p CABG, stent to RCA into proximal PDA. History of bioprosthetic mitral valve replacement. History of tricuspid valve repair. History of pacemaker placement with upgrade to biventricular cardioverter- defibrillator. History of atrial  fibrillation with ablation/maze procedure.: TTE obtained on 08/01 showed EF 20-25% with global diffuse hypokinesis.  Flattened septum c/w RV pressure/volume overload. Bioprosthetic mitral valve noted w annuloplasty ring in tricuspid position.     -Cardiology consulted.  -Pt was previously on furosemide 60 mg TID.  He has been transitioned to Entresto on 8/3.  Bumex 2 mg twice daily added on 8/3.  This was further transitioned to torsemide 20 mg twice daily on 8/5.  -Continue metoprolol, Jardiance, aspirin and statin     #Intermittent runs of ventricular tachycardia: Replace electrolytes as needed.  Continue prior to admission beta-blocker.  Amiodarone added as below.  Improved  -Cardiology consulted 8/1/22 and Amiodarone was started. Continue to monitor. If Vtach persists Toney might need to transfer to [facility] where he gets his cardiac care for repeat VT ablation.      #Acute kidney injury: Possibly due to urine retention and/or congestive heart failure  -Monitor renal function with diuresis and Shaffer catheter placement    #Mild hyponatremia: In the setting of diuresis.  Sodium 129 on 8/5.  Bumex transition to torsemide as above.  We will recheck in the a.m.     #Urinary retention: Patient had to be straight cathed multiple times.  Shaffer catheter was placed and Flomax was started.  Continue Shaffer catheter.  Will consider voiding trial later in week.      #Thrombocytopenia: Possibly related to infection.  Improving.     #COPD, without acute exacerbation: Continue prior to admission albuterol and Anoro Ellipta inhalers.     #Generalized weakness, left hip pain: Patient has had generalized weakness prior to admission.    He had a left intertrochanteric femur fracture with ORIF in January 2022.  He had previously been using a walker and then graduated to a cane.  His wife had noted that he had increased weakness.  X-ray of the left hip obtained at that shows no malposition of hardware but severe  osteoarthritis bilaterally.     DVT Prophylaxis: Pneumatic Compression Devices  Code Status: Full Code  Dispo: Likely several days given need for ADEN and IV antibiotics.    Carlos Hernandez MD    Interval History   Blood culture now positive as above.  Patient feels about the same.  No worsening pain.  No chest pain or shortness of breath.  No fevers or chills.    -Data reviewed today: I reviewed all new labs and imaging results over the last 24 hours.    Physical Exam   Temp: 97.7  F (36.5  C) Temp src: Oral BP: 103/48 Pulse: 69   Resp: 22 SpO2: 95 % O2 Device: None (Room air)    Vitals:    08/03/22 0426 08/04/22 0435 08/05/22 0700   Weight: 103.7 kg (228 lb 9.6 oz) 104.1 kg (229 lb 6.4 oz) 104.6 kg (230 lb 8 oz)     Vital Signs with Ranges  Temp:  [97.6  F (36.4  C)-98.9  F (37.2  C)] 97.7  F (36.5  C)  Pulse:  [69-74] 69  Resp:  [18-22] 22  BP: ()/(47-51) 103/48  SpO2:  [92 %-95 %] 95 %  I/O last 3 completed shifts:  In: 690 [P.O.:690]  Out: 2250 [Urine:2250]    Constitutional: Deconditioned.  Older than stated age.  HEENT: Normocephalic. MMM, No elevation of JVD noted.   Respiratory: Nl WOB, crackles at the bases bilateral.  No wheezes  Cardiovascular: Regular, +systolic murmur noted.   GI: BS+, NT, ND  Skin/Integumen: WWP, no rash.  Mild bilateral edema noted.    Neuro: CNII-XII intact. Moves all extremities. No tremor. A&Ox3.    Medications       amiodarone  400 mg Oral Daily     aspirin  81 mg Oral Daily     atorvastatin  80 mg Oral QAM     doxycycline (VIBRAMYCIN) IV  100 mg Intravenous Q12H     DULoxetine  30 mg Oral At Bedtime     empagliflozin  10 mg Oral Daily     loratadine  10 mg Oral QAM     metoprolol succinate ER  100 mg Oral QAM     piperacillin-tazobactam  3.375 g Intravenous Q6H     sacubitril-valsartan  1 tablet Oral BID     sodium chloride (PF)  3 mL Intracatheter Q8H     tamsulosin  0.4 mg Oral Daily     torsemide  20 mg Oral BID     umeclidinium-vilanterol  1 puff Inhalation QAM       Data    Recent Labs   Lab 08/05/22  0724 08/04/22  0623 08/03/22  0751 07/30/22  0629 07/29/22  2131   WBC 11.3* 11.3* 13.2*   < > 4.2   HGB 10.7* 10.7* 10.7*   < > 10.6*   MCV 99 97 97   < > 101*    206 178   < > 80*   * 131* 130*   < > 133*   POTASSIUM 3.5 3.6 4.2   < > 4.2   CHLORIDE 95* 95* 93*   < > 96*   CO2 25 24 28   < > 26   BUN 19.2 18.2 15.9   < > 36.9*   CR 0.86 0.76 0.69   < > 1.24*   ANIONGAP 9 12 9   < > 11   THOMAS 8.2* 8.3* 9.3   < > 9.7   * 112* 102*   < > 123*   ALBUMIN  --   --   --   --  3.5   PROTTOTAL  --   --   --   --  6.9   BILITOTAL  --   --   --   --  1.2   ALKPHOS  --   --   --   --  151*   ALT  --   --   --   --  23   AST  --   --   --   --  38   LIPASE  --   --   --   --  51    < > = values in this interval not displayed.       No results found for this or any previous visit (from the past 24 hour(s)).

## 2022-08-05 NOTE — PROVIDER NOTIFICATION
"Paged MD \"Pt's left hand and fingers have worsening edema (+3), very tender to touch, warm & red.\" @ 8099  "

## 2022-08-06 NOTE — PLAN OF CARE
Goal Outcome Evaluation:        Admitting Diagnosis: CHF/ weakness  Pertinent History: xt tricuspid valve repair, CAD, cardiomyopathy,. For vitals and assessment please see flow sheet.   Living Situation: Home   Pain plan: c/o back pain oxy x1 given.   Mobility: assistance, 2 people, lift team assistance  Baseline activity: with assistive equipment.   Alarms/Safety: BA.   LDA's: 2 PIV/ R arm.  Pertinent test results: K+ 3.5, Mg+ 1.8  Consults: PT following.    Abnormals/Pending: hgb: 10.9  Other Cares/Comments:A & O, VSS, tele 100% AV paced. LS clear, hep drip infusing. IV abx given.  Shaffer catheter in place.   Discharge Disposition: TBD.   Discharge Time: TBD.

## 2022-08-06 NOTE — PROVIDER NOTIFICATION
MD pageaidan. FYI, pt  US Upper extremity is done.  abnormal result is available to see.  Thank you.

## 2022-08-06 NOTE — PROGRESS NOTES
[facility]    Hospitalist Progress Note      Assessment & Plan   Toney Denton is a 71 year old male with history of tricuspid valve repair, bioprosthetic mitral valve replacement for regurgitation, coronary artery disease, ischemic cardiomyopathy (EF 25% in 4/21), pacemaker placement in 7/19, biventricular implantable xpmyyjqjompe-iuyzoximmoolj-hpccpmg from BiV-P in 5/20, atrial fibrillation (s/p ablation/maze procedure), severe COPD, hypertension, dyslipidemia, and borderline diabetes who presented on 7/29 for weakness, fever and edema.     #Capnocytophaga bacteremia: Pt presented with SOB, fever, weakness.  Initial imaging remarkable for CT of chest showed right lower lung patchy consolidation and groundglass opacities consistent with atelectasis versus infection w trace bilateral effusions.  Laboratory evaluation showed sodium 133, chloride 96, BUN 37, creatinine 1.24, BNP 11,024, troponin 34, procalcitonin 5.04, white blood cells 3.2, hemoglobin 10.6, platelets 66, unremarkable urinalysis, and negative testing for COVID-19/influenza/RSV.    -Blood culture from admission now positive for gram negative bacilli; speciation showed capnocytophaga which is associated with mouths of dogs and cats.   -Repeated blood cultures on 8/2 are still negative.  -Pt was started on ceftriaxone and doxycycline. He has improved.  ID consulted.  He was transitioned to Zosyn therapy on 8/3 for anaerobic coverage.  -Will reportedly need prolonged course of IV abx. Long line per infectious disease  -Per ID, will need ADEN performed for evaluation of endocarditis and pacemaker/ICD.  Planning on limited TTE today and ADEN on Monday.     #Acute on chronic HFrEF. Ischemic CM. CAD s/p CABG, stent to RCA into proximal PDA. History of bioprosthetic mitral valve replacement. History of tricuspid valve repair. History of pacemaker placement with upgrade to biventricular cardioverter- defibrillator. History of atrial  fibrillation with ablation/maze procedure.: TTE obtained on 08/01 showed EF 20-25% with global diffuse hypokinesis.  Flattened septum c/w RV pressure/volume overload. Bioprosthetic mitral valve noted w annuloplasty ring in tricuspid position.     -Cardiology consulted.  -Pt was previously on furosemide 60 mg TID.  He has been transitioned to Entresto on 8/3.  Bumex 2 mg twice daily added on 8/3.  This was further transitioned to torsemide 20 mg twice daily on 8/5.  -Continue metoprolol, Jardiance, aspirin and statin    #LUE DVT: Pt with swelling of LUE.  US done on 08/06 showed near occlusive thombosis of mid left subclavian vein to left axillary vein.  Started on heparin gtt.    -He has history of GIB in 06/2022 so would continue heparin gtt over weekend so monitor for any recurrent bleeding.      #Intermittent runs of ventricular tachycardia: Replace electrolytes as needed.  Continue prior to admission beta-blocker.  Amiodarone added as below.  Improved  -Cardiology consulted 8/1/22 and Amiodarone was started. Continue to monitor. If Vtach persists Toney might need to transfer to [facility] where he gets his cardiac care for repeat VT ablation.      #Acute kidney injury: Possibly due to urine retention and/or congestive heart failure.  Improved.      #Mild hyponatremia: In the setting of diuresis.  Sodium 129 on 8/5.  Bumex transition to torsemide as above.  We will recheck in the a.m.     #Urinary retention: Patient had to be straight cathed multiple times.  Das catheter was placed and Flomax was started.    -Will do voiding trial on 08/06. If still with retention, would replace the das and not straight cath.      #Thrombocytopenia: Possibly related to infection. Improved. Will monitor.      #COPD, without acute exacerbation: Continue prior to admission albuterol and Anoro Ellipta inhalers.     #Generalized weakness, left hip pain: Patient has had generalized weakness prior to admission.    He had a  left intertrochanteric femur fracture with ORIF in January 2022.  He had previously been using a walker and then graduated to a cane.  His wife had noted that he had increased weakness.  X-ray of the left hip obtained at that shows no malposition of hardware but severe osteoarthritis bilaterally.    I did update wife by phone.      DVT Prophylaxis: Hep gtt  Code Status: Full Code  Dispo: Likely several days given need for ADEN and IV antibiotics.    Carlos Hernandez MD    Interval History   LUE US showed DVT. Started on heparin gtt. No CP or SOB. Still with pain of LUE; not worse. Main complaint is gaseous distension earlier this AM that has resolved.     Pt frustrated with multiple medical problems. I did voice understanding. Explained risks of bleeding and clotting but with known clot we need to treat DVT.     -Data reviewed today: I reviewed all new labs and imaging results over the last 24 hours.     Physical Exam   Temp: 97.9  F (36.6  C) Temp src: Oral BP: 108/61 Pulse: 70   Resp: 22 SpO2: 92 % O2 Device: None (Room air)    Vitals:    08/04/22 0435 08/05/22 0700 08/06/22 0113   Weight: 104.1 kg (229 lb 6.4 oz) 104.6 kg (230 lb 8 oz) 105 kg (231 lb 8 oz)     Vital Signs with Ranges  Temp:  [97.7  F (36.5  C)-99  F (37.2  C)] 97.9  F (36.6  C)  Pulse:  [69-71] 70  Resp:  [16-22] 22  BP: ()/(20-61) 108/61  SpO2:  [91 %-95 %] 92 %  I/O last 3 completed shifts:  In: -   Out: 1700 [Urine:1700]    Constitutional: Deconditioned.  Older than stated age.  HEENT: Normocephalic. MMM, No elevation of JVD noted.   Respiratory: Nl WOB, crackles at the bases bilateral.  No wheezes  Cardiovascular: Regular, +systolic murmur noted.   GI: BS+, NT, ND  Skin/Integumen: WWP. Mild bilateral LE edema. LUE increased edema.  Warm. + strength.   Neuro: CNII-XII intact. Moves all extremities. No tremor. A&Ox3.    Medications     heparin 1,800 Units/hr (08/06/22 3317)       amiodarone  400 mg Oral Daily     aspirin  81 mg Oral Daily      atorvastatin  80 mg Oral QAM     DULoxetine  30 mg Oral At Bedtime     empagliflozin  10 mg Oral Daily     loratadine  10 mg Oral QAM     metoprolol succinate ER  100 mg Oral QAM     piperacillin-tazobactam  3.375 g Intravenous Q6H     sacubitril-valsartan  1 tablet Oral BID     sodium chloride (PF)  3 mL Intracatheter Q8H     tamsulosin  0.4 mg Oral Daily     torsemide  20 mg Oral BID     umeclidinium-vilanterol  1 puff Inhalation QAM       Data   Recent Labs   Lab 22  0622  1935 22  0724 22  06   WBC 11.8* 11.9* 11.3* 11.3*   HGB 10.7* 10.9* 10.7* 10.7*   MCV 96 99 99 97    274 235 206   *  --  129* 131*   POTASSIUM 4.1  --  3.5 3.6   CHLORIDE 94*  --  95* 95*   CO2 24  --  25 24   BUN 21.1  --  19.2 18.2   CR 0.98  --  0.86 0.76   ANIONGAP 12  --  9 12   THOMAS 8.3*  --  8.2* 8.3*   *  --  104* 112*       Recent Results (from the past 24 hour(s))   Echocardiogram Limited   Result Value    LVEF  20-25%    Narrative    [id number]  [id number]  [id number]  [id number]^JOHANN^QIAN^ALEXANDRA     [facility]  Echocardiography Laboratory  [address] [place] [address]     Name: YURIDIA BAZAN  MRN: [id number]  : 1950  Study Date: 2022 02:20 PM  Age: 71 yrs  Gender: Male  Patient Location: [facility]  Reason For Study: Endocarditis  Ordering Physician: QIAN WILLS  Performed By: Juliette Cazares     BSA: 2.1 m2  Height: 67 in  Weight: 230 lb  HR: 73  BP: 103/48 mmHg  ______________________________________________________________________________  Procedure  Limited Portable Echo Adult.  ______________________________________________________________________________  Interpretation Summary     No vegetations seen. Compared to prior study, there is no significant change.  ______________________________________________________________________________  Left Ventricle  The visual ejection fraction is 20-25%.     Right Ventricle  There is a  catheter/pacemaker lead seen in the right ventricle. Right  ventricular function cannot be assessed due to poor image quality.     Mitral Valve  There is no mitral regurgitation noted. There is a bioprosthetic mitral valve.  Normal prosthetic mitral valve gradients.     Tricuspid Valve  Tricuspid leaflets are thickened. There is trace to mild tricuspid  regurgitation. Right ventricular systolic pressure is elevated, consistent  with moderate to severe pulmonary hypertension. An annuloplasty ring is noted  in the tricuspid position. Normal prosthetic tricuspid valve gradient.     Aortic Valve  There is mild trileaflet aortic sclerosis.     Pulmonic Valve  Normal pulmonic valve.     Pericardium  There is no pericardial effusion.  ______________________________________________________________________________  Doppler Measurements & Calculations  MV max P.1 mmHg  MV mean P.0 mmHg  MV V2 VTI: 43.1 cm  TV V2 max: 163.0 cm/sec  TV max PG: 10.6 mmHg  TV mean P.0 mmHg  TV V2 VTI: 43.3 cm  TR max norma: 311.0 cm/sec  TR max P.7 mmHg     ______________________________________________________________________________  Report approved by: Natalia Gomez 2022 03:05 PM         US Upper Extremity Venous Duplex Left Portable    Narrative    EXAM: US UPPER EXTREMITY VENOUS DUPLEX LEFT PORTABLE  LOCATION: [facility]  DATE/TIME: 2022 5:33 PM    INDICATION: swelling of left upper extremity, has heart failure  COMPARISON: None.  TECHNIQUE: Venous Duplex ultrasound of the left upper extremity with (when possible) and without compression, augmentation, and duplex. Color flow and spectral Doppler with waveform analysis performed.    FINDINGS: Ultrasound includes evaluation of the internal jugular vein, innominate vein, subclavian vein, axillary vein, and brachial vein. The superficial cephalic and basilic veins were also evaluated where seen.     LEFT: There is near occlusive thrombosis of the  mid left subclavian vein, lateral left subclavian vein, and occlusive thrombosis of the left axillary vein proximally. No superficial thrombophlebitis.       Impression    IMPRESSION:   1.  Near occlusive thrombosis of the mid left subclavian vein to the left axillary vein.    Findings in this study discussed with the patient's nurse Nadja by Dr. Pablo Fernandez by telephone on 08/05/2022 at 1900 hours.

## 2022-08-06 NOTE — PLAN OF CARE
Temp: 97.2  F (36.2  C) Temp src: Oral BP: 103/61 (from right arm) Pulse: 71   Resp: 20 SpO2: 95 % O2 Device: None (Room air)       Orientation:  x4  VS: BP was low this pm after receiving Oxycodone.  pageaidan CANAS. Lactic acid drawn: 1.5  Pain:  back, shoulder knee pain, Oxycodone PRN given, Pain better   Tele:  SR dual paced, BBB prolonged QT  Activity:  decided to keep him in bed this shift with low BP and arm thrombosis   Resp:  no SOB, LS  clear and equal bilateral   GI:  no BM this shift. Ate most of his food   : Das pulled per order at 0900. 1400 no void yet, no urgency, Bladder scan 125ml. Pageaidan CANAS for parameters for new das   Notable Labs:  Na 130, K 4.1, Mag 1.8, recheck tomorrow   Skin: left arm swollen and red. thrombosis found per US.    Edema around waist +3. Lower extremity +1  Lines:  2 PIV SL on right arm. Hep drip at 1800 Units/h. On target, recheck tomorrow morning.Other: ADEN planned for Monday   Plan: continue with plan of care

## 2022-08-06 NOTE — PLAN OF CARE
Goal Outcome Evaluation:      Pt is alert, forgetful at times. Pt turns well in bed. Lungs sound clear, diminished. Pt on heparin drip as has a left arm DVT, 1st 10 A at 0330 was in range, next 10 A due at 1030. Pt taking oxycodone with good relief of back pain. Good urine output via das ( in for retention). Weight is up again today, has been steadily increasing, note left for MD. Tele reading 100 % AV paced.     Heart Failure Care Map  GOALS TO BE MET BEFORE DISCHARGE:    1. Decrease congestion and/or edema with diuretic therapy to achieve near optimal volume status.     Dyspnea improved: Yes, satisfactory for discharge.   Edema improved: No, further care required to meet this goal. Please explain pt still with 2+ edema, weight increasing every day.         Net I/O and Weights since admission:   07/07 0700 - 08/06 0659  In: 7977.1 [P.O.:7210; I.V.:767.1]  Out: 20250 [Urine:20250]  Net: -87110.9     Vitals:    07/30/22 0136 07/31/22 0031 08/01/22 0611 08/02/22 0645   Weight: 102.7 kg (226 lb 6.4 oz) 103 kg (227 lb) 103.6 kg (228 lb 8 oz) 104.7 kg (230 lb 12.8 oz)    08/03/22 0426 08/04/22 0435 08/05/22 0700 08/06/22 0113   Weight: 103.7 kg (228 lb 9.6 oz) 104.1 kg (229 lb 6.4 oz) 104.6 kg (230 lb 8 oz) 105 kg (231 lb 8 oz)       2.  O2 sats > 90% on room air, or at prior home O2 therapy level.      Able to wean O2 this shift to keep sats above 90%?: Yes, satisfactory for discharge.   Does patient use Home O2? No          Current oxygenation status:   SpO2: 91 %     O2 Device: None (Room air),      3.  Tolerates ambulation and mobility near baseline.     Ambulation: No, further care required to meet this goal. Please explain pt up with the lift at this time   Times patient ambulated with staff this shift: 0    Please review the Heart Failure Care Map for additional HF goal outcomes.    Diane Rogel RN  8/6/2022

## 2022-08-06 NOTE — PROGRESS NOTES
[facility]  Infectious Disease Progress Note          Assessment and Plan:   IMPRESSION:    1.  A 71-year-old male, admitted with vague acute illness, including shaking chills, fever, malaise, some increased respiratory distress, diagnosis made of probable pneumonia based on the imaging and the clinical picture, but not entirely clear, not hypoxic, currently does have underlying lung disease, he has a positive blood culture for a gram-negative ayush that preliminarily appears to be an anaerobe, but being difficult to identify, probably a true bacteremia given anaerobe.  Source is unclear.  Conceivably aspiration and lungs, conceivably abdomen, but little to suggest that, is clinically improved without even covering anaerobes.  2.  Chronic obstructive lung disease, some flare-up and worsening, although not even on oxygen currently.  3.  Coronary artery disease with prior stents.  4.  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  5.  History of atrial fibrillation and intermittent ventricular tachy dysrhythmias, has a pacemaker and an AICD in place.  6.  Prior history of GI bleeding.  7.  Acute renal failure, resolved.  8.  Urinary retention with no signs of UTI.     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 T down  2nd cx + and cx is capnocytophyga !!  Does have dog but no source of entry evident              Medications:       amiodarone  400 mg Oral Daily     aspirin  81 mg Oral Daily     atorvastatin  80 mg Oral QAM     DULoxetine  30 mg  "Oral At Bedtime     empagliflozin  10 mg Oral Daily     loratadine  10 mg Oral QAM     metoprolol succinate ER  100 mg Oral QAM     piperacillin-tazobactam  3.375 g Intravenous Q6H     sacubitril-valsartan  1 tablet Oral BID     sodium chloride (PF)  3 mL Intracatheter Q8H     tamsulosin  0.4 mg Oral Daily     torsemide  20 mg Oral BID     umeclidinium-vilanterol  1 puff Inhalation QAM                  Physical Exam:   Blood pressure 108/61, pulse 70, temperature 97.9  F (36.6  C), temperature source Oral, resp. rate 22, height 1.715 m (5' 7.5\"), weight 105 kg (231 lb 8 oz), SpO2 92 %.  Wt Readings from Last 2 Encounters:   08/06/22 105 kg (231 lb 8 oz)   06/19/22 95.3 kg (210 lb 3.2 oz)     Vital Signs with Ranges  Temp:  [97.7  F (36.5  C)-99  F (37.2  C)] 97.9  F (36.6  C)  Pulse:  [69-71] 70  Resp:  [16-22] 22  BP: ()/(20-61) 108/61  SpO2:  [91 %-95 %] 92 %    Constitutional: Awake, alert, cooperative, no apparent distress looks well chronic ill appearance   Lungs: Clear to auscultation bilaterally, no crackles or wheezing   Cardiovascular: Regular rate and rhythm, normal S1 and S2, and no murmur noted   Abdomen: Normal bowel sounds, soft, non-distended, non-tender   Skin: No rashes, no cyanosis, no edema   Other:           Data:   All microbiology laboratory data reviewed.  Recent Labs   Lab Test 08/06/22  0622 08/05/22  1935 08/05/22  0724   WBC 11.8* 11.9* 11.3*   HGB 10.7* 10.9* 10.7*   HCT 32.6* 35.0* 33.7*   MCV 96 99 99    274 235     Recent Labs   Lab Test 08/06/22  0622 08/05/22  0724 08/04/22  0623   CR 0.98 0.86 0.76     No lab results found.  No lab results found.    Invalid input(s):     "

## 2022-08-06 NOTE — PROGRESS NOTES
Cardiology chart review note:    Patient is a 71-year-old male with a history of CAD status post CABG, MVR, tricuspid valve repair, left atrial appendage clip with Maze procedure, VT ablation x2, CRT-D, COPD, RA, diabetes, hypertension, hyperlipidemia, dilated cardiomyopathy.  Noted to have several episodes of VT, started on amiodarone.  Mild decompensated heart failure, diuresed over 11 L since admission.  Blood cultures positive, plan for ADEN on Monday.  Please page if any clinical change, questions/concerns.    Manas Hogan MD, [facility]  Cardiology  August 6, 2022

## 2022-08-07 NOTE — PLAN OF CARE
Temp: 97.7  F (36.5  C) Temp src: Oral BP: 102/59 Pulse: 70   Resp: 18 SpO2: 93 % O2 Device: None (Room air)       Orientation:  x4 but very drowsy. Wakes up by sound of voice, falls asleep between cares.    VS: BP low, pt stated pain in chest at beginning of shift. Informed cardiac MD. EKG ordered, Med changes done, fluid bolus given of 500ml over 3h - Pt improved with BP and alertness.   Pain:  on back, distraction and position change   Tele: SR, AV-paced, BBB, prolonged QT    Activity:  in bed this shift, turning to side refused  Resp:   LS clear anterior, some cough in the afternoon  GI:  No BM, no urge, no discomfort. Ate all his meals   : das in place, patent. Good urine output   Notable Labs:  K 3.3 replaced recheck 3.8, more replacement given, recheck tomorrow morning. Mag 1.9 replacement given, recheck tomorrow.   INR 0.57 - in range, no change needed.   Skin:  bruising scattered on arms.  Edema: lower extremity improved. +2 in midsection   Lines: Hep gtt @ 1800, PIV upper arm SL after 500ml bolus, dressing change done on upper PIV  Plan:   continue with plan of care

## 2022-08-07 NOTE — PROGRESS NOTES
[facility]    Infectious Disease Progress Note    Date of Service : 08/07/2022     Assessment:  1.  Capnocytophagia canimorsus sepsis. Awaiting TRAMAINE to assess for endocarditis  2.  New LUE DVT  3. Chronic obstructive lung disease, some flare-up and worsening, although not even on oxygen currently.  4.  Coronary artery disease with prior stents.  5.  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  6.  History of atrial fibrillation and intermittent ventricular tachy dysrhythmias, has a pacemaker and an AICD in place.  7.  Prior history of GI bleeding.  8.  Acute renal failure, resolved.  9.  Urinary retention with no signs of UTI    Recommendations:  1. Await Tramaine  2. Follow sensitivity data  3. Continue Zosyn, likely transition to Ceftriaxone once sensitivities are available  4. Management of new DVT per medical team    Dacia Melton MD    Interval History   Patient was seen and examined, chart reviewed.  Complains of new pain and swelling in the left arm and has been diagnosed with DVT.  Has remained afebrile, leukocytosis has resolved, tolerating antibiotics without side effects    Physical Exam   Temp: 97.7  F (36.5  C) Temp src: Oral BP: 100/51 Pulse: 72   Resp: 18 SpO2: 92 % O2 Device: None (Room air)    Vitals:    08/05/22 0700 08/06/22 0113 08/07/22 0422   Weight: 104.6 kg (230 lb 8 oz) 105 kg (231 lb 8 oz) 105.7 kg (233 lb 1.6 oz)     Vital Signs with Ranges  Temp:  [97  F (36.1  C)-97.8  F (36.6  C)] 97.7  F (36.5  C)  Pulse:  [70-72] 72  Resp:  [16-22] 18  BP: ()/(17-61) 100/51  SpO2:  [90 %-95 %] 92 %    Constitutional: Awake, alert, cooperative, no apparent distress  Lungs: Clear to auscultation bilaterally, no crackles or wheezing  Cardiovascular: Regular rate and rhythm, normal S1 and S2, and no murmur noted  Abdomen: Normal bowel sounds, soft, non-distended, non-tender  Skin: No rashes, no cyanosis, no edema  MS : Extensive edema LUE    Other:    Medications      heparin 1,800 Units/hr (08/07/22 0929)       sodium chloride 0.9%  500 mL Intravenous Once     amiodarone  400 mg Oral Daily     aspirin  81 mg Oral Daily     atorvastatin  80 mg Oral QAM     DULoxetine  30 mg Oral At Bedtime     loratadine  10 mg Oral QAM     magnesium oxide  400 mg Oral Q4H     metoprolol succinate ER  100 mg Oral QAM     piperacillin-tazobactam  3.375 g Intravenous Q6H     potassium chloride  40 mEq Oral Once     [Held by provider] sacubitril-valsartan  1 tablet Oral BID     sodium chloride (PF)  3 mL Intracatheter Q8H     tamsulosin  0.4 mg Oral Daily     [Held by provider] torsemide  10 mg Oral BID     umeclidinium-vilanterol  1 puff Inhalation QAM       Data   All microbiology laboratory data reviewed.  Recent Labs   Lab Test 08/07/22 0614 08/06/22 0622 08/05/22  1935   WBC 9.2 11.8* 11.9*   HGB 10.6* 10.7* 10.9*   HCT 33.5* 32.6* 35.0*   MCV 99 96 99    291 274     Recent Labs   Lab Test 08/07/22 0614 08/06/22 0622 08/05/22  0724   CR 1.03 0.98 0.86     Microbiology  7/30 blood cxs  Arm, Right; Blood          0 Result Notes    Culture Positive on the 5th day of incubation Abnormal        Capnocytophaga canimorsus

## 2022-08-07 NOTE — PROGRESS NOTES
[facility]    Medicine Progress Note - Hospitalist Service    Date of Admission:  7/29/2022    Assessment & Plan            Toney Denton is a 71 year old male with history of tricuspid valve repair, bioprosthetic mitral valve replacement for regurgitation, coronary artery disease, ischemic cardiomyopathy (EF 25% in 4/21), pacemaker placement in 7/19, biventricular implantable wkarxqrpsrmb-bxxhmmkerbxls-warwktt from BiV-P in 5/20, atrial fibrillation (s/p ablation/maze procedure), severe COPD, hypertension, dyslipidemia, and borderline diabetes who presented on 7/29 for weakness, fever and edema.     #Capnocytophaga bacteremia: Pt presented with SOB, fever, weakness.  Initial imaging remarkable for CT of chest showed right lower lung patchy consolidation and groundglass opacities consistent with atelectasis versus infection w trace bilateral effusions.  Laboratory evaluation showed sodium 133, chloride 96, BUN 37, creatinine 1.24, BNP 11,024, troponin 34, procalcitonin 5.04, white blood cells 3.2, hemoglobin 10.6, platelets 66, unremarkable urinalysis, and negative testing for COVID-19/influenza/RSV.    -Blood culture from admission now positive for gram negative bacilli; speciation showed capnocytophaga which is associated with mouths of dogs and cats.   -Repeated blood cultures on 8/2 are still negative.  -Pt was started on ceftriaxone and doxycycline. He has improved.  ID consulted.  He was transitioned to Zosyn therapy on 8/3 for anaerobic coverage.  -Will reportedly need prolonged course of IV abx. Long line per infectious disease  -Per ID, will need ADEN performed for evaluation of endocarditis and pacemaker/ICD.  Planning  ADEN on Monday.     #Acute on chronic HFrEF. Ischemic CM. CAD s/p CABG, stent to RCA into proximal PDA. History of bioprosthetic mitral valve replacement. History of tricuspid valve repair. History of pacemaker placement with upgrade to biventricular  cardioverter- defibrillator. History of atrial fibrillation with ablation/maze procedure.: TTE obtained on 08/01 showed EF 20-25% with global diffuse hypokinesis.  Flattened septum c/w RV pressure/volume overload. Bioprosthetic mitral valve noted w annuloplasty ring in tricuspid position.     -Cardiology consulted.  -Pt was previously on furosemide 60 mg TID.  He has been transitioned to Entresto on 8/3.  Bumex 2 mg twice daily added on 8/3.  This was further transitioned to torsemide 20 mg twice daily on 8/5.  -Continue metoprolol, Jardiance, aspirin and statin    #LUE DVT: Pt with swelling of LUE.  US done on 08/06 showed near occlusive thombosis of mid left subclavian vein to left axillary vein.  Started on heparin gtt.    -He has history of GIB in 06/2022 so would continue heparin gtt over weekend so monitor for any recurrent bleeding.      #Intermittent runs of ventricular tachycardia: Replace electrolytes as needed.  Continue prior to admission beta-blocker.  Amiodarone added as below.  Improved  -Cardiology consulted 8/1/22 and Amiodarone was started. Continue to monitor. If Vtach persists Toney might need to transfer to [facility] where he gets his cardiac care for repeat VT ablation.      #Acute kidney injury: Possibly due to urine retention and/or congestive heart failure.  Improved.      #Mild hyponatremia: In the setting of diuresis.  Sodium 129 on 8/5.  Bumex transition to torsemide as above.  We will recheck in the a.m.     #Urinary retention: Patient had to be straight cathed multiple times.  Das catheter was placed and Flomax was started.    -Will do voiding trial on 08/06. If still with retention, would replace the das and not straight cath.      #Thrombocytopenia: Possibly related to infection. Improved. Will monitor.      #COPD, without acute exacerbation: Continue prior to admission albuterol and Anoro Ellipta inhalers.     #Generalized weakness, left hip pain: Patient has had  generalized weakness prior to admission.    He had a left intertrochanteric femur fracture with ORIF in January 2022.  He had previously been using a walker and then graduated to a cane.  His wife had noted that he had increased weakness.  X-ray of the left hip obtained at that shows no malposition of hardware but severe osteoarthritis bilaterally.         Diet: Combination Diet Low Saturated Fat Na <2400mg Diet, No Caffeine Diet  Snacks/Supplements Adult: Glucerna; Between Meals  Snacks/Supplements Adult: Gelatein sugar-free; Between Meals  NPO for Medical/Clinical Reasons Except for: Meds    DVT Prophylaxis: heparin gtt.  Shaffer Catheter: PRESENT, indication: Retention, Retention  Central Lines: None  Cardiac Monitoring: ACTIVE order. Indication: Tachyarrhythmias, acute (48 hours)  Code Status: Full Code      Disposition Plan           The patient's care was discussed with the Patient.    Bonnie Roberts MD  Hospitalist Service  [facility] [place]  Securely message with the Vocera Web Console (learn more here)  Text page via [employer] Paging/Directory         Clinically Significant Risk Factors Present on Admission                      ______________________________________________________________________    Interval History     No pain LLE. No fevers/chills. + SOB.    Data reviewed today: I reviewed all medications, new labs and imaging results over the last 24 hours. I personally reviewed no images or EKG's today.    Physical Exam   Vital Signs: Temp: 97.7  F (36.5  C) Temp src: Oral BP: 100/51 Pulse: 72   Resp: 18 SpO2: 92 % O2 Device: None (Room air)    Weight: 233 lbs 1.6 oz    Constitutional: Deconditioned.  Older than stated age.  HEENT: Normocephalic. MMM, No elevation of JVD noted.   Respiratory: Nl WOB, crackles at the bases bilateral.  No wheezes  Cardiovascular: Regular, +systolic murmur noted.   GI: BS+, NT, ND  Skin/Integumen: WWP. Mild bilateral LE edema. LUE increased edema.  Warm. +  strength.   Neuro: CNII-XII intact. Moves all extremities. No tremor. A&Ox3.    Data   Recent Labs   Lab 08/07/22  0614 08/06/22  0622 08/05/22  1935 08/05/22  0724   WBC 9.2 11.8* 11.9* 11.3*   HGB 10.6* 10.7* 10.9* 10.7*   MCV 99 96 99 99    291 274 235   * 130*  --  129*   POTASSIUM 3.3* 4.1  --  3.5   CHLORIDE 93* 94*  --  95*   CO2 26 24  --  25   BUN 20.5 21.1  --  19.2   CR 1.03 0.98  --  0.86   ANIONGAP 9 12  --  9   THOMAS 8.5* 8.3*  --  8.2*   * 110*  --  104*     No results found for this or any previous visit (from the past 24 hour(s)).  Medications     heparin 1,800 Units/hr (08/07/22 0929)       sodium chloride 0.9%  500 mL Intravenous Once     amiodarone  400 mg Oral Daily     aspirin  81 mg Oral Daily     atorvastatin  80 mg Oral QAM     DULoxetine  30 mg Oral At Bedtime     loratadine  10 mg Oral QAM     magnesium oxide  400 mg Oral Q4H     metoprolol succinate ER  100 mg Oral QAM     piperacillin-tazobactam  3.375 g Intravenous Q6H     potassium chloride  40 mEq Oral Once     [Held by provider] sacubitril-valsartan  1 tablet Oral BID     sodium chloride (PF)  3 mL Intracatheter Q8H     tamsulosin  0.4 mg Oral Daily     [Held by provider] torsemide  10 mg Oral BID     umeclidinium-vilanterol  1 puff Inhalation QAM

## 2022-08-07 NOTE — PROVIDER NOTIFICATION
MD paged. pt c/o back pain oxy 10 mg given @ 1620  resent BP 83/61.  This morning had low BP.   pt is stable denies pain.  Please advise. Thank you.

## 2022-08-07 NOTE — PLAN OF CARE
Goal Outcome Evaluation:           Admitting Diagnosis: CHF/ weakness  Pertinent History: xt tricuspid valve repair, CAD, cardiomyopathy,. For vitals and assessment please see flow sheet.   Living Situation: Home   Pain plan: c/o back pain oxy  10 mg  x1 given.   Mobility: assistance, 2 people, lift team assistance  Baseline activity: with assistive equipment.   Alarms/Safety: BA.   LDA's: 2 PIV/ R arm.  Pertinent test results: K+ 4.1, Mg+ 1.8  Consults: PT following.    Abnormals/Pending: hgb: 10.9  Other Cares/Comments:A & O, VSS except low BP, tele 100% AV paced. LS diminished, hep drip infusing. Shaffer catheter in place. IV abx x 2 given.   Discharge Disposition: TBD.   Discharge Time: TBD.

## 2022-08-07 NOTE — PROGRESS NOTES
Paged for hypotension. This is a chronically ill gentleman who has significant cardiac history who presented for fever, weakness, edema. He was found to have bacteremia and is on antibiotics.     He has had various low blood pressure readings. Seems that there may be some correlation to oxycodone dosing. His blood pressure is currently 83/61. He is asymptomatic.     On chart review, it seems that he is overall thought to be hypervolemic. His weight has slowly trended up over the recent days. I would like to avoid fluids at this time.  May or may not be related to the oxycodone dose that he received in earlier on.     Plan:  -Will recked lactic acid  -Would like to avoid fluid at this time as long as the patient remains asymptomatic    Jw Jules, DO

## 2022-08-07 NOTE — PLAN OF CARE
Goal Outcome Evaluation:           Pt is alert and oriented, bedrest overnight. Pt with good urine overnight via das. Pt denies need for pain medications overnight. Weight continues to increase. Blood pressure better overnight, was low late evening. Tele reading 100% AV paced. Heparin infusing at 1800 units/hour, next level check this am.

## 2022-08-07 NOTE — PROGRESS NOTES
Inpatient Cardiology Consultation Progress Note:    Toney Denton MRN#: [id number]   YOB: 1950 Age: 71 year old     Date of Admission: 7/29/2022  Consult indication: CHF         Assessment and Plan:     # Acute on chronic HFrEF, LVEF 20-25%, TTE 4/2021 [facility] LVEF 25-30%. Now net neg 14.5L/admission.  # VT s/p ablation 2020 and 2021 s/p CRTD, history of AFib, started on amiodarone this admission for recurrent VT  # CAD s/p CABG (radial to OM), stent to RCA into prox PDA.  Minimally elevated troponin, flat in trajectory.  Denies symptoms concerning for angina.  Overall clinical presentation does not seem characteristic of an acute coronary syndrome, seems more likely reflective of demand ischemia from decompensated heart failure  # Bacteremia, GNR  # s/p MVR, bioprosthetic MV, mean gradient 8 mmHg, no MR  # s/p TV repair, mild TR  # Left arm DVT, now on heparin  # History of GIB  # HTN  # HL  # Anemia    - Hypotensive overnight and this AM, with worsening hyponatremia this morning, will hold torsemide, will stop Jardiance which was started last week.  Will reassess diuretics in a.m. based on clinical course  -Patient was previously on lisinopril 2.5 mg daily, changed to Entresto earlier last week, will hold today due to hypotension, likely will need to transition back to lisinopril 2.5mg daily  - Continue metoprolol succinate, amiodarone  - Maintain potassium greater than 4, magnesium greater than 2.  Has had issues with intermittent VT during hospitalization.  - Continue aspirin, atorvastatin  - Plan was for ADEN on Monday due to bacteremia, bioprosthetic mitral valve, ICD, CRT-D, tricuspid valve repair.  N.p.o. at midnight for ADEN tomorrow  Ultimately will require close follow-up with his outpatient cardiology team (followed through [facility])  - Cardiology will follow    Thank you for allowing our team to participate in the care of Tonye Denton.  Please do not hesitate to page me with any questions or  concerns.     Manas Hogan MD, [facility]  Cardiology  August 7, 2022    Voice recognition software utilized.          Interval Events:     - Hypotensive overnight, with worsening hyponatremia this morning  - no chest pain  - no dyspnea         Medications reviewed:     Current medications:  Current Facility-Administered Medications Ordered in Epic   Medication Dose Route Frequency Last Rate Last Admin     acetaminophen (TYLENOL) Suppository 650 mg  650 mg Rectal Q6H PRN         acetaminophen (TYLENOL) tablet 1,000 mg  1,000 mg Oral Q6H PRN   1,000 mg at 08/04/22 2141     albuterol (PROVENTIL HFA/VENTOLIN HFA) inhaler  1-2 puff Inhalation Q4H PRN         amiodarone (PACERONE) tablet 400 mg  400 mg Oral Daily   400 mg at 08/06/22 0913     aspirin EC tablet 81 mg  81 mg Oral Daily   81 mg at 08/06/22 0912     atorvastatin (LIPITOR) tablet 80 mg  80 mg Oral QAM   80 mg at 08/06/22 0913     DULoxetine (CYMBALTA) DR capsule 30 mg  30 mg Oral At Bedtime   30 mg at 08/06/22 2203     heparin infusion 25,000 units in D5W 250 mL ANTICOAGULANT  0-5,000 Units/hr Intravenous Continuous 18 mL/hr at 08/07/22 0628 1,800 Units/hr at 08/07/22 0628     lidocaine (LMX4) cream   Topical Q1H PRN         lidocaine 1 % 0.1-1 mL  0.1-1 mL Other Q1H PRN         loratadine (CLARITIN) tablet 10 mg  10 mg Oral QAM   10 mg at 08/06/22 0913     magnesium oxide (MAG-OX) tablet 400 mg  400 mg Oral Q4H         melatonin tablet 1 mg  1 mg Oral At Bedtime PRN         metoprolol succinate ER (TOPROL XL) 24 hr tablet 100 mg  100 mg Oral QAM   100 mg at 08/06/22 0912     naloxone (NARCAN) injection 0.2 mg  0.2 mg Intravenous Q2 Min PRN        Or     naloxone (NARCAN) injection 0.4 mg  0.4 mg Intravenous Q2 Min PRN        Or     naloxone (NARCAN) injection 0.2 mg  0.2 mg Intramuscular Q2 Min PRN        Or     naloxone (NARCAN) injection 0.4 mg  0.4 mg Intramuscular Q2 Min PRN         nitroGLYcerin (NITROSTAT) sublingual tablet 0.4 mg  0.4 mg  Sublingual Q5 Min PRN         oxyCODONE (ROXICODONE) tablet 5-10 mg  5-10 mg Oral Q4H PRN   10 mg at 22 1620     piperacillin-tazobactam (ZOSYN) infusion 3.375 g  3.375 g Intravenous Q6H 100 mL/hr at 22 0415 3.375 g at 22 0415     potassium chloride ER (KLOR-CON M) CR tablet 40 mEq  40 mEq Oral Once         potassium chloride ER (KLOR-CON M) CR tablet 40 mEq  40 mEq Oral Once         sacubitril-valsartan (ENTRESTO) 24-26 MG per tablet 1 tablet  1 tablet Oral BID         sodium chloride (PF) 0.9% PF flush 3 mL  3 mL Intracatheter Q8H   3 mL at 22 1714     sodium chloride (PF) 0.9% PF flush 3 mL  3 mL Intracatheter q1 min prn   3 mL at 22 1742     tamsulosin (FLOMAX) capsule 0.4 mg  0.4 mg Oral Daily   0.4 mg at 22 0912     [Held by provider] torsemide (DEMADEX) tablet 10 mg  10 mg Oral BID         umeclidinium-vilanterol (ANORO ELLIPTA) 62.5-25 MCG/INH oral inhaler 1 puff  1 puff Inhalation QAM   1 puff at 22 0724     No current [facility]-ordered outpatient medications on file.             Physical Exam:   Vital signs were reviewed:  Temperatures:  Current - Temp: 97.7  F (36.5  C); Max - Temp  Av.5  F (36.4  C)  Min: 97  F (36.1  C)  Max: 97.8  F (36.6  C)  Respiration range: Resp  Av.4  Min: 16  Max: 22  Pulse range: Pulse  Av  Min: 70  Max: 72  Blood pressure range: Systolic (24hrs), Av , Min:82 , Max:121   ; Diastolic (24hrs), Av, Min:17, Max:61    Pulse oximetry range: SpO2  Av.7 %  Min: 90 %  Max: 95 %    Intake/Output Summary (Last 24 hours) at 2022 0839  Last data filed at 2022 0500  Gross per 24 hour   Intake 240 ml   Output 1875 ml   Net -1635 ml     233 lbs 1.6 oz  Body mass index is 35.97 kg/m .   Body surface area is 2.24 meters squared.    Constitutional: appears stated age, in no apparent distress, appears to be well nourished  Head: normocephalic, atraumatic  Neck: supple, trachea midline  Pulmonary: clear to auscultation  bilaterally  Cardiovascular: distant heart sounds but RRR, no murmurs  Gastrointestinal: no guarding, non-rigid   Neurologic: awake, alert, moves all extremities  Psychiatric: affect is normal, answers questions appropriately, oriented to self and place         Selected laboratory tests:   Laboratory test results personally reviewed:   CMP  Recent Labs   Lab 08/07/22  0614 08/06/22  0622 08/05/22  0724 08/04/22  0623   * 130* 129* 131*   POTASSIUM 3.3* 4.1 3.5 3.6   CHLORIDE 93* 94* 95* 95*   CO2 26 24 25 24   ANIONGAP 9 12 9 12   * 110* 104* 112*   BUN 20.5 21.1 19.2 18.2   CR 1.03 0.98 0.86 0.76   GFRESTIMATED 79 83 >90 >90   THOMAS 8.5* 8.3* 8.2* 8.3*   MAG 1.9 1.8 1.8 1.9     CBC  Recent Labs   Lab 08/07/22  0614 08/06/22  0622 08/05/22  1935 08/05/22  0724   WBC 9.2 11.8* 11.9* 11.3*   RBC 3.39* 3.40* 3.52* 3.39*   HGB 10.6* 10.7* 10.9* 10.7*   HCT 33.5* 32.6* 35.0* 33.7*   MCV 99 96 99 99   MCH 31.3 31.5 31.0 31.6   MCHC 31.6 32.8 31.1* 31.8   RDW 14.6 14.5 14.7 14.6    291 274 235

## 2022-08-08 NOTE — PROVIDER NOTIFICATION
Mayte CANAS pt is back from ADEN. I got verbally told he can drink and eat after 12:30. There are no post procedure orders, could you please order his diet? thanks     4309: Pt and family have asked several times about ordering some food. Could you please update the diet orders? Thanks     MD: diet ordered

## 2022-08-08 NOTE — PLAN OF CARE
Patient Alert and oriented, VSS, Shaffer in place. PRN oxy and tylenol given for back pain. Heparin gtt infusing at 1800 units/hr. Next Xa draw this AM. Tele 100% Bi V-paced. NPO since midnight for ADEN today. Will continue current POC.

## 2022-08-08 NOTE — PROGRESS NOTES
[facility]    Cardiology Progress Note    Primary Cardiologist: Dr. Giron - [facility]    Date of Admission: 07/29/2022  Service Date: 08/08/2022    Summary:  Mr. Toney Denton is a very pleasant 71 year old male with a complex past medical history of coronary artery disease, mitral valve replacement with a bioprosthetic valve, tricuspid valve repair, permanent atrial fibrillation status post Maze procedure and left atrial clip, V. tach storm requiring ablation twice in the past, BiV defibrillator, COPD, rheumatoid arthritis, prednisone induced diabetes mellitus, hypertension, hyperlipidemia, anemia, thrombocytopenia, dilated cardiomyopathy, carotid artery stenosis, left open hip reduction and internal fixation earlier this year,  recent GI bleed complicated by hemorrhagic shock now admitted on 07/29/22 after presenting with fever, diarrhea and peripheral edema. Cardiology was consulted for acute on chronic HFrEF.    Interval History   Patient is resting comfortably. He continues to feel poorly with diarrhea. He denies chest pain, palpitations, shortness of breath, dizziness, presyncope, or syncope.    Telemetry: Paced; No recurrence of VT noted overnight or this morning    Assessment & Plan   1. Acute on chronic HFrEF  - LVEF 20-25%, TTE 4/2021 [facility] LVEF 25-30%. Now net neg approximately 10 L since admission. Weight at 228 lbs today. Unclear baseline. Patient reports previously was around the 195-200 lbs range prior to breaking his hip this past January. He has been inactive since with weight trending up.   - Patient was hypotensive yesterday with worsening hyponatremia so torsemide was held, Jardiance which was started last week was stopped and Entresto which was started last week was held.      2. VT s/p ablation 2020 and 2021 s/p CRT-D  - Started on amiodarone this admission for recurrent VT.    3. CAD s/p CABG (radial to OM), stent to RCA into prox PDA  - Minimally elevated  troponin, flat in trajectory. Denies symptoms concerning for angina.  Overall clinical presentation does not seem characteristic of an acute coronary syndrome, seems more likely reflective of demand ischemia from decompensated heart failure.    4. Bacteremia, GNR    5. History of MVR, bioprosthetic MV, mean gradient 8 mmHg, no MR    6. History of TV repair, mild TR    7. Left arm DVT, now on heparin    8. History of GI Bleed    9. Hypertension    10. Hyperlipidemia    11. Anemia     Plan:   1. BP remains soft at times. Will restart on low dose torsemide 10 mg BID and lisinopril 2.5 mg once daily in place of Entresto. Continue metoprolol and amiodarone.   2. Will restart jardiance 10 mg once daily.   3. Continue with daily weights, strict I/Os, low sodium diet, and close monitoring of renal function and electrolytes.  4. Will start 2 L fluid restriction to help with mild hyponatremia.  5. ADEN today to rule out endocarditis due to bacteremia with history of bioprosthetic mitral valve, tricuspid valve repair, and CRT-D.   6. Cardiology will continue to follow along. Recommend follow up with the patient's primary cardiology team in the [facility] system within 1-2 weeks post discharge with a repeat BMP and NT pro BNP beforehand.    I spent 45 minutes face-to-face or coordinating care of Toney Denton. Over 50% of our time on the unit was spent counseling the patient and/or coordinating care.    Thank you for the opportunity to participate in this pleasant patient's care.     PASHA Marie, CNP   Nurse Practitioner  [facility]  Pager: [phone]  Text Page  (8am - 5pm, M-F)    Patient Active Problem List   Diagnosis     Fall, initial encounter     Closed nondisplaced intertrochanteric fracture of left femur, initial encounter (H)     Hemorrhagic shock (H)     Gastrointestinal hemorrhage, unspecified gastrointestinal hemorrhage type     Anemia, unspecified type     Elevated troponin     Bilateral leg  edema     Elevated brain natriuretic peptide (BNP) level     Acute renal failure, unspecified acute renal failure type (H)       Physical Exam   Temp: 98  F (36.7  C) Temp src: Oral BP: 104/56 Pulse: 88   Resp: 18 SpO2: 96 % O2 Device: None (Room air)    Vitals:    08/06/22 0113 08/07/22 0422 08/08/22 0411   Weight: 105 kg (231 lb 8 oz) 105.7 kg (233 lb 1.6 oz) 103.5 kg (228 lb 1.6 oz)     Vital Signs with Ranges  Temp:  [97.5  F (36.4  C)-98  F (36.7  C)] 98  F (36.7  C)  Pulse:  [70-88] 88  Resp:  [18-22] 18  BP: (102-117)/(25-59) 104/56  SpO2:  [88 %-96 %] 96 %  I/O last 3 completed shifts:  In: 243 [P.O.:240; I.V.:3]  Out: 1700 [Urine:1700]    Constitutional: Appears his stated age, well nourished, and in no acute distress.  Eyes: Pupils equal, round. Sclerae anicteric.   HEENT: Normocephalic, atraumatic.   Neck: Supple. Unable to visualize JVP due to body habitus. Respiratory: Breathing non-labored. Lungs clear to auscultation bilaterally. No crackles or wheezes appreciated.  Cardiovascular: Regular rate and rhythm, normal S1 and S2. No murmur, rub, or gallop.  Skin: Warm, dry.   Musculoskeletal/Extremities: Moves all extremities well and symmetrically. No LE edema bilaterally.  Neurologic: No gross focal deficits. Alert, cooperative.  Psychiatric: Affect appropriate. Mentation normal.    Medications     - MEDICATION INSTRUCTIONS -       heparin Stopped (08/08/22 0848)     - MEDICATION INSTRUCTIONS -       sodium chloride         amiodarone  400 mg Oral Daily     aspirin  81 mg Oral Daily     atorvastatin  80 mg Oral QAM     benzocaine 20%  1-4 spray Mouth/Throat Once     DULoxetine  30 mg Oral At Bedtime     glycopyrrolate  0.1 mg Intravenous Once     lidocaine (viscous)  15 mL Mouth/Throat Once     lidocaine   Topical Once    Or     lidocaine  1.5 mL Topical Once     loratadine  10 mg Oral QAM     metoprolol succinate ER  100 mg Oral QAM     piperacillin-tazobactam  3.375 g Intravenous Q6H     [Held by  provider] sacubitril-valsartan  1 tablet Oral BID     sodium chloride (PF)  3 mL Intravenous Q8H     sodium chloride (PF)  3 mL Intracatheter Q8H     sodium chloride (PF)  3 mL Intracatheter Q8H     tamsulosin  0.4 mg Oral Daily     [Held by provider] torsemide  10 mg Oral BID     umeclidinium-vilanterol  1 puff Inhalation QAM     Data   Recent Labs   Lab 08/08/22  0814 08/07/22  1242 08/07/22  0614 08/06/22  0622   WBC 8.2  --  9.2 11.8*   HGB 10.8*  --  10.6* 10.7*     --  99 96     --  296 291   *  --  128* 130*   POTASSIUM 3.8 3.8 3.3* 4.1   CHLORIDE 99  --  93* 94*   CO2 24  --  26 24   BUN 13.5  --  20.5 21.1   CR 0.78  --  1.03 0.98   ANIONGAP 10  --  9 12   THOMAS 9.2  --  8.5* 8.3*   GLC 97  --  111* 110*       No results for input(s): NTBNPI, NTBNP in the last 168 hours.  No results for input(s): TROPONIN, TROPI, TROPR, TROPONINIS in the last 168 hours.    Invalid input(s): TROP, TROPONINIES, TNIH     This note was completed in part using Dragon voice recognition software. Although reviewed after completion, some word and grammatical errors may occur.

## 2022-08-08 NOTE — DISCHARGE INSTRUCTIONS
Taking Care of Yourself after Trans-Esophageal Echocardiogram (ADEN) (Inpatient or Outpatient)    Patient's Name: Toney Denton  Today's Date: August 8, 2022    You had a: Trans-Esophageal Echocardiogram (ADEN)  Your doctor was Dr. Fowler    Activity and diet  Do not drive, drink alcohol or make major decisions for 24 hours. The medicines you received may make you dizzy, sleepy or forgetful.  An adult should stay with you for the first six hours at home. Take it easy for the rest of the day.  Return to your usual diet, but no scratchy foods for two days.  If your throat is sore, eat cold, bland or soft foods.  You may have heartburn if the tube used in the exam entered your stomach. If so:  Do not eat acidic and spicy foods.  Do not eat three hours before bedtime. Clear liquids are okay.  When lying down, use two pillows to raise your head.  Medicines  You may start taking your usual medicines again. Always follow your doctor s orders.  You may take Tylenol (acetaminophen) if your throat is sore.  You may take antacids if you have heartburn. Take as directed.  Call your family doctor if you have any of these problems:  Heartburn that is severe or lasts more than 72 hours.  A sore throat that feels worse after 72 hours.  Shortness of breath, neck pain, chest pain, fever, chills, coughing up blood, or other unusual signs..  Follow-up visits:  Call your family doctor for a follow-up visit as instructed

## 2022-08-08 NOTE — PROGRESS NOTES
Care Management Follow Up    Length of Stay (days): 10    Expected Discharge Date: 08/09/2022     Concerns to be Addressed: discharge planning     Patient plan of care discussed at interdisciplinary rounds: Yes    Anticipated Discharge Disposition: Transitional Care     Anticipated Discharge Services: None  Anticipated Discharge DME: None      Additional Information:  CM following for discharge planning. Patient has been accepted by [facility] Transitional Care Unit on discharge. This has been delayed due to need for ADEN. Patient had his ADEN today. He was also seen by ID and will need long line placed and IV antibiotics on discharge. Update given to [facility] admissions. They will place him for available when medically ready. CM will cont to follow and update the Transitional Care Unit when patient is medically ready for discharge.            Azalea Houston RN BSN CM  Inpatient Care Coordination  [facility]  [phone]

## 2022-08-08 NOTE — CONSULTS
Urology Consult    Name:  Toney Denton  MRN:  [id number]  Age/: 71 year old, 1950    CC: Gross hematuria    HPI: Toney Denton is a(n) 71 year old male With a significant medical history of tricuspid valve repair bioprosthetic mitral valve replacement as well as ischemic cardiomyopathy who has been admitted with bacteremia  worsening ischemic cardiomyopathy CHRISTA and urinary retention time of presentation.  He had subsequently developed hematuria and therefore catheter was placed there has been plans for a voiding trial on , Shaffer catheter still stays in  Was on heparin and has been held he did have some drop of hemoglobin over the last 24 hours.  Does not have any significant prior history of significant lower urinary tract symptoms  Denies being on any medications for obstructive urinary symptoms  No prior evaluation with allergist however recalls having had catheter placed multiple times during hospital admissions and no episodes of hematuria other than at the time of catheter removal for maybe a couple of days.  Was a smoker has quit smoking now      Past Medical History:  Past Medical History:   Diagnosis Date     Benign essential hypertension      Chronic atrial fibrillation (H)     s/p ablation/maze procedure/LA clip.  on ASA for stroke ppx     COPD (chronic obstructive pulmonary disease) (H)     severe by PFTs     Hyperlipidemia LDL goal <100      Ischemic cardiomyopathy     EF 25 % by echo 2021, with hx of VTach s/p biV pacer and ICD     Mitral valve regurgitation     s/p bioprosthetic MVR     Tricuspid regurgitation     s/p repair       Past Surgical History:  Past Surgical History:   Procedure Laterality Date     CABG GARTH QUAL ACT PERFORM       MITRAL VALVE REPLACEMENT       OPEN REDUCTION INTERNAL FIXATION HIP NAILING Left 2022    Procedure: Open reduction and internal fixation of the left hip using Synthes 3-hole 130 degree dynamic compression hip screw;  Surgeon: Sharlene Arias MD;   Location: [facility] OR     TRICUSPID VALVULOPLASTY         Allergies:     Allergies   Allergen Reactions     Spironolactone Unknown     Reported side effects after receiving it after heart surgery, resolved with ablasion     Sulindac Diarrhea, Muscle Pain (Myalgia) and Nausea and Vomiting     Felt like beaten up      Latex Rash     Gets red on direct contact       Medications:  No current facility-administered medications on file prior to encounter.  acetaminophen (TYLENOL) 500 MG tablet, Take 1,000 mg by mouth every 6 hours as needed for mild pain  albuterol (PROAIR HFA/PROVENTIL HFA/VENTOLIN HFA) 108 (90 Base) MCG/ACT inhaler, Inhale 1-2 puffs into the lungs every 4 hours as needed for shortness of breath / dyspnea or wheezing  atorvastatin (LIPITOR) 80 MG tablet, Take 80 mg by mouth every morning  bumetanide (BUMEX) 2 MG tablet, Take 2 mg by mouth every morning   bumetanide (BUMEX) 2 MG tablet, Take 1 mg by mouth every evening  bumetanide (BUMEX) 2 MG tablet, Take 2 mg by mouth daily At noon  lisinopril (ZESTRIL) 2.5 MG tablet, Take 2.5 mg by mouth every morning  loratadine (CLARITIN) 10 MG tablet, Take 10 mg by mouth every morning  metoprolol succinate ER (TOPROL XL) 100 MG 24 hr tablet, Take 1 tablet (100 mg) by mouth every morning (Not a new prescription, this is a change of dose)  oxyCODONE (ROXICODONE) 5 MG tablet, Take 1-2 tablets (5-10 mg) by mouth every 4 hours as needed for moderate to severe pain  potassium chloride ER (KLOR-CON M) 20 MEQ CR tablet, Take 20 mEq by mouth every morning  umeclidinium-vilanterol (ANORO ELLIPTA) 62.5-25 MCG/INH oral inhaler, Inhale 1 puff into the lungs every morning  DULoxetine (CYMBALTA) 30 MG capsule, Take 30 mg by mouth At Bedtime (Patient not taking: Reported on 7/30/2022)  nitroGLYcerin (NITROSTAT) 0.4 MG sublingual tablet, Place 0.4 mg under the tongue every 5 minutes as needed for chest pain For chest pain place 1 tablet under the tongue every 5 minutes for 3 doses. If  "symptoms persist 5 minutes after 1st dose call 911.        Social History:  Social History     Socioeconomic History     Marital status:      Spouse name: Not on file     Number of children: Not on file     Years of education: Not on file     Highest education level: Not on file   Occupational History     Not on file   Tobacco Use     Smoking status: Former Smoker     Types: Cigars     Smokeless tobacco: Never Used   Substance and Sexual Activity     Alcohol use: Yes     Comment: 3 beers (IPA) per day -can go for days without drinking and no hx of w/d     Drug use: Not on file     Sexual activity: Not on file   Other Topics Concern     Parent/sibling w/ CABG, MI or angioplasty before 65F 55M? Not Asked   Social History Narrative     Not on file     Social Determinants of Health     Financial Resource Strain: Not on file   Food Insecurity: Not on file   Transportation Needs: Not on file   Physical Activity: Not on file   Stress: Not on file   Social Connections: Not on file   Intimate Partner Violence: Not on file   Housing Stability: Not on file       Family History:  Family History   Problem Relation Age of Onset     Diabetes Father      Diabetes Brother        ROS:  The remainder of the complete ROS was negative unless noted in the HPI.    Exam:  /65 (BP Location: Right arm, Patient Position: Semi-Hillman's, Cuff Size: Adult Regular)   Pulse 68   Temp 97.8  F (36.6  C) (Oral)   Resp 18   Ht 1.715 m (5' 7.5\")   Wt 103.5 kg (228 lb 1.6 oz)   SpO2 95%   BMI 35.20 kg/m    General: Alert, interactive, & in NAD  Resp: CTAB, no crackles or wheezes  Cardiac: Regular rate; extremities warm;   Abdomen: Soft, nontende, nondistended. .  : Normal, Shaffer catheter in situ urine in the urine bag appears bubba-colored no obvious hematuria today  Extremities: No LE edema or obvious joint abnormalities  Skin: Warm and dry, no jaundice or rash    Labs:  Lab Results   Component Value Date    WBC 8.2 08/08/2022    " WBC 9.2 08/07/2022    WBC 11.8 (H) 08/06/2022    WBC 11.9 (H) 08/05/2022    WBC 11.3 (H) 08/05/2022    HGB 10.6 (L) 08/08/2022    HGB 10.8 (L) 08/08/2022    HGB 10.6 (L) 08/07/2022    HGB 10.7 (L) 08/06/2022    HGB 10.9 (L) 08/05/2022    HCT 34.8 (L) 08/08/2022    HCT 33.5 (L) 08/07/2022    HCT 32.6 (L) 08/06/2022    HCT 35.0 (L) 08/05/2022    HCT 33.7 (L) 08/05/2022     08/08/2022     08/07/2022     08/06/2022     08/05/2022     08/05/2022     (L) 08/08/2022     (L) 08/07/2022     (L) 08/06/2022     (L) 08/05/2022     (L) 08/04/2022    POTASSIUM 3.8 08/08/2022    POTASSIUM 3.8 08/07/2022    POTASSIUM 3.3 (L) 08/07/2022    POTASSIUM 4.1 08/06/2022    POTASSIUM 3.5 08/05/2022    CHLORIDE 99 08/08/2022    CHLORIDE 93 (L) 08/07/2022    CHLORIDE 94 (L) 08/06/2022    CHLORIDE 95 (L) 08/05/2022    CHLORIDE 95 (L) 08/04/2022    CO2 24 08/08/2022    CO2 26 08/07/2022    CO2 24 08/06/2022    CO2 25 08/05/2022    CO2 24 08/04/2022    BUN 13.5 08/08/2022    BUN 20.5 08/07/2022    BUN 21.1 08/06/2022    BUN 19.2 08/05/2022    BUN 18.2 08/04/2022    CR 0.78 08/08/2022    CR 1.03 08/07/2022    CR 0.98 08/06/2022    CR 0.86 08/05/2022    CR 0.76 08/04/2022    GLC 97 08/08/2022     (H) 08/07/2022     (H) 08/06/2022     (H) 08/05/2022     (H) 08/04/2022    NTBNPI 11,024 (H) 07/29/2022    NTBNPI 4,354 (H) 06/17/2022    AST 38 07/29/2022    AST 21 06/17/2022    ALT 23 07/29/2022    ALT 17 06/17/2022    ALKPHOS 151 (H) 07/29/2022    ALKPHOS 68 06/17/2022    BILITOTAL 1.2 07/29/2022    BILITOTAL 0.7 06/17/2022    INR 1.30 (H) 06/17/2022    INR 1.09 01/23/2022       Imaging: CT angio from June 2022  Reviewed the images for the kidney and the bladder as well as the ureter  No evidence of any mass lesions but thickened bladder wall was seen along with a small nonobstructing renal calculus        Assessment and Plan: Toney Denton is a(n) 71 year old  male with history of hematuria and a significant medical comorbidity and was on heparin which has been recently stopped.  Currently no active hematuria was seen is also been started on Flomax in view of retention of urine though he denies any prior lower urinary tract symptoms.  Recommendations:  1.  Okay to proceed ahead with voiding trial as the patient has been on tamsulosin for some time and has no prior history of obstructive lower urinary tract symptoms  2.  Hematuria seems to have settled down after withholding heparin.  Can  consider starting heparin if medically deemed necessary and reassess.  3.  He will need outpatient work-up for hematuria with urine cytology and cystoscopy.  We will arrange for a follow-up prior to discharge with urology clinic.  Thank you for letting us take part in his care.    Broderick Mcfarlane MD  [facility]

## 2022-08-08 NOTE — PROCEDURES
[facility]    Single Lumen Midline Placement    Date/Time: 8/8/2022 4:13 PM  Performed by: Lucho Ocampo RN  Authorized by: Archie Baca MD   Indications: vascular access      UNIVERSAL PROTOCOL   Site Marked: Yes  Prior Images Obtained and Reviewed:  Yes  Required items: Required blood products, implants, devices and special equipment available    Patient identity confirmed:  Verbally with patient  Patient was reevaluated immediately before administering moderate or deep sedation or anesthesia  Confirmation Checklist:  Patient's identity using two indicators, relevant allergies, procedure was appropriate and matched the consent or emergent situation and correct equipment/implants were available  Time out: Immediately prior to the procedure a time out was called    Universal Protocol: the Joint Commission Universal Protocol was followed    Preparation: Patient was prepped and draped in usual sterile fashion       ANESTHESIA    Anesthesia: See MAR for details  Local Anesthetic:  Lidocaine 1% without epinephrine  Anesthetic Total (mL):  3      SEDATION    Patient Sedated: No        Preparation: skin prepped with ChloraPrep  Skin prep agent: skin prep agent completely dried prior to procedure  Sterile barriers: maximum sterile barriers were used: cap, mask, sterile gown, sterile gloves, and large sterile sheet  Hand hygiene: hand hygiene performed prior to central venous catheter insertion  Type of line used: Midline  Catheter type: single lumen  Catheter size: 4 Fr  Placement method: venipuncture and ultrasound  Number of attempts: 1  Difficulty threading catheter: no  Successful placement: yes  Orientation: right    Location: brachial vein (lateral)  Tip Location: distal to axillary vein (upper arm)  Arm circumference: adults 15 cm  Extremity circumference: 31  Visible catheter length: 2  Internal length: 11 cm  Total catheter length: 13  Dressing and securement: adhesive securement  device, blood cleaned with CHG, chlorhexidine patch applied, transparent securement dressing and sterile dressing applied  Post procedure assessment: blood return through all ports and free fluid flow  PROCEDURE   Patient Tolerance:  Patient tolerated the procedure well with no immediate complications   Okay to use Midline. RN advised.

## 2022-08-08 NOTE — PROGRESS NOTES
[facility]  Infectious Disease Progress Note          Assessment and Plan:   IMPRESSION:    1.  A 71-year-old male, admitted with vague acute illness, including shaking chills, fever, malaise, some increased respiratory distress, diagnosis made of probable pneumonia based on the imaging and the clinical picture, but not entirely clear, not hypoxic, currently does have underlying lung disease, he has a positive blood culture for a gram-negative ayush that preliminarily appears to be an anaerobe, but being difficult to identify, probably a true bacteremia given anaerobe.  Source is unclear.  Conceivably aspiration and lungs, conceivably abdomen, but little to suggest that, is clinically improved without even covering anaerobes.  2.  Chronic obstructive lung disease, some flare-up and worsening, although not even on oxygen currently.  3.  Coronary artery disease with prior stents.  4.  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  5.  History of atrial fibrillation and intermittent ventricular tachy dysrhythmias, has a pacemaker and an AICD in place.  6.  Prior history of GI bleeding.  7.  Acute renal failure, resolved.  8.  Urinary retention with no signs of UTI.     RECOMMENDATIONS:  1 BC has defined the issue, BC is capnoctophyga, as this implies he does have a dog BUT no bite/imjury etc thus the bacteremia is unexplained and high level of concern re valve\AICD Follow-up cx neg and clinically better  Zosyn  sens  A certainty but likely can simplify  Discussed with cards TTE OK  ADEN likely Ok also certainly no op indication,plan IV course any way  Place nidline for this purpose likely rehab disposition  Discussed in detail pt and wife           Interval History:   no new complaints and doing well; no cp, sob, n/v/d, or abd pain. T down  2nd cx + and cx is capnocytophyga !!  Does have dog but no source of entry evident ADEN noted sens delaye by poor growth              Medications:  "      amiodarone  400 mg Oral Daily     aspirin  81 mg Oral Daily     atorvastatin  80 mg Oral QAM     DULoxetine  30 mg Oral At Bedtime     empagliflozin  10 mg Oral Daily     fentaNYL (PF)         lisinopril  2.5 mg Oral Daily     loratadine  10 mg Oral QAM     metoprolol succinate ER  100 mg Oral QAM     piperacillin-tazobactam  3.375 g Intravenous Q6H     sodium chloride (PF)  3 mL Intravenous Q8H     sodium chloride (PF)  3 mL Intracatheter Q8H     sodium chloride (PF)  3 mL Intracatheter Q8H     tamsulosin  0.4 mg Oral Daily     torsemide  10 mg Oral BID     umeclidinium-vilanterol  1 puff Inhalation QAM                  Physical Exam:   Blood pressure 96/54, pulse 70, temperature 98.4  F (36.9  C), temperature source Oral, resp. rate 18, height 1.715 m (5' 7.5\"), weight 103.5 kg (228 lb 1.6 oz), SpO2 93 %.  Wt Readings from Last 2 Encounters:   08/08/22 103.5 kg (228 lb 1.6 oz)   06/19/22 95.3 kg (210 lb 3.2 oz)     Vital Signs with Ranges  Temp:  [97.5  F (36.4  C)-98.4  F (36.9  C)] 98.4  F (36.9  C)  Pulse:  [69-88] 70  Resp:  [16-24] 18  BP: ()/(25-70) 96/54  SpO2:  [88 %-97 %] 93 %    Constitutional: Awake, alert, cooperative, no apparent distress looks well chronic ill appearance   Lungs: Clear to auscultation bilaterally, no crackles or wheezing   Cardiovascular: Regular rate and rhythm, normal S1 and S2, and no murmur noted   Abdomen: Normal bowel sounds, soft, non-distended, non-tender   Skin: No rashes, no cyanosis, no edema   Other:           Data:   All microbiology laboratory data reviewed.  Recent Labs   Lab Test 08/08/22 0814 08/07/22  0614 08/06/22  0622   WBC 8.2 9.2 11.8*   HGB 10.8* 10.6* 10.7*   HCT 34.8* 33.5* 32.6*    99 96    296 291     Recent Labs   Lab Test 08/08/22  0814 08/07/22  0614 08/06/22  0622   CR 0.78 1.03 0.98     No lab results found.  No lab results found.    Invalid input(s): POP    "

## 2022-08-08 NOTE — PLAN OF CARE
Orientation:  x4, drowsy this morning. Concerned about his diarrhea since yesterday   VS: stable this morning   Pain:  abdominal discomfort due to diarrhea. Some back pain. Cleaned and repositioned   Tele:  SR, AV paced, BBB, prolonged QT  Activity:  In bed this shift.   Resp:   No SOB, LS clear,   GI:  diarrhea, abdominal discomfort, NPO for ADEN.   : urine looks reddish, informed MD, Hep drip paused   Notable Labs:  K 3.8 Mag 2.0, - both need replacement per protocol. HB 10.8 stable   Skin: some bruising. Little scrape wound on chin, bleeding, upper PIV is bleeding,  Lines: upper PIV bleeding,  patent but not infusing right - got pulled during ADEN. Lower PIV SL   Other:  Midline placement today for antibiotic therapy at time of discharge   Plan:   TCU once medically ready for discharge

## 2022-08-08 NOTE — PROGRESS NOTES
ADEN done woth conscious sedation-  1mg Versed and 50mcg Fent given  VSS monitored- B/p lowered to 80's sys- monitored d/t CHF and low EF- up to 90's sys before returning pt to room-  Results per MD  Report off at bedside to RN

## 2022-08-08 NOTE — PLAN OF CARE
Goal Outcome Evaluation:      Admitting Diagnosis: CHF/ weakness  Pertinent History: xt tricuspid valve repair, CAD, cardiomyopathy,. For vitals and assessment please see flow sheet.   Living Situation: Home   Pain plan: c/o back pain prn Tylenol x1 given  Mobility: assistance, 2 people, lift team assistance  Baseline activity: with assistive equipment.   Alarms/Safety: BA.   LDA's: 2 PIV/ R arm.  Pertinent test results: K+ 3.8, Mg+ 1.9.  Consults: PT following.    Abnormals/Pending: hgb: 10.9  Other Cares/Comments:A & O, VSS except low BP, tele 100% Bi-V paced. LS diminished, hep drip infusing. Shaffer catheter in place. IV abx x 2 given. ADEN tomorrow, NPO after midnight..   Discharge Disposition: TBD.   Discharge Time: TBD.

## 2022-08-08 NOTE — PROGRESS NOTES
[facility]    Hospitalist Progress Note             Date of Admission:  7/29/2022                   Day of hospitalization: 10    Assessment and Plan:   Toney Denton is a 71 year old male with history of tricuspid valve repair, bioprosthetic mitral valve replacement for regurgitation, coronary artery disease, ischemic cardiomyopathy (EF 25% in 4/21), pacemaker placement in 7/19, biventricular implantable cvdvpvumotub-wmcmndenoiffa-ytrgrfl from BiV-P in 5/20, atrial fibrillation (s/p ablation/maze procedure), severe COPD, hypertension, dyslipidemia, and borderline diabetes who presented on 7/29 for weakness, fever and edema.     #Capnocytophaga bacteremia: Pt presented with SOB, fever, weakness.  Initial imaging remarkable for CT of chest showed right lower lung patchy consolidation and groundglass opacities consistent with atelectasis versus infection w trace bilateral effusions.  Laboratory evaluation showed sodium 133, chloride 96, BUN 37, creatinine 1.24, BNP 11,024, troponin 34, procalcitonin 5.04, white blood cells 3.2, hemoglobin 10.6, platelets 66, unremarkable urinalysis, and negative testing for COVID-19/influenza/RSV.    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   -Pt was started on ceftriaxone and doxycycline. He has improved.  ID consulted.  He was transitioned to Zosyn therapy on 8/3 for anaerobic coverage.  -Will reportedly need prolonged course of IV abx. Long line per infectious disease  -Per ID, will need ADEN performed for evaluation of endocarditis Possible mitral valve endocarditis on ADEN     #Acute on chronic HFrEF. Ischemic CM. CAD s/p CABG, stent to RCA into proximal PDA. History of bioprosthetic mitral valve replacement. History of tricuspid valve repair. History of pacemaker placement with upgrade to biventricular cardioverter- defibrillator. History of  atrial fibrillation with ablation/maze procedure.: TTE obtained on 08/01 showed EF 20-25% with global diffuse hypokinesis.  Flattened septum c/w RV pressure/volume overload. Bioprosthetic mitral valve noted w annuloplasty ring in tricuspid position.     -Cardiology consulted.  -Pt was previously on furosemide 60 mg TID.  He has been transitioned to Entresto on 8/3.  Bumex 2 mg twice daily added on 8/3.  This was further transitioned to torsemide 20 mg twice daily on 8/5. Diuretics held due to borderline blood pressures, continue to hold as appears Euvolemic  -Continue metoprolol, aspirin and statin, Jardiance held by cardiology     #LUE DVT: Pt with swelling of LUE.  US done on 08/06 showed near occlusive thombosis of mid left subclavian vein to left axillary vein.  Started on heparin gtt, now held in the setting of hematuria  -He has history of GIB in 06/2022 so would continue heparin gtt over weekend so monitor for any recurrent bleeding.      #Intermittent runs of ventricular tachycardia: Replace electrolytes as needed.  Continue prior to admission beta-blocker.  Amiodarone added as below.  Improved  -Cardiology consulted 8/1/22 and Amiodarone was started. Continue to monitor. If Vtach persists Toney might need to transfer to [facility] where he gets his cardiac care for repeat VT ablation.      #Acute kidney injury: Possibly due to urine retention and/or congestive heart failure.  Improved.      #Mild hyponatremia: In the setting of diuresis.  Sodium 129 on 8/5. Monitor improved today     #Urinary retention  # hematuria   Patient had to be straight cathed multiple times.  Das catheter was placed and Flomax was started.    -Will do voiding trial on 08/06. Has das in place  - urology consult placed for hematuria, will hold heparin and monitor hemoglobin      #Thrombocytopenia: Possibly related to infection. Improved. Will monitor.      #COPD, without acute exacerbation: Continue prior to admission  "albuterol and Anoro Ellipta inhalers.     #Generalized weakness, left hip pain: Patient has had generalized weakness prior to admission.    He had a left intertrochanteric femur fracture with ORIF in January 2022.  He had previously been using a walker and then graduated to a cane.  His wife had noted that he had increased weakness.  X-ray of the left hip obtained at that shows no malposition of hardware but severe osteoarthritis bilaterally.           # Code status: Full   # Anticipated discharge date and Disposition: 2-3 days  # DVT: SCDs  # IVF: none                      Sheila Bojorquez MD  Text Page (7am - 6pm, M-F)               Subjective   Chief Complaint: weakness  Subjective: With minimal complaints other than his chronic osteoarthritic hip pain.  No fevers chills nausea vomiting abdominal pain chest pain or shortness of breath.       Objective   BP 96/54 (BP Location: Right arm, Patient Position: Semi-Hillman's, Cuff Size: Adult Regular)   Pulse 70   Temp 98.4  F (36.9  C) (Oral)   Resp 18   Ht 1.715 m (5' 7.5\")   Wt 103.5 kg (228 lb 1.6 oz)   SpO2 93%   BMI 35.20 kg/m       Physical Exam  General: Pt in NAD, normal appearance  HEENT: OP clear MMM, no JVD  Lungs: Bibasilar crackles, normal breathing  without accessory muscle usage, no wheezing, rhonchi or crackles  Cardiac: +S1, S2, RRR,+murmur, no edema  Abdominal: normal bowel sounds, NT/ND, no hepatosplenomegaly  Skin: warm, dry, normal turgor, no rash  Psyche: A& O x3, appropriate affect             Intake/Output Summary (Last 24 hours) at 8/8/2022 1452  Last data filed at 8/8/2022 1448  Gross per 24 hour   Intake 243 ml   Output 1525 ml   Net -1282 ml           Labs and Imaging Results:      Recent Labs   Lab 08/08/22 0814 08/07/22  0614   WBC 8.2 9.2   HGB 10.8* 10.6*    296        Recent Labs   Lab 08/08/22 0814 08/07/22  0614   * 128*   CO2 24 26   BUN 13.5 20.5      No results for input(s): INR, PTT in the last 168 hours.   No " results for input(s): CKMB in the last 168 hours.    Invalid input(s): TROPONINT   No results for input(s): ALBUMIN, AST, ALT, ALKPHOS, BILITOT in the last 168 hours.     Micro:     Radio:  Transesophageal Echocardiogram   Final Result      US Upper Extremity Venous Duplex Left Portable   Final Result   IMPRESSION:    1.  Near occlusive thrombosis of the mid left subclavian vein to the left axillary vein.      Findings in this study discussed with the patient's nurse Nadja by Dr. Pablo Fernandez by telephone on 08/05/2022 at 1900 hours.      Echocardiogram Limited   Final Result      XR Pelvis w Hip Left 1 View   Final Result   IMPRESSION:   1.  Open reduction internal fixation left femur intertrochanteric   fracture with dynamic femoral neck screw and lateral plate-screw   fixation. Orthopedic hardware is intact. The fracture line is not   visible, consistent with osseous healing. No new or additional   fracture is evident.   2.  Advanced left hip degenerative arthrosis, unchanged.   3.  Advanced right hip degenerative arthrosis, unchanged.   4.  Degenerative changes in the lower lumbar spine.      ROSANNA RANGEL MD            SYSTEM ID:  [id number]      Echocardiogram Complete   Final Result      CT Chest w/o Contrast   Final Result   IMPRESSION:    1.  Trace bilateral pleural effusions and left lower lobe and to a lesser extent right lower lobe patchy consolidative and groundglass pulmonary opacities, could represent atelectasis versus infection.   2.  Cardiomegaly and moderate atherosclerotic vascular calcification of the coronary arteries.   3.  Hepatic steatosis.            XR Chest 2 Views   Final Result   IMPRESSION: Stable chest with again seen loss of the normally seen left hemidiaphragm most typical for underlying infiltrate/atelectasis in the left lower lobe. Prior postoperative change of sternotomy. Transvenous wires are in stable position. No active    CHF/volume overload identified.      Cardiac  Device Check - Inpatient    (Results Pending)           Medications:      Scheduled Meds:      amiodarone  400 mg Oral Daily     aspirin  81 mg Oral Daily     atorvastatin  80 mg Oral QAM     DULoxetine  30 mg Oral At Bedtime     empagliflozin  10 mg Oral Daily     fentaNYL (PF)         lisinopril  2.5 mg Oral Daily     loratadine  10 mg Oral QAM     magnesium oxide  400 mg Oral Q4H     metoprolol succinate ER  100 mg Oral QAM     piperacillin-tazobactam  3.375 g Intravenous Q6H     potassium chloride  20 mEq Oral Once     sodium chloride (PF)  3 mL Intravenous Q8H     sodium chloride (PF)  3 mL Intracatheter Q8H     sodium chloride (PF)  3 mL Intracatheter Q8H     tamsulosin  0.4 mg Oral Daily     torsemide  10 mg Oral BID     umeclidinium-vilanterol  1 puff Inhalation QAM     Continuous Infusions:      - MEDICATION INSTRUCTIONS -       [Held by provider] heparin Stopped (08/08/22 1005)     - MEDICATION INSTRUCTIONS -       PRN Meds:  acetaminophen, albuterol, - MEDICATION INSTRUCTIONS -, HOLD MEDICATION, HOLD MEDICATION, HOLD MEDICATION, lidocaine 4%, lidocaine (buffered or not buffered), lidocaine (buffered or not buffered), - MEDICATION INSTRUCTIONS -, melatonin, naloxone **OR** naloxone **OR** naloxone **OR** naloxone, nitroGLYcerin, oxyCODONE, sodium chloride (PF), sodium chloride (PF), sodium chloride (PF)

## 2022-08-08 NOTE — PRE-PROCEDURE
GENERAL PRE-PROCEDURE:   Procedure:  ADEN  Date/Time:  8/8/2022 11:17 AM    Written consent obtained?: Yes    Risks and benefits: Risks, benefits and alternatives were discussed    Consent given by:  Patient  Patient states understanding of procedure being performed: Yes    Patient's understanding of procedure matches consent: Yes    Procedure consent matches procedure scheduled: Yes    Expected level of sedation:  Moderate  Appropriately NPO:  Yes  ASA Class:  4  Mallampati  :  Grade 2- soft palate, base of uvula, tonsillar pillars, and portion of posterior pharyngeal wall visible  Lungs:  Lungs clear with good breath sounds bilaterally  Heart:  Normal heart sounds and rate, systolic murmur and diastolic murmur  History & Physical reviewed:  History and physical reviewed and no updates needed  Statement of review:  I have reviewed the lab findings, diagnostic data, medications, and the plan for sedation      The risks and benefits of ADEN have been discussed with the patient in detail including the risks of serious complications including rare risk of death, esophageal tear or trauma, emergent surgery, pharyngeal hematoma/ trauma, methemoglobinemia, gastrointestinal bleeding etc. Patient denies any active upper GI issues or bleeding or difficult swallowing. Denies prior sedation related problems. The patient understands the above mentioned risks and is willing to proceed with the ADEN.

## 2022-08-09 NOTE — PLAN OF CARE
"VSS on RA. A/O x4, forgetful. Assist of 2 w/ lift. Back pain 9/10 per pt, not well controlled with oxy - given per MAR. Pt declined tylenol, heat pack or ice. Shaffer removed this shift, pt voided 100 mL spontaneously in urinal at bedside 3 hours after removal. 1 PIV, 1 midline - no blood return, flushing well. Pt calm & cooperative. Possible discharge to TCU in 1-2 days.       Problem: Plan of Care - These are the overarching goals to be used throughout the patient stay.    Goal: Plan of Care Review/Shift Note  Description: The Plan of Care Review/Shift note should be completed every shift.  The Outcome Evaluation is a brief statement about your assessment that the patient is improving, declining, or no change.  This information will be displayed automatically on your shift note.  8/9/2022 1411 by Zoe López, DAWSON  Outcome: Ongoing, Progressing  8/9/2022 1410 by Zoe López RN  Outcome: Ongoing, Not Progressing  Goal: Patient-Specific Goal (Individualized)  Description: You can add care plan individualizations to a care plan. Examples of Individualization might be:  \"Parent requests to be called daily at 9am for status\", \"I have a hard time hearing out of my right ear\", or \"Do not touch me to wake me up as it startles me\".  8/9/2022 1411 by Zoe López, DAWSON  Outcome: Ongoing, Progressing  8/9/2022 1410 by Zoe López, DAWSON  Outcome: Ongoing, Not Progressing  Goal: Absence of Hospital-Acquired Illness or Injury  8/9/2022 1411 by Zoe López RN  Outcome: Ongoing, Progressing  8/9/2022 1410 by Zoe López, RN  Outcome: Ongoing, Not Progressing  Intervention: Identify and Manage Fall Risk  Recent Flowsheet Documentation  Taken 8/9/2022 5608 by Zoe López, RN  Safety Promotion/Fall Prevention:    activity supervised    assistive device/personal items within reach    bed alarm on    clutter free environment maintained    fall prevention program maintained    increased rounding and " observation    increase visualization of patient    lighting adjusted    nonskid shoes/slippers when out of bed    patient and family education    room door open    room organization consistent    safety round/check completed    supervised activity  Intervention: Prevent Skin Injury  Recent Flowsheet Documentation  Taken 8/9/2022 0947 by Zoe López RN  Body Position: supine, head elevated  Intervention: Prevent and Manage VTE (Venous Thromboembolism) Risk  Recent Flowsheet Documentation  Taken 8/9/2022 0947 by Zoe López RN  VTE Prevention/Management: SCDs (sequential compression devices) off  Activity Management: activity adjusted per tolerance  Intervention: Prevent Infection  Recent Flowsheet Documentation  Taken 8/9/2022 0947 by Zoe López RN  Infection Prevention:    hand hygiene promoted    rest/sleep promoted    personal protective equipment utilized  Goal: Optimal Comfort and Wellbeing  8/9/2022 1411 by Zoe López RN  Outcome: Ongoing, Progressing  8/9/2022 1410 by Zoe López RN  Outcome: Ongoing, Not Progressing  Goal: Readiness for Transition of Care  8/9/2022 1411 by Zoe López RN  Outcome: Ongoing, Progressing  8/9/2022 1410 by Zoe López RN  Outcome: Ongoing, Not Progressing     Problem: Adjustment to Illness (Heart Failure)  Goal: Optimal Coping  8/9/2022 1411 by Zoe López RN  Outcome: Ongoing, Progressing  8/9/2022 1410 by Zoe López RN  Outcome: Ongoing, Not Progressing     Problem: Cardiac Output Decreased (Heart Failure)  Goal: Optimal Cardiac Output  8/9/2022 1411 by Zoe López RN  Outcome: Ongoing, Progressing  8/9/2022 1410 by Zoe López RN  Outcome: Ongoing, Not Progressing     Problem: Dysrhythmia (Heart Failure)  Goal: Stable Heart Rate and Rhythm  8/9/2022 1411 by Zoe López RN  Outcome: Ongoing, Progressing  8/9/2022 1410 by Zoe López RN  Outcome: Ongoing, Not Progressing     Problem:  Fluid Imbalance (Heart Failure)  Goal: Fluid Balance  8/9/2022 1411 by Zoe López RN  Outcome: Ongoing, Progressing  8/9/2022 1410 by Zoe López RN  Outcome: Ongoing, Not Progressing     Problem: Functional Ability Impaired (Heart Failure)  Goal: Optimal Functional Ability  8/9/2022 1411 by Zoe López RN  Outcome: Ongoing, Progressing  8/9/2022 1410 by Zoe López RN  Outcome: Ongoing, Not Progressing  Intervention: Optimize Functional Ability  Recent Flowsheet Documentation  Taken 8/9/2022 0947 by Zoe López RN  Activity Management: activity adjusted per tolerance     Problem: Oral Intake Inadequate (Heart Failure)  Goal: Optimal Nutrition Intake  8/9/2022 1411 by Zoe López RN  Outcome: Ongoing, Progressing  8/9/2022 1410 by Zoe López RN  Outcome: Ongoing, Not Progressing     Problem: Respiratory Compromise (Heart Failure)  Goal: Effective Oxygenation and Ventilation  8/9/2022 1411 by Zoe López RN  Outcome: Ongoing, Progressing  8/9/2022 1410 by Zoe López RN  Outcome: Ongoing, Not Progressing  Intervention: Promote Airway Secretion Clearance  Recent Flowsheet Documentation  Taken 8/9/2022 0947 by Zoe López RN  Cough And Deep Breathing: done independently per patient     Problem: Sleep Disordered Breathing (Heart Failure)  Goal: Effective Breathing Pattern During Sleep  8/9/2022 1411 by Zoe López RN  Outcome: Ongoing, Progressing  8/9/2022 1410 by Zoe López RN  Outcome: Ongoing, Not Progressing

## 2022-08-09 NOTE — PLAN OF CARE
Goal Outcome Evaluation:    Plan of Care Reviewed With: patient   Patient alert and oriented when fully awake & calm and cooperative. See flow sheets.  Edema of extremities and scrotum much improved when compared to 1 week ago. IV midline patent and no complaints from patient regarding same.

## 2022-08-09 NOTE — PROVIDER NOTIFICATION
Paged provider --    Das removed this a.m., pt voided 100 mL since. Bladder scanned at 368 mL just now, no urge to void. There's an order from 8/6 to replace das if retaining >300 mL. Please advise if you want the das back in or try straight cath. Thanks.

## 2022-08-09 NOTE — PROGRESS NOTES
[facility]    Hospitalist Progress Note             Date of Admission:  7/29/2022                   Day of hospitalization: 11    Assessment and Plan:   Toney Denton is a 71 year old male with history of tricuspid valve repair, bioprosthetic mitral valve replacement for regurgitation, coronary artery disease, ischemic cardiomyopathy (EF 25% in 4/21), pacemaker placement in 7/19, biventricular implantable ftusnxqueejv-jkpjwlkyxroff-hdtwhqo from BiV-P in 5/20, atrial fibrillation (s/p ablation/maze procedure), severe COPD, hypertension, dyslipidemia, and borderline diabetes who presented on 7/29 for weakness, fever and edema.     #Capnocytophaga bacteremia: Pt presented with SOB, fever, weakness.  Initial imaging remarkable for CT of chest showed right lower lung patchy consolidation and groundglass opacities consistent with atelectasis versus infection w trace bilateral effusions.  Laboratory evaluation showed sodium 133, chloride 96, BUN 37, creatinine 1.24, BNP 11,024, troponin 34, procalcitonin 5.04, white blood cells 3.2, hemoglobin 10.6, platelets 66, unremarkable urinalysis, and negative testing for COVID-19/influenza/RSV.    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   -Pt was started on ceftriaxone and doxycycline. He has improved.  ID consulted.  He was transitioned to Zosyn therapy on 8/3 for anaerobic coverage.  -Will reportedly need prolonged course of IV abx. Long line per infectious disease  - ADEN shows fibrinous strands, but in the appropriate clinical context could be vegetations, Defer to ID length of therapy     #Acute on chronic HFrEF. Ischemic CM. CAD s/p CABG, stent to RCA into proximal PDA. History of bioprosthetic mitral valve replacement. History of tricuspid valve repair. History of pacemaker placement with upgrade to biventricular  cardioverter- defibrillator. History of atrial fibrillation with ablation/maze procedure.: TTE obtained on 08/01 showed EF 20-25% with global diffuse hypokinesis.  Flattened septum c/w RV pressure/volume overload. Bioprosthetic mitral valve noted w annuloplasty ring in tricuspid position.     -Cardiology consulted.  -Pt was previously on furosemide 60 mg TID.  He has been transitioned to Entresto on 8/3.  Bumex 2 mg twice daily added on 8/3.  This was further transitioned to torsemide 20 mg twice daily on 8/5. Diuretics held due to borderline blood pressures, continue to hold as appears Euvolemic,  - Currently on torsemide 10 mg daily can change to twice daily tomorrow if his blood pressures remain normotensive.  -Continue metoprolol, aspirin and statin, Jardiance      #LUE DVT: Pt with swelling of LUE.  US done on 08/06 showed near occlusive thombosis of mid left subclavian vein to left axillary vein.   -I have restarted heparin as hematuria has resolved can change to oral anticoagulation tomorrow if hematuria continues to improve.  -He has history of GIB in 06/2022 so would continue heparin gtt over weekend so monitor for any recurrent bleeding.      #Intermittent runs of ventricular tachycardia: Replace electrolytes as needed.  Continue prior to admission beta-blocker.  Amiodarone added as below.  Improved  -Cardiology consulted 8/1/22 and Amiodarone was started. Continue to monitor. If Vtach persists Toney might need to transfer to [facility] where he gets his cardiac care for repeat VT ablation.      #Acute kidney injury: Possibly due to urine retention and/or congestive heart failure.  Improved.      #Mild hyponatremia: In the setting of diuresis.  Sodium 129 on 8/5. Monitor improved today     #Urinary retention  # hematuria   Patient had to be straight cathed multiple times.  Shaffer catheter was placed and Flomax was started.    -Plan for voiding trial today  - urology consult and will need outpatient  "urine cytology with cystoscopy    #Thrombocytopenia: Possibly related to infection. Improved. Will monitor.      #COPD, without acute exacerbation: Continue prior to admission albuterol and Anoro Ellipta inhalers.     #Generalized weakness, left hip pain: Patient has had generalized weakness prior to admission.    He had a left intertrochanteric femur fracture with ORIF in January 2022.  He had previously been using a walker and then graduated to a cane.  His wife had noted that he had increased weakness.  X-ray of the left hip obtained at that shows no malposition of hardware but severe osteoarthritis bilaterally.           # Code status: Full   # Anticipated discharge date and Disposition: Needs long-term antibiotics, seems close to medically ready anticipate discharge in 1 to 2 days  # DVT: SCDs  # IVF: none                      Sheila Bojorquez MD  Text Page (7am - 6pm, M-F)               Subjective   Chief Complaint: weakness  Subjective: With minimal complaints other than his chronic osteoarthritic hip pain.  No fevers chills nausea vomiting abdominal pain chest pain or shortness of breath.  Patient wants to get out of bed today       Objective   /69 (BP Location: Left arm)   Pulse 88   Temp 97.6  F (36.4  C) (Oral)   Resp 18   Ht 1.715 m (5' 7.5\")   Wt 104.6 kg (230 lb 9.6 oz)   SpO2 94%   BMI 35.58 kg/m       Physical Exam  General: Pt in NAD, normal appearance  HEENT: OP clear MMM, no JVD  Lungs: Bibasilar crackles, normal breathing  without accessory muscle usage, no wheezing, rhonchi or crackles  Cardiac: +S1, S2, RRR,+murmur, no edema  Abdominal: normal bowel sounds, NT/ND, no hepatosplenomegaly  Skin: warm, dry, normal turgor, no rash  Psyche: A& O x3, appropriate affect             Intake/Output Summary (Last 24 hours) at 8/8/2022 1452  Last data filed at 8/8/2022 1448  Gross per 24 hour   Intake 243 ml   Output 1525 ml   Net -1282 ml           Labs and Imaging Results:      Recent Labs   Lab " 08/09/22  0549 08/08/22  1607 08/08/22  0814   WBC 7.0  --  8.2   HGB 10.0*  10.0* 10.6* 10.8*     --  300        Recent Labs   Lab 08/09/22  0549 08/08/22  0814   * 133*   CO2 25 24   BUN 11.4 13.5      No results for input(s): INR, PTT in the last 168 hours.   No results for input(s): CKMB in the last 168 hours.    Invalid input(s): TROPONINT   No results for input(s): ALBUMIN, AST, ALT, ALKPHOS, BILITOT in the last 168 hours.     Micro:     Radio:  Transesophageal Echocardiogram   Final Result      US Upper Extremity Venous Duplex Left Portable   Final Result   IMPRESSION:    1.  Near occlusive thrombosis of the mid left subclavian vein to the left axillary vein.      Findings in this study discussed with the patient's nurse Nadja by Dr. Pablo Fernandez by telephone on 08/05/2022 at 1900 hours.      Echocardiogram Limited   Final Result      XR Pelvis w Hip Left 1 View   Final Result   IMPRESSION:   1.  Open reduction internal fixation left femur intertrochanteric   fracture with dynamic femoral neck screw and lateral plate-screw   fixation. Orthopedic hardware is intact. The fracture line is not   visible, consistent with osseous healing. No new or additional   fracture is evident.   2.  Advanced left hip degenerative arthrosis, unchanged.   3.  Advanced right hip degenerative arthrosis, unchanged.   4.  Degenerative changes in the lower lumbar spine.      ROSANNA RANGEL MD            SYSTEM ID:  [id number]      Echocardiogram Complete   Final Result      CT Chest w/o Contrast   Final Result   IMPRESSION:    1.  Trace bilateral pleural effusions and left lower lobe and to a lesser extent right lower lobe patchy consolidative and groundglass pulmonary opacities, could represent atelectasis versus infection.   2.  Cardiomegaly and moderate atherosclerotic vascular calcification of the coronary arteries.   3.  Hepatic steatosis.            XR Chest 2 Views   Final Result   IMPRESSION: Stable chest  with again seen loss of the normally seen left hemidiaphragm most typical for underlying infiltrate/atelectasis in the left lower lobe. Prior postoperative change of sternotomy. Transvenous wires are in stable position. No active    CHF/volume overload identified.      Cardiac Device Check - Inpatient    (Results Pending)           Medications:      Scheduled Meds:      amiodarone  400 mg Oral Daily     aspirin  81 mg Oral Daily     atorvastatin  80 mg Oral QAM     DULoxetine  30 mg Oral At Bedtime     empagliflozin  10 mg Oral Daily     lisinopril  2.5 mg Oral Daily     loratadine  10 mg Oral QAM     metoprolol succinate ER  100 mg Oral QAM     piperacillin-tazobactam  3.375 g Intravenous Q6H     sodium chloride (PF)  3 mL Intravenous Q8H     sodium chloride (PF)  3 mL Intracatheter Q8H     sodium chloride (PF)  3 mL Intracatheter Q8H     tamsulosin  0.4 mg Oral Daily     torsemide  10 mg Oral Daily     umeclidinium-vilanterol  1 puff Inhalation QAM     Continuous Infusions:      - MEDICATION INSTRUCTIONS -       heparin 1,700 Units/hr (08/09/22 1128)     - MEDICATION INSTRUCTIONS -       PRN Meds:  acetaminophen, albuterol, - MEDICATION INSTRUCTIONS -, HOLD MEDICATION, HOLD MEDICATION, HOLD MEDICATION, lidocaine 4%, lidocaine (buffered or not buffered), lidocaine (buffered or not buffered), - MEDICATION INSTRUCTIONS -, melatonin, naloxone **OR** naloxone **OR** naloxone **OR** naloxone, nitroGLYcerin, oxyCODONE, sodium chloride (PF), sodium chloride (PF), sodium chloride (PF)

## 2022-08-09 NOTE — PROGRESS NOTES
[facility] Progress Note          Assessment and Plan:     Assessment:    Gross hematuria    Urinary retention    Elevated troponin    Bilateral leg edema    Elevated brain natriuretic peptide (BNP) level    Acute renal failure, unspecified acute renal failure type (H)      Plan:   -Shaffer catheter was removed.  Patient denies being able to urinate yet.  If patient goes back into urinary retention would recommend replacement of Shaffer catheter and follow up outpatient with urology.  -Continue Flomax 0.4 mg.  -We will need outpatient urine cytology and cystoscopy with Dr. Mcfarlane.  Our office will coordinate.  - Can  consider starting heparin if medically deemed necessary and reassess.  -Will sign off.  Please contact us with any urological concerns.    Alisha Spears PA-C   [facility]  [phone]               Interval History:     Shaffer catheter was removed.  Patient denies having it urinated.  Uncertain if he is still having hematuria.  He has not been out of bed.  Afebrile no tachycardia.  Hemoglobin 10.0 (10.6 (10.8)).  Renin 0.83 EGFR greater than 90. Denies N/V/F/C/SOB/CP.              Review of Systems:     The 5 point Review of Systems is negative other than noted in the HPI             Medications:     Current Facility-Administered Medications Ordered in Epic   Medication Dose Route Frequency Last Rate Last Admin     acetaminophen (TYLENOL) tablet 1,000 mg  1,000 mg Oral Q6H PRN   1,000 mg at 08/09/22 0243     albuterol (PROVENTIL HFA/VENTOLIN HFA) inhaler  1-2 puff Inhalation Q4H PRN         amiodarone (PACERONE) tablet 400 mg  400 mg Oral Daily   400 mg at 08/09/22 0936     aspirin EC tablet 81 mg  81 mg Oral Daily   81 mg at 08/09/22 0936     atorvastatin (LIPITOR) tablet 80 mg  80 mg Oral QAM   80 mg at 08/09/22 0936     DULoxetine (CYMBALTA) DR capsule 30 mg  30 mg Oral At Bedtime   30 mg at 08/08/22 2209     empagliflozin (JARDIANCE) tablet 10 mg  10 mg Oral Daily   10  mg at 08/09/22 0936     Give   of usual dose of LONG ACTING insulin AM of procedure IF diabetic   Does not apply Continuous PRN         heparin infusion 25,000 units in D5W 250 mL ANTICOAGULANT  0-5,000 Units/hr Intravenous Continuous 17 mL/hr at 08/09/22 1128 1,700 Units/hr at 08/09/22 1128     HOLD: Insulin - RAPID/SHORT acting AM of procedure IF diabetic   Does not apply HOLD         HOLD: Insulin - REGULAR AM of procedure IF diabetic   Does not apply HOLD         HOLD: Oral hypoglycemics AM of procedure IF diabetic   Does not apply HOLD         lidocaine (LMX4) cream   Topical Q1H PRN         lidocaine 1 % 0.1-1 mL  0.1-1 mL Other Q1H PRN         lidocaine 1 % 1 mL  1 mL Other Q1H PRN         lisinopril (ZESTRIL) tablet 2.5 mg  2.5 mg Oral Daily   2.5 mg at 08/09/22 0936     loratadine (CLARITIN) tablet 10 mg  10 mg Oral QAM   10 mg at 08/09/22 0936     May take oral meds with a sip of water, the morning of ADEN procedure.   Does not apply Continuous PRN         melatonin tablet 1 mg  1 mg Oral At Bedtime PRN         metoprolol succinate ER (TOPROL XL) 24 hr tablet 100 mg  100 mg Oral QAM   100 mg at 08/09/22 0936     naloxone (NARCAN) injection 0.2 mg  0.2 mg Intravenous Q2 Min PRN        Or     naloxone (NARCAN) injection 0.4 mg  0.4 mg Intravenous Q2 Min PRN        Or     naloxone (NARCAN) injection 0.2 mg  0.2 mg Intramuscular Q2 Min PRN        Or     naloxone (NARCAN) injection 0.4 mg  0.4 mg Intramuscular Q2 Min PRN         nitroGLYcerin (NITROSTAT) sublingual tablet 0.4 mg  0.4 mg Sublingual Q5 Min PRN         oxyCODONE (ROXICODONE) tablet 5-10 mg  5-10 mg Oral Q4H PRN   10 mg at 08/09/22 0935     piperacillin-tazobactam (ZOSYN) infusion 3.375 g  3.375 g Intravenous Q6H 100 mL/hr at 08/09/22 1040 3.375 g at 08/09/22 1040     sodium chloride (PF) 0.9% PF flush 3 mL  3 mL Intravenous Q1H PRN         sodium chloride (PF) 0.9% PF flush 3 mL  3 mL Intravenous Q8H   3 mL at 08/09/22 0944     sodium chloride (PF)  0.9% PF flush 3 mL  3 mL Intracatheter Q1H PRN         sodium chloride (PF) 0.9% PF flush 3 mL  3 mL Intracatheter Q8H   3 mL at 08/09/22 0941     sodium chloride (PF) 0.9% PF flush 3 mL  3 mL Intracatheter Q8H   10 mL at 08/09/22 0944     sodium chloride (PF) 0.9% PF flush 3 mL  3 mL Intracatheter q1 min prn   3 mL at 08/02/22 1742     tamsulosin (FLOMAX) capsule 0.4 mg  0.4 mg Oral Daily   0.4 mg at 08/09/22 0936     torsemide (DEMADEX) tablet 10 mg  10 mg Oral Daily   10 mg at 08/09/22 0936     umeclidinium-vilanterol (ANORO ELLIPTA) 62.5-25 MCG/INH oral inhaler 1 puff  1 puff Inhalation QAM   1 puff at 08/09/22 0946     No current [facility]-ordered outpatient medications on file.                  Physical Exam:   Vitals were reviewed  Patient Vitals for the past 8 hrs:   BP Temp Temp src Pulse Resp SpO2 Weight   08/09/22 0805 134/67 97.6  F (36.4  C) Oral 70 18 98 % --   08/09/22 0702 -- -- -- -- -- -- 104.6 kg (230 lb 9.6 oz)   08/09/22 0430 102/45 97.8  F (36.6  C) Oral 69 18 93 % --     GEN: NAD, lying in bed  EYES: EOMI  MOUTH: MMM  NECK: Supple  SKIN: Warm  ABD: soft  NEURO: AAO  URO: Shaffer removed.  Has yet to urinate.           Data:     Lab Results   Component Value Date    NTBNPI 11,024 (H) 07/29/2022    NTBNPI 4,354 (H) 06/17/2022     Lab Results   Component Value Date    WBC 7.0 08/09/2022    WBC 8.2 08/08/2022    WBC 9.2 08/07/2022    HGB 10.0 (L) 08/09/2022    HGB 10.0 (L) 08/09/2022    HGB 10.6 (L) 08/08/2022    HCT 32.3 (L) 08/09/2022    HCT 34.8 (L) 08/08/2022    HCT 33.5 (L) 08/07/2022     08/09/2022     08/08/2022    MCV 99 08/07/2022     08/09/2022     08/08/2022     08/07/2022     Lab Results   Component Value Date    INR 1.30 (H) 06/17/2022    INR 1.09 01/23/2022

## 2022-08-09 NOTE — PROGRESS NOTES
[facility]  Infectious Disease Progress Note          Assessment and Plan:   IMPRESSION:    1.  A 71-year-old male, admitted with vague acute illness, including shaking chills, fever, malaise, some increased respiratory distress, diagnosis made of probable pneumonia based on the imaging and the clinical picture, but not entirely clear, not hypoxic, currently does have underlying lung disease, he has a positive blood culture for a gram-negative ayush that preliminarily appears to be an anaerobe, but being difficult to identify, probably a true bacteremia given anaerobe.  Source is unclear.  Conceivably aspiration and lungs, conceivably abdomen, but little to suggest that, is clinically improved without even covering anaerobes.  2.  Chronic obstructive lung disease, some flare-up and worsening, although not even on oxygen currently.  3.  Coronary artery disease with prior stents.  4.  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  5.  History of atrial fibrillation and intermittent ventricular tachy dysrhythmias, has a pacemaker and an AICD in place.  6.  Prior history of GI bleeding.  7.  Acute renal failure, resolved.  8.  Urinary retention with no signs of UTI.     RECOMMENDATIONS:  1 BC has defined the issue, BC is capnoctophyga, as this implies he does have a dog BUT no bite/imjury etc thus the bacteremia is unexplained and high level of concern re valve\AICD Follow-up cx neg and clinically better  Zosyn  sens  Is a  certainty but likely can simplify  Discussed with cards TTE OK  ADEN likely Ok also certainly no op indication,plan IV course any way  Placed midline for this purpose likely rehab disposition  Hope wns back in AM an will put in orders for rehab ABX tomorrow           Interval History:   no new complaints and doing well; no cp, sob, n/v/d, or abd pain. T down  2nd cx + and cx is capnocytophyga !!  Does have dog but no source of entry evident ADEN noted sens delaye  "by poor growth              Medications:       amiodarone  400 mg Oral Daily     aspirin  81 mg Oral Daily     atorvastatin  80 mg Oral QAM     DULoxetine  30 mg Oral At Bedtime     empagliflozin  10 mg Oral Daily     lisinopril  2.5 mg Oral Daily     loratadine  10 mg Oral QAM     metoprolol succinate ER  100 mg Oral QAM     piperacillin-tazobactam  3.375 g Intravenous Q6H     sodium chloride (PF)  3 mL Intravenous Q8H     sodium chloride (PF)  3 mL Intracatheter Q8H     sodium chloride (PF)  3 mL Intracatheter Q8H     tamsulosin  0.4 mg Oral Daily     torsemide  10 mg Oral Daily     umeclidinium-vilanterol  1 puff Inhalation QAM                  Physical Exam:   Blood pressure 116/69, pulse 88, temperature 97.6  F (36.4  C), temperature source Oral, resp. rate 18, height 1.715 m (5' 7.5\"), weight 104.6 kg (230 lb 9.6 oz), SpO2 94 %.  Wt Readings from Last 2 Encounters:   08/09/22 104.6 kg (230 lb 9.6 oz)   06/19/22 95.3 kg (210 lb 3.2 oz)     Vital Signs with Ranges  Temp:  [97.6  F (36.4  C)-98.7  F (37.1  C)] 97.6  F (36.4  C)  Pulse:  [68-88] 88  Resp:  [18-22] 18  BP: ()/(41-69) 116/69  SpO2:  [93 %-98 %] 94 %    Constitutional: Awake, alert, cooperative, no apparent distress looks well chronic ill appearance   Lungs: Clear to auscultation bilaterally, no crackles or wheezing   Cardiovascular: Regular rate and rhythm, normal S1 and S2, and no murmur noted   Abdomen: Normal bowel sounds, soft, non-distended, non-tender   Skin: No rashes, no cyanosis, no edema   Other:           Data:   All microbiology laboratory data reviewed.  Recent Labs   Lab Test 08/09/22  0549 08/08/22  1607 08/08/22  0814 08/07/22  0614   WBC 7.0  --  8.2 9.2   HGB 10.0*  10.0* 10.6* 10.8* 10.6*   HCT 32.3*  --  34.8* 33.5*     --  100 99     --  300 296     Recent Labs   Lab Test 08/09/22  0549 08/08/22  0814 08/07/22  0614   CR 0.83 0.78 1.03     No lab results found.  No lab results found.    Invalid input(s): " UC

## 2022-08-09 NOTE — PROGRESS NOTES
[facility]    Cardiology Progress Note    Primary Cardiologist: Dr. Giron - [email or web address]    Date of Admission: 07/29/2022  Service Date: 08/09/2022    Summary:  Mr. Toney Denton is a very pleasant 71 year old male with a complex past medical history of coronary artery disease, mitral valve replacement with a bioprosthetic valve, tricuspid valve repair, permanent atrial fibrillation status post Maze procedure and left atrial clip, V. tach storm requiring ablation twice in the past, BiV defibrillator, COPD, rheumatoid arthritis, prednisone induced diabetes mellitus, hypertension, hyperlipidemia, anemia, thrombocytopenia, dilated cardiomyopathy, carotid artery stenosis, left open hip reduction and internal fixation earlier this year,  recent GI bleed complicated by hemorrhagic shock now admitted on 07/29/22 after presenting with fever, diarrhea and peripheral edema. Cardiology was consulted for acute on chronic HFrEF.    Interval History   Patient is resting comfortably. Primary complaint today is low back pain. He otherwise denies symptoms of chest pain, palpitations, shortness of breath, dizziness, presyncope, or syncope. We reviewed his ADEN findings and the plan of care as outlined below. He stated understanding and agreement.     Telemetry: Paced; occasional ectopic beats; No recurrence of VT noted overnight or this morning    Assessment & Plan   1. Acute on chronic HFrEF  - LVEF 20-25% by ADEN 8/8/22. TTE 4/2021 through [facility] system noted LVEF 25-30%. Now net neg approximately 10 L since admission. Weight at 230 lbs today. Unclear baseline. Patient reports previously was around the 195-200 lbs range prior to hip fracture this past January. He has been inactive since with weight trending up.   - Patient has been intermittently hypotensive which unfortunately has limited titration of GDMT. Entresto needed to be stopped and he is now back on low dose lisinopril 2.5 mg daily. Continues  on Jardiance 10 mg daily and beta-blocker as noted below.     2. VT s/p ablation 2020 and 2021 s/p CRT-D  - Started on amiodarone this admission for recurrent VT. Continues on metoprolol succinate 100 mg daily.     3. CAD s/p CABG (radial to OM), stent to RCA into prox PDA  - Minimally elevated troponin, flat in trajectory. Denies symptoms concerning for angina.  Overall clinical presentation does not seem characteristic of an acute coronary syndrome, seems more likely reflective of demand ischemia from decompensated heart failure.    4. Bacteremia, GNR    5. History of MVR, bioprosthetic MV, mean gradient 8 mmHg, no MR  - ADEN 8/8/22     6. History of TV repair, mild TR    7. Left arm DVT, now on heparin    8. History of GI Bleed    9. Hypertension    10. Hyperlipidemia    11. Anemia     Plan:   1. Continue torsemide 10 mg BID, lisinopril 2.5 mg once, jardiance 10 mg once daily, metoprolol succinate 100 mg once daily, and amiodarone.   2. Continue with 2 L fluid restriction to help with mild hyponatremia, daily weights, strict I/Os, low sodium diet, and close monitoring of renal function and electrolytes.  3. Placed order for IP PT consult per patients request to help with deconditioning and mobility.  4. Recommend follow up with the patient's primary cardiology team in the [facility] system within 1-2 weeks post discharge with a repeat BMP and NT pro BNP beforehand.  5. No further recommendations from a Cardiology at this time. We will sign off. Please do not hesitate to call with questions.    I spent 35 minutes face-to-face or coordinating care of Toney Denton. Over 50% of our time on the unit was spent counseling the patient and/or coordinating care.    Thank you for the opportunity to participate in this pleasant patient's care.     PASHA Marie, CNP   Nurse Practitioner  [facility]  Pager: [phone]  Text Page  (8am - 5pm, M-F)    Patient Active Problem List   Diagnosis     Fall, initial  encounter     Closed nondisplaced intertrochanteric fracture of left femur, initial encounter (H)     Hemorrhagic shock (H)     Gastrointestinal hemorrhage, unspecified gastrointestinal hemorrhage type     Anemia, unspecified type     Elevated troponin     Bilateral leg edema     Elevated brain natriuretic peptide (BNP) level     Acute renal failure, unspecified acute renal failure type (H)       Physical Exam   Temp: 97.6  F (36.4  C) Temp src: Oral BP: 134/67 Pulse: 70   Resp: 18 SpO2: 98 % O2 Device: None (Room air) Oxygen Delivery: 2 LPM  Vitals:    08/07/22 0422 08/08/22 0411 08/09/22 0702   Weight: 105.7 kg (233 lb 1.6 oz) 103.5 kg (228 lb 1.6 oz) 104.6 kg (230 lb 9.6 oz)     Vital Signs with Ranges  Temp:  [97.6  F (36.4  C)-98.7  F (37.1  C)] 97.6  F (36.4  C)  Pulse:  [68-71] 70  Resp:  [16-24] 18  BP: ()/(41-69) 134/67  SpO2:  [93 %-98 %] 98 %  I/O last 3 completed shifts:  In: 360 [P.O.:360]  Out: 1150 [Urine:1150]    Constitutional: Appears his stated age, well nourished, and in no acute distress.  Eyes: Pupils equal, round. Sclerae anicteric.   HEENT: Normocephalic, atraumatic.   Neck: Supple. Unable to visualize JVP due to body habitus. Respiratory: Breathing non-labored. Lungs clear to auscultation bilaterally. No crackles or wheezes appreciated.  Cardiovascular: Regular rate and rhythm, normal S1 and S2. No murmur, rub, or gallop.  Skin: Warm, dry.   Musculoskeletal/Extremities: Moves all extremities well and symmetrically. No LE edema bilaterally.  Neurologic: No gross focal deficits. Alert, cooperative.  Psychiatric: Affect appropriate. Mentation normal.    Medications     - MEDICATION INSTRUCTIONS -       heparin Stopped (08/08/22 1005)     - MEDICATION INSTRUCTIONS -         amiodarone  400 mg Oral Daily     aspirin  81 mg Oral Daily     atorvastatin  80 mg Oral QAM     DULoxetine  30 mg Oral At Bedtime     empagliflozin  10 mg Oral Daily     lisinopril  2.5 mg Oral Daily     loratadine  10  mg Oral QAM     metoprolol succinate ER  100 mg Oral QAM     piperacillin-tazobactam  3.375 g Intravenous Q6H     sodium chloride (PF)  3 mL Intravenous Q8H     sodium chloride (PF)  3 mL Intracatheter Q8H     sodium chloride (PF)  3 mL Intracatheter Q8H     tamsulosin  0.4 mg Oral Daily     torsemide  10 mg Oral Daily     umeclidinium-vilanterol  1 puff Inhalation QAM     Data   Recent Labs   Lab 08/09/22  0549 08/08/22  1607 08/08/22  0814 08/07/22  1242 08/07/22  0614   WBC 7.0  --  8.2  --  9.2   HGB 10.0*  10.0* 10.6* 10.8*  --  10.6*     --  100  --  99     --  300  --  296   *  --  133*  --  128*   POTASSIUM 3.6  --  3.8 3.8 3.3*   CHLORIDE 98  --  99  --  93*   CO2 25  --  24  --  26   BUN 11.4  --  13.5  --  20.5   CR 0.83  --  0.78  --  1.03   ANIONGAP 9  --  10  --  9   THOMAS 8.5*  --  9.2  --  8.5*   GLC 93  --  97  --  111*     This note was completed in part using Dragon voice recognition software. Although reviewed after completion, some word and grammatical errors may occur.

## 2022-08-10 NOTE — PLAN OF CARE
"VSS on RA. A/O x4. No PRNs given this shift. Pt had 1 large BM, diarrhea. No c/o pain this shift. K, Mag = am rechecks. Midline infusing Heparin, SL PIV on R. On Zosyn, will need long term abx. Edema improving. Tele is paced w/ BBB & prolonged QT. Continue POC, monitoring. Discharge in the next few days to [facility] TCU.       Problem: Plan of Care - These are the overarching goals to be used throughout the patient stay.    Goal: Plan of Care Review/Shift Note  Description: The Plan of Care Review/Shift note should be completed every shift.  The Outcome Evaluation is a brief statement about your assessment that the patient is improving, declining, or no change.  This information will be displayed automatically on your shift note.  Outcome: Ongoing, Progressing  Goal: Patient-Specific Goal (Individualized)  Description: You can add care plan individualizations to a care plan. Examples of Individualization might be:  \"Parent requests to be called daily at 9am for status\", \"I have a hard time hearing out of my right ear\", or \"Do not touch me to wake me up as it startles me\".  Outcome: Ongoing, Progressing  Goal: Absence of Hospital-Acquired Illness or Injury  Outcome: Ongoing, Progressing  Intervention: Identify and Manage Fall Risk  Recent Flowsheet Documentation  Taken 8/10/2022 0845 by Zoe López RN  Safety Promotion/Fall Prevention:    activity supervised    assistive device/personal items within reach    bed alarm on    clutter free environment maintained    fall prevention program maintained    increased rounding and observation    increase visualization of patient    lighting adjusted    patient and family education    room organization consistent    safety round/check completed    supervised activity  Intervention: Prevent Skin Injury  Recent Flowsheet Documentation  Taken 8/10/2022 0845 by Zoe López RN  Body Position: weight shifting  Intervention: Prevent and Manage VTE (Venous " Thromboembolism) Risk  Recent Flowsheet Documentation  Taken 8/10/2022 0845 by Zoe López RN  VTE Prevention/Management: (hep gtt) SCDs (sequential compression devices) off  Activity Management:    activity encouraged    activity adjusted per tolerance  Intervention: Prevent Infection  Recent Flowsheet Documentation  Taken 8/10/2022 0845 by Zoe López RN  Infection Prevention:    rest/sleep promoted    single patient room provided  Goal: Optimal Comfort and Wellbeing  Outcome: Ongoing, Progressing  Goal: Readiness for Transition of Care  Outcome: Ongoing, Progressing     Problem: Adjustment to Illness (Heart Failure)  Goal: Optimal Coping  Outcome: Ongoing, Progressing     Problem: Cardiac Output Decreased (Heart Failure)  Goal: Optimal Cardiac Output  Outcome: Ongoing, Progressing     Problem: Dysrhythmia (Heart Failure)  Goal: Stable Heart Rate and Rhythm  Outcome: Ongoing, Progressing     Problem: Fluid Imbalance (Heart Failure)  Goal: Fluid Balance  Outcome: Ongoing, Progressing     Problem: Functional Ability Impaired (Heart Failure)  Goal: Optimal Functional Ability  Outcome: Ongoing, Progressing  Intervention: Optimize Functional Ability  Recent Flowsheet Documentation  Taken 8/10/2022 0845 by Zoe López RN  Activity Management:    activity encouraged    activity adjusted per tolerance     Problem: Oral Intake Inadequate (Heart Failure)  Goal: Optimal Nutrition Intake  Outcome: Ongoing, Progressing     Problem: Respiratory Compromise (Heart Failure)  Goal: Effective Oxygenation and Ventilation  Outcome: Ongoing, Progressing     Problem: Sleep Disordered Breathing (Heart Failure)  Goal: Effective Breathing Pattern During Sleep  Outcome: Ongoing, Progressing

## 2022-08-10 NOTE — PROGRESS NOTES
[facility]  Infectious Disease Progress Note          Assessment and Plan:   IMPRESSION:    1.  A 71-year-old male, admitted with vague acute illness, including shaking chills, fever, malaise, some increased respiratory distress, diagnosis made of probable pneumonia based on the imaging and the clinical picture, but not entirely clear, not hypoxic, currently does have underlying lung disease, he has a positive blood culture for a gram-negative ayush that preliminarily appears to be an anaerobe, but being difficult to identify, probably a true bacteremia given anaerobe.  Source is unclear.  Conceivably aspiration and lungs, conceivably abdomen, but little to suggest that, is clinically improved without even covering anaerobes.  2.  Chronic obstructive lung disease, some flare-up and worsening, although not even on oxygen currently.  3.  Coronary artery disease with prior stents.  4.  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  5.  History of atrial fibrillation and intermittent ventricular tachy dysrhythmias, has a pacemaker and an AICD in place.  6.  Prior history of GI bleeding.  7.  Acute renal failure, resolved.  8.  Urinary retention with no signs of UTI.     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 T down  2nd cx + and cx is capnocytophyga !!  Does have dog but no  "source of entry evident ADEN noted sens delaye by poor growth              Medications:       amiodarone  400 mg Oral Daily     aspirin  81 mg Oral Daily     atorvastatin  80 mg Oral QAM     DULoxetine  30 mg Oral At Bedtime     empagliflozin  10 mg Oral Daily     lisinopril  2.5 mg Oral Daily     loratadine  10 mg Oral QAM     metoprolol succinate ER  100 mg Oral QAM     piperacillin-tazobactam  3.375 g Intravenous Q6H     sodium chloride (PF)  3 mL Intravenous Q8H     sodium chloride (PF)  3 mL Intracatheter Q8H     sodium chloride (PF)  3 mL Intracatheter Q8H     tamsulosin  0.4 mg Oral Daily     torsemide  10 mg Oral Daily     umeclidinium-vilanterol  1 puff Inhalation QAM                  Physical Exam:   Blood pressure 137/56, pulse 70, temperature 97.7  F (36.5  C), temperature source Oral, resp. rate 16, height 1.715 m (5' 7.5\"), weight 104.6 kg (230 lb 9.6 oz), SpO2 98 %.  Wt Readings from Last 2 Encounters:   08/09/22 104.6 kg (230 lb 9.6 oz)   06/19/22 95.3 kg (210 lb 3.2 oz)     Vital Signs with Ranges  Temp:  [97  F (36.1  C)-98.4  F (36.9  C)] 97.7  F (36.5  C)  Pulse:  [70-74] 70  Resp:  [16-20] 16  BP: (101-137)/(39-60) 137/56  SpO2:  [92 %-98 %] 98 %    Constitutional: Awake, alert, cooperative, no apparent distress looks well chronic ill appearance   Lungs: Clear to auscultation bilaterally, no crackles or wheezing   Cardiovascular: Regular rate and rhythm, normal S1 and S2, and no murmur noted   Abdomen: Normal bowel sounds, soft, non-distended, non-tender   Skin: No rashes, no cyanosis, no edema   Other:           Data:   All microbiology laboratory data reviewed.  Recent Labs   Lab Test 08/10/22  0614 08/09/22  1755 08/09/22  0549 08/08/22  1607 08/08/22  0814   WBC 6.9  --  7.0  --  8.2   HGB 10.3*  10.3* 10.5* 10.0*  10.0*   < > 10.8*   HCT 33.3*  --  32.3*  --  34.8*     --  100  --  100     --  283  --  300    < > = values in this interval not displayed.     Recent Labs   Lab " Test 08/10/22  0614 08/09/22  0549 08/08/22  0814   CR 0.85 0.83 0.78     No lab results found.  No lab results found.    Invalid input(s): UC

## 2022-08-10 NOTE — PLAN OF CARE
Temp: 98.4  F (36.9  C) Temp src: Oral BP: (!) 101/39 Pulse: 70   Resp: 16 SpO2: 94 % O2 Device: None (Room air)       Orientation:  x4 with some illogical rambling, drowsy this shift   VS: stable, BP taken on leg soft   Pain: yes, 6/10, repositioned   Tele:  B-V and A paced, BBB and prolonged QT  Activity:  in bed this shift.Turned   Resp:   LS clear, no cough noted this shift   GI:  no BM this shift - Mag iv given today vs orally due to diarrhea yesterday   Ate most of his dinner   : retention, placed Shaffre per order, 600ml output, urine is yellow  Notable Labs: K 3.6, Mag 1.8, both replaced and recheck for tomorrow    Skin:  Edema +4 on hip/flank +3 upper legs, +1 on left cheek and feet   INR 0.22, 3000 units Bolus given and increased rate to 1850 Units / h, next INR check at 2330  Lines: PIIC line infusing Hep drip @ 1850 units /h, PIV patent   Other:  Pt is very sleepy this shift   Plan:   Continue with plan of care, possible discharge to TCU 1-2 days

## 2022-08-10 NOTE — PLAN OF CARE
Goal Outcome Evaluation:      Pt is alert and oriented, makes his needs known. Pt with a das cath in place, to have follow up output with urology. Midline to right upper arm, heparin drip infusing for left arm DVT. No hematuria noted overnight. Pt denies needing pain medications. Tele reading a paced rhythm. Will need long term antibiotics. TCU in the next few days. Pt is up with a  lift at this point.

## 2022-08-10 NOTE — PROGRESS NOTES
[facility]    Hospitalist Progress Note    Date of Service (when I saw the patient): 08/10/2022  Provider:  Reese Maravilla MD   Text Page  7am - 6PM       Assessment & Plan   Toney Denton is a 71 year old male with history of tricuspid valve repair, bioprosthetic mitral valve replacement for regurgitation, coronary artery disease, ischemic cardiomyopathy (EF 25% in 4/21), pacemaker placement in 7/19, biventricular implantable [id number] from BiV-P in 5/20, atrial fibrillation (s/p ablation/maze procedure), severe COPD, hypertension, dyslipidemia, and borderline diabetes who presented on 7/29 for weakness, fever and edema.     #Capnocytophaga bacteremia: Pt presented with SOB, fever, weakness.  Initial imaging remarkable for CT of chest showed right lower lung patchy consolidation and groundglass opacities consistent with atelectasis versus infection w trace bilateral effusions.  Laboratory evaluation showed sodium 133, chloride 96, BUN 37, creatinine 1.24, BNP 11,024, troponin 34, procalcitonin 5.04, white blood cells 3.2, hemoglobin 10.6, platelets 66, unremarkable urinalysis, and negative testing for COVID-19/influenza/RSV.    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   -Pt was started on ceftriaxone and doxycycline. He has improved.  ID consulted.  He was transitioned to Zosyn therapy on 8/3 for anaerobic coverage.  -Will reportedly need prolonged course of IV abx. Long line per infectious disease  - ADEN shows fibrinous strands, but in the appropriate clinical context could be vegetations, Defer to ID length of therapy     #Acute on chronic HFrEF. Ischemic CM. CAD s/p CABG, stent to RCA into proximal PDA. History of bioprosthetic mitral valve replacement. History of tricuspid valve repair. History of pacemaker placement with upgrade to biventricular  cardioverter- defibrillator. History of atrial fibrillation with ablation/maze procedure.: TTE obtained on 08/01 showed EF 20-25% with global diffuse hypokinesis.  Flattened septum c/w RV pressure/volume overload. Bioprosthetic mitral valve noted w annuloplasty ring in tricuspid position.     -Cardiology consulted.  -Pt was previously on furosemide 60 mg TID.  He has been transitioned to Entresto on 8/3.  Bumex 2 mg twice daily added on 8/3.  This was further transitioned to torsemide 20 mg twice daily on 8/5. Diuretics held due to borderline blood pressures, continue to hold as appears Euvolemic,  - Currently on torsemide 10 mg daily can change to twice daily tomorrow if his blood pressures remain normotensive.  -Continue metoprolol, aspirin and statin, Jardiance      #LUE DVT: Pt with swelling of LUE.  US done on 08/06 showed near occlusive thombosis of mid left subclavian vein to left axillary vein.   -I have restarted heparin as hematuria has resolved can change to oral anticoagulation tomorrow if hematuria continues to improve.  -He has history of GIB in 06/2022 so would continue heparin gtt over weekend so monitor for any recurrent bleeding.      #Intermittent runs of ventricular tachycardia: Replace electrolytes as needed.  Continue prior to admission beta-blocker.  Amiodarone added as below.  Improved  -Cardiology consulted 8/1/22 and Amiodarone was started. Continue to monitor. If Vtach persists Toney might need to transfer to [facility] where he gets his cardiac care for repeat VT ablation.      #Acute kidney injury: Possibly due to urine retention and/or congestive heart failure.  Improved.      #Mild hyponatremia: In the setting of diuresis.  Sodium 129 on 8/5. Monitor improved today     #Urinary retention  # hematuria   Patient had to be straight cathed multiple times.  Shaffer catheter was placed and Flomax was started.    -Plan for voiding trial today  - urology consult and will need outpatient  urine cytology with cystoscopy     #Thrombocytopenia: Possibly related to infection. Improved. Will monitor.      #COPD, without acute exacerbation: Continue prior to admission albuterol and Anoro Ellipta inhalers.     #Generalized weakness, left hip pain: Patient has had generalized weakness prior to admission.    He had a left intertrochanteric femur fracture with ORIF in January 2022.  He had previously been using a walker and then graduated to a cane.  His wife had noted that he had increased weakness.  X-ray of the left hip obtained at that shows no malposition of hardware but severe osteoarthritis bilaterally.     # IVF: none     DVT Prophylaxis: Pneumatic Compression Devices  Code Status: Full Code    Disposition: Expected discharge in 2 days once ABX plan outlined. .    Interval History   Afebrile, no chest pain or dyspnea. Appetite is better. No other concerns    -Data reviewed today: I reviewed all new labs and imaging results over the last 24 hours.     Physical Exam   Temp: 97.7  F (36.5  C) Temp src: Oral BP: 137/56 Pulse: 70   Resp: 16 SpO2: 98 % O2 Device: None (Room air)    Vitals:    08/07/22 0422 08/08/22 0411 08/09/22 0702   Weight: 105.7 kg (233 lb 1.6 oz) 103.5 kg (228 lb 1.6 oz) 104.6 kg (230 lb 9.6 oz)     Vital Signs with Ranges  Temp:  [97  F (36.1  C)-98.4  F (36.9  C)] 97.7  F (36.5  C)  Pulse:  [70-88] 70  Resp:  [16-20] 16  BP: (101-137)/(39-69) 137/56  SpO2:  [92 %-98 %] 98 %  I/O last 3 completed shifts:  In: 1335 [P.O.:1335]  Out: 2050 [Urine:2050]    GEN:  Alert, oriented x 3, appears comfortable, NAD.  HEENT:  Normocephalic/atraumatic, no scleral icterus, no nasal discharge, mouth moist.  CV:  Regular rate and rhythm, no murmur or JVD.  S1 + S2 noted, no S3 or S4.  LUNGS:  Clear to auscultation bilaterally without rales/rhonchi/wheezing/retractions.  Symmetric chest rise on inhalation noted.  ABD:  Active bowel sounds, soft, non-tender/non-distended.  No  rebound/guarding/rigidity.  EXT:  No edema or cyanosis.  No joint synovitis noted.  SKIN:  Dry to touch, no exanthems noted in the visualized areas.       Medications     - MEDICATION INSTRUCTIONS -       heparin 1,850 Units/hr (08/10/22 0040)     - MEDICATION INSTRUCTIONS -         amiodarone  400 mg Oral Daily     aspirin  81 mg Oral Daily     atorvastatin  80 mg Oral QAM     DULoxetine  30 mg Oral At Bedtime     empagliflozin  10 mg Oral Daily     lisinopril  2.5 mg Oral Daily     loratadine  10 mg Oral QAM     metoprolol succinate ER  100 mg Oral QAM     piperacillin-tazobactam  3.375 g Intravenous Q6H     sodium chloride (PF)  3 mL Intravenous Q8H     sodium chloride (PF)  3 mL Intracatheter Q8H     sodium chloride (PF)  3 mL Intracatheter Q8H     tamsulosin  0.4 mg Oral Daily     torsemide  10 mg Oral Daily     umeclidinium-vilanterol  1 puff Inhalation QAM       Data   Recent Labs   Lab 08/10/22  0614 08/09/22  1755 08/09/22  0549 08/08/22  1607 08/08/22  0814   WBC 6.9  --  7.0  --  8.2   HGB 10.3*  10.3* 10.5* 10.0*  10.0*   < > 10.8*     --  100  --  100     --  283  --  300   *  --  132*  --  133*   POTASSIUM 3.9  --  3.6  --  3.8   CHLORIDE 97*  --  98  --  99   CO2 24  --  25  --  24   BUN 10.0  --  11.4  --  13.5   CR 0.85  --  0.83  --  0.78   ANIONGAP 10  --  9  --  10   THOMAS 9.0  --  8.5*  --  9.2   *  --  93  --  97    < > = values in this interval not displayed.       No results found for this or any previous visit (from the past 24 hour(s)).      Securely message with the Vocera Web Console (learn more here)  Text page via [facility] Paging/Directory        Disclaimer: This note consists of symbols derived from keyboarding, dictation and/or voice recognition software. As a result, there may be errors in the script that have gone undetected. Please consider this when interpreting information found in this chart.

## 2022-08-10 NOTE — PROVIDER NOTIFICATION
"1715 Dr. Maravilla notified: \"Pt is having multiple loose BMs. Dark brown. Pt reports that he takes imodium at home and this is not new for him. He has had 3-4 already since 1500.\"  "

## 2022-08-11 NOTE — PROGRESS NOTES
Care Management Follow Up    Length of Stay (days): 13    Expected Discharge Date: 08/12/2022     Concerns to be Addressed: discharge planning     Patient plan of care discussed at interdisciplinary rounds: Yes    Anticipated Discharge Disposition: Transitional Care     Anticipated Discharge Services: therapy and IV abx  Anticipated Discharge DME: None    Patient/family educated on Medicare website which has current facility and service quality ratings: yes  Education Provided on the Discharge Plan:    Patient/Family in Agreement with the Plan: yes    Referrals Placed by CM/SW: Post Acute Facilities  Private pay costs discussed: transportation costs    Additional Information:  Upon reviewing ID recommendations, with hospitalist anticipates pt will be discharge ready tomorrow 8/12/22.  [facility] intake said they can accept pt anytime after 11 am tomorrow.    Met with pt at bedside to discuss discharge plan. Pt is agreeable to going to TCU.  [facility] for base rate and $5.26 per mile to the destination. Spoke with pt and he expressed understanding and are agreeable to this. [facility] wheel chair transport scheduled for 12 noon on 8/12/22.  LVM with TCU about transportation time.    Kim Diego RN, BSN, PHN, CCM  Care Coordinator   [facility]  [phone]

## 2022-08-11 NOTE — PROGRESS NOTES
[facility]    Hospitalist Progress Note    Date of Service (when I saw the patient): 08/11/2022  Provider:  Reese Maravilla MD   Text Page  7am - 6PM       Assessment & Plan   Toney Denton is a 71 year old male with history of tricuspid valve repair, bioprosthetic mitral valve replacement for regurgitation, coronary artery disease, ischemic cardiomyopathy (EF 25% in 4/21), pacemaker placement in 7/19, biventricular implantable alyvnnxwmtok-pyyqbtekdphcr-lastftr from BiV-P in 5/20, atrial fibrillation (s/p ablation/maze procedure), severe COPD, hypertension, dyslipidemia, and borderline diabetes who presented on 7/29 for weakness, fever and edema.     #Capnocytophaga bacteremia: Pt presented with SOB, fever, weakness.  Initial imaging remarkable for CT of chest showed right lower lung patchy consolidation and groundglass opacities consistent with atelectasis versus infection w trace bilateral effusions.  Laboratory evaluation showed sodium 133, chloride 96, BUN 37, creatinine 1.24, BNP 11,024, troponin 34, procalcitonin 5.04, white blood cells 3.2, hemoglobin 10.6, platelets 66, unremarkable urinalysis, and negative testing for COVID-19/influenza/RSV.    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   -Pt was started on ceftriaxone and doxycycline. He has improved.  ID consulted.  He was transitioned to Zosyn therapy on 8/3 for anaerobic coverage. Now on Rocephin starting 8/11  -Will reportedly need prolonged course of IV abx. Long line per infectious disease  - ADEN shows fibrinous strands, but in the appropriate clinical context could be vegetations, Defer to ID length of therapy     #Acute on chronic HFrEF. Ischemic CM. CAD s/p CABG, stent to RCA into proximal PDA. History of bioprosthetic mitral valve replacement. History of tricuspid valve repair. History of pacemaker placement  with upgrade to biventricular cardioverter- defibrillator. History of atrial fibrillation with ablation/maze procedure.: TTE obtained on 08/01 showed EF 20-25% with global diffuse hypokinesis.  Flattened septum c/w RV pressure/volume overload. Bioprosthetic mitral valve noted w annuloplasty ring in tricuspid position.     -Cardiology consulted.  -Pt was previously on furosemide 60 mg TID.  He has been transitioned to Entresto on 8/3.  Bumex 2 mg twice daily added on 8/3.  This was further transitioned to torsemide 20 mg twice daily on 8/5. Diuretics held due to borderline blood pressures, continue to hold as appears Euvolemic,  - Currently on torsemide 10 mg daily can change to twice daily tomorrow if his blood pressures remain normotensive.  -Continue metoprolol, aspirin and statin, Jardiance      #LUE DVT: Pt with swelling of LUE.  US done on 08/06 showed near occlusive thombosis of mid left subclavian vein to left axillary vein.   -I have restarted heparin as hematuria has resolved can change to oral anticoagulation tomorrow if hematuria continues to improve.  -He has history of GIB in 06/2022 so would continue heparin gtt over weekend so monitor for any recurrent bleeding.      #Intermittent runs of ventricular tachycardia: Replace electrolytes as needed.  Continue prior to admission beta-blocker.  Amiodarone added as below.  Improved  -Cardiology consulted 8/1/22 and Amiodarone was started. Continue to monitor. If Vtach persists Toney might need to transfer to [facility] where he gets his cardiac care for repeat VT ablation.      #Acute kidney injury: Possibly due to urine retention and/or congestive heart failure.  Improved.      #Mild hyponatremia: In the setting of diuresis.  Sodium 129 on 8/5. Monitor improved today     #Urinary retention  # hematuria   Patient had to be straight cathed multiple times.  Shaffer catheter was placed and Flomax was started.    -Plan for voiding trial today  - urology  consult and will need outpatient urine cytology with cystoscopy     #Thrombocytopenia: Possibly related to infection. Improved. Will monitor.      #COPD, without acute exacerbation: Continue prior to admission albuterol and Anoro Ellipta inhalers.     #Generalized weakness, left hip pain: Patient has had generalized weakness prior to admission.    He had a left intertrochanteric femur fracture with ORIF in January 2022.  He had previously been using a walker and then graduated to a cane.  His wife had noted that he had increased weakness.  X-ray of the left hip obtained at that shows no malposition of hardware but severe osteoarthritis bilaterally.     # IVF: none     DVT Prophylaxis: Pneumatic Compression Devices  Code Status: Full Code    Disposition: Expected discharge in 2 days once ABX plan outlined. .    Interval History   Afebrile, no chest pain or dyspnea. Appetite is better. No other concerns    -Data reviewed today: I reviewed all new labs and imaging results over the last 24 hours.     Physical Exam   Temp: 98.4  F (36.9  C) Temp src: Oral BP: (Abnormal) 147/46 Pulse: 70   Resp: 18 SpO2: 97 % O2 Device: None (Room air)    Vitals:    08/08/22 0411 08/09/22 0702 08/11/22 0713   Weight: 103.5 kg (228 lb 1.6 oz) 104.6 kg (230 lb 9.6 oz) 104.8 kg (231 lb)     Vital Signs with Ranges  Temp:  [97.6  F (36.4  C)-98.4  F (36.9  C)] 98.4  F (36.9  C)  Pulse:  [64-70] 70  Resp:  [18] 18  BP: ()/(42-56) 147/46  SpO2:  [95 %-97 %] 97 %  I/O last 3 completed shifts:  In: 720 [P.O.:720]  Out: 1825 [Urine:1825]    GEN:  Alert, oriented x 3, appears comfortable, NAD.  HEENT:  Normocephalic/atraumatic, no scleral icterus, no nasal discharge, mouth moist.  CV:  Regular rate and rhythm, no murmur or JVD.  S1 + S2 noted, no S3 or S4.  LUNGS:  Clear to auscultation bilaterally without rales/rhonchi/wheezing/retractions.  Symmetric chest rise on inhalation noted.  ABD:  Active bowel sounds, soft, non-tender/non-distended.   No rebound/guarding/rigidity.  EXT:  No edema or cyanosis.  No joint synovitis noted.  SKIN:  Dry to touch, no exanthems noted in the visualized areas.       Medications     - MEDICATION INSTRUCTIONS -       heparin 1,750 Units/hr (08/11/22 0753)     - MEDICATION INSTRUCTIONS -         amiodarone  400 mg Oral Daily     aspirin  81 mg Oral Daily     atorvastatin  80 mg Oral QAM     cefTRIAXone  2 g Intravenous Q24H     DULoxetine  30 mg Oral At Bedtime     empagliflozin  10 mg Oral Daily     lisinopril  2.5 mg Oral Daily     loratadine  10 mg Oral QAM     magnesium sulfate  2 g Intravenous Once     metoprolol succinate ER  100 mg Oral QAM     sodium chloride (PF)  3 mL Intravenous Q8H     sodium chloride (PF)  3 mL Intracatheter Q8H     sodium chloride (PF)  3 mL Intracatheter Q8H     tamsulosin  0.4 mg Oral Daily     torsemide  10 mg Oral Daily     umeclidinium-vilanterol  1 puff Inhalation QAM       Data   Recent Labs   Lab 08/11/22  0554 08/10/22  1839 08/10/22  0614 08/09/22  1755 08/09/22  0549   WBC 6.5  --  6.9  --  7.0   HGB 9.9*  9.9* 10.2* 10.3*  10.3*   < > 10.0*  10.0*     --  100  --  100     --  269  --  283   *  --  131*  --  132*   POTASSIUM 3.8  --  3.9  --  3.6   CHLORIDE 99  --  97*  --  98   CO2 25  --  24  --  25   BUN 9.2  --  10.0  --  11.4   CR 0.81  --  0.85  --  0.83   ANIONGAP 9  --  10  --  9   THOMAS 8.8  --  9.0  --  8.5*   *  --  104*  --  93    < > = values in this interval not displayed.       No results found for this or any previous visit (from the past 24 hour(s)).      Securely message with the Vocera Web Console (learn more here)  Text page via [facility] Paging/Directory        Disclaimer: This note consists of symbols derived from keyboarding, dictation and/or voice recognition software. As a result, there may be errors in the script that have gone undetected. Please consider this when interpreting information found in this chart.

## 2022-08-11 NOTE — PLAN OF CARE
"Goal Outcome Evaluation:        Alert and oriented. No c/o pain this shift. K, Mag = am rechecks. Midline infusing Heparin, following heparin protocol. SL PIV on R. On Zosyn, will need long term abx. Edema improving. Tele is 100% AV paced. Continue POC, monitoring. Plan is to discharge to [facility] TCU when appropriate.    /46 (BP Location: Left leg)   Pulse 64   Temp 97.6  F (36.4  C) (Oral)   Resp 18   Ht 1.715 m (5' 7.5\")   Wt 104.6 kg (230 lb 9.6 oz)   SpO2 96%   BMI 35.58 kg/m                            "

## 2022-08-11 NOTE — PROGRESS NOTES
[facility]  Infectious Disease Progress Note          Assessment and Plan:   IMPRESSION:    1.  A 71-year-old male, admitted with vague acute illness, including shaking chills, fever, malaise, some increased respiratory distress, diagnosis made of probable pneumonia based on the imaging and the clinical picture, but not entirely clear, not hypoxic, currently does have underlying lung disease, he has a positive blood culture for a gram-negative ayush that preliminarily appears to be an anaerobe, but being difficult to identify, probably a true bacteremia given anaerobe.  Source is unclear.  Conceivably aspiration and lungs, conceivably abdomen, but little to suggest that, is clinically improved without even covering anaerobes.  2.  Chronic obstructive lung disease, some flare-up and worsening, although not even on oxygen currently.  3.  Coronary artery disease with prior stents.  4.  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  5.  History of atrial fibrillation and intermittent ventricular tachy dysrhythmias, has a pacemaker and an AICD in place.  6.  Prior history of GI bleeding.  7.  Acute renal failure, resolved.  8.  Urinary retention with no signs of UTI.     RECOMMENDATIONS:  1 BC has defined the issue, BC is capnoctophyga, as this implies he does have a dog BUT no bite/imjury etc thus the bacteremia is unexplained and high level of concern re valve\AICD Follow-up cx neg and clinically better  Zosyn  sens  Is a  certainty but likely can simplify  Discussed with cards TTE OK  ADEN likely Ok also certainly no op indication,plan IV course any way  Placed midline for this purpose likely rehab disposition  stiil sens pending,  to ceftriaxone and disposition while waiting and adjust if needed as outpt  Orders in           Interval History:   no new complaints and doing well; no cp, sob, n/v/d, or abd pain. T down  2nd cx + and cx is capnocytophyga !!  Does have dog but no source  "of entry evident ADEN noted sens delaye by poor growth              Medications:       amiodarone  400 mg Oral Daily     aspirin  81 mg Oral Daily     atorvastatin  80 mg Oral QAM     cefTRIAXone  2 g Intravenous Q24H     DULoxetine  30 mg Oral At Bedtime     empagliflozin  10 mg Oral Daily     lisinopril  2.5 mg Oral Daily     loratadine  10 mg Oral QAM     metoprolol succinate ER  100 mg Oral QAM     sodium chloride (PF)  3 mL Intravenous Q8H     sodium chloride (PF)  3 mL Intracatheter Q8H     sodium chloride (PF)  3 mL Intracatheter Q8H     tamsulosin  0.4 mg Oral Daily     torsemide  10 mg Oral Daily     umeclidinium-vilanterol  1 puff Inhalation QAM                  Physical Exam:   Blood pressure (!) 146/56, pulse 66, temperature 98  F (36.7  C), temperature source Oral, resp. rate 18, height 1.715 m (5' 7.5\"), weight 104.8 kg (231 lb), SpO2 95 %.  Wt Readings from Last 2 Encounters:   08/11/22 104.8 kg (231 lb)   06/19/22 95.3 kg (210 lb 3.2 oz)     Vital Signs with Ranges  Temp:  [97.6  F (36.4  C)-98  F (36.7  C)] 98  F (36.7  C)  Pulse:  [64-70] 66  Resp:  [16-18] 18  BP: ()/(42-56) 146/56  SpO2:  [95 %-99 %] 95 %    Constitutional: Awake, alert, cooperative, no apparent distress looks well chronic ill appearance   Lungs: Clear to auscultation bilaterally, no crackles or wheezing   Cardiovascular: Regular rate and rhythm, normal S1 and S2, and no murmur noted   Abdomen: Normal bowel sounds, soft, non-distended, non-tender   Skin: No rashes, no cyanosis, no edema   Other:           Data:   All microbiology laboratory data reviewed.  Recent Labs   Lab Test 08/11/22  0554 08/10/22  1839 08/10/22  0614 08/09/22  1755 08/09/22  0549   WBC 6.5  --  6.9  --  7.0   HGB 9.9*  9.9* 10.2* 10.3*  10.3*   < > 10.0*  10.0*   HCT 31.8*  --  33.3*  --  32.3*     --  100  --  100     --  269  --  283    < > = values in this interval not displayed.     Recent Labs   Lab Test 08/11/22  0554 " 08/10/22  0614 08/09/22  0549   CR 0.81 0.85 0.83     No lab results found.  No lab results found.    Invalid input(s): POP

## 2022-08-11 NOTE — PLAN OF CARE
"Goal Outcome Evaluation:  Vitals:/55 (BP Location: Left arm)   Pulse 70   Temp 98  F (36.7  C) (Oral)   Resp 16   Ht 1.715 m (5' 7.5\")   Wt 104.6 kg (230 lb 9.6 oz)   SpO2 99%   BMI 35.58 kg/m      Pain: Pt reports chronic back pain. Oxy and tylenol given with some relief. Pt states he is still in pain, but declines any further intervention.   Neuro: A&O  Cardiac/Tele: SR demand paced. Left arm DVT.   GI/: das catheter. Pt had 4-5 loose BMs from 9692-8025. None since then. Pt has asked for imodium as he takes this at home, this writer paged about it with no response.   Skin: scattered bruising.   LDAs: Midline in right arm, DVT in left. Limb alerts. BP on calf.  Labs: Hgb 10.2  Diet: low fat low Na  Activity: Pt assist of 2 with lift. Not OOB this shift.   Plan: Discharge to [facility] TCU eventually.                       "

## 2022-08-11 NOTE — PROGRESS NOTES
"SPIRITUAL HEALTH SERVICES Progress Note    Med Surg 3    I saw pt Toney Denton per length of stay at . Toney was oriented to [facility].    Toney was happy to inform me, \"I'm being sprung today! Now I just got to get these legs moving.\" He was talking about PT somewhere in [place].    This author wished him well. No other  support needed at this time.    Dale Valladares MDIV  Intern   Phone: [phone]   "

## 2022-08-11 NOTE — PLAN OF CARE
Goal Outcome Evaluation:    Plan of Care Reviewed With: patient     Overall Patient Progress: improving       Patient afebrile this shift. Tele- 100% Bi-V paced, demand A-paced. No shortness of breath. Complains of chronic low back pain (prn oxy and tylenol).   K+ and Mag protocol. Up with Arlette steady and assist of 2 into chair. Incontinent of stool; loose/soft stool x2 this shift. Das patent; das care complete. IV antibiotics changed to Rocephin; Midline previously placed. ID ordered antibiotic at discharge; SWS following. Plan to discharge to TCU tomorrow at 1200 per Care coordinators work.

## 2022-08-12 NOTE — TELEPHONE ENCOUNTER
LM for patient to call us back to schedule his Consult Appointment with Arlette Enriquez in 1 - 2  Weeks.

## 2022-08-12 NOTE — PROGRESS NOTES
[facility]  Infectious Disease Progress Note          Assessment and Plan:   IMPRESSION:    1.  A 71-year-old male, admitted with vague acute illness, including shaking chills, fever, malaise, some increased respiratory distress, diagnosis made of probable pneumonia based on the imaging and the clinical picture, but not entirely clear, not hypoxic, currently does have underlying lung disease, he has a positive blood culture for a gram-negative ayush that preliminarily appears to be an anaerobe, but being difficult to identify, probably a true bacteremia given anaerobe.  Source is unclear.  Conceivably aspiration and lungs, conceivably abdomen, but little to suggest that, is clinically improved without even covering anaerobes.  2.  Chronic obstructive lung disease, some flare-up and worsening, although not even on oxygen currently.  3.  Coronary artery disease with prior stents.  4.  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  5.  History of atrial fibrillation and intermittent ventricular tachy dysrhythmias, has a pacemaker and an AICD in place.  6.  Prior history of GI bleeding.  7.  Acute renal failure, resolved.  8.  Urinary retention with no signs of UTI.     RECOMMENDATIONS:  1 BC has defined the issue, BC is capnoctophyga, as this implies he does have a dog BUT no bite/imjury etc thus the bacteremia is unexplained and high level of concern re valve\AICD Follow-up cx neg and clinically better  Zosyn  sens  Is a  certainty but likely can simplify  Discussed with cards TTE OK  ADEN likely Ok also certainly no op indication,plan IV course any way  Placed midline for this purpose likely rehab disposition  stiil sens pending,  to ceftriaxone and disposition while waiting and adjust if needed as outpt  Orders in           Interval History:   no new complaints and doing well; no cp, sob, n/v/d, or abd pain. T down  2nd cx + and cx is capnocytophyga !!  Does have dog but no source  "of entry evident ADEN noted sens delayed by poor growth may not even be able to get              Medications:       amiodarone  400 mg Oral Daily     aspirin  81 mg Oral Daily     atorvastatin  80 mg Oral QAM     cefTRIAXone  2 g Intravenous Q24H     DULoxetine  30 mg Oral At Bedtime     empagliflozin  10 mg Oral Daily     lisinopril  2.5 mg Oral Daily     loratadine  10 mg Oral QAM     metoprolol succinate ER  100 mg Oral QAM     sodium chloride (PF)  3 mL Intravenous Q8H     sodium chloride (PF)  3 mL Intracatheter Q8H     sodium chloride (PF)  3 mL Intracatheter Q8H     tamsulosin  0.4 mg Oral Daily     torsemide  10 mg Oral Daily     umeclidinium-vilanterol  1 puff Inhalation QAM                  Physical Exam:   Blood pressure (!) 141/46, pulse 73, temperature 98.1  F (36.7  C), temperature source Oral, resp. rate 18, height 1.715 m (5' 7.5\"), weight 104.8 kg (231 lb 1.6 oz), SpO2 94 %.  Wt Readings from Last 2 Encounters:   08/12/22 104.8 kg (231 lb 1.6 oz)   06/19/22 95.3 kg (210 lb 3.2 oz)     Vital Signs with Ranges  Temp:  [97.7  F (36.5  C)-98.4  F (36.9  C)] 98.1  F (36.7  C)  Pulse:  [69-82] 73  Resp:  [16-18] 18  BP: (114-147)/(46-55) 141/46  SpO2:  [93 %-97 %] 94 %    Constitutional: Awake, alert, cooperative, no apparent distress looks well chronic ill appearance   Lungs: Clear to auscultation bilaterally, no crackles or wheezing   Cardiovascular: Regular rate and rhythm, normal S1 and S2, and no murmur noted   Abdomen: Normal bowel sounds, soft, non-distended, non-tender   Skin: No rashes, no cyanosis, no edema   Other:           Data:   All microbiology laboratory data reviewed.  Recent Labs   Lab Test 08/12/22  0557 08/11/22  1803 08/11/22  0554 08/10/22  1839 08/10/22  0614   WBC 7.4  --  6.5  --  6.9   HGB 9.6*  9.6* 10.1* 9.9*  9.9*   < > 10.3*  10.3*   HCT 31.4*  --  31.8*  --  33.3*     --  100  --  100     --  221  --  269    < > = values in this interval not displayed. "     Recent Labs   Lab Test 08/12/22  0557 08/11/22  0554 08/10/22  0614   CR 0.69 0.81 0.85     No lab results found.  No lab results found.    Invalid input(s): POP

## 2022-08-12 NOTE — PLAN OF CARE
Goal Outcome Evaluation:    Plan of Care Reviewed With: patient     Overall Patient Progress: no change    VSS on RA. Tele Bi-V paced, demand A paced. A/O x 4. Up assist of 2 w/ sujit steady. Weight shifting as tolerated overnight. C/o of back and hip pain, declined pain medications. Rt midline, heparin gtt infusing @ 1750 units/hr. RPIV SL. IV rocephin. LUE + 3 edema. Shaffer in place. No BM on shift. ID following. Plan to discharge today @ 1200 to TCU. Will continue with plan of care.

## 2022-08-12 NOTE — TELEPHONE ENCOUNTER
Pt referred to [facility] by Reese Maravilla MD for left upper extremity DVT.    Per discharge notes:  #LUE DVT: Pt with swelling of LUE.  US done on 08/06 showed near occlusive thombosis of mid left subclavian vein to left axillary vein.   -Treatment with heparin as hematuria has resolved, switch to oral anticoagulation with Xarelto.  Case has obtained curbsided over the phone with Dr. Chirinos of vascular medicine.  He needs to be seen in the vascular medicine office in the next 2 or 3 weeks for follow-up of the clot and decision making on length of anticoagulation therapy, likely needs anticoagulation for life.  -He has history of GIB in 06/2022, no evidence of bleeding during the hospital stay.    Pt needs to be scheduled for consult with Arlette Enriquez PA-C.  Will route to scheduling to coordinate an appointment within 1-2 weeks.    Appt note: Ref by Dr. Reese Maravilla for left upper extremity DVT; follow up to 8/12/22 hospital discharge.    Alicja Major, DAVEN, RN-[facility] [place]

## 2022-08-12 NOTE — PROGRESS NOTES
Care Management Discharge Note    Discharge Date: 08/12/2022       Discharge Disposition: Transitional Care    Discharge Services: None    Discharge DME: None    Discharge Transportation: family or friend will provide    Private pay costs discussed: Not applicable    PAS Confirmation Code: [id number] ([id number])  Patient/family educated on Medicare website which has current facility and service quality ratings: yes    Education Provided on the Discharge Plan:    Persons Notified of Discharge Plans: admission at TCU, nurse and doctor  Patient/Family in Agreement with the Plan: yes    Handoff Referral Completed: No    Additional Information:  Provider requested discharge be pushed back to later this afternoon.  CM call [facility] transport [phone] and they were able to accommodate request.  New transportation time 1500.  I notified MD, bedside nurse and LVM for admission coordinator at TCU.    Will continue to follow for care coordination.    Addendum 1144  Discharge orders were faxed to [facility].    Kim Diego RN, BSN, PHN, CCM  Care Coordinator  [facility]  [phone]

## 2022-08-12 NOTE — CONSULTS
Patient has BCBS through an employer.     Eliquis/Xarelto: $35/mo.    Trinidad Hamm  Pharmacy Technician/Liaison, Discharge Pharmacy   [phone]  [email or web address]

## 2022-08-12 NOTE — DISCHARGE SUMMARY
[facility]    Discharge Summary  Hospitalist    Date of Admission:  7/29/2022  Date of Discharge:  8/12/2022  Provider:  Reese Maravilla MD  Date of Service (when I last saw the patient): 08/12/22    Discharge Diagnoses   #Capnocytophaga canimorsus bacteremia and sepsis, unclear source   #Acute on chronic HFrEF.  #Ischemic CM.   #CAD s/p CABG, stent to RCA into proximal PDA.   #History of bioprosthetic mitral valve replacement.   #History of tricuspid valve repair.   #History of pacemaker placement with upgrade to biventricular cardioverter- defibrillator.   #History of atrial fibrillation with ablation/maze procedure.    #LUE DVT, in SCV and axillary (wires of PM)  #Intermittent runs of ventricular tachycardia    #Acute kidney injury     #Mild hyponatremia   #Urinary retention  # Hematuria   #Thrombocytopenia, transient  #COPD, without acute exacerbation   #Generalized weakness  #Left hip pain      Other medical issues:  Past Medical History:   Diagnosis Date     Benign essential hypertension      Chronic atrial fibrillation (H)     s/p ablation/maze procedure/LA clip.  on ASA for stroke ppx     COPD (chronic obstructive pulmonary disease) (H)     severe by PFTs     Hyperlipidemia LDL goal <100      Ischemic cardiomyopathy     EF 25 % by echo 4/2021, with hx of VTach s/p biV pacer and ICD     Mitral valve regurgitation     s/p bioprosthetic MVR     Tricuspid regurgitation     s/p repair       History of Present Illness   Toney Denton is an 71 year old male who presented with weakness, fever and edema.  Please see the admission history and physical for full details.    Hospital Course   Toney Denton is a 71 year old male with history of tricuspid valve repair, bioprosthetic mitral valve replacement for regurgitation, coronary artery disease, ischemic cardiomyopathy (EF 25% in 4/21), pacemaker placement in 7/19, biventricular implantable [id number] from BiV-P in  5/20, atrial fibrillation (s/p ablation/maze procedure), severe COPD, hypertension, dyslipidemia, and borderline diabetes who presented on 7/29 for weakness, fever and edema.     #Capnocytophaga bacteremia: Pt presented with SOB, fever, weakness.  Initial imaging remarkable for CT of chest showed right lower lung patchy consolidation and groundglass opacities consistent with atelectasis versus infection w trace bilateral effusions.  Laboratory evaluation showed sodium 133, chloride 96, BUN 37, creatinine 1.24, BNP 11,024, troponin 34, procalcitonin 5.04, white blood cells 3.2, hemoglobin 10.6, platelets 66, unremarkable urinalysis, and negative testing for COVID-19/influenza/RSV.    -Blood culture from admission positive for above, repeat negative so far; speciation showed capnocytophaga which is associated with mouths of dogs and cats.   -Pt was started on ceftriaxone and doxycycline. He improved.  ID consulted.  He was transitioned to Zosyn therapy on 8/3 for anaerobic coverage. Now on Rocephin starting 8/11, needs prolonged course of IV aAcbx. Plan per infectious disease      Acute on chronic HFrEF. Ischemic CM. CAD s/p CABG, stent to RCA into proximal PDA. History of bioprosthetic mitral valve replacement. History of tricuspid valve repair. History of pacemaker placement with upgrade to biventricular cardioverter- defibrillator. History of atrial fibrillation with ablation/maze procedure.: TTE obtained on 08/01 showed EF 20-25% with global diffuse hypokinesis.  Flattened septum c/w RV pressure/volume overload. Bioprosthetic mitral valve noted w annuloplasty ring in tricuspid position.     -Cardiology consulted.  -Pt was previously on furosemide 60 mg TID.  He has been transitioned to Entresto on 8/3.  Bumex 2 mg twice daily added on 8/3.  This was further transitioned to torsemide 20 mg twice daily on 8/5. Diuretics held due to borderline blood pressures, continue to hold as appears Euvolemic,  - Lastly on  torsemide 10 mg daily should go back on Bumex as prior to admission and discharged    #LUE DVT: Pt with swelling of LUE.  US done on 08/06 showed near occlusive thombosis of mid left subclavian vein to left axillary vein.   -Treatment with heparin as hematuria has resolved, switch to oral anticoagulation with Xarelto.  Case has obtained curbsided over the phone with Dr. Chirinos of vascular medicine.  He needs to be seen in the vascular medicine office in the next 2 or 3 weeks for follow-up of the clot and decision making on length of anticoagulation therapy, likely needs anticoagulation for life.  -He has history of GIB in 06/2022, no evidence of bleeding during the hospital stay.    #Intermittent runs of ventricular tachycardia: Replace electrolytes as needed.  Continue prior to admission beta-blocker.  Amiodarone added as below.  Improved  -Cardiology consulted 8/1/22 and Amiodarone was started. Continue to monitor.      #Acute kidney injury: Possibly due to urine retention and/or congestive heart failure.  Improved.      #Mild hyponatremia: In the setting of diuresis.  Sodium 129 on 8/5. Monitor improved today     #Urinary retention  # hematuria   Patient had to be straight cathed multiple times.  Shaffer catheter was placed and Flomax was started.    -Plan for voiding trial today  - urology consult and will need outpatient urine cytology with cystoscopy     #Thrombocytopenia: Temporarily, possibly related to infection.  Resolved.     #COPD, without acute exacerbation: Continue prior to admission albuterol and Anoro Ellipta inhalers.     #Generalized weakness, left hip pain: Patient has had generalized weakness prior to admission.    He had a left intertrochanteric femur fracture with ORIF in January 2022.  He had previously been using a walker and then graduated to a cane.  His wife had noted that he had increased weakness.  X-ray of the left hip obtained at that shows no malposition of hardware but severe  osteoarthritis bilaterally.    # Discharge Pain Plan:    - During his hospitalization, Toney experienced pain due to left hip pain.  The pain plan for discharge was discussed with Toney and the plan was created in a collaborative fashion.    - Opioids prescribed on discharge: Oxycodone  - Duration of opioids after discharge: Per [facility] [place] opioid prescribing guidelines, a 3 day prescription of opioids was provided.  - Bowel regimen: not needed      Significant Results and Procedures   See below     Pending Results   Unresulted Labs Ordered in the Past 30 Days of this Admission     Date and Time Order Name Status Description    8/2/2022  7:26 AM Blood Culture Arm, Left Preliminary     8/2/2022  7:26 AM Blood Culture Arm, Right Preliminary           Code Status   Full Code       Primary Care Physician   Kelley Stoddard    GEN:  Alert, oriented x 3, appears comfortable, NAD.  HEENT:  Normocephalic/atraumatic, no scleral icterus, no nasal discharge, mouth moist.  CV:  Regular paced rhythm, no murmur or JVD.  S1 + S2 noted, no S3 or S4.  LUNGS:  Clear to auscultation bilaterally without rales/rhonchi/wheezing/retractions.  Symmetric chest rise on inhalation noted.  ABD:  Active bowel sounds, soft, non-tender/non-distended.  No rebound/guarding/rigidity.  EXT:  BLE edema, no cyanosis.  No joint synovitis noted.  SKIN:  Dry to touch, no exanthems noted in the visualized areas.     Discharge Disposition   Discharged to TCU    Consultations This Hospital Stay   PHYSICAL THERAPY ADULT IP CONSULT  CARDIOLOGY IP CONSULT  CARE MANAGEMENT / SOCIAL WORK IP CONSULT  PHARMACY LIAISON FOR MEDICATION COVERAGE CONSULT  INFECTIOUS DISEASES IP CONSULT  PHARMACY IP CONSULT  PHARMACY IP CONSULT  UROLOGY IP CONSULT  VASCULAR ACCESS ADULT IP CONSULT  PHYSICAL THERAPY ADULT IP CONSULT  PHYSICAL THERAPY ADULT IP CONSULT  PHARMACY LIAISON FOR MEDICATION COVERAGE CONSULT  PHYSICAL THERAPY ADULT IP CONSULT  OCCUPATIONAL THERAPY ADULT IP CONSULT    Time Spent  on this Encounter   I, Reese Maravilla MD, discharged this patient today but I did not personally see the patient today and will not be billing for the patient's discharge.     Discharge Orders      General info for SNF    Length of Stay Estimate: Short Term Care: Estimated # of Days <30  Condition at Discharge: Improving  Level of care:skilled   Rehabilitation Potential: Good  Admission H&P remains valid and up-to-date: Yes  Recent Chemotherapy: N/A  Use Nursing Home Standing Orders: Yes     Mantoux instructions    Give two-step Mantoux (PPD) Per Facility Policy Yes     Follow Up and recommended labs and tests    Follow up with detention physician.  The following labs/tests are recommended: BMP, hgb.   He needs followup with Vascular Medicine and Vascular Surgery given presence of DVT in LSCV where wires of PM are in place.     Reason for your hospital stay    Bacteriemia/sepsis and finding of LUE DVT     IV access    Midline .     Activity - Up with nursing assistance     Activity - Up with assistive device     Full Code     Physical Therapy Adult Consult    Evaluate and treat as clinically indicated.    Reason:  decondition     Occupational Therapy Adult Consult    Evaluate and treat as clinically indicated.    Reason: Deconditioning     Fall precautions     Diet    Follow this diet upon discharge: Orders Placed This Encounter      Snacks/Supplements Adult: Glucerna; Between Meals      Snacks/Supplements Adult: Gelatein sugar-free; Between Meals      Low Saturated Fat Na <2400 mg     Discharge Medications   Current Discharge Medication List      START taking these medications    Details   amiodarone (PACERONE) 400 MG tablet Take 1 tablet (400 mg) by mouth daily    Associated Diagnoses: NSVT (nonsustained ventricular tachycardia) (H)      cefTRIAXone (ROCEPHIN) 1 GM vial Inject 2 g (2,000 mg) into the vein daily for 20 days ESR,CRP,CBC with differential, creatinine, SGOT weekly while on this medication to be  faxed to Dr. Baca office.  Qty: 600 mL, Refills: 0    Associated Diagnoses: Bacteremia      empagliflozin (JARDIANCE) 10 MG TABS tablet Take 1 tablet (10 mg) by mouth daily    Associated Diagnoses: Chronic diastolic heart failure (H)      tamsulosin (FLOMAX) 0.4 MG capsule Take 1 capsule (0.4 mg) by mouth daily    Associated Diagnoses: Urinary retention         CONTINUE these medications which have CHANGED    Details   oxyCODONE (ROXICODONE) 5 MG tablet Take 1-2 tablets (5-10 mg) by mouth every 4 hours as needed for moderate to severe pain  Qty: 10 tablet, Refills: 0    Associated Diagnoses: Closed nondisplaced intertrochanteric fracture of left femur, initial encounter (H)         CONTINUE these medications which have NOT CHANGED    Details   acetaminophen (TYLENOL) 500 MG tablet Take 1,000 mg by mouth every 6 hours as needed for mild pain      albuterol (PROAIR HFA/PROVENTIL HFA/VENTOLIN HFA) 108 (90 Base) MCG/ACT inhaler Inhale 1-2 puffs into the lungs every 4 hours as needed for shortness of breath / dyspnea or wheezing      atorvastatin (LIPITOR) 80 MG tablet Take 80 mg by mouth every morning      !! bumetanide (BUMEX) 2 MG tablet Take 2 mg by mouth every morning       !! bumetanide (BUMEX) 2 MG tablet Take 1 mg by mouth every evening      !! bumetanide (BUMEX) 2 MG tablet Take 2 mg by mouth daily At noon      lisinopril (ZESTRIL) 2.5 MG tablet Take 2.5 mg by mouth every morning      loratadine (CLARITIN) 10 MG tablet Take 10 mg by mouth every morning      metoprolol succinate ER (TOPROL XL) 100 MG 24 hr tablet Take 1 tablet (100 mg) by mouth every morning (Not a new prescription, this is a change of dose)    Associated Diagnoses: Congestive heart failure, unspecified HF chronicity, unspecified heart failure type (H)      potassium chloride ER (KLOR-CON M) 20 MEQ CR tablet Take 20 mEq by mouth every morning      umeclidinium-vilanterol (ANORO ELLIPTA) 62.5-25 MCG/INH oral inhaler Inhale 1 puff into the lungs  every morning      DULoxetine (CYMBALTA) 30 MG capsule Take 30 mg by mouth At Bedtime      nitroGLYcerin (NITROSTAT) 0.4 MG sublingual tablet Place 0.4 mg under the tongue every 5 minutes as needed for chest pain For chest pain place 1 tablet under the tongue every 5 minutes for 3 doses. If symptoms persist 5 minutes after 1st dose call 911.       !! - Potential duplicate medications found. Please discuss with provider.        Allergies   Allergies   Allergen Reactions     Spironolactone Unknown     Reported side effects after receiving it after heart surgery, resolved with ablasion     Sulindac Diarrhea, Muscle Pain (Myalgia) and Nausea and Vomiting     Felt like beaten up      Latex Rash     Gets red on direct contact     Data   Most Recent 3 CBC's:Recent Labs   Lab Test 08/12/22  0557 08/11/22  1803 08/11/22  0554 08/10/22  1839 08/10/22  0614   WBC 7.4  --  6.5  --  6.9   HGB 9.6*  9.6* 10.1* 9.9*  9.9*   < > 10.3*  10.3*     --  100  --  100     --  221  --  269    < > = values in this interval not displayed.      Most Recent 3 BMP's:  Recent Labs   Lab Test 08/12/22  0557 08/11/22  0554 08/10/22  0614   * 133* 131*   POTASSIUM 4.5 3.8 3.9   CHLORIDE 101 99 97*   CO2 25 25 24   BUN 11.0 9.2 10.0   CR 0.69 0.81 0.85   ANIONGAP 7 9 10   THOMAS 9.2 8.8 9.0   * 111* 104*     Most Recent 2 LFT's:  Recent Labs   Lab Test 07/29/22  2131 06/17/22  2230   AST 38 21   ALT 23 17   ALKPHOS 151* 68   BILITOTAL 1.2 0.7     Most Recent INR's and Anticoagulation Dosing History:  Anticoagulation Dose History     Recent Dosing and Labs Latest Ref Rng & Units 1/23/2022 6/17/2022    INR 0.85 - 1.15 1.09 1.30(H)        Most Recent 3 Troponin's:No lab results found.  Most Recent Cholesterol Panel:No lab results found.  Most Recent 6 Bacteria Isolates From Any Culture (See EPIC Reports for Culture Details):No lab results found.  Most Recent TSH, T4 and A1c Labs:No lab results found.  Results for orders  placed or performed during the hospital encounter of 07/29/22   XR Chest 2 Views    Narrative    EXAM: XR CHEST 2 VIEWS  LOCATION: [facility]  DATE/TIME: 7/29/2022 11:34 PM    INDICATION: Lower extremity edema, weakness.  COMPARISON: 6/18/2022      Impression    IMPRESSION: Stable chest with again seen loss of the normally seen left hemidiaphragm most typical for underlying infiltrate/atelectasis in the left lower lobe. Prior postoperative change of sternotomy. Transvenous wires are in stable position. No active   CHF/volume overload identified.   CT Chest w/o Contrast    Narrative    EXAM: CT CHEST WITHOUT CONTRAST  LOCATION: [facility]  DATE/TIME: 07/30/2022, 9:31 AM    INDICATION: Possible infiltrate left lower lobe on recent x-ray.  COMPARISON: Chest x-ray on 07/29/2022.  TECHNIQUE: CT chest without IV contrast. Multiplanar reformats were obtained. Dose reduction techniques were used.  CONTRAST: None.    FINDINGS:   LUNGS AND PLEURA: Trace bilateral pleural effusions. No significant pneumothorax. Left lower lobe and to a lesser extent posterior right lower lobe patchy consolidative and groundglass pulmonary opacities, could represent atelectasis versus infection.    MEDIASTINUM/AXILLAE: Left chest wall AICD. Postsurgical changes of cardiac surgery with median sternotomy wires and surgical clips. Cardiomegaly. No significant pericardial effusion. Multiple prominent but not significantly enlarged mediastinal, hilar   and axillary lymph nodes, indeterminate, could be reactive.    CORONARY ARTERY CALCIFICATION: Moderate.    UPPER ABDOMEN: Limited evaluation of the upper abdomen due to lack of coverage and contrast. Diffuse hypoattenuation of the liver, likely due to underlying hepatic steatosis.    MUSCULOSKELETAL: No suspicious osseous lesion.      Impression    IMPRESSION:   1.  Trace bilateral pleural effusions and left lower lobe and to a lesser extent right lower  lobe patchy consolidative and groundglass pulmonary opacities, could represent atelectasis versus infection.  2.  Cardiomegaly and moderate atherosclerotic vascular calcification of the coronary arteries.  3.  Hepatic steatosis.       XR Pelvis w Hip Left 1 View    Narrative    PELVIS AND HIP LEFT ONE VIEW August 3, 2022 10:55 AM     INDICATION: Left-sided hip pain.     COMPARISON: 1/23/2022.      Impression    IMPRESSION:  1.  Open reduction internal fixation left femur intertrochanteric  fracture with dynamic femoral neck screw and lateral plate-screw  fixation. Orthopedic hardware is intact. The fracture line is not  visible, consistent with osseous healing. No new or additional  fracture is evident.  2.  Advanced left hip degenerative arthrosis, unchanged.  3.  Advanced right hip degenerative arthrosis, unchanged.  4.  Degenerative changes in the lower lumbar spine.    ROSANNA RANGEL MD         SYSTEM ID:  [id number]   US Upper Extremity Venous Duplex Left Portable    Narrative    EXAM: US UPPER EXTREMITY VENOUS DUPLEX LEFT PORTABLE  LOCATION: [facility]  DATE/TIME: 8/5/2022 5:33 PM    INDICATION: swelling of left upper extremity, has heart failure  COMPARISON: None.  TECHNIQUE: Venous Duplex ultrasound of the left upper extremity with (when possible) and without compression, augmentation, and duplex. Color flow and spectral Doppler with waveform analysis performed.    FINDINGS: Ultrasound includes evaluation of the internal jugular vein, innominate vein, subclavian vein, axillary vein, and brachial vein. The superficial cephalic and basilic veins were also evaluated where seen.     LEFT: There is near occlusive thrombosis of the mid left subclavian vein, lateral left subclavian vein, and occlusive thrombosis of the left axillary vein proximally. No superficial thrombophlebitis.       Impression    IMPRESSION:   1.  Near occlusive thrombosis of the mid left subclavian vein to the left  axillary vein.    Findings in this study discussed with the patient's nurse Claytonltesim by Dr. Pablo Fernandez by telephone on 2022 at 1900 hours.   Echocardiogram Complete     Value    LVEF  20-25%    [facility]    [id number]  [id number]  [id number]  [id number]^JOHANN^QIAN^ALEXANDRA     [facility]  Echocardiography Laboratory  [address] [place] [address]     Name: YURIDIA BAZAN  MRN: [id number]  : 1950  Study Date: 2022 10:56 AM  Age: 71 yrs  Gender: Male  Patient [facility]  Reason For Study: Cardiomyopathy  Ordering Physician: QIAN WILLS  Performed By: Juliette Cazares     BSA: 2.1 m2  Height: 67 in  Weight: 228 lb  HR: 96  BP: 103/65 mmHg  ______________________________________________________________________________  Procedure  Complete Portable Echo Adult. Optison (NDC #3356-8580) given intravenously.  ______________________________________________________________________________  Interpretation Summary     The left ventricle is severely dilated. LVIDd: 7.0 cm  Left ventricular systolic function is severely reduced. The visual ejection  fraction is 20-25%.  Global diffuse hypokinesis, worse in the inferior wall segments. Flattened  septum is consistent with RV pressure/volume overload.  Right ventricle is not well visualized, however global systolic function is  probably moderate-severely reduced. There is a pacemaker lead in the right  ventricle.  There is a bioprosthetic mitral valve. The prosthetic mitral valve is well-  seated. Elevated mean gradient 8 mmHg at 97 bpm. There is no mitral  regurgitation noted.  An annuloplasty ring is noted in the tricuspid position. Mild (1+) tricuspid  regurgitation.  The right ventricular systolic pressure is approximated at 41mmHg plus the  right atrial pressure.  Dilation of the inferior vena cava is present with abnormal respiratory  variation in diameter.  The ascending aorta is Mildly dilated. Max diameter of the  visualized portion  3.9 cm.     There are no prior studies available for comparison.  ______________________________________________________________________________  Left Ventricle  The left ventricle is severely dilated. LVIDd: 7.0 cm. There is severe  eccentric left ventricular hypertrophy. Left ventricular systolic function is  severely reduced. The visual ejection fraction is 20-25%. Left ventricular  diastolic function is not assessable. Flattened septum is consistent with RV  pressure/volume overload. Global diffuse hypokinesis, worse in the inferior  wall segments.     Right Ventricle  The right ventricle is not well visualized. Right ventricle is not well  visualized, however global systolic function is probably moderate-severely  reduced. There is a pacemaker lead in the right ventricle.     Atria  The left atrium is severely dilated. The right atrium is severely dilated.     Mitral Valve  There is no mitral regurgitation noted. Mean gradient 8 mmHg at 97 bpm. There  is a bioprosthetic mitral valve. The prosthetic mitral valve is well-seated.     Tricuspid Valve  There is mild (1+) tricuspid regurgitation. The right ventricular systolic  pressure is approximated at 41mmHg plus the right atrial pressure. An  annuloplasty ring is noted in the tricuspid position.     Aortic Valve  The aortic valve is trileaflet with aortic valve sclerosis. No aortic  regurgitation is present. No hemodynamically significant valvular aortic  stenosis.     Pulmonic Valve  The pulmonic valve is not well seen, but is grossly normal.     Vessels  The aortic root is normal size. The ascending aorta is Mildly dilated. Max  diameter of the visualized portion 3.9 cm. Dilation of the inferior vena cava  is present with abnormal respiratory variation in diameter.     ______________________________________________________________________________  MMode/2D Measurements & Calculations  IVSd: 1.1 cm  LVIDd: 7.0 cm  LVIDs: 6.1 cm  LVPWd: 1.1  cm  FS: 12.8 %  LV mass(C)d: 352.9 grams  LV mass(C)dI: 165.1 grams/m2  Ao root diam: 3.4 cm  asc Aorta Diam: 3.9 cm     LVOT diam: 2.1 cm  LVOT area: 3.5 cm2  LA Volume (BP): 121.0 ml  LA Volume Index (BP): 56.5 ml/m2  RWT: 0.31     Doppler Measurements & Calculations  MV E max norma: 179.0 cm/sec  MV max PG: 15.8 mmHg  MV mean P.5 mmHg  MV V2 VTI: 38.0 cm  MVA(VTI): 2.0 cm2  MV dec time: 0.27 sec  LV V1 max P.4 mmHg  LV V1 max: 126.0 cm/sec  LV V1 VTI: 21.6 cm     SV(LVOT): 75.0 ml  SI(LVOT): 35.1 ml/m2  TR max norma: 320.5 cm/sec  TR max P.4 mmHg     ______________________________________________________________________________  Report approved by: Natalia Baron 2022 12:01 PM         Echocardiogram Limited     Value    LVEF  20-25%    Narrative    [id number]  [id number]  [id number]  [id number]^JOHANN^QIAN^ALEXANDRA     [facility]  Echocardiography Laboratory  [address] [place] [address]     Name: YURIDIA BAZAN  MRN: [id number]  : 1950  Study Date: 2022 02:20 PM  Age: 71 yrs  Gender: Male  Patient Location: [facility]  Reason For Study: Endocarditis  Ordering Physician: QIAN WILLS  Performed By: Juliette Cazares     BSA: 2.1 m2  Height: 67 in  Weight: 230 lb  HR: 73  BP: 103/48 mmHg  ______________________________________________________________________________  Procedure  Limited Portable Echo Adult.  ______________________________________________________________________________  Interpretation Summary     No vegetations seen. Compared to prior study, there is no significant change.  ______________________________________________________________________________  Left Ventricle  The visual ejection fraction is 20-25%.     Right Ventricle  There is a catheter/pacemaker lead seen in the right ventricle. Right  ventricular function cannot be assessed due to poor image quality.     Mitral Valve  There is no mitral regurgitation noted. There is a bioprosthetic mitral  valve.  Normal prosthetic mitral valve gradients.     Tricuspid Valve  Tricuspid leaflets are thickened. There is trace to mild tricuspid  regurgitation. Right ventricular systolic pressure is elevated, consistent  with moderate to severe pulmonary hypertension. An annuloplasty ring is noted  in the tricuspid position. Normal prosthetic tricuspid valve gradient.     Aortic Valve  There is mild trileaflet aortic sclerosis.     Pulmonic Valve  Normal pulmonic valve.     Pericardium  There is no pericardial effusion.  ______________________________________________________________________________  Doppler Measurements & Calculations  MV max P.1 mmHg  MV mean P.0 mmHg  MV V2 VTI: 43.1 cm  TV V2 max: 163.0 cm/sec  TV max PG: 10.6 mmHg  TV mean P.0 mmHg  TV V2 VTI: 43.3 cm  TR max norma: 311.0 cm/sec  TR max P.7 mmHg     ______________________________________________________________________________  Report approved by: Natalia Gomez 2022 03:05 PM         Transesophageal Echocardiogram     Value    LVEF  20-25%    Narrative    [id number]  [id number]  [id number]  [id number]^JAIDEN^ALEX^ALYSHA     [facility]  Echocardiography Laboratory  [address] [place] [address]     Name: YURIDIA BAZAN  MRN: [id number]  : 1950  Study Date: 2022 11:12 AM  Age: 71 yrs  Gender: Male  [facility]  Reason For Study: Endocarditis  Ordering Physician: ALEX HWANG  Referring Physician: Kelley Stoddard MD  Performed By: Chika Talbot RDCS     BSA: 2.1 m2  Height: 67 in  Weight: 228 lb  HR: 70  BP: 123/69 mmHg  ______________________________________________________________________________  Procedure  Complete ADEN Adult. ADEN Probe serial #[id number] ([id number]) was used during the  procedure. The heart rate, respiratory rate and response to care were  monitored throughout the procedure with the assistance of the  nurse.  ______________________________________________________________________________  Interpretation Summary     The left ventricle is moderately dilated. The visual ejection fraction is 20-  25%.  There is a bioprosthetic mitral valve. There are small filamentous opacities  noted on the mitral valve. These seem likely fibrinous strands, but in the  appropriate clinical context could be vegetations. Image 4-6. No prior ADEN to  directly compare images.  An annuloplasty ring is noted in the tricuspid position. There is mild to  moderate (1-2+) tricuspid regurgitation.  The right ventricle is moderate to severely dilated. The right ventricular  systolic function is moderate to severely reduced.  There is a pacemaker lead in the right ventricle and atrium.  The left atrial appendage is not well visualized. History of surgical  clipping.  There is no color Doppler evidence of an atrial shunt.  ______________________________________________________________________________  ADEN  I determined this patient to be an appropriate candidate for the planned  sedation and procedure and have reassessed the patient immediately prior to  sedation and procedure. Total sedation time: 12 min minutes of continuous  bedside 1:1 monitoring. Versed (1mg) was given intravenously. Fentanyl (50mcg)  was given intravenously. Consent to the procedure was obtained prior to  sedation. Prior to the exam, the oral cavity was checked and no overcrowding  was noted. The transesophageal probe was passed without difficulty. There were  no complications associated with this procedure.     Left Ventricle  The left ventricle is moderately dilated. The visual ejection fraction is 20-  25%.     Right Ventricle  The right ventricle is moderate to severely dilated. The right ventricular  systolic function is moderate to severely reduced. There is a pacemaker lead  in the right ventricle.     Atria  The left atrium is severely dilated. Right atrium not well  visualized. Pacer  wire in right atrium. There is no color Doppler evidence of an atrial shunt.  The left atrial appendage is not well visualized. History of surgical  clipping.     Mitral Valve  There is trace to mild mitral regurgitation. There is a bioprosthetic mitral  valve. There are small filamentous opacities noted on the mitral valve. These  seem likely fibrinous strands, but in the appropriate clinical context could  be vegetations. No prior ADEN to directly compare images.     Tricuspid Valve  There is mild to moderate (1-2+) tricuspid regurgitation. An annuloplasty ring  is noted in the tricuspid position.     Aortic Valve  The aortic valve is trileaflet with aortic valve sclerosis. No hemodynamically  significant valvular aortic stenosis.     Pulmonic Valve  The pulmonic valve is not well visualized. There is trace pulmonic valvular  regurgitation.     Vessels  The aortic root is normal size.     Pericardial/Pleural  There is no pericardial effusion.  ______________________________________________________________________________  Report approved by: Natalia Estrada 08/08/2022 12:00 PM     ______________________________________________________________________________              Disclaimer: This note consists of symbols derived from keyboarding, dictation and/or voice recognition software. As a result, there may be errors in the script that have gone undetected. Please consider this when interpreting information found in this chart.

## 2022-08-12 NOTE — PLAN OF CARE
Physical Therapy Discharge Summary    Reason for therapy discharge:    Discharged to transitional care facility.    Progress towards therapy goal(s). See goals on Care Plan in [facility] electronic health record for goal details.  Goals partially met.  Barriers to achieving goals:   discharge from facility.    Therapy recommendation(s):    Continued therapy is recommended.  Rationale/Recommendations:  TCU recommended for progression of strength and safety with functional mobility.

## 2022-08-12 NOTE — PLAN OF CARE
Goal Outcome Evaluation:      Patient to discharge to TCU [facility]. Packet sent with paper scripts for oxycodone and xaralto. Shaffer removed this am and patient voided 450cc 90 min later into urinal. Belongings sent with patient.

## 2022-08-13 NOTE — PROGRESS NOTES
[facility] Care Resource [place]    Background: Care Coordination referral placed from [facility] [place] [facility] discharge report for reason of patient meeting criteria for a TCM outreach call by [facility] team.    Assessment: Upon chart review, CCRC Team member will cancel/close the referral for TCM outreach due to reason below:    Patient is not established within [facility] Primary Care and CCRC team member noted patient discharged to TCU/ARU/LTACH.    Plan: Care Coordination referral for TCM outreach canceled.      Destinee Forte MA  Connected [facility] Resource [place], [facility]    *Connected Care Resource Team does NOT follow patient ongoing. Referrals are identified based on internal discharge reports and the outreach is to ensure patient has an understanding of their discharge instructions.

## 2022-08-15 NOTE — PROGRESS NOTES
[facility]  PRIMARY CARE PROVIDER AND CLINIC:  Kelley Stoddard MD, [facility] [address]  / [facility] [id number]  Chief Complaint   Patient presents with     [facility] Medical Record Number:  [id number]  Place of Service where encounter took place:  [facility] [[id number]]    Toney Denton  is a 71 year old  (1950), admitted to the above facility from  [facility]. Hospital stay 7/29/22 through 8/12/22..  Admitted to this facility for  rehab, medical management and nursing care.    HPI:    HPI information obtained from: facility chart records, facility staff and patient report.   Brief Summary of Hospital Course:   Updates on Status Since Skilled nursing Admission:     Patient Toney Denton is a 71 yr old male admitted to [facility] for rehabilitation s/p hospitalization [facility] 7/29-8/12/22 s/p bacteremia with weakness, fever, SOB and edema.   Per hospital report:   CT chest show right lower lung patchy consolidation and groundglass opacities consistent with atelectasis versus infection w trace bilateral effusions.    Laboratory evaluation showed sodium 133, chloride 96, BUN 37, creatinine 1.24, BNP 11,024, troponin 34, procalcitonin 5.04, white blood cells 3.2, hemoglobin 10.6, platelets 66, unremarkable urinalysis, and negative testing for COVID-19/influenza/RSV.    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   -Pt was started on ceftriaxone and doxycycline. He improved.  ID consulted.  He was transitioned to Zosyn therapy on 8/3 for anaerobic coverage. Now on Rocephin starting 8/11, needs prolonged course of IV aAcbx. Plan per infectious disease    With complication CHF exacerbation and DVT left arm   Also, complications:  -acute kidney injury with urinary  retention, resolved   -mild hyponatremia, In the setting of diuresis.  Sodium 129 on 8/5. Improved to 133  -  Patient report recent left hip surgery beginning of year     PMHx of tricuspid valve repair, bioprosthetic mitral valve replacement for regurgitation, coronary artery disease, ischemic cardiomyopathy (EF 25% in 4/21), pacemaker placement in 7/19, biventricular implantable [id number] from BiV-P in 5/20, atrial fibrillation (s/p ablation/maze procedure), severe COPD, hypertension, dyslipidemia, and borderline diabetes     TODAY   Has swelling in left upper arm and some pain with this  Denies shortness of breath or chest pain  Reports diarrhea at times  States voiding fine  No report hematuria  Has some bleeding at PICC site, minor. Consulted charge nurse regarding-plan to change dressing.   Wishes to be FULL code        CODE STATUS/ADVANCE DIRECTIVES DISCUSSION:   CPR/Full code   Patient's living condition: lives with spouse  ALLERGIES: Spironolactone, Sulindac, and Latex  PAST MEDICAL HISTORY:  has a past medical history of Benign essential hypertension, Chronic atrial fibrillation (H), COPD (chronic obstructive pulmonary disease) (H), Hyperlipidemia LDL goal <100, Ischemic cardiomyopathy, Mitral valve regurgitation, and Tricuspid regurgitation.    He has no past medical history of Complication of anesthesia or Sleep apnea.  PAST SURGICAL HISTORY:   has a past surgical history that includes cabg yue qual act perform; mitral valve replacement; Tricuspid Valvuloplasty; and Open reduction internal fixation hip nailing (Left, 1/24/2022).  FAMILY HISTORY: family history includes Diabetes in his brother and father.  SOCIAL HISTORY:   reports that he has quit smoking. His smoking use included cigars. He has never used smokeless tobacco. He reports current alcohol use.    Post Discharge Medication Reconciliation Status: discharge medications reconciled and changed, per note/orders    Current  Outpatient Medications   Medication Sig Dispense Refill     acetaminophen (TYLENOL) 500 MG tablet Take 1,000 mg by mouth every 6 hours as needed for mild pain       albuterol (PROAIR HFA/PROVENTIL HFA/VENTOLIN HFA) 108 (90 Base) MCG/ACT inhaler Inhale 1-2 puffs into the lungs every 4 hours as needed for shortness of breath / dyspnea or wheezing       amiodarone (PACERONE) 400 MG tablet Take 1 tablet (400 mg) by mouth daily       atorvastatin (LIPITOR) 80 MG tablet Take 80 mg by mouth every morning       bumetanide (BUMEX) 2 MG tablet Take 2 mg by mouth every morning        bumetanide (BUMEX) 2 MG tablet Take 1 mg by mouth every evening       bumetanide (BUMEX) 2 MG tablet Take 2 mg by mouth daily At noon       cefTRIAXone (ROCEPHIN) 1 GM vial Inject 2 g (2,000 mg) into the vein daily for 20 days ESR,CRP,CBC with differential, creatinine, SGOT weekly while on this medication to be faxed to Dr. Baca office. 600 mL 0     DULoxetine (CYMBALTA) 30 MG capsule Take 30 mg by mouth At Bedtime       empagliflozin (JARDIANCE) 10 MG TABS tablet Take 1 tablet (10 mg) by mouth daily       lisinopril (ZESTRIL) 2.5 MG tablet Take 2.5 mg by mouth every morning       loratadine (CLARITIN) 10 MG tablet Take 10 mg by mouth every morning       metoprolol succinate ER (TOPROL XL) 100 MG 24 hr tablet Take 1 tablet (100 mg) by mouth every morning (Not a new prescription, this is a change of dose)       nitroGLYcerin (NITROSTAT) 0.4 MG sublingual tablet Place 0.4 mg under the tongue every 5 minutes as needed for chest pain For chest pain place 1 tablet under the tongue every 5 minutes for 3 doses. If symptoms persist 5 minutes after 1st dose call 911.       oxyCODONE (ROXICODONE) 5 MG tablet Take 1-2 tablets (5-10 mg) by mouth every 4 hours as needed for moderate to severe pain 10 tablet 0     potassium chloride ER (KLOR-CON M) 20 MEQ CR tablet Take 20 mEq by mouth every morning       Rivaroxaban ANTICOAGULANT 15 & 20 MG TBPK Starter  "Therapy Pack Take 15 mg by mouth 2 times daily (with meals) for 21 days, THEN 20 mg daily with food for 9 days. 51 each 0     tamsulosin (FLOMAX) 0.4 MG capsule Take 1 capsule (0.4 mg) by mouth daily       umeclidinium-vilanterol (ANORO ELLIPTA) 62.5-25 MCG/INH oral inhaler Inhale 1 puff into the lungs every morning         ROS:  10 point ROS of systems including Constitutional, Eyes, Respiratory, Cardiovascular, Gastroenterology, Genitourinary, Integumentary, Musculoskeletal, Psychiatric were all negative except for pertinent positives noted in my HPI.    Vitals:  /68   Pulse 70   Temp 98.1  F (36.7  C)   Resp 18   Ht 1.715 m (5' 7.5\")   Wt 94.6 kg (208 lb 9.6 oz)   SpO2 92%   BMI 32.19 kg/m    Exam:  GENERAL APPEARANCE:  Alert, in no distress  ENT:  Mouth and posterior oropharynx normal, moist mucous membranes  EYES:  EOM, conjunctivae, lids, pupils and irises normal  NECK:  No adenopathy,masses or thyromegaly  RESP:  respiratory effort and palpation of chest normal, lungs clear to auscultation , no respiratory distress  CV:  Palpation and auscultation of heart done , regular rate and rhythm, no murmur, rub, or gallop  ABDOMEN:  normal bowel sounds, soft, nontender, no hepatosplenomegaly or other masses  M/S:   sitting in WC  SKIN:  Inspection of skin and subcutaneous tissue swelling left arm  NEURO:   Cranial nerves 2-12 are normal tested and grossly at patient's baseline  PSYCH:  oriented X 3    Lab/Diagnostic data:  Labs done in SNF are in [place] EPIC. Please refer to them using EPIC/Care Everywhere.   Last Comprehensive Metabolic Panel:  Sodium   Date Value Ref Range Status   08/12/2022 133 (L) 136 - 145 mmol/L Final     Potassium   Date Value Ref Range Status   08/12/2022 4.5 3.4 - 5.3 mmol/L Final   06/19/2022 3.7 3.4 - 5.3 mmol/L Final     Chloride   Date Value Ref Range Status   08/12/2022 101 98 - 107 mmol/L Final   06/19/2022 102 94 - 109 mmol/L Final     Carbon Dioxide (CO2)   Date Value " Ref Range Status   08/12/2022 25 22 - 29 mmol/L Final   06/19/2022 28 20 - 32 mmol/L Final     Anion Gap   Date Value Ref Range Status   08/12/2022 7 7 - 15 mmol/L Final   06/19/2022 6 3 - 14 mmol/L Final     Glucose   Date Value Ref Range Status   08/12/2022 108 (H) 70 - 99 mg/dL Final   06/19/2022 120 (H) 70 - 99 mg/dL Final     Urea Nitrogen   Date Value Ref Range Status   08/12/2022 11.0 8.0 - 23.0 mg/dL Final   06/19/2022 20 7 - 30 mg/dL Final     Creatinine   Date Value Ref Range Status   08/12/2022 0.69 0.67 - 1.17 mg/dL Final     GFR Estimate   Date Value Ref Range Status   08/12/2022 >90 >60 mL/min/1.73m2 Final     Comment:     Effective December 21, 2021 eGFRcr in adults is calculated using the 2021 CKD-EPI creatinine equation which includes age and gender (Kris et al., NE, DOI: 10.1056/RQWMxn4503646)     GFR, ESTIMATED POCT   Date Value Ref Range Status   06/17/2022 54 (L) >60 mL/min/1.73m2 Final     Calcium   Date Value Ref Range Status   08/12/2022 9.2 8.8 - 10.2 mg/dL Final     Lab Results   Component Value Date    WBC 7.4 08/12/2022     Lab Results   Component Value Date    RBC 3.15 08/12/2022     Lab Results   Component Value Date    HGB 9.6 08/12/2022    HGB 9.6 08/12/2022     Lab Results   Component Value Date    HCT 31.4 08/12/2022     No components found for: MCT  Lab Results   Component Value Date     08/12/2022     Lab Results   Component Value Date    MCH 30.5 08/12/2022     Lab Results   Component Value Date    MCHC 30.6 08/12/2022     Lab Results   Component Value Date    RDW 14.6 08/12/2022     Lab Results   Component Value Date     08/12/2022         ASSESSMENT/PLAN:     Capnocytophaga bacteremia  Continue course of Rocephin   Continue weekly labs per infectious disease, follow up infectious disease as advised  Monitor labs per infectious disease   Vitals stable     Acute on chronic HFrEF. Ischemic CM. CAD s/p CABG, stent to RCA into proximal PDA.   History  of bioprosthetic mitral valve replacement. History of tricuspid valve repair.   History of pacemaker placement with upgrade to biventricular cardioverter- defibrillator.   History of atrial fibrillation with ablation/maze procedure  TTE obtained on 08/01 showed EF 20-25% with global diffuse hypokinesis. Flattened septum c/w RV pressure/volume overload. Bioprosthetic mitral valve noted w annuloplasty ring in tricuspid position.   Continue Bumex . Not appear fluid up  Wt Readings from Last 2 Encounters:   08/15/22 94.6 kg (208 lb 9.6 oz)   08/12/22 104.8 kg (231 lb 1.6 oz)   follow up cardiologist as advised  Follow up EP for pacemaker check per protocol     LUE DVT  near occlusive thombosis of mid left subclavian vein to left axillary vein.   Continue Xarelto, no signs and symptoms bleeding   History GIB i06/2022, no evidence of bleeding during hospital stay  schedule fu vascular specialist Dr. Chirinos 2 wks.  Likely need anticoagulation for life     hypertension  blood pressure managed  Continue Lisinopril & Metoprolol     HLD  Continue Lipitor     Intermittent runs of ventricular tachycardia  blood pressure and hr managed  continue Metoprolol. Cardiology 8/1/22 started Amiodarone   Monitor parameters per protocol and vitals   schedule fu cardiologist Dr. [facility]    DMII  Continue Jardiance     Urinary retention  hematuria  had to be straight cathed multiple times.   Shaffer catheter was placed.   Flomax started, past void trial? recommend give Flomax in evening     States voiding within normal limits   Recommend outpatient urine cytology with cystoscopy  PVR AM and PM shift x2      COPD, without acute exacerbation  Denies shortness of breath  Continue albuterol and Anoro Ellipta inhalers.     Generalized weakness, left hip pain  left intertrochanteric femur fracture and s/p ORIF in January 2022  Was using walker and then graduated to cane.     X-ray left hip shows no malposition of hardware but severe  osteoarthritis bilaterally  Continue tylenol and oxycodone for pain, wean off oxycodone as able  Patient also on Cymbalta  Will add Senna S for bowel movement management   Continue therapies   Lives with wife and states has 7 stairs    Mood disorder  States mood fine  Continue Cymbalta    Diarrhea  Stool check for cdiff    Advanced care planning   Wishes to be FULL code       Appointments in Next Year    Sep 14, 2022  2:30 PM  (Arrive by 2:15 PM)  Cystoscopy with Broderick Mcfarlane MD, [facility]  [facility] ([facility] ) [phone]          Total time spent with patient visit at the skilled nursing facility was 37 min including patient visit and review of past records. Greater than 50% of total time spent with counseling and coordinating care due to discussion on pain management, IV therapy for blood infection, functional goals, goals of care and discharge planning and consult with nursing as noted above .     Electronically signed by:  PASHA Babin CNP

## 2022-08-15 NOTE — LETTER
8/15/2022        RE: Toney Denton  [address]  [place] [address]        [facility]  PRIMARY CARE PROVIDER AND CLINIC:  Kelley Stoddard MD, [facility] [address] / [place] [address]  Chief Complaint   Patient presents with     [facility] Medical Record Number:  [id number]  Place of Service where encounter took place:  [facility] [[id number]]    Toney Denton  is a 71 year old  (1950), admitted to the above facility from  [facility]. Hospital stay 7/29/22 through 8/12/22..  Admitted to this facility for  rehab, medical management and nursing care.    HPI:    HPI information obtained from: facility chart records, facility staff and patient report.   Brief Summary of Hospital Course:   Updates on Status Since Skilled nursing Admission:     Patient Toney Denton is a 71 yr old male admitted to [facility] for rehabilitation s/p hospitalization [facility] 7/29-8/12/22 s/p bacteremia with weakness, fever, SOB and edema.   Per hospital report:   CT chest show right lower lung patchy consolidation and groundglass opacities consistent with atelectasis versus infection w trace bilateral effusions.    Laboratory evaluation showed sodium 133, chloride 96, BUN 37, creatinine 1.24, BNP 11,024, troponin 34, procalcitonin 5.04, white blood cells 3.2, hemoglobin 10.6, platelets 66, unremarkable urinalysis, and negative testing for COVID-19/influenza/RSV.    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   -Pt was started on ceftriaxone and doxycycline. He improved.  ID consulted.  He was transitioned to Zosyn therapy on 8/3 for anaerobic coverage. Now on Rocephin starting 8/11, needs prolonged course of IV aAcbx. Plan per infectious disease    With complication CHF exacerbation  and DVT left arm   Also, complications:  -acute kidney injury with urinary retention, resolved   -mild hyponatremia, In the setting of diuresis.  Sodium 129 on 8/5. Improved to 133  -  Patient report recent left hip surgery beginning of year     PMHx of tricuspid valve repair, bioprosthetic mitral valve replacement for regurgitation, coronary artery disease, ischemic cardiomyopathy (EF 25% in 4/21), pacemaker placement in 7/19, biventricular implantable sdbciyakbvcw-pfromsznbibkk-iyagzrd from BiV-P in 5/20, atrial fibrillation (s/p ablation/maze procedure), severe COPD, hypertension, dyslipidemia, and borderline diabetes     TODAY   Has swelling in left upper arm and some pain with this  Denies shortness of breath or chest pain  Reports diarrhea at times  States voiding fine  No report hematuria  Has some bleeding at PICC site, minor. Consulted charge nurse regarding-plan to change dressing.   Wishes to be FULL code        CODE STATUS/ADVANCE DIRECTIVES DISCUSSION:   CPR/Full code   Patient's living condition: lives with spouse  ALLERGIES: Spironolactone, Sulindac, and Latex  PAST MEDICAL HISTORY:  has a past medical history of Benign essential hypertension, Chronic atrial fibrillation (H), COPD (chronic obstructive pulmonary disease) (H), Hyperlipidemia LDL goal <100, Ischemic cardiomyopathy, Mitral valve regurgitation, and Tricuspid regurgitation.    He has no past medical history of Complication of anesthesia or Sleep apnea.  PAST SURGICAL HISTORY:   has a past surgical history that includes cabg yue qual act perform; mitral valve replacement; Tricuspid Valvuloplasty; and Open reduction internal fixation hip nailing (Left, 1/24/2022).  FAMILY HISTORY: family history includes Diabetes in his brother and father.  SOCIAL HISTORY:   reports that he has quit smoking. His smoking use included cigars. He has never used smokeless tobacco. He reports current alcohol use.    Post Discharge Medication Reconciliation  Status: discharge medications reconciled and changed, per note/orders    Current Outpatient Medications   Medication Sig Dispense Refill     acetaminophen (TYLENOL) 500 MG tablet Take 1,000 mg by mouth every 6 hours as needed for mild pain       albuterol (PROAIR HFA/PROVENTIL HFA/VENTOLIN HFA) 108 (90 Base) MCG/ACT inhaler Inhale 1-2 puffs into the lungs every 4 hours as needed for shortness of breath / dyspnea or wheezing       amiodarone (PACERONE) 400 MG tablet Take 1 tablet (400 mg) by mouth daily       atorvastatin (LIPITOR) 80 MG tablet Take 80 mg by mouth every morning       bumetanide (BUMEX) 2 MG tablet Take 2 mg by mouth every morning        bumetanide (BUMEX) 2 MG tablet Take 1 mg by mouth every evening       bumetanide (BUMEX) 2 MG tablet Take 2 mg by mouth daily At noon       cefTRIAXone (ROCEPHIN) 1 GM vial Inject 2 g (2,000 mg) into the vein daily for 20 days ESR,CRP,CBC with differential, creatinine, SGOT weekly while on this medication to be faxed to Dr. Baca office. 600 mL 0     DULoxetine (CYMBALTA) 30 MG capsule Take 30 mg by mouth At Bedtime       empagliflozin (JARDIANCE) 10 MG TABS tablet Take 1 tablet (10 mg) by mouth daily       lisinopril (ZESTRIL) 2.5 MG tablet Take 2.5 mg by mouth every morning       loratadine (CLARITIN) 10 MG tablet Take 10 mg by mouth every morning       metoprolol succinate ER (TOPROL XL) 100 MG 24 hr tablet Take 1 tablet (100 mg) by mouth every morning (Not a new prescription, this is a change of dose)       nitroGLYcerin (NITROSTAT) 0.4 MG sublingual tablet Place 0.4 mg under the tongue every 5 minutes as needed for chest pain For chest pain place 1 tablet under the tongue every 5 minutes for 3 doses. If symptoms persist 5 minutes after 1st dose call 911.       oxyCODONE (ROXICODONE) 5 MG tablet Take 1-2 tablets (5-10 mg) by mouth every 4 hours as needed for moderate to severe pain 10 tablet 0     potassium chloride ER (KLOR-CON M) 20 MEQ CR tablet Take 20 mEq by  "mouth every morning       Rivaroxaban ANTICOAGULANT 15 & 20 MG TBPK Starter Therapy Pack Take 15 mg by mouth 2 times daily (with meals) for 21 days, THEN 20 mg daily with food for 9 days. 51 each 0     tamsulosin (FLOMAX) 0.4 MG capsule Take 1 capsule (0.4 mg) by mouth daily       umeclidinium-vilanterol (ANORO ELLIPTA) 62.5-25 MCG/INH oral inhaler Inhale 1 puff into the lungs every morning         ROS:  10 point ROS of systems including Constitutional, Eyes, Respiratory, Cardiovascular, Gastroenterology, Genitourinary, Integumentary, Musculoskeletal, Psychiatric were all negative except for pertinent positives noted in my HPI.    Vitals:  /68   Pulse 70   Temp 98.1  F (36.7  C)   Resp 18   Ht 1.715 m (5' 7.5\")   Wt 94.6 kg (208 lb 9.6 oz)   SpO2 92%   BMI 32.19 kg/m    Exam:  GENERAL APPEARANCE:  Alert, in no distress  ENT:  Mouth and posterior oropharynx normal, moist mucous membranes  EYES:  EOM, conjunctivae, lids, pupils and irises normal  NECK:  No adenopathy,masses or thyromegaly  RESP:  respiratory effort and palpation of chest normal, lungs clear to auscultation , no respiratory distress  CV:  Palpation and auscultation of heart done , regular rate and rhythm, no murmur, rub, or gallop  ABDOMEN:  normal bowel sounds, soft, nontender, no hepatosplenomegaly or other masses  M/S:   sitting in WC  SKIN:  Inspection of skin and subcutaneous tissue swelling left arm  NEURO:   Cranial nerves 2-12 are normal tested and grossly at patient's baseline  PSYCH:  oriented X 3    Lab/Diagnostic data:  Labs done in SNF are in [place] EPIC. Please refer to them using EPIC/Care Everywhere.   Last Comprehensive Metabolic Panel:  Sodium   Date Value Ref Range Status   08/12/2022 133 (L) 136 - 145 mmol/L Final     Potassium   Date Value Ref Range Status   08/12/2022 4.5 3.4 - 5.3 mmol/L Final   06/19/2022 3.7 3.4 - 5.3 mmol/L Final     Chloride   Date Value Ref Range Status   08/12/2022 101 98 - 107 mmol/L Final "   06/19/2022 102 94 - 109 mmol/L Final     Carbon Dioxide (CO2)   Date Value Ref Range Status   08/12/2022 25 22 - 29 mmol/L Final   06/19/2022 28 20 - 32 mmol/L Final     Anion Gap   Date Value Ref Range Status   08/12/2022 7 7 - 15 mmol/L Final   06/19/2022 6 3 - 14 mmol/L Final     Glucose   Date Value Ref Range Status   08/12/2022 108 (H) 70 - 99 mg/dL Final   06/19/2022 120 (H) 70 - 99 mg/dL Final     Urea Nitrogen   Date Value Ref Range Status   08/12/2022 11.0 8.0 - 23.0 mg/dL Final   06/19/2022 20 7 - 30 mg/dL Final     Creatinine   Date Value Ref Range Status   08/12/2022 0.69 0.67 - 1.17 mg/dL Final     GFR Estimate   Date Value Ref Range Status   08/12/2022 >90 >60 mL/min/1.73m2 Final     Comment:     Effective December 21, 2021 eGFRcr in adults is calculated using the 2021 CKD-EPI creatinine equation which includes age and gender (Kris et al., NEJ, DOI: 10.1056/FLKOwk9023767)     GFR, ESTIMATED POCT   Date Value Ref Range Status   06/17/2022 54 (L) >60 mL/min/1.73m2 Final     Calcium   Date Value Ref Range Status   08/12/2022 9.2 8.8 - 10.2 mg/dL Final     Lab Results   Component Value Date    WBC 7.4 08/12/2022     Lab Results   Component Value Date    RBC 3.15 08/12/2022     Lab Results   Component Value Date    HGB 9.6 08/12/2022    HGB 9.6 08/12/2022     Lab Results   Component Value Date    HCT 31.4 08/12/2022     No components found for: MCT  Lab Results   Component Value Date     08/12/2022     Lab Results   Component Value Date    MCH 30.5 08/12/2022     Lab Results   Component Value Date    MCHC 30.6 08/12/2022     Lab Results   Component Value Date    RDW 14.6 08/12/2022     Lab Results   Component Value Date     08/12/2022         ASSESSMENT/PLAN:     Capnocytophaga bacteremia  Continue course of Rocephin   Continue weekly labs per infectious disease, follow up infectious disease as advised  Monitor labs per infectious disease   Vitals stable     Acute on chronic HFrEF.  Ischemic CM. CAD s/p CABG, stent to RCA into proximal PDA.   History of bioprosthetic mitral valve replacement. History of tricuspid valve repair.   History of pacemaker placement with upgrade to biventricular cardioverter- defibrillator.   History of atrial fibrillation with ablation/maze procedure  TTE obtained on 08/01 showed EF 20-25% with global diffuse hypokinesis. Flattened septum c/w RV pressure/volume overload. Bioprosthetic mitral valve noted w annuloplasty ring in tricuspid position.   Continue Bumex . Not appear fluid up  Wt Readings from Last 2 Encounters:   08/15/22 94.6 kg (208 lb 9.6 oz)   08/12/22 104.8 kg (231 lb 1.6 oz)   follow up cardiologist as advised  Follow up EP for pacemaker check per protocol     LUE DVT  near occlusive thombosis of mid left subclavian vein to left axillary vein.   Continue Xarelto, no signs and symptoms bleeding   History GIB i06/2022, no evidence of bleeding during hospital stay  schedule fu vascular specialist Dr. Chirinos 2 wks.  Likely need anticoagulation for life     hypertension  blood pressure managed  Continue Lisinopril & Metoprolol     HLD  Continue Lipitor     Intermittent runs of ventricular tachycardia  blood pressure and hr managed  continue Metoprolol. Cardiology 8/1/22 started Amiodarone   Monitor parameters per protocol and vitals   schedule fu cardiologist Dr. [facility]    DMII  Continue Jardiance     Urinary retention  hematuria  had to be straight cathed multiple times.   Shaffer catheter was placed.   Flomax started, past void trial? recommend give Flomax in evening     States voiding within normal limits   Recommend outpatient urine cytology with cystoscopy  PVR AM and PM shift x2      COPD, without acute exacerbation  Denies shortness of breath  Continue albuterol and Anoro Ellipta inhalers.     Generalized weakness, left hip pain  left intertrochanteric femur fracture and s/p ORIF in January 2022  Was using walker and then graduated to  cane.     X-ray left hip shows no malposition of hardware but severe osteoarthritis bilaterally  Continue tylenol and oxycodone for pain, wean off oxycodone as able  Patient also on Cymbalta  Will add Senna S for bowel movement management   Continue therapies   Lives with wife and states has 7 stairs    Mood disorder  States mood fine  Continue Cymbalta    Diarrhea  Stool check for cdiff    Advanced care planning   Wishes to be FULL code       Appointments in Next Year    Sep 14, 2022  2:30 PM  (Arrive by 2:15 PM)  Cystoscopy with Broderick Mcfarlane MD, [facility]  [facility] [place] ([facility] [place] ) [phone]          Total time spent with patient visit at the skilled nursing facility was 37 min including patient visit and review of past records. Greater than 50% of total time spent with counseling and coordinating care due to discussion on pain management, IV therapy for blood infection, functional goals, goals of care and discharge planning and consult with nursing as noted above .     Electronically signed by:  PASHA Babin CNP                           Sincerely,        PASHA Babin CNP

## 2022-08-15 NOTE — TELEPHONE ENCOUNTER
Lm for pt to call back and make appointment .    2nd and final attempt and notified pt no more calls will be made . Advised pt to return call and scheduled appointment.

## 2022-08-17 NOTE — PROGRESS NOTES
[facility] SERVICES  [place] Medical Record Number:  [id number]  Place of Service where encounter took place:  [facility] (TCU) [[id number]]  Chief Complaint   Patient presents with     Nursing Home Acute       HPI:    Toney Denton  is a 71 year old (1950), who is being seen today for an episodic care visit.  HPI information obtained from: facility chart records, facility staff and patient report. Today's concern is:    Patient Toney Denton is a 71 yr old male admitted to [facility] ([facility] for rehabilitation s/p hospitalization [facility] 7/29-8/12/22 s/p bacteremia with weakness, fever, SOB and edema.   Per hospital report:   CT chest show right lower lung patchy consolidation and groundglass opacities consistent with atelectasis versus infection w trace bilateral effusions.    Laboratory evaluation showed sodium 133, chloride 96, BUN 37, creatinine 1.24, BNP 11,024, troponin 34, procalcitonin 5.04, white blood cells 3.2, hemoglobin 10.6, platelets 66, unremarkable urinalysis, and negative testing for COVID-19/influenza/RSV.    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   -Pt was started on ceftriaxone and doxycycline. He improved.  ID consulted.  He was transitioned to Zosyn therapy on 8/3 for anaerobic coverage. Now on Rocephin starting 8/11, needs prolonged course of IV aAcbx. Plan per infectious disease    With complication CHF exacerbation and DVT left arm   Also, complications:  -acute kidney injury with urinary retention, resolved   -mild hyponatremia, In the setting of diuresis.  Sodium 129 on 8/5. Improved to 133  -  Patient report recent left hip surgery beginning of year     PMHx of tricuspid valve repair, bioprosthetic mitral valve replacement for regurgitation, coronary artery disease, ischemic cardiomyopathy (EF 25%  in 4/21), pacemaker placement in 7/19, biventricular implantable rgzdithmrfik-znyugrnseceet-zrcbxvb from BiV-P in 5/20, atrial fibrillation (s/p ablation/maze procedure), severe COPD, hypertension, dyslipidemia, and borderline diabetes          Bacteremia  Other congestive heart failure (H)  Personal history of DVT (deep vein thrombosis)     Tolerating Rocephin  PICC dressing changed and adjusted so not poking into skin and causing bleeding. No complications today  C/o diarrhea, reminder to staff still need stool sample to check for cdiff. Has used as needed Imodium in past for diarrhea.   Labs noted today, infectious disease updated  Patient wanting to know when he can go home. Discussed likely need to stay until IV antibiotics completed and strong enough to manage activities of daily living at home. Patient report he currently does not feel strong enough to go home, He currently needs help with cares.    Per therapies report  LB drsg: max A   Toileting: max A   ADLs: Max A   Discharge plan and any ID barriers to discharge: Resident lives in house with spouse, dani, stairs within (7). He has 2ww, cane, sc,r ts, toilet grab bars. Teaching may include i.v. abx. Goal is to d/c home when able.     Less swelling in left arm  Asking about putting brace on left arm, Recommend not doing this due to swelling  Tolerating anticoagulation Rivaroxaban with no signs and symptoms bleeding.   Patient has history gastrointestinal bleed due to hemorrhoid    Vitals stable room air  Not appear fluid up, weights stable  Wt Readings from Last 2 Encounters:   08/17/22 94.4 kg (208 lb 1.6 oz)   08/15/22 94.6 kg (208 lb 9.6 oz)            Past Medical and Surgical History reviewed in Epic today.    MEDICATIONS:    Current Outpatient Medications   Medication Sig Dispense Refill     acetaminophen (TYLENOL) 325 MG tablet Take 650 mg by mouth 2 times daily & 2 x daily as needed       albuterol (PROAIR HFA/PROVENTIL HFA/VENTOLIN HFA) 108 (90  Base) MCG/ACT inhaler Inhale 1-2 puffs into the lungs every 4 hours as needed for shortness of breath / dyspnea or wheezing       amiodarone (PACERONE) 400 MG tablet Take 1 tablet (400 mg) by mouth daily       atorvastatin (LIPITOR) 80 MG tablet Take 80 mg by mouth every morning       bumetanide (BUMEX) 2 MG tablet Take 2 mg by mouth every morning        bumetanide (BUMEX) 2 MG tablet Take 1 mg by mouth every evening       bumetanide (BUMEX) 2 MG tablet Take 2 mg by mouth daily At noon       cefTRIAXone (ROCEPHIN) 1 GM vial Inject 2 g (2,000 mg) into the vein daily for 20 days ESR,CRP,CBC with differential, creatinine, SGOT weekly while on this medication to be faxed to Dr. Baca office. 600 mL 0     DULoxetine (CYMBALTA) 30 MG capsule Take 30 mg by mouth At Bedtime       empagliflozin (JARDIANCE) 10 MG TABS tablet Take 1 tablet (10 mg) by mouth daily       lisinopril (ZESTRIL) 2.5 MG tablet Take 2.5 mg by mouth every morning       loratadine (CLARITIN) 10 MG tablet Take 10 mg by mouth every morning       metoprolol succinate ER (TOPROL XL) 100 MG 24 hr tablet Take 1 tablet (100 mg) by mouth every morning (Not a new prescription, this is a change of dose)       nitroGLYcerin (NITROSTAT) 0.4 MG sublingual tablet Place 0.4 mg under the tongue every 5 minutes as needed for chest pain For chest pain place 1 tablet under the tongue every 5 minutes for 3 doses. If symptoms persist 5 minutes after 1st dose call 911.       oxyCODONE (ROXICODONE) 5 MG tablet Take 1-2 tablets (5-10 mg) by mouth every 4 hours as needed for moderate to severe pain 45 tablet 0     potassium chloride ER (KLOR-CON M) 20 MEQ CR tablet Take 20 mEq by mouth every morning       Rivaroxaban ANTICOAGULANT 15 & 20 MG TBPK Starter Therapy Pack Take 15 mg by mouth 2 times daily (with meals) for 21 days, THEN 20 mg daily with food for 9 days. 51 each 0     senna-docusate (SENOKOT-S/PERICOLACE) 8.6-50 MG tablet Take 2 tablets by mouth daily as needed for  "constipation       tamsulosin (FLOMAX) 0.4 MG capsule Take 1 capsule (0.4 mg) by mouth daily       umeclidinium-vilanterol (ANORO ELLIPTA) 62.5-25 MCG/INH oral inhaler Inhale 1 puff into the lungs every morning           REVIEW OF SYSTEMS:  4 point ROS including Respiratory, CV, GI and , other than that noted in the HPI,  is negative    Objective:  /67   Pulse 69   Temp 98.2  F (36.8  C)   Resp 18   Ht 1.715 m (5' 7.5\")   Wt 94.4 kg (208 lb 1.6 oz)   SpO2 92%   BMI 32.11 kg/m    Exam:  GENERAL APPEARANCE:  Alert, in no distress  RESP:  respiratory effort and palpation of chest normal, lungs clear to auscultation , no respiratory distress  CV:  Palpation and auscultation of heart done , regular rate and rhythm, no murmur, rub, or gallop  ABDOMEN:  normal bowel sounds, soft, nontender, no hepatosplenomegaly or other masses  M/S:   sitting in WC  SKIN:  Inspection of skin and subcutaneous tissue baseline, slightly less swelling left arm, patient reminded to keep elevated on pillow. States he can now close his hand in a fist, PICC intact right arm, no bleeding noted  +1 edema bilateral lower extremity   NEURO:   Cranial nerves 2-12 are normal tested and grossly at patient's baseline  PSYCH:  oriented X 3    Labs:   Labs done in SNF are in [place] EPIC. Please refer to them using EPIC/Care Everywhere.   Last Comprehensive Metabolic Panel:  Sodium   Date Value Ref Range Status   08/12/2022 133 (L) 136 - 145 mmol/L Final     Potassium   Date Value Ref Range Status   08/12/2022 4.5 3.4 - 5.3 mmol/L Final   06/19/2022 3.7 3.4 - 5.3 mmol/L Final     Chloride   Date Value Ref Range Status   08/12/2022 101 98 - 107 mmol/L Final   06/19/2022 102 94 - 109 mmol/L Final     Carbon Dioxide (CO2)   Date Value Ref Range Status   08/12/2022 25 22 - 29 mmol/L Final   06/19/2022 28 20 - 32 mmol/L Final     Anion Gap   Date Value Ref Range Status   08/12/2022 7 7 - 15 mmol/L Final   06/19/2022 6 3 - 14 mmol/L Final "     Glucose   Date Value Ref Range Status   08/12/2022 108 (H) 70 - 99 mg/dL Final   06/19/2022 120 (H) 70 - 99 mg/dL Final     Urea Nitrogen   Date Value Ref Range Status   08/12/2022 11.0 8.0 - 23.0 mg/dL Final   06/19/2022 20 7 - 30 mg/dL Final     Creatinine   Date Value Ref Range Status   08/17/2022 0.67 0.66 - 1.25 mg/dL Final     GFR Estimate   Date Value Ref Range Status   08/17/2022 >90 >60 mL/min/1.73m2 Final     Comment:     Effective December 21, 2021 eGFRcr in adults is calculated using the 2021 CKD-EPI creatinine equation which includes age and gender (Kris et al., NE, DOI: 10.1056/SOMRhh5531700)     GFR, ESTIMATED POCT   Date Value Ref Range Status   06/17/2022 54 (L) >60 mL/min/1.73m2 Final     Calcium   Date Value Ref Range Status   08/12/2022 9.2 8.8 - 10.2 mg/dL Final     Lab Results   Component Value Date    WBC 6.7 08/17/2022     Lab Results   Component Value Date    RBC 3.00 08/17/2022     Lab Results   Component Value Date    HGB 9.2 08/17/2022     Lab Results   Component Value Date    HCT 29.0 08/17/2022     No components found for: MCT  Lab Results   Component Value Date    MCV 97 08/17/2022     Lab Results   Component Value Date    MCH 30.7 08/17/2022     Lab Results   Component Value Date    MCHC 31.7 08/17/2022     Lab Results   Component Value Date    RDW 14.4 08/17/2022     Lab Results   Component Value Date     08/17/2022     CRP Inflammation   Date Value Ref Range Status   08/17/2022 88.4 (H) 0.0 - 8.0 mg/L Final     Erythrocyte Sedimentation Rate   Date Value Ref Range Status   08/17/2022 73 (H) 0 - 20 mm/hr Final         ASSESSMENT/PLAN:     Bacteremia  Other congestive heart failure (H)  Personal history of DVT (deep vein thrombosis)    Tolerating Rocephin  PICC dressing changed and adjusted so not poking into skin and causing bleeding. No complications today  follow up infectious disease as advised     C/o diarrhea, reminder to staff still need stool sample to check for  cdiff. Has used as needed Imodium in past for diarrhea.     Labs noted today, BMP,CBC fairly stable, inflammatory markers elevated. , infectious disease updated  Continue weekly labs     Patient wanting to know when he can go home. Discussed likely need to stay until IV antibiotics completed and strong enough to manage activities of daily living at home. Patient report he currently does not feel strong enough to go home, He currently needs help with cares.   involved in safe plan home     Per therapies report  LB drsg: max A   Toileting: max A   ADLs: Max A   Discharge plan and any ID barriers to discharge: Resident lives in house with spouse, dani, stairs within (7). He has 2ww, cane, sc,r ts, toilet grab bars. Teaching may include i.v. abx. Goal is to d/c home when able.     Less swelling in left arm  Asking about putting brace on left arm, Recommend not doing this due to swelling  Tolerating anticoagulation Rivaroxaban with no signs and symptoms bleeding.   Patient has history gastrointestinal bleed due to hemorrhoid  hgb stable  Monitor     Vitals stable room air  Not appear fluid up, weights stable  Wt Readings from Last 2 Encounters:   08/17/22 94.4 kg (208 lb 1.6 oz)   08/15/22 94.6 kg (208 lb 9.6 oz)      continue current diuretic    Appointments in Next Year    Sep 14, 2022  2:30 PM  (Arrive by 2:15 PM)  Cystoscopy with Broderick Mcfarlane MD, [facility]  [facility] ([facility] ) [phone]            Electronically signed by:  PASHA Babin CNP

## 2022-08-19 NOTE — PROGRESS NOTES
[facility] SERVICES  [place] Medical Record Number:  [id number]  Place of Service where encounter took place:  [facility] (TCU) [[id number]]  Chief Complaint   Patient presents with     Nursing Home Acute       HPI:    Toney Denton  is a 71 year old (1950), who is being seen today for an episodic care visit.  HPI information obtained from: facility chart records, facility staff and patient report. Today's concern is:      Patient Toney Denton is a 71 yr old male admitted to [facility] for rehabilitation s/p hospitalization [facility] 7/29-8/12/22 s/p bacteremia with weakness, fever, SOB and edema.   Per hospital report:   CT chest show right lower lung patchy consolidation and groundglass opacities consistent with atelectasis versus infection w trace bilateral effusions.    Laboratory evaluation showed sodium 133, chloride 96, BUN 37, creatinine 1.24, BNP 11,024, troponin 34, procalcitonin 5.04, white blood cells 3.2, hemoglobin 10.6, platelets 66, unremarkable urinalysis, and negative testing for COVID-19/influenza/RSV.    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   -Pt was started on ceftriaxone and doxycycline. He improved.  ID consulted.  He was transitioned to Zosyn therapy on 8/3 for anaerobic coverage. Now on Rocephin starting 8/11, needs prolonged course of IV aAcbx. Plan per infectious disease    With complication CHF exacerbation and DVT left arm   Also, complications:  -acute kidney injury with urinary retention, resolved   -mild hyponatremia, In the setting of diuresis.  Sodium 129 on 8/5. Improved to 133  -  Patient report recent left hip surgery beginning of year     PMHx of tricuspid valve repair, bioprosthetic mitral valve replacement for regurgitation, coronary artery disease, ischemic cardiomyopathy (EF  25% in 4/21), pacemaker placement in 7/19, biventricular implantable bjcdefumbtrs-ujlciiorybobb-hnnpctb from BiV-P in 5/20, atrial fibrillation (s/p ablation/maze procedure), severe COPD, hypertension, dyslipidemia, and borderline diabetes      Hypokalemia  Bacteremia  Diarrhea, unspecified type     Potassium low today at 3, asymptomatic    History diarrhea, Cdiff negative  On antibiotic    participating in therapies   LB drsg: max A   Toileting: max A   ADLs: Max A   Discharge plan and any ID barriers to discharge: Resident lives in house with spouse, dani, stairs within (7). He has 2ww, cane, sc,r ts, toilet grab bars. Teaching may include i.v. abx. Goal is to d/c home when able.     Past Medical and Surgical History reviewed in Epic today.    MEDICATIONS:    Current Outpatient Medications   Medication Sig Dispense Refill     acetaminophen (TYLENOL) 325 MG tablet Take 650 mg by mouth 2 times daily & 2 x daily as needed       albuterol (PROAIR HFA/PROVENTIL HFA/VENTOLIN HFA) 108 (90 Base) MCG/ACT inhaler Inhale 1-2 puffs into the lungs every 4 hours as needed for shortness of breath / dyspnea or wheezing       amiodarone (PACERONE) 400 MG tablet Take 1 tablet (400 mg) by mouth daily       atorvastatin (LIPITOR) 80 MG tablet Take 80 mg by mouth every morning       bumetanide (BUMEX) 2 MG tablet Take 2 mg by mouth every morning        bumetanide (BUMEX) 2 MG tablet Take 1 mg by mouth every evening       bumetanide (BUMEX) 2 MG tablet Take 2 mg by mouth daily At noon       cefTRIAXone (ROCEPHIN) 1 GM vial Inject 2 g (2,000 mg) into the vein daily for 20 days ESR,CRP,CBC with differential, creatinine, SGOT weekly while on this medication to be faxed to Dr. Baca office. 600 mL 0     DULoxetine (CYMBALTA) 30 MG capsule Take 30 mg by mouth At Bedtime       empagliflozin (JARDIANCE) 10 MG TABS tablet Take 1 tablet (10 mg) by mouth daily       lisinopril (ZESTRIL) 2.5 MG tablet Take 2.5 mg by mouth every morning        "loratadine (CLARITIN) 10 MG tablet Take 10 mg by mouth every morning       metoprolol succinate ER (TOPROL XL) 100 MG 24 hr tablet Take 1 tablet (100 mg) by mouth every morning (Not a new prescription, this is a change of dose)       nitroGLYcerin (NITROSTAT) 0.4 MG sublingual tablet Place 0.4 mg under the tongue every 5 minutes as needed for chest pain For chest pain place 1 tablet under the tongue every 5 minutes for 3 doses. If symptoms persist 5 minutes after 1st dose call 911.       oxyCODONE (ROXICODONE) 5 MG tablet Take 1-2 tablets (5-10 mg) by mouth every 4 hours as needed for moderate to severe pain 45 tablet 0     potassium chloride ER (KLOR-CON M) 20 MEQ CR tablet Take 30 mEq by mouth every morning       Rivaroxaban ANTICOAGULANT 15 & 20 MG TBPK Starter Therapy Pack Take 15 mg by mouth 2 times daily (with meals) for 21 days, THEN 20 mg daily with food for 9 days. 51 each 0     senna-docusate (SENOKOT-S/PERICOLACE) 8.6-50 MG tablet Take 2 tablets by mouth daily as needed for constipation       tamsulosin (FLOMAX) 0.4 MG capsule Take 1 capsule (0.4 mg) by mouth daily       umeclidinium-vilanterol (ANORO ELLIPTA) 62.5-25 MCG/INH oral inhaler Inhale 1 puff into the lungs every morning           REVIEW OF SYSTEMS:  4 point ROS including Respiratory, CV, GI and , other than that noted in the HPI,  is negative    Objective:  /69   Pulse 74   Temp 97.6  F (36.4  C)   Resp 16   Ht 1.702 m (5' 7\")   Wt 94.2 kg (207 lb 9.6 oz)   SpO2 92%   BMI 32.51 kg/m    Exam:  GENERAL APPEARANCE:  Alert, in no distress  HR reg  RESP:  no respiratory distress  M/S:   Sitting in WC  SKIN:  Inspection of skin and subcutaneous tissue baseline  PSYCH:  oriented X 3    Labs:   Labs done in SNF are in [place] EPIC. Please refer to them using [place]/Care Everywhere.     Last Comprehensive Metabolic Panel:  Sodium   Date Value Ref Range Status   08/19/2022 131 (L) 133 - 144 mmol/L Final     Potassium   Date Value Ref Range " Status   08/19/2022 3.0 (L) 3.4 - 5.3 mmol/L Final     Chloride   Date Value Ref Range Status   08/19/2022 95 94 - 109 mmol/L Final     Carbon Dioxide (CO2)   Date Value Ref Range Status   08/19/2022 30 20 - 32 mmol/L Final     Anion Gap   Date Value Ref Range Status   08/19/2022 6 3 - 14 mmol/L Final     Glucose   Date Value Ref Range Status   08/19/2022 113 (H) 70 - 99 mg/dL Final     Urea Nitrogen   Date Value Ref Range Status   08/19/2022 14 7 - 30 mg/dL Final     Creatinine   Date Value Ref Range Status   08/19/2022 0.63 (L) 0.66 - 1.25 mg/dL Final     GFR Estimate   Date Value Ref Range Status   08/19/2022 >90 >60 mL/min/1.73m2 Final     Comment:     Effective December 21, 2021 eGFRcr in adults is calculated using the 2021 CKD-EPI creatinine equation which includes age and gender (Kris et al., NEJ, DOI: 10.1056/CDQRdz9872446)     GFR, ESTIMATED POCT   Date Value Ref Range Status   06/17/2022 54 (L) >60 mL/min/1.73m2 Final     Calcium   Date Value Ref Range Status   08/19/2022 8.6 8.5 - 10.1 mg/dL Final       Lab Results   Component Value Date    WBC 6.8 08/19/2022     Lab Results   Component Value Date    RBC 2.84 08/19/2022     Lab Results   Component Value Date    HGB 8.7 08/19/2022     Lab Results   Component Value Date    HCT 26.8 08/19/2022     No components found for: MCT  Lab Results   Component Value Date    MCV 94 08/19/2022     Lab Results   Component Value Date    MCH 30.6 08/19/2022     Lab Results   Component Value Date    MCHC 32.5 08/19/2022     Lab Results   Component Value Date    RDW 14.2 08/19/2022     Lab Results   Component Value Date     08/19/2022         ASSESSMENT/PLAN:     Hypokalemia  Bacteremia  Diarrhea, unspecified type     Potassium low today at 3, asymptomatic  Increase KCL 30meq daily    History diarrhea, Cdiff negative  On antibiotic  Will add as needed imodium     participating in therapies   LB drsg: max A   Toileting: max A   ADLs: Max A   Discharge plan and any ID  barriers to discharge: Resident lives in house with spouse, dani, stairs within (7). He has 2ww, cane, sc,r ts, toilet grab bars. Teaching may include i.v. abx. Goal is to d/c home when able.   Continue therapies      Electronically signed by:  PASHA Babin CNP

## 2022-08-22 NOTE — PROGRESS NOTES
[facility] SERVICES  [place] Medical Record Number:  [id number]  Place of Service where encounter took place:  [facility] (TCU) [[id number]]  Chief Complaint   Patient presents with     Nursing Home Acute       HPI:    Toney Denton  is a 71 year old (1950), who is being seen today for an episodic care visit.  HPI information obtained from: facility chart records, facility staff and patient report. Today's concern is:      Patient Toney Denton is a 71 yr old male admitted to [facility] for rehabilitation s/p hospitalization [facility] 7/29-8/12/22 s/p bacteremia with weakness, fever, SOB and edema.   Per hospital report:   CT chest show right lower lung patchy consolidation and groundglass opacities consistent with atelectasis versus infection w trace bilateral effusions.    Laboratory evaluation showed sodium 133, chloride 96, BUN 37, creatinine 1.24, BNP 11,024, troponin 34, procalcitonin 5.04, white blood cells 3.2, hemoglobin 10.6, platelets 66, unremarkable urinalysis, and negative testing for COVID-19/influenza/RSV.    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   -Pt was started on ceftriaxone and doxycycline. He improved.  ID consulted.  He was transitioned to Zosyn therapy on 8/3 for anaerobic coverage. Now on Rocephin starting 8/11, needs prolonged course of IV aAcbx. Plan per infectious disease    With complication CHF exacerbation and DVT left arm   Also, complications:  -acute kidney injury with urinary retention, resolved   -mild hyponatremia, In the setting of diuresis.  Sodium 129 on 8/5. Improved to 133  -  Patient report recent left hip surgery beginning of year     PMHx of tricuspid valve repair, bioprosthetic mitral valve replacement for regurgitation, coronary artery disease, ischemic cardiomyopathy (EF  25% in 4/21), pacemaker placement in 7/19, biventricular implantable [id number]-hbbzfwgjxnvqo-bblvert from BiV-P in 5/20, atrial fibrillation (s/p ablation/maze procedure), severe COPD, hypertension, dyslipidemia, and borderline diabetes         Bacteremia  Personal history of DVT (deep vein thrombosis)  Generalized muscle weakness     Tolerating IV antibiotic Rocephin  Diarrhea resolved, cdiff negative  Eating meals  Progressing in therapies, patient still needing some help getting dressed and has not been ambulating  Reports he has been working on stairs  Reports less pain and swelling in left arm  No signs and symptoms bleeding, remains on Rivaroxaban       Labs noted today       Past Medical and Surgical History reviewed in Epic today.    MEDICATIONS:    Current Outpatient Medications   Medication Sig Dispense Refill     acetaminophen (TYLENOL) 325 MG tablet Take 650 mg by mouth 2 times daily & 2 x daily as needed       albuterol (PROAIR HFA/PROVENTIL HFA/VENTOLIN HFA) 108 (90 Base) MCG/ACT inhaler Inhale 1-2 puffs into the lungs every 4 hours as needed for shortness of breath / dyspnea or wheezing       amiodarone (PACERONE) 400 MG tablet Take 1 tablet (400 mg) by mouth daily       atorvastatin (LIPITOR) 80 MG tablet Take 80 mg by mouth every morning       bumetanide (BUMEX) 2 MG tablet Take 2 mg by mouth every morning        bumetanide (BUMEX) 2 MG tablet Take 1 mg by mouth every evening       bumetanide (BUMEX) 2 MG tablet Take 2 mg by mouth daily At noon       cefTRIAXone (ROCEPHIN) 1 GM vial Inject 2 g (2,000 mg) into the vein daily for 20 days ESR,CRP,CBC with differential, creatinine, SGOT weekly while on this medication to be faxed to Dr. Baca office. 600 mL 0     DULoxetine (CYMBALTA) 30 MG capsule Take 30 mg by mouth At Bedtime       empagliflozin (JARDIANCE) 10 MG TABS tablet Take 1 tablet (10 mg) by mouth daily       lisinopril (ZESTRIL) 2.5 MG tablet Take 2.5 mg by mouth every morning        "loratadine (CLARITIN) 10 MG tablet Take 10 mg by mouth every morning       metoprolol succinate ER (TOPROL XL) 100 MG 24 hr tablet Take 1 tablet (100 mg) by mouth every morning (Not a new prescription, this is a change of dose)       nitroGLYcerin (NITROSTAT) 0.4 MG sublingual tablet Place 0.4 mg under the tongue every 5 minutes as needed for chest pain For chest pain place 1 tablet under the tongue every 5 minutes for 3 doses. If symptoms persist 5 minutes after 1st dose call 911.       oxyCODONE (ROXICODONE) 5 MG tablet Take 1-2 tablets (5-10 mg) by mouth every 4 hours as needed for moderate to severe pain 45 tablet 0     potassium chloride ER (KLOR-CON M) 20 MEQ CR tablet Take 30 mEq by mouth every morning       Rivaroxaban ANTICOAGULANT 15 & 20 MG TBPK Starter Therapy Pack Take 15 mg by mouth 2 times daily (with meals) for 21 days, THEN 20 mg daily with food for 9 days. 51 each 0     senna-docusate (SENOKOT-S/PERICOLACE) 8.6-50 MG tablet Take 2 tablets by mouth daily as needed for constipation       tamsulosin (FLOMAX) 0.4 MG capsule Take 1 capsule (0.4 mg) by mouth daily       umeclidinium-vilanterol (ANORO ELLIPTA) 62.5-25 MCG/INH oral inhaler Inhale 1 puff into the lungs every morning           REVIEW OF SYSTEMS:  4 point ROS including Respiratory, CV, GI and , other than that noted in the HPI,  is negative    Objective:  /68   Pulse 68   Temp 97.4  F (36.3  C)   Resp 18   Ht 1.702 m (5' 7\")   Wt 94.2 kg (207 lb 9.6 oz)   SpO2 95%   BMI 32.51 kg/m    Exam:  GENERAL APPEARANCE:  Alert, in no distress  RESP:  respiratory effort and palpation of chest normal, lungs clear to auscultation , no respiratory distress  CV:  Palpation and auscultation of heart done , regular rate and rhythm, no murmur, rub, or gallop  ABDOMEN:  normal bowel sounds, soft, nontender, no hepatosplenomegaly or other masses  M/S:   sitting in WC, continue to have decrease swelling left arm  SKIN:  Inspection of skin and " subcutaneous tissue scant blood under PICC line drsg, no redness  PSYCH:  oriented X 3    Labs:   Labs done in SNF are in [place] EPIC. Please refer to them using [facility] Everywhere.     Last Comprehensive Metabolic Panel:  Sodium   Date Value Ref Range Status   08/22/2022 131 (L) 133 - 144 mmol/L Final     Potassium   Date Value Ref Range Status   08/22/2022 3.4 3.4 - 5.3 mmol/L Final     Chloride   Date Value Ref Range Status   08/22/2022 95 94 - 109 mmol/L Final     Carbon Dioxide (CO2)   Date Value Ref Range Status   08/22/2022 29 20 - 32 mmol/L Final     Anion Gap   Date Value Ref Range Status   08/22/2022 7 3 - 14 mmol/L Final     Glucose   Date Value Ref Range Status   08/22/2022 87 70 - 99 mg/dL Final     Urea Nitrogen   Date Value Ref Range Status   08/22/2022 13 7 - 30 mg/dL Final     Creatinine   Date Value Ref Range Status   08/22/2022 0.65 (L) 0.66 - 1.25 mg/dL Final     GFR Estimate   Date Value Ref Range Status   08/22/2022 >90 >60 mL/min/1.73m2 Final     Comment:     Effective December 21, 2021 eGFRcr in adults is calculated using the 2021 CKD-EPI creatinine equation which includes age and gender (Kris et al., NE, DOI: 10.1056/BJSAnt7512745)     GFR, ESTIMATED POCT   Date Value Ref Range Status   06/17/2022 54 (L) >60 mL/min/1.73m2 Final     Calcium   Date Value Ref Range Status   08/22/2022 9.1 8.5 - 10.1 mg/dL Final       Lab Results   Component Value Date    WBC 8.0 08/22/2022     Lab Results   Component Value Date    RBC 2.94 08/22/2022     Lab Results   Component Value Date    HGB 8.9 08/22/2022     Lab Results   Component Value Date    HCT 28.1 08/22/2022     No components found for: MCT  Lab Results   Component Value Date    MCV 96 08/22/2022     Lab Results   Component Value Date    MCH 30.3 08/22/2022     Lab Results   Component Value Date    MCHC 31.7 08/22/2022     Lab Results   Component Value Date    RDW 14.1 08/22/2022     Lab Results   Component Value Date     08/22/2022      Hemoglobin   Date Value Ref Range Status   08/22/2022 8.9 (L) 13.3 - 17.7 g/dL Final   08/19/2022 8.7 (L) 13.3 - 17.7 g/dL Final         ASSESSMENT/PLAN:     Bacteremia  Personal history of DVT (deep vein thrombosis)  Generalized muscle weakness    Tolerating IV antibiotic Rocephin  Continue course    Diarrhea resolved, cdiff negative  Discontinue imodium    Eating meals    Progressing in therapies, patient still needing some help getting dressed and has not been ambulating  Reports he has been working on stairs  Continue therapies    Reports less pain and swelling in left arm  Continue to elevate arm on 2 pillows    No signs and symptoms bleeding, remains on Rivaroxaban   Continue anticoagulation as ordered     Labs noted today, hgb ~9 fairly stable, chronic low sodium and is 131    Updated charge nurse regarding PICC line dressing       Electronically signed by:  PASHA Babin CNP

## 2022-08-23 NOTE — LETTER
8/23/2022        RE: Toney Denton  [address]  [facility] [address]        [facility] GERIATRICS    PRIMARY CARE PROVIDER AND CLINIC:  Kelley Stoddard MD, [place] [id number]  / [facility] [address]  Chief Complaint   Patient presents with     Hospital F/U      [place] Medical Record Number:  [id number]  Place of Service where encounter took place:  [facility] (U)     Toney Denton  is a 71 year old  (1950), admitted to the above facility from  [facility]. Hospital stay 7/29/22 through 8/12/22..       Hospital course was reviewed by me, is as noted in the hospital discharge summary and initial nurse practitioner note.    Patient was hospitalized for the evaluation of weakness, fever, shortness of breath and worsening lower extremity edema.  Blood cultures revealed Capnocytophaga bacteremia.  CT chest revealed trace bilateral pleural effusions, right lower lobe patchy consolidation consistent with infection versus atelectasis.  Patient was treated initially with ceftriaxone and doxycycline which was transitioned to Zosyn and later Rocephin.  He has been discharged on Rocephin, starting date 8/11/2022 for a prolonged course, duration to be determined by infectious disease.  Patient has a history of ischemic cardiopathy status post CABG, bioprosthetic mitral valve replacement, history of tricuspid valve repair and pacemaker placement.  ADEN revealed no evidence of endocarditis though ID suspects a high likelihood of this given the nature of bacteremia.  Ejection fraction 20 to 25% with global hypokinesis, volume overload noted on admission.  Diuretics were changed on multiple occasions as supervised by cardiology.  Ultimately, was discharged on Bumex 5 mg daily total.    Course complicated by:    # left upper extremity DVT with near occlusive thrombosis of the mid left subclavian vein.  Vascular surgery recommends long-term  anticoagulation.  Patient was discharged on rivaroxaban.  # Patient had intermittent runs of V. Tach for which she was started on amiodarone.  #Urinary retention, managed with Shaffer catheter and initiation of tamsulosin.  #CHRISTA secondary to congestive heart failure, initial urinary retention, resolved  #Mild hyponatremia, improved  #Thrombocytopenia secondary to acute infection, resolved  #COPD, stable on inhalers.    Patient states he feels weak but is slowly improving.  He states he is starting to ambulate with therapy.  He denies cough, chest pain, shortness of breath, nausea, vomiting.  He did have loose stools earlier during hospitalization and TCU stay which have improved.  He notes improvement in left arm swelling and pain over the last week.  He notes chronic left hip pain status post left femur fracture status post ORIF in January 2022.  At baseline he uses a cane.        CODE STATUS/ADVANCE DIRECTIVES DISCUSSION:  Full Code  CPR/Full code   ALLERGIES:   Allergies   Allergen Reactions     Spironolactone Unknown     Reported side effects after receiving it after heart surgery, resolved with ablasion     Sulindac Diarrhea, Muscle Pain (Myalgia) and Nausea and Vomiting     Felt like beaten up      Latex Rash     Gets red on direct contact      PAST MEDICAL HISTORY:   Past Medical History:   Diagnosis Date     Benign essential hypertension      Chronic atrial fibrillation (H)     s/p ablation/maze procedure/LA clip.  on ASA for stroke ppx     COPD (chronic obstructive pulmonary disease) (H)     severe by PFTs     Hyperlipidemia LDL goal <100      Ischemic cardiomyopathy     EF 25 % by echo 4/2021, with hx of VTach s/p biV pacer and ICD     Mitral valve regurgitation     s/p bioprosthetic MVR     Tricuspid regurgitation     s/p repair      PAST SURGICAL HISTORY:   has a past surgical history that includes cabg yue qual act perform; mitral valve replacement; Tricuspid Valvuloplasty; and Open reduction internal  fixation hip nailing (Left, 1/24/2022).  FAMILY HISTORY: family history includes Diabetes in his brother and father.  SOCIAL HISTORY:   reports that he has quit smoking. His smoking use included cigars. He has never used smokeless tobacco. He reports current alcohol use.  Patient's living condition: lives with spouse         Current Outpatient Medications   Medication Sig     acetaminophen (TYLENOL) 325 MG tablet Take 650 mg by mouth 2 times daily & 2 x daily as needed     albuterol (PROAIR HFA/PROVENTIL HFA/VENTOLIN HFA) 108 (90 Base) MCG/ACT inhaler Inhale 1-2 puffs into the lungs every 4 hours as needed for shortness of breath / dyspnea or wheezing     amiodarone (PACERONE) 400 MG tablet Take 1 tablet (400 mg) by mouth daily     atorvastatin (LIPITOR) 80 MG tablet Take 80 mg by mouth every morning     bumetanide (BUMEX) 2 MG tablet Take 2 mg by mouth every morning      bumetanide (BUMEX) 2 MG tablet Take 1 mg by mouth every evening     bumetanide (BUMEX) 2 MG tablet Take 2 mg by mouth daily At noon     cefTRIAXone (ROCEPHIN) 1 GM vial Inject 2 g (2,000 mg) into the vein daily for 20 days ESR,CRP,CBC with differential, creatinine, SGOT weekly while on this medication to be faxed to Dr. Baca office.     DULoxetine (CYMBALTA) 30 MG capsule Take 30 mg by mouth At Bedtime     empagliflozin (JARDIANCE) 10 MG TABS tablet Take 1 tablet (10 mg) by mouth daily     lisinopril (ZESTRIL) 2.5 MG tablet Take 2.5 mg by mouth every morning     loratadine (CLARITIN) 10 MG tablet Take 10 mg by mouth every morning     metoprolol succinate ER (TOPROL XL) 100 MG 24 hr tablet Take 1 tablet (100 mg) by mouth every morning (Not a new prescription, this is a change of dose)     nitroGLYcerin (NITROSTAT) 0.4 MG sublingual tablet Place 0.4 mg under the tongue every 5 minutes as needed for chest pain For chest pain place 1 tablet under the tongue every 5 minutes for 3 doses. If symptoms persist 5 minutes after 1st dose call 911.      "oxyCODONE (ROXICODONE) 5 MG tablet Take 1-2 tablets (5-10 mg) by mouth every 4 hours as needed for moderate to severe pain     potassium chloride ER (KLOR-CON M) 20 MEQ CR tablet Take 30 mEq by mouth every morning     Rivaroxaban ANTICOAGULANT 15 & 20 MG TBPK Starter Therapy Pack Take 15 mg by mouth 2 times daily (with meals) for 21 days, THEN 20 mg daily with food for 9 days.     senna-docusate (SENOKOT-S/PERICOLACE) 8.6-50 MG tablet Take 2 tablets by mouth daily as needed for constipation     tamsulosin (FLOMAX) 0.4 MG capsule Take 1 capsule (0.4 mg) by mouth daily     umeclidinium-vilanterol (ANORO ELLIPTA) 62.5-25 MCG/INH oral inhaler Inhale 1 puff into the lungs every morning     No current facility-administered medications for this visit.       ROS:  10 point ROS of systems including Constitutional, Eyes, Respiratory, Cardiovascular, Gastroenterology, Genitourinary, Integumentary, Musculoskeletal, Psychiatric were all negative except for pertinent positives noted in my HPI.    Vitals:  /59   Pulse 78   Temp 97.2  F (36.2  C)   Resp 18   Ht 1.702 m (5' 7\")   Wt 94.2 kg (207 lb 9.6 oz)   SpO2 93%   BMI 32.51 kg/m    Exam:  Pleasant, pale appearing male, sitting at the bedside.  In no distress.  H EENT: Oral mucosa moist  No eye redness or drainage  Neck: No adenopathy  Lungs clear bilaterally.  No murmurs  Abdomen soft, nontender  Extremities: Left arm is edematous, slight erythema over back of hand.  Patient notes this is considerably improved over the last week  No lower extremity edema.  Neuro: Alert, fully oriented, very pleasant.  Face symmetric.  No tremors.  No focal weakness.    Lab/Diagnostic data:    Most Recent 3 CBC's:Recent Labs   Lab Test 08/24/22  0850 08/22/22  0625 08/19/22  0555   WBC 7.4 8.0 6.8   HGB 9.2* 8.9* 8.7*   MCV 95 96 94    237 214     Most Recent 3 BMP's:Recent Labs   Lab Test 08/24/22  0850 08/22/22  0625 08/19/22  0555 08/17/22  0645 08/12/22  0557   NA  --  " 131* 131*  --  133*   POTASSIUM  --  3.4 3.0*  --  4.5   CHLORIDE  --  95 95  --  101   CO2  --  29 30  --  25   BUN  --  13 14  --  11.0   CR 0.62* 0.65* 0.63*   < > 0.69   ANIONGAP  --  7 6  --  7   THOMAS  --  9.1 8.6  --  9.2   GLC  --  87 113*  --  108*    < > = values in this interval not displayed.     Most Recent 2 LFT's:Recent Labs   Lab Test 08/24/22  0850 08/17/22  0645 07/29/22  2131 06/17/22  2230   AST 39 42 38 21   ALT  --   --  23 17   ALKPHOS  --   --  151* 68   BILITOTAL  --   --  1.2 0.7     Most Recent 3 BNP's:Recent Labs   Lab Test 07/29/22 2131 06/17/22  2230   NTBNPI 11,024* 4,354*     Most Recent Anemia Panel:Recent Labs   Lab Test 08/24/22  0850   WBC 7.4   HGB 9.2*   HCT 28.9*   MCV 95          ASSESSMENT/PLAN:    Capnocytophaga bacteremia  Etiology unclear, likely related to dog, no evidence of skin site.  Strong concern for endocarditis even with negative imaging.  Plan: Continue Rocephin, monitor vitals, weekly labs.  Infect disease follow-up to determine duration of therapy.    Acute on chronic HFrEF. Ischemic CM. CAD s/p CABG, stent to RCA into proximal PDA.   History of bioprosthetic mitral valve replacement. History of tricuspid valve repair.   History of pacemaker placement with upgrade to biventricular cardioverter- defibrillator.   History of atrial fibrillation with ablation/maze procedure  Ventricular tachycardia noted during hospitalization.  Managed with metoprolol and amiodarone  Hypertension, stable  CV status appears stable.  Patient remains on Bumex and lisinopril, empagliflozin, remains off Entresto  Plan: Monitor weight, exam, BMP, vitals  Continue amiodarone, metoprolol  Cardiology follow-up for CHF and arrhythmia      LUE DVT  near occlusive thombosis of mid left subclavian vein to left axillary vein.   Continue Xarelto, no signs and symptoms bleeding   History GIB i06/2022, no evidence of bleeding during hospital stay  Improving edema discomfort left arm per  patient.  Plan: Continue rivaroxaban.  Vascular surgery follow-up    HLD  Continue Lipitor    Hyponatremia in the setting of aggressive diuresis  Stable  Plan: Monitor BMP     Urinary retention  Episode of hematuria during hospitalization  Patient required Shaffer catheter for a period of time, is now voiding well.  Plan: Continue Flomax.  Urology follow-up       COPD, without acute exacerbation  Pulmonary status stable  Continue albuterol and Anoro Ellipta inhalers.    Generalized weakness, left hip pain  left intertrochanteric femur fracture and s/p ORIF in January 2022  Plan: Therapies      Anil Cano MD              Sincerely,        Anil Cano MD

## 2022-08-23 NOTE — PROGRESS NOTES
[facility] GERIATRICS    PRIMARY CARE PROVIDER AND CLINIC:  Kelley Stoddard MD, [facility] [address]  / [facility] [address]  Chief Complaint   Patient presents with     Hospital F/U      [place] Medical Record Number:  [id number]  Place of Service where encounter took place:  [facility] ([facility])     Toney Denton  is a 71 year old  (1950), admitted to the above facility from  [facility]. Hospital stay 7/29/22 through 8/12/22..       Hospital course was reviewed by me, is as noted in the hospital discharge summary and initial nurse practitioner note.    Patient was hospitalized for the evaluation of weakness, fever, shortness of breath and worsening lower extremity edema.  Blood cultures revealed Capnocytophaga bacteremia.  CT chest revealed trace bilateral pleural effusions, right lower lobe patchy consolidation consistent with infection versus atelectasis.  Patient was treated initially with ceftriaxone and doxycycline which was transitioned to Zosyn and later Rocephin.  He has been discharged on Rocephin, starting date 8/11/2022 for a prolonged course, duration to be determined by infectious disease.  Patient has a history of ischemic cardiopathy status post CABG, bioprosthetic mitral valve replacement, history of tricuspid valve repair and pacemaker placement.  ADEN revealed no evidence of endocarditis though ID suspects a high likelihood of this given the nature of bacteremia.  Ejection fraction 20 to 25% with global hypokinesis, volume overload noted on admission.  Diuretics were changed on multiple occasions as supervised by cardiology.  Ultimately, was discharged on Bumex 5 mg daily total.    Course complicated by:    # left upper extremity DVT with near occlusive thrombosis of the mid left subclavian vein.  Vascular surgery recommends long-term anticoagulation.  Patient was discharged on rivaroxaban.  # Patient had intermittent runs of V. Tach  for which she was started on amiodarone.  #Urinary retention, managed with Shaffer catheter and initiation of tamsulosin.  #CHRISTA secondary to congestive heart failure, initial urinary retention, resolved  #Mild hyponatremia, improved  #Thrombocytopenia secondary to acute infection, resolved  #COPD, stable on inhalers.    Patient states he feels weak but is slowly improving.  He states he is starting to ambulate with therapy.  He denies cough, chest pain, shortness of breath, nausea, vomiting.  He did have loose stools earlier during hospitalization and TCU stay which have improved.  He notes improvement in left arm swelling and pain over the last week.  He notes chronic left hip pain status post left femur fracture status post ORIF in January 2022.  At baseline he uses a cane.        CODE STATUS/ADVANCE DIRECTIVES DISCUSSION:  Full Code  CPR/Full code   ALLERGIES:   Allergies   Allergen Reactions     Spironolactone Unknown     Reported side effects after receiving it after heart surgery, resolved with ablasion     Sulindac Diarrhea, Muscle Pain (Myalgia) and Nausea and Vomiting     Felt like beaten up      Latex Rash     Gets red on direct contact      PAST MEDICAL HISTORY:   Past Medical History:   Diagnosis Date     Benign essential hypertension      Chronic atrial fibrillation (H)     s/p ablation/maze procedure/LA clip.  on ASA for stroke ppx     COPD (chronic obstructive pulmonary disease) (H)     severe by PFTs     Hyperlipidemia LDL goal <100      Ischemic cardiomyopathy     EF 25 % by echo 4/2021, with hx of VTach s/p biV pacer and ICD     Mitral valve regurgitation     s/p bioprosthetic MVR     Tricuspid regurgitation     s/p repair      PAST SURGICAL HISTORY:   has a past surgical history that includes cabg yue qual act perform; mitral valve replacement; Tricuspid Valvuloplasty; and Open reduction internal fixation hip nailing (Left, 1/24/2022).  FAMILY HISTORY: family history includes Diabetes in his  brother and father.  SOCIAL HISTORY:   reports that he has quit smoking. His smoking use included cigars. He has never used smokeless tobacco. He reports current alcohol use.  Patient's living condition: lives with spouse         Current Outpatient Medications   Medication Sig     acetaminophen (TYLENOL) 325 MG tablet Take 650 mg by mouth 2 times daily & 2 x daily as needed     albuterol (PROAIR HFA/PROVENTIL HFA/VENTOLIN HFA) 108 (90 Base) MCG/ACT inhaler Inhale 1-2 puffs into the lungs every 4 hours as needed for shortness of breath / dyspnea or wheezing     amiodarone (PACERONE) 400 MG tablet Take 1 tablet (400 mg) by mouth daily     atorvastatin (LIPITOR) 80 MG tablet Take 80 mg by mouth every morning     bumetanide (BUMEX) 2 MG tablet Take 2 mg by mouth every morning      bumetanide (BUMEX) 2 MG tablet Take 1 mg by mouth every evening     bumetanide (BUMEX) 2 MG tablet Take 2 mg by mouth daily At noon     cefTRIAXone (ROCEPHIN) 1 GM vial Inject 2 g (2,000 mg) into the vein daily for 20 days ESR,CRP,CBC with differential, creatinine, SGOT weekly while on this medication to be faxed to Dr. Baca office.     DULoxetine (CYMBALTA) 30 MG capsule Take 30 mg by mouth At Bedtime     empagliflozin (JARDIANCE) 10 MG TABS tablet Take 1 tablet (10 mg) by mouth daily     lisinopril (ZESTRIL) 2.5 MG tablet Take 2.5 mg by mouth every morning     loratadine (CLARITIN) 10 MG tablet Take 10 mg by mouth every morning     metoprolol succinate ER (TOPROL XL) 100 MG 24 hr tablet Take 1 tablet (100 mg) by mouth every morning (Not a new prescription, this is a change of dose)     nitroGLYcerin (NITROSTAT) 0.4 MG sublingual tablet Place 0.4 mg under the tongue every 5 minutes as needed for chest pain For chest pain place 1 tablet under the tongue every 5 minutes for 3 doses. If symptoms persist 5 minutes after 1st dose call 911.     oxyCODONE (ROXICODONE) 5 MG tablet Take 1-2 tablets (5-10 mg) by mouth every 4 hours as needed for  "moderate to severe pain     potassium chloride ER (KLOR-CON M) 20 MEQ CR tablet Take 30 mEq by mouth every morning     Rivaroxaban ANTICOAGULANT 15 & 20 MG TBPK Starter Therapy Pack Take 15 mg by mouth 2 times daily (with meals) for 21 days, THEN 20 mg daily with food for 9 days.     senna-docusate (SENOKOT-S/PERICOLACE) 8.6-50 MG tablet Take 2 tablets by mouth daily as needed for constipation     tamsulosin (FLOMAX) 0.4 MG capsule Take 1 capsule (0.4 mg) by mouth daily     umeclidinium-vilanterol (ANORO ELLIPTA) 62.5-25 MCG/INH oral inhaler Inhale 1 puff into the lungs every morning     No current facility-administered medications for this visit.       ROS:  10 point ROS of systems including Constitutional, Eyes, Respiratory, Cardiovascular, Gastroenterology, Genitourinary, Integumentary, Musculoskeletal, Psychiatric were all negative except for pertinent positives noted in my HPI.    Vitals:  /59   Pulse 78   Temp 97.2  F (36.2  C)   Resp 18   Ht 1.702 m (5' 7\")   Wt 94.2 kg (207 lb 9.6 oz)   SpO2 93%   BMI 32.51 kg/m    Exam:  Pleasant, pale appearing male, sitting at the bedside.  In no distress.  H EENT: Oral mucosa moist  No eye redness or drainage  Neck: No adenopathy  Lungs clear bilaterally.  No murmurs  Abdomen soft, nontender  Extremities: Left arm is edematous, slight erythema over back of hand.  Patient notes this is considerably improved over the last week  No lower extremity edema.  Neuro: Alert, fully oriented, very pleasant.  Face symmetric.  No tremors.  No focal weakness.    Lab/Diagnostic data:    Most Recent 3 CBC's:Recent Labs   Lab Test 08/24/22  0850 08/22/22  0625 08/19/22  0555   WBC 7.4 8.0 6.8   HGB 9.2* 8.9* 8.7*   MCV 95 96 94    237 214     Most Recent 3 BMP's:Recent Labs   Lab Test 08/24/22  0850 08/22/22  0625 08/19/22  0555 08/17/22  0645 08/12/22  0557   NA  --  131* 131*  --  133*   POTASSIUM  --  3.4 3.0*  --  4.5   CHLORIDE  --  95 95  --  101   CO2  --  29 " 30  --  25   BUN  --  13 14  --  11.0   CR 0.62* 0.65* 0.63*   < > 0.69   ANIONGAP  --  7 6  --  7   THOMAS  --  9.1 8.6  --  9.2   GLC  --  87 113*  --  108*    < > = values in this interval not displayed.     Most Recent 2 LFT's:Recent Labs   Lab Test 08/24/22  0850 08/17/22  0645 07/29/22  2131 06/17/22  2230   AST 39 42 38 21   ALT  --   --  23 17   ALKPHOS  --   --  151* 68   BILITOTAL  --   --  1.2 0.7     Most Recent 3 BNP's:Recent Labs   Lab Test 07/29/22 2131 06/17/22  2230   NTBNPI 11,024* 4,354*     Most Recent Anemia Panel:Recent Labs   Lab Test 08/24/22  0850   WBC 7.4   HGB 9.2*   HCT 28.9*   MCV 95          ASSESSMENT/PLAN:    Capnocytophaga bacteremia  Etiology unclear, likely related to dog, no evidence of skin site.  Strong concern for endocarditis even with negative imaging.  Plan: Continue Rocephin, monitor vitals, weekly labs.  Infect disease follow-up to determine duration of therapy.    Acute on chronic HFrEF. Ischemic CM. CAD s/p CABG, stent to RCA into proximal PDA.   History of bioprosthetic mitral valve replacement. History of tricuspid valve repair.   History of pacemaker placement with upgrade to biventricular cardioverter- defibrillator.   History of atrial fibrillation with ablation/maze procedure  Ventricular tachycardia noted during hospitalization.  Managed with metoprolol and amiodarone  Hypertension, stable  CV status appears stable.  Patient remains on Bumex and lisinopril, empagliflozin, remains off Entresto  Plan: Monitor weight, exam, BMP, vitals  Continue amiodarone, metoprolol  Cardiology follow-up for CHF and arrhythmia      LUE DVT  near occlusive thombosis of mid left subclavian vein to left axillary vein.   Continue Xarelto, no signs and symptoms bleeding   History GIB i06/2022, no evidence of bleeding during hospital stay  Improving edema discomfort left arm per patient.  Plan: Continue rivaroxaban.  Vascular surgery follow-up    HLD  Continue  Lipitor    Hyponatremia in the setting of aggressive diuresis  Stable  Plan: Monitor BMP     Urinary retention  Episode of hematuria during hospitalization  Patient required Shaffer catheter for a period of time, is now voiding well.  Plan: Continue Flomax.  Urology follow-up       COPD, without acute exacerbation  Pulmonary status stable  Continue albuterol and Anoro Ellipta inhalers.    Generalized weakness, left hip pain  left intertrochanteric femur fracture and s/p ORIF in January 2022  Plan: Therapies      Anil Cano MD

## 2022-09-02 PROBLEM — I48.19 PERSISTENT ATRIAL FIBRILLATION (H): Status: ACTIVE | Noted: 2022-01-01

## 2022-09-02 PROBLEM — I25.118 CORONARY ARTERY DISEASE OF NATIVE ARTERY OF NATIVE HEART WITH STABLE ANGINA PECTORIS (H): Status: ACTIVE | Noted: 2022-01-01

## 2022-09-02 PROBLEM — I50.22 CHRONIC SYSTOLIC (CONGESTIVE) HEART FAILURE (H): Status: ACTIVE | Noted: 2022-01-01

## 2022-09-02 PROBLEM — J44.9 COPD, SEVERE (H): Status: ACTIVE | Noted: 2022-01-01

## 2022-09-02 NOTE — PROGRESS NOTES
"[facility]ERIATRIC [facility] DISCHARGE SUMMARY    PATIENT'S NAME: Toney Denton  YOB: 1950  MEDICAL RECORD NUMBER:  [id number]  Place of Service where encounter took place:  [facility] [[id number]]    PRIMARY CARE PROVIDER AND CLINIC RESPONSIBLE AFTER TRANSFER:   Kelley Stoddard MD, [facility] [address]  / [place] [address]    Non-FMG Provider     Transferring providers: PASHA Sanchez CNP; Anil Cano MD  Recent Hospitalization/ED:  [facility] Hospital stay 7/29/22 to 8/12/22.  Date of SNF Admission: August 12, 2022  Date of SNF (anticipated) Discharge: September 03, 2022  Discharged to: previous independent home  Cognitive Scores: SLUMS: 22/30  Physical Function: Ambulating 150 ft with walker  DME: Walker    CODE STATUS/ADVANCE DIRECTIVES DISCUSSION:  Full Code   ALLERGIES: Latex, Spironolactone, and Sulindac      DISCHARGE DIAGNOSIS/NURSING FACILITY COURSE:     PMH: tricuspid valve repair, bioprosthetic mitral valve replacement for regurgitation, coronary artery disease, ischemic cardiomyopathy (EF 25% in 4/21), pacemaker placement in 7/19, biventricular implantable [id number]-[id number]-[id number] from BiV-P in 5/20, atrial fibrillation (s/p ablation/maze procedure), severe COPD, hypertension, dyslipidemia, and borderline diabetes     Admitted to [facility] 7/29-8/12/22 due to weakness, fever, edema. CT of chest showed right lower lung patchy consolidation and groundglass opacities consistent with atelectasis versus infection w trace bilateral effusions. Blood cultures + Capnocytophaga bacteremia associated with mouths of dogs and cats. Was treated with ceftriaxone and doxycyline. ID consulted, transitioned to zosyn. Then started on rocephin on 8/11 through 9/2/22. Echo 8/1 found EF 20-25%. Cardiology consulted, PTA was taking lasix 60mg TID, then transitioned to entresto on 8/3 and bumex added 2mg BID on 8/3. \"This was " "further transitioned to torsemide 20 mg twice daily on 8/5. Diuretics held due to borderline blood pressures, continue to hold as appears Euvolemic, Lastly on torsemide 10 mg daily should go back on Bumex as prior to admission and discharged.\" US BALDERAS 8/6 + DVT, was started on xarelto. Recommending to follow-up with vascular clinic in 2-3 weeks to determine duration of anticoagulation therapy. Also noted intermittent runs of ventricular tachycardia and started on amiodarone per Cardiology. Noted to have urinary retention and hematuria, required das placement with recommendations for outpatient cystoscpoy.     Transferred to [facility] on 8/12/22.      Bacteremia  Blood cultures + Capnocytophaga bacteremia associated with mouths of dogs and cats. Was treated with ceftriaxone and doxycyline. ID consulted, transitioned to zosyn. Then started on rocephin on 8/11 through 9/2/22. Today, patient denies fever or chills.   - Per ID/Dr. Baca, ok to remove midline following completion of antibiotics on 9/2 and recheck blood cultures outpatient on 9/8    Chronic systolic (congestive) heart failure (H)  Coronary artery disease of native artery of native heart with stable angina pectoris (H)  Chronic CHF, CAD. Echo 8/1 found EF 20-25%. Currently taking bumex, lisinopril, metoprolol, lipitor. Denies chest pain, SOB, headache, syncope.    Persistent atrial fibrillation (H)  Chronic A fib and intermittent ventricular tacychardia.   Recently seen by Cardiology on 8/22 for arrhythmia and device follow-up; hx  ventricular tachycardia s/p VT ablation 4/30/2020 & repeat VT ablation 2/4/21, CAD s/p coronary artery bypass graft/TV repair/MAZE ablation and JEFF clip 7/9/19, ischemic cardiomyopathy LVEF 20%, chronic atrial fibrillation, sinus node dysfunction s/p CRT-D 5/1/2020. Per Dr. Torres (Cardiologist), recommending to stop amiodarone 1 month following abx and bacterial infection has completely subsided. Recommending if recurrent " "infection, need to investigate ICD leads and consider extraction.  - Plan to follow-up with Dr. Giron in 2 months, and EP/Cardiology JINNY in 3-6 months.    COPD, severe (H)  Currently taking Anoro Ellipta inhaler. Denies shortness of breath or coughing. O2 sats stable on RA.    Deep vein thrombosis (DVT) of left upper extremity, unspecified chronicity, unspecified vein (H)  Noted to have swelling to LUE during hospital stay, LUE US 8/6 found \"near occlusive thombosis of mid left subclavian vein to left axillary vein.\" Was started on heparin, then transitioned to xarelto. Vascular medicine consulted/Dr. Chirinos d/t hx GI bleed in 06/2022.  - Plan to follow-up with Vascular clinic in 2-3 weeks for follow-up regarding DVT and recommendations for duration of anticoagulation therapy    Urinary retention  Required multiple straight cath and das placement during hospital stay and was started on flomax. Denies urinary issues today.   - Plan to follow-up with Urology with outpatient cystoscopy on 9/14    Physical deconditioning  PTA lives a home w/spouse. SLUMS 22/30. Ambulates 150ft w/walker. Has been participating in therapy. Feeling stronger and ready to go home. Patient discharging home, as well as home care services, including home PT, OT, RN, HHA.     Diabetes  Currently taking jardiance.     L hip pain  Hx Left intertrochanteric femur fracture and s/p ORIF in January 2022. Has ongoing L hip pain, recent XR found no acute findings. Currently taking tylenol, oxycodone PRN, cymbalta.         Past Medical History:  has a past medical history of Benign essential hypertension, Chronic atrial fibrillation (H), COPD (chronic obstructive pulmonary disease) (H), Hyperlipidemia LDL goal <100, Ischemic cardiomyopathy, Mitral valve regurgitation, and Tricuspid regurgitation.    He has no past medical history of Complication of anesthesia or Sleep apnea.    Discharge Medications:    Current Outpatient Medications   Medication Sig " Dispense Refill     acetaminophen (TYLENOL) 325 MG tablet Take 650 mg by mouth 2 times daily & 2 x daily as needed       albuterol (PROAIR HFA/PROVENTIL HFA/VENTOLIN HFA) 108 (90 Base) MCG/ACT inhaler Inhale 1-2 puffs into the lungs every 4 hours as needed for shortness of breath / dyspnea or wheezing       amiodarone (PACERONE) 400 MG tablet Take 1 tablet (400 mg) by mouth daily       atorvastatin (LIPITOR) 80 MG tablet Take 80 mg by mouth every morning       bumetanide (BUMEX) 2 MG tablet Take 2 mg by mouth 2 times daily At 0800 and 1200       bumetanide (BUMEX) 2 MG tablet Take 1 mg by mouth every evening       DULoxetine (CYMBALTA) 30 MG capsule Take 30 mg by mouth At Bedtime       empagliflozin (JARDIANCE) 10 MG TABS tablet Take 1 tablet (10 mg) by mouth daily       lisinopril (ZESTRIL) 2.5 MG tablet Take 2.5 mg by mouth every morning       loperamide (IMODIUM A-D) 2 MG tablet Take 2-4 mg by mouth 4 times daily as needed for diarrhea Give 4 mg after first loose stool and 2 mg after each additional loose stool       loratadine (CLARITIN) 10 MG tablet Take 10 mg by mouth every morning       metoprolol succinate ER (TOPROL XL) 100 MG 24 hr tablet Take 1 tablet (100 mg) by mouth every morning (Not a new prescription, this is a change of dose)       nitroGLYcerin (NITROSTAT) 0.4 MG sublingual tablet Place 0.4 mg under the tongue every 5 minutes as needed for chest pain For chest pain place 1 tablet under the tongue every 5 minutes for 3 doses. If symptoms persist 5 minutes after 1st dose call 911.       oxyCODONE (ROXICODONE) 5 MG tablet Take 1-2 tablets (5-10 mg) by mouth every 4 hours as needed for moderate to severe pain 45 tablet 0     potassium chloride ER (KLOR-CON M) 20 MEQ CR tablet Take 30 mEq by mouth every morning       Rivaroxaban ANTICOAGULANT 15 & 20 MG TBPK Starter Therapy Pack Take 15 mg by mouth 2 times daily (with meals) for 21 days, THEN 20 mg daily with food for 9 days. 51 each 0      "senna-docusate (SENOKOT-S/PERICOLACE) 8.6-50 MG tablet Take 2 tablets by mouth daily as needed for constipation       tamsulosin (FLOMAX) 0.4 MG capsule Take 1 capsule (0.4 mg) by mouth daily       umeclidinium-vilanterol (ANORO ELLIPTA) 62.5-25 MCG/INH oral inhaler Inhale 1 puff into the lungs every morning       acetaminophen (TYLENOL) 325 MG tablet Take 650 mg by mouth 2 times daily         Medication Changes/Rationale:     Completed course of IV antibiotics on 9/2    Controlled medications sent with patient:   Medication: oxycodone , 10 tabs given to patient at the time of discharge to take home       ROS:   10 point ROS of systems including Constitutional, Eyes, Respiratory, Cardiovascular, Gastroenterology, Genitourinary, Integumentary, Musculoskeletal, Psychiatric were all negative except for pertinent positives noted in my HPI.    Physical Exam:   Vitals: /85   Pulse 68   Temp 98.1  F (36.7  C)   Resp 18   Ht 1.715 m (5' 7.5\")   Wt 90.8 kg (200 lb 1.6 oz)   SpO2 95%   BMI 30.88 kg/m    BMI= Body mass index is 30.88 kg/m .  GENERAL APPEARANCE:  Alert, in no distress, appears healthy, cooperative  ENT:  Mouth and posterior oropharynx normal, moist mucous membranes, normal hearing acuity  EYES:  EOM, conjunctivae, lids, pupils and irises normal, PERRL  RESP:  no respiratory distress  CV:  no edema  M/S:   Gait and station abnormal, sitting in wheelchair.   SKIN:  Inspection of skin and subcutaneous tissue baseline, Palpation of skin and subcutaneous tissue baseline  NEURO:   Cranial nerves 2-12 are normal tested and grossly at patient's baseline, Examination of sensation by touch normal  PSYCH:  oriented X 3, affect and mood normal     SNF labs: Recent labs in [facility] reviewed by me today.       DISCHARGE PLAN:    Follow up labs: Recheck blood cultures on 9/8    Medical Follow Up:     Follow up with PCP in 1-2 weeks   Follow up with Specialists               9/14 215pm Dr. Mcfarlane: [facility] " Urology, Cystoscopy [address] [phone]              Patient to follow-up with Cardiologist (Dr. Giron) as directed              Patient to follow-up with Vascular Clinic as directed to review duration of anticoagulation/xarelto for DVT      Discharge Services: Home Care:  Occupational Therapy, Physical Therapy, Registered Nurse, Home Health Aide and From:  [facility]      Discharge Instructions Verbalized to Patient at Discharge:   Weigh yourself daily in the morning and keep a record. Call your primary clinic: a) if you are more short of breath, or b) if your weight changes more than 3 pounds in one day or more than 5 pounds in one week.    Notify PCP if you have a fever greater than 100.5 degrees.   Do NOT drive while taking narcotics      TOTAL DISCHARGE TIME:   Greater than 30 minutes     Electronically signed by:  PASHA Sanchez CNP       Documentation of Face to Face and Certification for Home Health Services    I certify that services are/were furnished while this patient was under the care of a physician and that a physician or an allowed non-physician practitioner (NPP), had a face-to-face encounter that meets the physician face-to-face encounter requirements. The encounter was in whole, or in part, related to the primary reason for home health. The patient is confined to his/her home and needs intermittent skilled nursing, physical therapy, speech-language pathology, or the continued need for occupational therapy. A plan of care has been established by a physician and is periodically reviewed by a physician.  Date of Face-to-Face Encounter: 9/2/22.    I certify that, based on my findings, the following services are medically necessary home health services: Nursing, Occupational Therapy, Physical Therapy and HHA.    My clinical findings support the need for the above skilled services because: Requires assistance of another person or specialized equipment to  access medical services because patient: Is unable to walk greater than 150 feet without rest. and Requires supervision of another for safe transfer...    Patient to re-establish plan of care with their PCP within 7-10 days after leaving the facility to reestablish care.  Medicare certified [place] provider: PASHA Sanchez CNP  Date: September 3, 2022

## 2022-09-02 NOTE — LETTER
9/2/2022        RE: Toney Denton  [address] [place] [facility] [address]        No notes on file      Sincerely,        PASHA Sanchez CNP

## 2022-09-03 PROBLEM — I61.5: Status: ACTIVE | Noted: 2022-01-01

## 2022-09-03 PROBLEM — R41.89 UNRESPONSIVE: Status: ACTIVE | Noted: 2022-01-01

## 2022-09-03 NOTE — CONSULTS
[facility]    Stroke Consult Note    Reason for Consult:  Intraventricular hemorrhage with ICH    Chief Complaint: Altered Mental Status       HPI  Toney Denton is a 71 year old male with known multiple risk factors of CAD s/p CABG, ischemic cardiomyopathy EF 20 to 25%, COPD, HTN, bioprosthetic MVR. Patient is on Xarelto for recent subclavian DVT.  He was found to be altered this morning after a fall with no head trauma.   CT head initial was obtained which showed Large Extensive IVH with hydrocephalus and extension to third and fourth ventricles with midline supratentorial hemorrhage between frontal horns and vasogenic edema in addition to SAH.  Patient was still having reactive pupils and withdrawal of extremities to painful stimuli. He received K centra for reversal. He was then taken to surgery for EVD insertion. CT head repeated which showed EVD and no significant change. Vessel images were not showing dilatation, spot sign, dissection. After that the patient was reporte dto have worsening neurological exam with loss of cranial stem reflexes including pupil reaction.      Stroke neurology was paged at 2529 regarding routine ICU consult and we were not aware of patient in the hospital before that. According to nursing pupil exam change with loss of reactivity happened at 1445.    EVD was noted to be clotted and discussion has been held for thrombolytics which was declined upon discussion with neurosurgery team at [facility].    On my immediate exam upon consultation, no brain stem reflexes were present except for cough. No limb reaction to painful stimuli. Wife at bedside which understands grim prognosis and waiting for daughter to arrive from [place].        Imaging Review:    CT head:  1.  Large volume/extensive intraventricular hyperdense hemorrhage with ventricular dilation involving the lateral and third ventricles, and to a lesser extent the fourth ventricle.     2.  There is  "midline supratentorial hemorrhage interposed between the frontal horns of the lateral ventricles, probable surrounding vasogenic edema and/or associated ischemia. Suspect primary intraparenchymal hemorrhage and secondary decompression into   the ventricular system.      HEAD CTA:   1.  No evidence for aneurysm/ruptured aneurysm, large vessel occlusion or dissection. Mild stenoses of the cavernous ICA segment. Reduced caliber of the right ICA related to right ICA origin stenosis. Dural venous sinus pattern of enhancement is   satisfactory.     NECK CTA:  1.  Very high-grade right ICA origin stenosis. Mild narrowing left ECA and ICA.    Impression   Large Extensive IVH with hydrocephalus and extension to third and fourth ventricles with midline supratentorial hemorrhage between frontal horns and vasogenic edema in addition to SAH.    Loss of bvrain stem reflexes except for cough, no motor response to painful stimuli. Exam done off sedation.    >Elevated ICP    >Intubation    ICH score of 4    Recommendations:  Although patient has grim prognosis with only cough reflex present off sedation no reaction of extremities, ICP is 60  Things that we can do to help decrease ICH  Na goal of 140-145 give manitol and hypertonic saline    Discussed with wife grim bad prognosis which she wants to wait for daughter arriving from [place] to discuss further goals of care    Thank you for this consult. We will continue to follow.     The Stroke Staff is Dr. Duenas.    Lawanda Cortez MD  Vascular Neurology Fellow  To page me or covering stroke neurology team member, click here: AMCOM   Choose \"On Call\" tab at top, then search dropdown box for \"Neurology Adult\", select location, press Enter, then look for stroke/neuro ICU/telestroke.    _____________________________________________________       Past Medical History   Past Medical History:   Diagnosis Date     Benign essential hypertension      Chronic atrial fibrillation (H)     s/p " ablation/maze procedure/LA clip.  on ASA for stroke ppx     COPD (chronic obstructive pulmonary disease) (H)     severe by PFTs     Hyperlipidemia LDL goal <100      Ischemic cardiomyopathy     EF 25 % by echo 4/2021, with hx of VTach s/p biV pacer and ICD     Mitral valve regurgitation     s/p bioprosthetic MVR     Tricuspid regurgitation     s/p repair     Past Surgical History   Past Surgical History:   Procedure Laterality Date     CABG GARTH QUAL ACT PERFORM       MITRAL VALVE REPLACEMENT       OPEN REDUCTION INTERNAL FIXATION HIP NAILING Left 1/24/2022    Procedure: Open reduction and internal fixation of the left hip using Synthes 3-hole 130 degree dynamic compression hip screw;  Surgeon: Sharlene Arias MD;  Location: [facility] OR     TRICUSPID VALVULOPLASTY       Medications   Home Meds  Prior to Admission medications    Medication Sig Start Date End Date Taking? Authorizing Provider   acetaminophen (TYLENOL) 325 MG tablet Take 650 mg by mouth 2 times daily   Yes Unknown, Entered By History   acetaminophen (TYLENOL) 325 MG tablet Take 650 mg by mouth 2 times daily & 2 x daily as needed   Yes Reported, Patient   albuterol (PROAIR HFA/PROVENTIL HFA/VENTOLIN HFA) 108 (90 Base) MCG/ACT inhaler Inhale 1-2 puffs into the lungs every 4 hours as needed for shortness of breath / dyspnea or wheezing   Yes Unknown, Entered By History   amiodarone (PACERONE) 400 MG tablet Take 1 tablet (400 mg) by mouth daily 8/13/22  Yes Reese Maravilla MD   atorvastatin (LIPITOR) 80 MG tablet Take 80 mg by mouth every morning   Yes Unknown, Entered By History   bumetanide (BUMEX) 2 MG tablet Take 2 mg by mouth 2 times daily At 0800 and 1200   Yes Unknown, Entered By History   bumetanide (BUMEX) 2 MG tablet Take 1 mg by mouth every evening   Yes Unknown, Entered By History   DULoxetine (CYMBALTA) 30 MG capsule Take 30 mg by mouth At Bedtime   Yes Unknown, Entered By History   empagliflozin (JARDIANCE) 10 MG TABS tablet Take 1 tablet (10  mg) by mouth daily 8/13/22  Yes Reese Maravilla MD   lisinopril (ZESTRIL) 2.5 MG tablet Take 2.5 mg by mouth every morning   Yes Unknown, Entered By History   loperamide (IMODIUM A-D) 2 MG tablet Take 2-4 mg by mouth 4 times daily as needed for diarrhea Give 4 mg after first loose stool and 2 mg after each additional loose stool   Yes Reported, Patient   loratadine (CLARITIN) 10 MG tablet Take 10 mg by mouth every morning   Yes Unknown, Entered By History   metoprolol succinate ER (TOPROL XL) 100 MG 24 hr tablet Take 1 tablet (100 mg) by mouth every morning (Not a new prescription, this is a change of dose) 6/19/22  Yes Satish Montelongo,    nitroGLYcerin (NITROSTAT) 0.4 MG sublingual tablet Place 0.4 mg under the tongue every 5 minutes as needed for chest pain For chest pain place 1 tablet under the tongue every 5 minutes for 3 doses. If symptoms persist 5 minutes after 1st dose call 911.   Yes Unknown, Entered By History   oxyCODONE (ROXICODONE) 5 MG tablet Take 1-2 tablets (5-10 mg) by mouth every 4 hours as needed for moderate to severe pain 8/17/22  Yes Chrystal Kaur APRN CNP   potassium chloride ER (KLOR-CON M) 20 MEQ CR tablet Take 30 mEq by mouth every morning   Yes Unknown, Entered By History   Rivaroxaban ANTICOAGULANT 15 & 20 MG TBPK Starter Therapy Pack Take 15 mg by mouth 2 times daily (with meals) for 21 days, THEN 20 mg daily with food for 9 days. 8/12/22 9/11/22 Yes Reese Maravilla MD   senna-docusate (SENOKOT-S/PERICOLACE) 8.6-50 MG tablet Take 2 tablets by mouth daily as needed for constipation   Yes Reported, Patient   tamsulosin (FLOMAX) 0.4 MG capsule Take 1 capsule (0.4 mg) by mouth daily 8/13/22  Yes Reese Maravilla MD   umeclidinium-vilanterol (ANORO ELLIPTA) 62.5-25 MCG/INH oral inhaler Inhale 1 puff into the lungs every morning   Yes Unknown, Entered By History       Scheduled Meds    amiodarone  400 mg Oral Daily     ceFAZolin  1 g Intravenous Q8H     chlorhexidine  15 mL  Mouth/Throat Q12H     insulin aspart  1-6 Units Subcutaneous Q4H     [START ON 9/4/2022] pantoprazole  40 mg Per Feeding Tube QAM AC    Or     [START ON 9/4/2022] pantoprazole  40 mg Intravenous QAM AC       Infusion Meds    - MEDICATION INSTRUCTIONS -       niCARdipine Stopped (09/03/22 0900)     propofol Stopped (09/03/22 0828)       PRN Meds  acetaminophen **OR** acetaminophen, glucose **OR** dextrose **OR** glucagon, flumazenil, HYDROmorphone, CK total **AND** Triglycerides **AND** - MEDICATION INSTRUCTIONS - **AND** Notify Physician, naloxone, naloxone, naloxone, naloxone, ondansetron **OR** ondansetron, polyethylene glycol, senna-docusate **OR** senna-docusate    Allergies   Allergies   Allergen Reactions     Latex Rash     Gets red on direct contact     Spironolactone Unknown     Reported side effects after receiving it after heart surgery, resolved with ablasion     Sulindac Diarrhea, Muscle Pain (Myalgia) and Nausea and Vomiting     Felt like beaten up      Family History   Family History   Problem Relation Age of Onset     Diabetes Father      Diabetes Brother      Social History   Social History     Tobacco Use     Smoking status: Former Smoker     Types: Cigars     Smokeless tobacco: Never Used   Substance Use Topics     Alcohol use: Yes     Comment: 3 beers (IPA) per day -can go for days without drinking and no hx of w/d       Review of Systems   Cannot be obtained       PHYSICAL EXAMINATION   Temp:  [97  F (36.1  C)-97.3  F (36.3  C)] 97  F (36.1  C)  Pulse:  [] 85  Resp:  [14-25] 25  BP: (139-165)/() 139/75  FiO2 (%):  [40 %-100 %] 40 %  SpO2:  [90 %-99 %] 98 %    Neurologic done off sedation  Mental Status:   No brain stem reflexes except for cough  Cranial Nerves:  Only cough reflex present  Motor: No response to pain  Sensory:  No response  Coordination:cant be performed  Station/Gait:  Cant be performed    Dysphagia Screen  Per Nursing    Stroke Scales    ICH Score (at  arrival)  Scoring Tool Score   Age ? 80 years 1 point No   GCS score  3-4   5-12   13-15   2 point  1 point  0 point GCS 3-4   Hematoma volume, cm 3 ? 30 1 point Yes   Intraventricular extension 1 point Yes   Infratentorial location 1 point No   Total 4         Imaging  I personally reviewed all imaging; relevant findings per HPI.    Labs Data   CBC  Recent Labs   Lab 09/03/22  0535 09/03/22  0533 08/31/22  0630   WBC 10.0  --  6.7   RBC 3.48*  --  3.21*   HGB 10.2* 11.9* 9.5*   HCT 31.5* 35* 29.9*     --  271     Basic Metabolic Panel   Recent Labs   Lab 09/03/22  1219 09/03/22  0535 09/03/22  0533 08/31/22  0630   NA  --  139 134 131*   POTASSIUM  --  2.7* 3.6 3.9   CHLORIDE  --  108  --  95   CO2  --  22  --  27   BUN  --  12  --  14   CR  --  0.48*  --  0.72  0.71   * 127*  --  75   THOMAS  --  7.1*  --  9.5     Liver Panel  Recent Labs   Lab 09/03/22  0535 08/31/22  0630   PROTTOTAL 6.0*  --    ALBUMIN 2.4*  --    BILITOTAL 0.4  --    ALKPHOS 82  --    AST 17 25   ALT 18  --      INR    Recent Labs   Lab Test 09/03/22  0959 09/03/22  0903 09/03/22  0535   INR 1.98* 1.98* 2.85*      Lipid Profile  No lab results found.  A1C    Recent Labs   Lab Test 09/03/22  0535   A1C 5.8*     Troponin    Recent Labs   Lab 09/03/22  0535   TROPONINIS 23          Stroke Consult Data Data   This was a non-emergent, non-telestroke consult.

## 2022-09-03 NOTE — ANESTHESIA POSTPROCEDURE EVALUATION
Patient: Toney Denton    Procedure: Procedure(s):  RIGHT FRONTAL VENTRICULOSTOMY       Anesthesia Type:  General    Note:  Disposition: Inpatient   Postop Pain Control: Uneventful            Sign Out: Well controlled pain   PONV: No   Neuro/Psych: Uneventful            Sign Out: Acceptable/Baseline neuro status   Airway/Respiratory: Uneventful            Sign Out: AIRWAY IN SITU/Resp. Support   CV/Hemodynamics:    Other NRE: NONE   DID A NON-ROUTINE EVENT OCCUR?            Last vitals:  Vitals:    09/03/22 0730 09/03/22 0800 09/03/22 0900   BP: (!) 160/97 (!) 158/99 139/75   Pulse: 78 77 85   Resp: 20 23 25   Temp: 36.1  C (97  F) 36.1  C (97  F)    SpO2: 99% 99% 98%       Electronically Signed By: Azalea Thompson  September 3, 2022  1:21 PM

## 2022-09-03 NOTE — CONSULTS
[facility]    Neurosurgery Consultation     Date of Admission:  9/3/2022  Date of Consult (When I saw the patient): 09/03/22    Assessment & Plan   Toney Denton is a 71 year old male on Xarelto for atrial fibrillation who presented to the ED on 9/3/2022. He was found unresponsive at TCU this morning. Radiographic findings include large volume/extensive intraventricular hemorrhage with ventricular dilation involving the lateral and third ventricles, and to a lesser extent the fourth ventricle. On exam patient is intubated, no gag reflex, no corneal reflex, positive cough reflex, right > left arm posturing, pupils sluggish. K Centra given, INR pending. Dr. Quintero met with wife and discussed options including EVD placement. Risks and benefits discussed. Wife would like to proceed with surgery.     Active Problems:    Ventricular hemorrhage (H)    Unresponsive      Plan:   - INR pending  - OR this morning for emergent EVD placement  - ICU admission   - Continue to monitor neuro exam   - NSG will continue to follow     I have discussed the following assessment and plan with Dr. Quintero.     Ynes Gaytan CNP  [facility] [place] [address]  Tel [phone]  Pager [phone]    Code Status    Prior    Reason for Consult   Reason for consult: I was asked by Dr. Sorenson to evaluate this patient for intraventricular hemorrhage.    Primary Care Physician   Kelley Stoddard    Chief Complaint   Unresponsive     History is obtained from the electronic health record and emergency department physician    History of Present Illness   Toney Denton is a 71 year old male on Xarelto for atrial fibrillation who presented to the ED on 9/3/2022 after being found unresponsive at TCU. He has been at TCU recovering from a bacterial infection. He was planning to discharge home today. He went to the bathroom with assistance around  0445. The nurse checked on him 30 minutes later and he was found unresponsive. Radiographic findings include large volume/extensive intraventricular hemorrhage with ventricular dilation involving the lateral and third ventricles, and to a lesser extent the fourth ventricle. On exam patient is intubated, no gag reflex, no corneal reflex, positive cough reflex, right > left arm posturing, pupils sluggish. K Centra given, INR pending. Dr. Quintero met with wife and discussed options including EVD placement. Risks and benefits discussed. Wife would like to proceed with surgery.     EXAM: CT HEAD WITHOUT CONTRAST  LOCATION: [facility]  DATE/TIME: 09/03/2022, 6:29 AM                                                                  IMPRESSION:  1.  Large volume/extensive intraventricular hyperdense hemorrhage with ventricular dilation involving the lateral and third ventricles, and to a lesser extent the fourth ventricle.  2.  There is midline supratentorial hemorrhage interposed between the frontal horns of the lateral ventricles, probable surrounding vasogenic edema and/or associated ischemia. Suspect primary intraparenchymal hemorrhage and secondary decompression into   the ventricular system.   3.  There is a relative paucity of subarachnoid hemorrhage involving sulci of the supratentorial and infratentorial level, with predominantly intraparenchymal and intraventricular hemorrhage noted.    Past Medical History   I have reviewed this patient's medical history and updated it with pertinent information if needed.   Past Medical History:   Diagnosis Date     Benign essential hypertension      Chronic atrial fibrillation (H)     s/p ablation/maze procedure/LA clip.  on ASA for stroke ppx     COPD (chronic obstructive pulmonary disease) (H)     severe by PFTs     Hyperlipidemia LDL goal <100      Ischemic cardiomyopathy     EF 25 % by echo 4/2021, with hx of VTach s/p biV pacer and ICD     Mitral  valve regurgitation     s/p bioprosthetic MVR     Tricuspid regurgitation     s/p repair       Past Surgical History   I have reviewed this patient's surgical history and updated it with pertinent information if needed.  Past Surgical History:   Procedure Laterality Date     CABG GARTH QUAL ACT PERFORM       MITRAL VALVE REPLACEMENT       OPEN REDUCTION INTERNAL FIXATION HIP NAILING Left 1/24/2022    Procedure: Open reduction and internal fixation of the left hip using Synthes 3-hole 130 degree dynamic compression hip screw;  Surgeon: Sharlene Arias MD;  Location: [facility] OR     TRICUSPID VALVULOPLASTY         Prior to Admission Medications   Prior to Admission Medications   Prescriptions Last Dose Informant Patient Reported? Taking?   DULoxetine (CYMBALTA) 30 MG capsule 9/2/2022 at bed time  Yes Yes   Sig: Take 30 mg by mouth At Bedtime   Rivaroxaban ANTICOAGULANT 15 & 20 MG TBPK Starter Therapy Pack 9/2/2022 at 1700  No Yes   Sig: Take 15 mg by mouth 2 times daily (with meals) for 21 days, THEN 20 mg daily with food for 9 days.   acetaminophen (TYLENOL) 325 MG tablet  at PRN  Yes Yes   Sig: Take 650 mg by mouth 2 times daily & 2 x daily as needed   acetaminophen (TYLENOL) 325 MG tablet 9/2/2022 at pm  Yes Yes   Sig: Take 650 mg by mouth 2 times daily   albuterol (PROAIR HFA/PROVENTIL HFA/VENTOLIN HFA) 108 (90 Base) MCG/ACT inhaler  at PRN  Yes Yes   Sig: Inhale 1-2 puffs into the lungs every 4 hours as needed for shortness of breath / dyspnea or wheezing   amiodarone (PACERONE) 400 MG tablet 9/2/2022 at am  No Yes   Sig: Take 1 tablet (400 mg) by mouth daily   atorvastatin (LIPITOR) 80 MG tablet 9/2/2022 at am  Yes Yes   Sig: Take 80 mg by mouth every morning   bumetanide (BUMEX) 2 MG tablet 9/2/2022 at 1200  Yes Yes   Sig: Take 2 mg by mouth 2 times daily At 0800 and 1200   bumetanide (BUMEX) 2 MG tablet 9/2/2022 at 1600  Yes Yes   Sig: Take 1 mg by mouth every evening   empagliflozin (JARDIANCE) 10 MG TABS tablet  9/2/2022 at am  No Yes   Sig: Take 1 tablet (10 mg) by mouth daily   lisinopril (ZESTRIL) 2.5 MG tablet 9/2/2022 at am  Yes Yes   Sig: Take 2.5 mg by mouth every morning   loperamide (IMODIUM A-D) 2 MG tablet  at PRN  Yes Yes   Sig: Take 2-4 mg by mouth 4 times daily as needed for diarrhea Give 4 mg after first loose stool and 2 mg after each additional loose stool   loratadine (CLARITIN) 10 MG tablet 9/2/2022 at am  Yes Yes   Sig: Take 10 mg by mouth every morning   metoprolol succinate ER (TOPROL XL) 100 MG 24 hr tablet 9/2/2022 at am  No Yes   Sig: Take 1 tablet (100 mg) by mouth every morning (Not a new prescription, this is a change of dose)   nitroGLYcerin (NITROSTAT) 0.4 MG sublingual tablet   Yes Yes   Sig: Place 0.4 mg under the tongue every 5 minutes as needed for chest pain For chest pain place 1 tablet under the tongue every 5 minutes for 3 doses. If symptoms persist 5 minutes after 1st dose call 911.   oxyCODONE (ROXICODONE) 5 MG tablet 9/2/2022 at 2121  No Yes   Sig: Take 1-2 tablets (5-10 mg) by mouth every 4 hours as needed for moderate to severe pain   potassium chloride ER (KLOR-CON M) 20 MEQ CR tablet 9/2/2022 at am  Yes Yes   Sig: Take 30 mEq by mouth every morning   senna-docusate (SENOKOT-S/PERICOLACE) 8.6-50 MG tablet  at PRN  Yes Yes   Sig: Take 2 tablets by mouth daily as needed for constipation   tamsulosin (FLOMAX) 0.4 MG capsule 9/2/2022 at 1300  No Yes   Sig: Take 1 capsule (0.4 mg) by mouth daily   umeclidinium-vilanterol (ANORO ELLIPTA) 62.5-25 MCG/INH oral inhaler 9/2/2022 at am  Yes Yes   Sig: Inhale 1 puff into the lungs every morning      Facility-Administered Medications: None     Allergies   Allergies   Allergen Reactions     Latex Rash     Gets red on direct contact     Spironolactone Unknown     Reported side effects after receiving it after heart surgery, resolved with ablasion     Sulindac Diarrhea, Muscle Pain (Myalgia) and Nausea and Vomiting     Felt like beaten up         Social History   I have reviewed this patient's social history and updated it with pertinent information if needed. Toney Denton  reports that he has quit smoking. His smoking use included cigars. He has never used smokeless tobacco. He reports current alcohol use.    Family History   I have reviewed this patient's family history and updated it with pertinent information if needed.   Family History   Problem Relation Age of Onset     Diabetes Father      Diabetes Brother        Review of Systems   10 point ROS negative other than symptoms noted in HPI.    Physical Exam   Temp: 97  F (36.1  C)   BP: (!) 158/99 Pulse: 77   Resp: 23 SpO2: 99 % O2 Device: Mechanical Ventilator    Vital Signs with Ranges  Temp:  [97  F (36.1  C)-97.3  F (36.3  C)] 97  F (36.1  C)  Pulse:  [] 77  Resp:  [14-23] 23  BP: (150-165)/() 158/99  FiO2 (%):  [100 %] 100 %  SpO2:  [90 %-99 %] 99 %  0 lbs 0 oz     , Blood pressure (!) 158/99, pulse 77, temperature 97  F (36.1  C), resp. rate 23, SpO2 99 %.  0 lbs 0 oz     NEUROLOGICAL EXAMINATION:   Intubated, sedation held for neuro exam   No gag reflex  No corneal reflex   Positive cough reflex   Pupils equal, round, sluggish   Right > left arm posturing     Data     CBC RESULTS:   Recent Labs   Lab Test 09/03/22  0535   WBC 10.0   RBC 3.48*   HGB 10.2*   HCT 31.5*   MCV 91   MCH 29.3   MCHC 32.4   RDW 14.2        Basic Metabolic Panel:  Lab Results   Component Value Date     09/03/2022     09/03/2022      Lab Results   Component Value Date    POTASSIUM 2.7 09/03/2022    POTASSIUM 3.6 09/03/2022     Lab Results   Component Value Date    CHLORIDE 108 09/03/2022     Lab Results   Component Value Date    THOMAS 7.1 09/03/2022     Lab Results   Component Value Date    CO2 22 09/03/2022     Lab Results   Component Value Date    BUN 12 09/03/2022     Lab Results   Component Value Date    CR 0.48 09/03/2022     Lab Results   Component Value Date     09/03/2022      INR:  Lab Results   Component Value Date    INR 2.85 09/03/2022    INR 1.30 06/17/2022    INR 1.09 01/23/2022

## 2022-09-03 NOTE — PROGRESS NOTES
[facility] Discharge Orders    Name: Toney Denton  : 1950  Planned Discharge Date: 9/3/22  Discharged to: previous independent home      MEDICAL FOLLOW UP  Follow up with PCP in 1-2 weeks   Follow up with Specialists     215pm Dr. Mcfarlane: [facility], Cystoscopy [address] [phone]   Patient to follow-up with Cardiologist (Dr. Giron) as directed   Patient to follow-up with Vascular Clinic as directed to review duration of anticoagulation/xarelto for DVT      FUTURE LABS:   - Repeat blood cultures on  with results sent to Dr. Baca    ORDER CHANGES:  - Completed rocephin on , midline to be pulled following completion of antibiotic    DISCHARGE MEDICATIONS:  The patient s pharmacy is authorized to dispense a 30-day supply of medications. Refill requests should be directed to the primary provider, Kelley Stoddard.   At discharge, the facility may send patient's remaining supply of controlled substances, specifically oxycodone #10 tablets.    Current Outpatient Medications   Medication Sig Dispense Refill     acetaminophen (TYLENOL) 325 MG tablet Take 650 mg by mouth 2 times daily & 2 x daily as needed       albuterol (PROAIR HFA/PROVENTIL HFA/VENTOLIN HFA) 108 (90 Base) MCG/ACT inhaler Inhale 1-2 puffs into the lungs every 4 hours as needed for shortness of breath / dyspnea or wheezing       amiodarone (PACERONE) 400 MG tablet Take 1 tablet (400 mg) by mouth daily       atorvastatin (LIPITOR) 80 MG tablet Take 80 mg by mouth every morning       bumetanide (BUMEX) 2 MG tablet Take 2 mg by mouth every morning        bumetanide (BUMEX) 2 MG tablet Take 1 mg by mouth every evening       bumetanide (BUMEX) 2 MG tablet Take 2 mg by mouth daily At noon       DULoxetine (CYMBALTA) 30 MG capsule Take 30 mg by mouth At Bedtime       empagliflozin (JARDIANCE) 10 MG TABS tablet Take 1 tablet (10 mg) by mouth daily       lisinopril (ZESTRIL) 2.5 MG tablet  Take 2.5 mg by mouth every morning       loperamide (IMODIUM A-D) 2 MG tablet Take 2-4 mg by mouth 4 times daily as needed for diarrhea Give 4 mg after first loose stool and 2 mg after each additional loose stool       loratadine (CLARITIN) 10 MG tablet Take 10 mg by mouth every morning       metoprolol succinate ER (TOPROL XL) 100 MG 24 hr tablet Take 1 tablet (100 mg) by mouth every morning (Not a new prescription, this is a change of dose)       nitroGLYcerin (NITROSTAT) 0.4 MG sublingual tablet Place 0.4 mg under the tongue every 5 minutes as needed for chest pain For chest pain place 1 tablet under the tongue every 5 minutes for 3 doses. If symptoms persist 5 minutes after 1st dose call 911.       oxyCODONE (ROXICODONE) 5 MG tablet Take 1-2 tablets (5-10 mg) by mouth every 4 hours as needed for moderate to severe pain 45 tablet 0     potassium chloride ER (KLOR-CON M) 20 MEQ CR tablet Take 30 mEq by mouth every morning       Rivaroxaban ANTICOAGULANT 15 & 20 MG TBPK Starter Therapy Pack Take 15 mg by mouth 2 times daily (with meals) for 21 days, THEN 20 mg daily with food for 9 days. 51 each 0     senna-docusate (SENOKOT-S/PERICOLACE) 8.6-50 MG tablet Take 2 tablets by mouth daily as needed for constipation       tamsulosin (FLOMAX) 0.4 MG capsule Take 1 capsule (0.4 mg) by mouth daily       umeclidinium-vilanterol (ANORO ELLIPTA) 62.5-25 MCG/INH oral inhaler Inhale 1 puff into the lungs every morning         SERVICES:  [facility] RN, OT, PT, HHA    ADDITIONAL INSTRUCTIONS:  Weigh yourself daily in the morning and keep a record. Call your primary clinic: a) if you are more short of breath, or b) if your weight changes more than 3 pounds in one day or more than 5 pounds in one week.    Notify PCP if you have a fever greater than 100.5 degrees.   Do NOT drive while taking narcotics      PASHA Sanchez CNP  This document was electronically signed on September 2, 2022

## 2022-09-03 NOTE — ANESTHESIA PREPROCEDURE EVALUATION
Anesthesia Pre-Procedure Evaluation    Patient: Toney Denton   MRN: [id number] : 1950        Procedure : Procedure(s):  VENTRICULOSTOMY, OPEN          Past Medical History:   Diagnosis Date     Benign essential hypertension      Chronic atrial fibrillation (H)     s/p ablation/maze procedure/LA clip.  on ASA for stroke ppx     COPD (chronic obstructive pulmonary disease) (H)     severe by PFTs     Hyperlipidemia LDL goal <100      Ischemic cardiomyopathy     EF 25 % by echo 2021, with hx of VTach s/p biV pacer and ICD     Mitral valve regurgitation     s/p bioprosthetic MVR     Tricuspid regurgitation     s/p repair      Past Surgical History:   Procedure Laterality Date     CABG GARTH QUAL ACT PERFORM       MITRAL VALVE REPLACEMENT       OPEN REDUCTION INTERNAL FIXATION HIP NAILING Left 2022    Procedure: Open reduction and internal fixation of the left hip using Synthes 3-hole 130 degree dynamic compression hip screw;  Surgeon: Sharlene Arias MD;  Location: [facility] OR     TRICUSPID VALVULOPLASTY        Allergies   Allergen Reactions     Latex Rash     Gets red on direct contact     Spironolactone Unknown     Reported side effects after receiving it after heart surgery, resolved with ablasion     Sulindac Diarrhea, Muscle Pain (Myalgia) and Nausea and Vomiting     Felt like beaten up       Social History     Tobacco Use     Smoking status: Former Smoker     Types: Cigars     Smokeless tobacco: Never Used   Substance Use Topics     Alcohol use: Yes     Comment: 3 beers (IPA) per day -can go for days without drinking and no hx of w/d      Wt Readings from Last 1 Encounters:   22 90.8 kg (200 lb 1.6 oz)        Anesthesia Evaluation   Pt has had prior anesthetic.     No history of anesthetic complications       ROS/MED HX  ENT/Pulmonary:     (+) COPD, O2 dependent,  (-) tobacco use and sleep apnea   Neurologic:       Cardiovascular: Comment: EF 20-25%  MV bioprosthetic valve  Mild-mod TR  RV mod to  severe decrease  Pacemaker/icd    S/p vtach with biventricular pacer/icd    (+) Dyslipidemia hypertension--CAD angina---CHF (ischemic cardiomyopathy) Last EF: 20-25% pacemaker, ICD dysrhythmias, a-fib,     METS/Exercise Tolerance:     Hematologic:       Musculoskeletal:       GI/Hepatic:    (-) GERD   Renal/Genitourinary:     (+) renal disease,     Endo:    (-) Type II DM and thyroid disease   Psychiatric/Substance Use:       Infectious Disease:       Malignancy:       Other:            Physical Exam    Airway      Comment: Breathing tube in situ         Respiratory Devices and Support         Dental  no notable dental history         Cardiovascular   cardiovascular exam normal          Pulmonary   pulmonary exam normal                OUTSIDE LABS:  CBC:   Lab Results   Component Value Date    WBC 10.0 09/03/2022    WBC 6.7 08/31/2022    HGB 10.2 (L) 09/03/2022    HGB 11.9 (L) 09/03/2022    HCT 31.5 (L) 09/03/2022    HCT 35 (L) 09/03/2022     09/03/2022     08/31/2022     BMP:   Lab Results   Component Value Date     09/03/2022     09/03/2022    POTASSIUM 2.7 (L) 09/03/2022    POTASSIUM 3.6 09/03/2022    CHLORIDE 108 09/03/2022    CHLORIDE 95 08/31/2022    CO2 22 09/03/2022    CO2 27 08/31/2022    BUN 12 09/03/2022    BUN 14 08/31/2022    CR 0.48 (L) 09/03/2022    CR 0.71 08/31/2022    CR 0.72 08/31/2022     (H) 09/03/2022    GLC 75 08/31/2022     COAGS:   Lab Results   Component Value Date    PTT 40 (H) 01/23/2022    INR 1.98 (H) 09/03/2022     POC: No results found for: BGM, HCG, HCGS  HEPATIC:   Lab Results   Component Value Date    ALBUMIN 2.4 (L) 09/03/2022    PROTTOTAL 6.0 (L) 09/03/2022    ALT 18 09/03/2022    AST 17 09/03/2022    ALKPHOS 82 09/03/2022    BILITOTAL 0.4 09/03/2022     OTHER:   Lab Results   Component Value Date    PH 7.43 09/03/2022    LACT 1.1 09/03/2022    THOMAS 7.1 (L) 09/03/2022    PHOS 3.0 07/31/2022    MAG 1.3 (L) 09/03/2022    LIPASE 51 07/29/2022    CRP  82.8 (H) 08/31/2022    SED 72 (H) 08/31/2022       Anesthesia Plan    ASA Status:  4, emergent       Anesthesia Type: General.     - Airway: ETT      Maintenance: Balanced.        Consents    Anesthesia Plan(s) and associated risks, benefits, and realistic alternatives discussed. Questions answered and patient/representative(s) expressed understanding.    - Discussed:     - Discussed with:  Patient         Postoperative Care    Pain management: IV analgesics.        Comments:    Other Comments: Magnet.    Place pads for procedure            Azalea Thompson

## 2022-09-03 NOTE — PROGRESS NOTES
Patient transported for head CT on [place] transport ventilator with the following settings:    Mode: CMV  Rate: 16/min  FiO2: 40%   Tidal Volume 470 mL  PEEP: 5 cmH2O.    No issues to report, patient now back in adult ICU.     Pool Callahan, RRT on 9/3/2022 at 2:11 PM.

## 2022-09-03 NOTE — ED NOTES
Bed: ST03  Expected date:   Expected time:   Means of arrival:   Comments:  513 69m unresponsive stroke alert eta 8

## 2022-09-03 NOTE — PROGRESS NOTES
Neurosurgery Brief Note     Paged by nursing- patient returned to ICU after head CT, neuro exam change with fixed and dilated pupils.     EXAM: CT HEAD W/O CONTRAST  LOCATION: [facility]  DATE/TIME: 9/3/2022 1:48 PM                                                    IMPRESSION:  1.  Large volume acute intraventricular hemorrhage is redemonstrated and relatively unchanged. As previously noted, there is a component intervening between the frontal horns bilaterally. A component of this likely relates to hemorrhage within a cavum   septum pellucidum, likely with an additional small component of adjacent intraparenchymal hemorrhage.  2.  Interval placement of a right frontal approach EVD terminating in the above suspected cavum septum pellucidum. While the left temporal horn has increased in size slightly since previous, with diffusely dilated lateral ventricles and third ventricle   elsewhere are overall unchanged.  3.  White matter hypoattenuation marginating the bilateral lateral ventricles (more so posteriorly) may reflect a component of transependymal CSF flow, superimposed on top of a mild burden chronic small vessel ischemic change.    Evaluated patient at bedside. EVD at 5, minimal output.     Neuro Exam:  Intubated, off sedation  Pupils fixed and dilated to 6   Cough reflex present  No gag reflex  BUE posturing  BLE withdraws to pain     Discussed with Dr. Quintero, intensivist Dr. Villanueva, and family at bedside.      - Family would prefer to continue with cares at this time. Daughter is planning to arrive from [place] tomorrow.   - Continue EVD at 5  - Continue to monitor neuro exam   - NSG will continue to follow     Ynes Gaytan CNP  [facility]  [address] [place] [id number]  Tel [phone]  Pager [phone]

## 2022-09-03 NOTE — PROGRESS NOTES
SPIRITUAL HEALTH SERVICES  SPIRITUAL ASSESSMENT Progress Note  [facility] ED     REFERRAL SOURCE: ED Staff    [facility]S received call from ED staff for Buddhism sacrament for pt Toney. I coordinated with Fr Mooney, who provided the sacrament of anointing for pt.     PLAN: [facility] continues to remain available and will follow.     Ryann Newman  Associate    Phone: [phone]  [facility] On Call Pager: [phone]

## 2022-09-03 NOTE — PHARMACY-ADMISSION MEDICATION HISTORY
Pharmacy Medication History  Admission medication history interview status for the 9/3/2022  admission is complete. See EPIC admission navigator for prior to admission medications     Location of Interview: Outside patient room but on unit  Medication history sources: MAR ([facility] )    Significant changes made to the medication list:    Added scheduled tylenol   Adjusted Bumetanide entry     In the past week, patient estimated taking medication this percent of the time: greater than 90%    Additional medication history information:     Per MAR, patient starts was scheduled to start 20 mg xarelto on 9/3 until 9/11. Patient finished Rocephin X 20 days for bacteremia on 9/2.      Medication reconciliation completed by provider prior to medication history? No    Time spent in this activity: 15 minutes     Prior to Admission medications    Medication Sig Last Dose Taking? Auth Provider Long Term End Date   acetaminophen (TYLENOL) 325 MG tablet Take 650 mg by mouth 2 times daily 9/2/2022 at pm Yes Unknown, Entered By History     acetaminophen (TYLENOL) 325 MG tablet Take 650 mg by mouth 2 times daily & 2 x daily as needed  at PRN Yes Reported, Patient     albuterol (PROAIR HFA/PROVENTIL HFA/VENTOLIN HFA) 108 (90 Base) MCG/ACT inhaler Inhale 1-2 puffs into the lungs every 4 hours as needed for shortness of breath / dyspnea or wheezing  at PRN Yes Unknown, Entered By History Yes    amiodarone (PACERONE) 400 MG tablet Take 1 tablet (400 mg) by mouth daily 9/2/2022 at am Yes Reese Maravilla MD Yes    atorvastatin (LIPITOR) 80 MG tablet Take 80 mg by mouth every morning 9/2/2022 at am Yes Unknown, Entered By History Yes    bumetanide (BUMEX) 2 MG tablet Take 2 mg by mouth 2 times daily At 0800 and 1200 9/2/2022 at 1200 Yes Unknown, Entered By History Yes    bumetanide (BUMEX) 2 MG tablet Take 1 mg by mouth every evening 9/2/2022 at 1600 Yes Unknown, Entered By History Yes    DULoxetine (CYMBALTA) 30 MG  capsule Take 30 mg by mouth At Bedtime 9/2/2022 at bed time Yes Unknown, Entered By History Yes    empagliflozin (JARDIANCE) 10 MG TABS tablet Take 1 tablet (10 mg) by mouth daily 9/2/2022 at am Yes Reese Maravilla MD     lisinopril (ZESTRIL) 2.5 MG tablet Take 2.5 mg by mouth every morning 9/2/2022 at am Yes Unknown, Entered By History Yes    loperamide (IMODIUM A-D) 2 MG tablet Take 2-4 mg by mouth 4 times daily as needed for diarrhea Give 4 mg after first loose stool and 2 mg after each additional loose stool  at PRN Yes Reported, Patient     loratadine (CLARITIN) 10 MG tablet Take 10 mg by mouth every morning 9/2/2022 at am Yes Unknown, Entered By History     metoprolol succinate ER (TOPROL XL) 100 MG 24 hr tablet Take 1 tablet (100 mg) by mouth every morning (Not a new prescription, this is a change of dose) 9/2/2022 at am Yes Satish Montelongo, DO Yes    nitroGLYcerin (NITROSTAT) 0.4 MG sublingual tablet Place 0.4 mg under the tongue every 5 minutes as needed for chest pain For chest pain place 1 tablet under the tongue every 5 minutes for 3 doses. If symptoms persist 5 minutes after 1st dose call 911.  Yes Unknown, Entered By History Yes    oxyCODONE (ROXICODONE) 5 MG tablet Take 1-2 tablets (5-10 mg) by mouth every 4 hours as needed for moderate to severe pain 9/2/2022 at 2121 Yes Chrystal Kaur APRN CNP     potassium chloride ER (KLOR-CON M) 20 MEQ CR tablet Take 30 mEq by mouth every morning 9/2/2022 at am Yes Unknown, Entered By History     Rivaroxaban ANTICOAGULANT 15 & 20 MG TBPK Starter Therapy Pack Take 15 mg by mouth 2 times daily (with meals) for 21 days, THEN 20 mg daily with food for 9 days. 9/2/2022 at 1700 Yes Reese Maravilla MD Yes 9/11/22   senna-docusate (SENOKOT-S/PERICOLACE) 8.6-50 MG tablet Take 2 tablets by mouth daily as needed for constipation  at PRN Yes Reported, Patient     tamsulosin (FLOMAX) 0.4 MG capsule Take 1 capsule (0.4 mg) by mouth daily 9/2/2022 at 1300 Yes  Reese Maravilla MD     umeclidinium-vilanterol (ANORO ELLIPTA) 62.5-25 MCG/INH oral inhaler Inhale 1 puff into the lungs every morning 9/2/2022 at am Yes Unknown, Entered By History         The information provided in this note is only as accurate as the sources available at the time of update(s)   Wilma Kelly, AlejandraD

## 2022-09-03 NOTE — ED TRIAGE NOTES
Pt arrives from TCU. Pt awoke by staff at 0430, pt reported weakness. Staff reports pt became unresponsive approx 0445. On arrival GCS 3, agonal respirations. Decision made to intubate.      Triage Assessment     Row Name 09/03/22 0552       Triage Assessment (Adult)    Airway WDL X;airway symptoms    Airway Symptoms unable to maintain airway    Airway Interventions artificial airway placed       Respiratory WDL    Respiratory WDL X;rhythm/pattern;expansion/retractions    Rhythm/Pattern, Respiratory agonal    Expansion/Accessory Muscles/Retractions abdominal muscle use;accessory muscle use       Skin Circulation/Temperature WDL    Skin Circulation/Temperature WDL WDL       Cardiac WDL    Cardiac WDL WDL       Peripheral/Neurovascular WDL    Peripheral Neurovascular WDL WDL       Cognitive/Neuro/Behavioral WDL    Cognitive/Neuro/Behavioral WDL X;arousability;level of consciousness;motor response    Level of Consciousness obtunded    Arousal Level unresponsive       [place] Coma Scale    Best Eye Response 1-->(E1) none    Best Motor Response 1-->(M1) none    Best Verbal Response 1-->(V1) none    [place] Coma Scale Score 3

## 2022-09-03 NOTE — H&P
Critical Care  Note      09/03/2022    Name: Toney Denton MRN#: [id number]   Age: 71 year old YOB: 1950     Hsptl Day# 0  ICU DAY #  0    MV DAY # 0             Problem List:   Active Problems:    Ventricular hemorrhage (H)    Unresponsive           Summary/Hospital Course:     Toney Denton is an anticoagulated (on Xarelto for recent subclavian DVT) 71 year old man with complex PMH including CAD s/p CABG, ischemic cardiomyopathy last EF 20-25% 8/8/22, s/p biventricular CRT-D placement, COPD, HTN, bioprosthetic MVR, who presents from his TCU after being found unresponsive this morning, CT scan in ED with extensive intraventricular hemorrhage.     Xarelto reversed in the ED with Kcentra. Pre-op exam by neurosurgery, in ED, notable for minimally responsive pupils, absent corneal and gag reflexes, positive cough reflex, extending to central stimulus. Taken for ventriculostomy/EVM placement by neurosurgery, now admitted to the ICU intubated and sedated.         Assessment and plan :     Toney Denton is an 71 year old male with complex cardiovascular history, anticoagulated on Xarelto for recent DVT, admitted from his TCU on 9/3/2022 for extensive intraventricular hemorrhage s/p ventriculostomy and EVM placement 9/3.   I have personally reviewed the daily labs, imaging studies, cultures and discussed the case with referring physician and consulting physicians.     My assessment and plan by system for this patient is as follows:    Neurology/Psychiatry:   1. Intraventricular hemorrhage, s/p EVM placement 9/3/22  2. Pain/analgesia  3. Anxiety  4. Delirium  Plan  - Wean off of propofol drip, goal RAAS 0 to -1  - Tylenol for mild pain, opioid tolerant (on PRN oxycodone at home) protocol for severe pain   - neurosurgery c/s  - neurocrit c/s  - palliative care c/s     Cardiovascular:   1.Hemodynamics - stable  2.Rhythm - Ventricularly paced  3. Ischemia - no signs or symptoms to suggest ischemia. Negative  troponin.   Plan  -Nicardipine gtt for SBP <150 per neurosurgery  -Continue PTA amiodarone PO 400mg daily   -Holding PTA Bumex for now, pending assessment  -Holding PTA lisinopril 2.5mg daily  -Holding PTA metoprolol succinate 100mg q morning  -Holding PTA Xarelto given bleed    Pulmonary/Ventilator Management:   1. Airway: ETT in place  2. Oxygenation/ventilation/mechanics  Plan  - Continue mechanical ventilation    GI and Nutrition :   NPO  Plan  -Continue to monitor and reassess    Renal/Fluids/Electrolytes:   1. Hypokalemia at 2.7 s/p repletion  2. Electrolyte abnormality  3. Acid/base status  4. Volume status  Plan  - monitor function and electrolytes as needed with replacement per ICU protocols. - generally avoid nephrotoxic agents such as NSAID, IV contrast unless specifically required  - adjust medications as needed for renal clearance  - follow I/O's as appropriate.    Infectious Disease:   No signs or symptoms to suggest infectious process.  Plan  - Perioperative cefazolin, per neurosurgical team    Endocrine:   1. Hx prednisone-induced DM  Currently euglycemic.   Plan  - ICU insulin protocol, goal sugar <180  - Holding PTA empagliflozin    Hematology/Oncology:   1. Normocytic anemia, improved from prior. no signs, symptoms of active blood loss  Hgb 10.2 from 9.5 on 8/31/22  2. Drug-induced coagulopathy 2/2 rivaroxaban  Plan  - Continue to monitor  - hold rivaroxaban      IV/Access:   1. Venous access - PIV x2  2. Arterial access - None  Plan  - Continue to monitor      ICU Prophylaxis:   1. DVT: mechanical  2. VAP: HOB 30 degrees, chlorhexidine rinse  3. Stress Ulcer: PPI/H2 blocker  4. Restraints: Nonviolent soft two point restraints required and necessary for patient safety and continued cares and good effect as patient continues to pull at necessary lines, tubes despite education and distraction. Will readdress daily.   5. Wound care  - per unit protocols.   6. Feeding - NPO  7. Family Update: Family,  at bedside, updated  8. Disposition - ICU        Key goals for next 24 hours:   1. Follow up post-op CT, regular neuro checks  2. Wean sedation as tolerated.            Medical History:     Past Medical History:   Diagnosis Date     Benign essential hypertension      Chronic atrial fibrillation (H)     s/p ablation/maze procedure/LA clip.  on ASA for stroke ppx     COPD (chronic obstructive pulmonary disease) (H)     severe by PFTs     Hyperlipidemia LDL goal <100      Ischemic cardiomyopathy     EF 25 % by echo 4/2021, with hx of VTach s/p biV pacer and ICD     Mitral valve regurgitation     s/p bioprosthetic MVR     Tricuspid regurgitation     s/p repair     Past Surgical History:   Procedure Laterality Date     CABG GARTH QUAL ACT PERFORM       MITRAL VALVE REPLACEMENT       OPEN REDUCTION INTERNAL FIXATION HIP NAILING Left 1/24/2022    Procedure: Open reduction and internal fixation of the left hip using Synthes 3-hole 130 degree dynamic compression hip screw;  Surgeon: Sharlene Arias MD;  Location: [facility] OR     TRICUSPID VALVULOPLASTY       Social History     Socioeconomic History     Marital status:      Spouse name: Not on file     Number of children: Not on file     Years of education: Not on file     Highest education level: Not on file   Occupational History     Not on file   Tobacco Use     Smoking status: Former Smoker     Types: Cigars     Smokeless tobacco: Never Used   Substance and Sexual Activity     Alcohol use: Yes     Comment: 3 beers (IPA) per day -can go for days without drinking and no hx of w/d     Drug use: Not on file     Sexual activity: Not on file   Other Topics Concern     Parent/sibling w/ CABG, MI or angioplasty before 65F 55M? Not Asked   Social History Narrative     Not on file     Social Determinants of Health     Financial Resource Strain: Not on file   Food Insecurity: Not on file   Transportation Needs: Not on file   Physical Activity: Not on file   Stress: Not on file    Social Connections: Not on file   Intimate Partner Violence: Not on file   Housing Stability: Not on file        Allergies   Allergen Reactions     Latex Rash     Gets red on direct contact     Spironolactone Unknown     Reported side effects after receiving it after heart surgery, resolved with ablasion     Sulindac Diarrhea, Muscle Pain (Myalgia) and Nausea and Vomiting     Felt like beaten up               Key Medications:       amiodarone  400 mg Oral Daily     ceFAZolin  2 g Intravenous Pre-Op/Pre-procedure x 1 dose     chlorhexidine  15 mL Mouth/Throat Q12H     insulin aspart  1-6 Units Subcutaneous Q4H     [START ON 9/4/2022] pantoprazole  40 mg Per Feeding Tube QAM AC    Or     [START ON 9/4/2022] pantoprazole  40 mg Intravenous QAM AC       - MEDICATION INSTRUCTIONS -       niCARdipine Stopped (09/03/22 0900)     propofol Stopped (09/03/22 0828)        Home Meds  No current facility-administered medications on file prior to encounter.  acetaminophen (TYLENOL) 325 MG tablet, Take 650 mg by mouth 2 times daily  acetaminophen (TYLENOL) 325 MG tablet, Take 650 mg by mouth 2 times daily & 2 x daily as needed  albuterol (PROAIR HFA/PROVENTIL HFA/VENTOLIN HFA) 108 (90 Base) MCG/ACT inhaler, Inhale 1-2 puffs into the lungs every 4 hours as needed for shortness of breath / dyspnea or wheezing  amiodarone (PACERONE) 400 MG tablet, Take 1 tablet (400 mg) by mouth daily  atorvastatin (LIPITOR) 80 MG tablet, Take 80 mg by mouth every morning  bumetanide (BUMEX) 2 MG tablet, Take 2 mg by mouth 2 times daily At 0800 and 1200  bumetanide (BUMEX) 2 MG tablet, Take 1 mg by mouth every evening  DULoxetine (CYMBALTA) 30 MG capsule, Take 30 mg by mouth At Bedtime  empagliflozin (JARDIANCE) 10 MG TABS tablet, Take 1 tablet (10 mg) by mouth daily  lisinopril (ZESTRIL) 2.5 MG tablet, Take 2.5 mg by mouth every morning  loperamide (IMODIUM A-D) 2 MG tablet, Take 2-4 mg by mouth 4 times daily as needed for diarrhea Give 4 mg after  first loose stool and 2 mg after each additional loose stool  loratadine (CLARITIN) 10 MG tablet, Take 10 mg by mouth every morning  metoprolol succinate ER (TOPROL XL) 100 MG 24 hr tablet, Take 1 tablet (100 mg) by mouth every morning (Not a new prescription, this is a change of dose)  nitroGLYcerin (NITROSTAT) 0.4 MG sublingual tablet, Place 0.4 mg under the tongue every 5 minutes as needed for chest pain For chest pain place 1 tablet under the tongue every 5 minutes for 3 doses. If symptoms persist 5 minutes after 1st dose call 911.  oxyCODONE (ROXICODONE) 5 MG tablet, Take 1-2 tablets (5-10 mg) by mouth every 4 hours as needed for moderate to severe pain  potassium chloride ER (KLOR-CON M) 20 MEQ CR tablet, Take 30 mEq by mouth every morning  Rivaroxaban ANTICOAGULANT 15 & 20 MG TBPK Starter Therapy Pack, Take 15 mg by mouth 2 times daily (with meals) for 21 days, THEN 20 mg daily with food for 9 days.  senna-docusate (SENOKOT-S/PERICOLACE) 8.6-50 MG tablet, Take 2 tablets by mouth daily as needed for constipation  tamsulosin (FLOMAX) 0.4 MG capsule, Take 1 capsule (0.4 mg) by mouth daily  umeclidinium-vilanterol (ANORO ELLIPTA) 62.5-25 MCG/INH oral inhaler, Inhale 1 puff into the lungs every morning               Physical Examination:   Temp:  [97  F (36.1  C)-97.3  F (36.3  C)] 97  F (36.1  C)  Pulse:  [] 85  Resp:  [14-25] 25  BP: (139-165)/() 139/75  FiO2 (%):  [50 %-100 %] 50 %  SpO2:  [90 %-99 %] 98 %    Intake/Output Summary (Last 24 hours) at 9/3/2022 1349  Last data filed at 9/3/2022 0900  Gross per 24 hour   Intake 31.04 ml   Output --   Net 31.04 ml     Wt Readings from Last 4 Encounters:   09/02/22 90.8 kg (200 lb 1.6 oz)   08/23/22 94.2 kg (207 lb 9.6 oz)   08/22/22 94.2 kg (207 lb 9.6 oz)   08/19/22 94.2 kg (207 lb 9.6 oz)     BP - Mean:  [105-131] 105  Vent Mode: CMV/AC  (Continuous Mandatory Ventilation/ Assist Control)  FiO2 (%): 50 %  Resp Rate (Set): 16 breaths/min  Tidal Volume  (Set, mL): 470 mL  PEEP (cm H2O): 5 cmH2O  Resp: 25    Recent Labs   Lab 09/03/22  0532   PH 7.43       GEN: Unresponsive, lying reclined in hospital bed.   HEENT: head ncat, sclera anicteric, OP patent, trachea midline   PULM: unlabored synchronous with vent, clear anteriorly    CV/COR: RRR S1S2 no gallop,  No rub, no murmur  ABD: soft, non-distended  EXT:  Edema   warm  NEURO: Unresponsive to painful stimuli. Pupils fixed and dilated. Cough reflex present. Gag reflex absent. Corneal reflex absent. Toes up-going bilaterally. Exam performed at 15:11.   SKIN: no obvious rash  LINES: clean, dry intact         Data:   All data and imaging reviewed     ROUTINE ICU LABS (Last four results)  CMP  Recent Labs   Lab 09/03/22  1219 09/03/22  0535 09/03/22  0533 08/31/22  0630   NA  --  139 134 131*   POTASSIUM  --  2.7* 3.6 3.9   CHLORIDE  --  108  --  95   CO2  --  22  --  27   ANIONGAP  --  9  --  9   * 127*  --  75   BUN  --  12  --  14   CR  --  0.48*  --  0.72  0.71   GFRESTIMATED  --  >90  --  >90  >90   THOMAS  --  7.1*  --  9.5   MAG  --  1.3*  --   --    PROTTOTAL  --  6.0*  --   --    ALBUMIN  --  2.4*  --   --    BILITOTAL  --  0.4  --   --    ALKPHOS  --  82  --   --    AST  --  17  --  25   ALT  --  18  --   --      CBC  Recent Labs   Lab 09/03/22  0535 09/03/22  0533 08/31/22  0630   WBC 10.0  --  6.7   RBC 3.48*  --  3.21*   HGB 10.2* 11.9* 9.5*   HCT 31.5* 35* 29.9*   MCV 91  --  93   MCH 29.3  --  29.6   MCHC 32.4  --  31.8   RDW 14.2  --  14.3     --  271     INR  Recent Labs   Lab 09/03/22  0959 09/03/22  0903 09/03/22  0535   INR 1.98* 1.98* 2.85*     Arterial Blood Gas  Recent Labs   Lab 09/03/22  0532   PH 7.43       All cultures:  No results for input(s): CULT in the last 168 hours.  Recent Results (from the past 24 hour(s))   XR Chest Port 1 View    Narrative    EXAM: CHEST SINGLE VIEW PORTABLE  LOCATION: [facility]  DATE/TIME: 09/03/2022, 6:22 AM    INDICATION:  Unresponsive. Intubated.  COMPARISON: 07/29/2022.    FINDINGS: An endotracheal tube is present in the trachea with distal tip approximately 1.0 cm proximal to the blair. An enteric drainage tube is present with distal tip not visualized, but at least to the stomach. A few curvilinear opacities scattered   within both lungs most likely represent scarring. The lungs are otherwise clear. Mild cardiomegaly. Atherosclerotic calcification in the thoracic aorta. Left anterior chest wall cardiac device with lead tips in the right atrium, right ventricle and a   branch of the coronary sinus. A left atrial appendage clip is in place. Prior median sternotomy.      Impression    IMPRESSION:   1. An endotracheal tube is present with distal tip in the trachea, approximately 1.0 cm proximal to the blair.  2. An enteric drainage tube is present with distal tip not visualized, but at least to the stomach.  3. No convincing evidence of acute cardiopulmonary disease.      Head CT w/o contrast   Result Value    Radiologist flags Hemorrhage. (AA)    Narrative    EXAM: CT HEAD WITHOUT CONTRAST  LOCATION: [facility]  DATE/TIME: 09/03/2022, 6:29 AM    INDICATION: Unresponsive.  COMPARISON: None.  TECHNIQUE: Routine CT Head without IV contrast. Multiplanar reformats. Dose reduction techniques were used.    FINDINGS:  INTRACRANIAL CONTENTS: Large volume of intraventricular hemorrhage involves the lateral third and fourth ventricles with heterogeneous hyperdense blood. Dilation of the lateral and third ventricles is noted with some clot formation/cast formation   particularly within the temporal horns of the lateral ventricles. There is apparent intraparenchymal hemorrhage and surrounding vasogenic edema superior to the third ventricle in the midline at the level of the corpus callosum. Suspect primary   intraparenchymal hemorrhage with secondary decompression into the ventricles. There is a relative paucity of  subarachnoid hemorrhage at the sulci overlying the cerebral hemispheres and in the posterior fossa also supporting secondary decompression into   the ventricular system. CTA is described separately with no convincing evidence for [place] aneurysm. Low-attenuation region about blood products in the midline at the supratentorial level interposed between the ventricles, may reflect a   combination of vasogenic edema of the parenchyma and/or associated ischemia. This extends to involve the edematous appearing genu and body of the corpus callosum. There is dilation of the ventricular system. Mild to moderate presumed chronic small vessel   ischemic changes. No evidence for midline shift. Mesial temporal lobes are displaced medially at the suprasellar level. There is partial effacement of the perimesencephalic cistern. Position of the cerebellar tonsils is satisfactory. No blood products   within the sella and no evidence for sella mass. There is suprasellar hemorrhage present.    VISUALIZED ORBITS/SINUSES/MASTOIDS: No acute orbital process. No paranasal sinus mucosal disease. No middle ear or mastoid effusion.    BONES/SOFT TISSUES: No fracture of the calvarium or skull base.      Impression    IMPRESSION:  1.  Large volume/extensive intraventricular hyperdense hemorrhage with ventricular dilation involving the lateral and third ventricles, and to a lesser extent the fourth ventricle.    2.  There is midline supratentorial hemorrhage interposed between the frontal horns of the lateral ventricles, probable surrounding vasogenic edema and/or associated ischemia. Suspect primary intraparenchymal hemorrhage and secondary decompression into   the ventricular system.    3.  There is a relative paucity of subarachnoid hemorrhage involving sulci of the supratentorial and infratentorial level, with predominantly intraparenchymal and intraventricular hemorrhage noted.    4.  Additional details and description  above.      [Critical Result: Hemorrhage].      Dr. Sorenson was contacted by me on 09/03/2022 at 7:06 AM and verbalized understanding of the critical result.        CTA Head Neck with Contrast    Narrative    EXAM: CTA HEAD NECK WITH CONTRAST  LOCATION: [facility]  DATE/TIME: 09/03/2022, 6:31 AM    INDICATION: Unresponsive.  COMPARISON: Head CT 09/03/2022.  CONTRAST: 75 mL Isovue 370.  TECHNIQUE: Head and neck CT angiogram with IV contrast. Noncontrast head CT followed by axial helical CT images of the head and neck vessels obtained during the arterial phase of intravenous contrast administration. Axial 2D reconstructed images and   multiplanar 3D MIP reconstructed images of the head and neck vessels were performed by the technologist. Dose reduction techniques were used. All stenosis measurements made according to NASCET criteria unless otherwise specified.    FINDINGS:   NONCONTRAST HEAD CT:   Described separately, large volume intraparenchymal and intraventricular hemorrhage with mass effect.    HEAD CTA:  ANTERIOR CIRCULATION: No stenosis/occlusion, aneurysm, or high-flow vascular malformation. Reduced caliber right ICA from the proximal right ICA origin stenosis. There is calcification of the cavernous ICA segments and tortuosity with mild hemodynamic   stenosis bilaterally probably in the 20-30% range. There is patency of the anterior communicating artery. There is symmetric branch arborization of the LOLI and MCA vascular territory. No convincing evidence for aneurysm/ruptured aneurysm within the   anterior circulation. There is some displacement of the LOLI vessels due to expansive changes upon the ventricles related to intraventricular clot and mass effect. This is seen series 13 image 47.    POSTERIOR CIRCULATION: No stenosis/occlusion, aneurysm, or high-flow vascular malformation. Balanced vertebral arteries supply a normal basilar artery. Duplicate left superior cerebellar  artery system. Symmetric branch arborization of the PCA vascular   territory bilaterally.    DURAL VENOUS SINUSES: Expected enhancement of the major dural venous sinuses.    NECK CTA:  RIGHT CAROTID: Very high-grade right ICA stenosis at the origin 90-95% in degree with a short segment angiographic string sign series 9 image 269. Reduced caliber of the right ICA throughout the remainder of the course of the neck without additional   stenosis or dissection. Reduced caliber ICA extends to the cavernous and supraclinoid level.    LEFT CAROTID: Eccentric calcification at the bifurcation with mild 5-10% proximal ICA and ECA stenoses. Tortuosity mid left cervical ICA segment. No tandem or high-grade stenosis and no dissection are present.    VERTEBRAL ARTERIES: Mild stenosis 15-20% of the right vertebral arterial origin. No additional or tandem vertebral arterial stenoses with codominant V4 segments coalescing to a normal caliber basilar artery. Balanced vertebral arteries.    AORTIC ARCH: Classic aortic arch anatomy with no significant stenosis at the origin of the great vessels.    NONVASCULAR STRUCTURES:  localizer shows sternotomy, AICD, cardiac enlargement, infiltrates in the lungs.. Procedural changes both globes. Patient is intubated. ETT and enteric tube, small amount of fluid at the nasopharyngeal level. There are   levels of mild to moderate spinal stenosis and or foraminal compromise incidentally noted on a chronic degenerative basis.      Impression    IMPRESSION:   HEAD CT:  1.  Described separately, large-volume intraventricular hemorrhage and midline supratentorial intraparenchymal hemorrhage anteriorly.    HEAD CTA:   1.  No evidence for aneurysm/ruptured aneurysm, large vessel occlusion or dissection. Mild stenoses of the cavernous ICA segment. Reduced caliber of the right ICA related to right ICA origin stenosis. Dural venous sinus pattern of enhancement is   satisfactory.    NECK CTA:  1.  Very  high-grade right ICA origin stenosis. Mild narrowing left ECA and ICA.    2.  Additional details and full discussion provided above.     3.  Discussed with referring clinician Dr. Sorenson on 09/03/2022 at 7:09 AM.       Patient discussed with attending intensivist, Dr. Polly Ritter, MS4    Billing: This patient is critically ill: Yes. Total critical care time today 60 min.                       [facility]  CRITICAL CARE STAFF NOTE    Date of Service: 09/03/22    Acute Issues     1. Intraventricular hemorrhage - s/p EVD placement by neurosurgery. ICPs in the 60s. Pupils fixed and dilated. Very poor neurologic prognosis. Neurosurgery consulted. Neurocrit consulted. Palliative care consulted.     The patient was seen and examined with the resident/fellow physician.  We have discussed the patient in detail and I agree with the findings, assessment, and plan as documented when this note was cosigned on this day. The plan was formulated in conjunction with pharmacy, ICU nurses, and respiratory therapist. I have evaluated all laboratory values and imaging studies for the past 24 hours. I have reviewed all the consults that have been ordered and are active for this patient.      I was present with the student who participated in the service and in the documentation of the note. I have verified the history and personally performed the physical exam and medical decision-making. I agree with the assessment and plan of care as documented in the note.     Critical Care Time: 60 min.  I spent this time (excluding procedures) personally providing and directing critical care services at the bedside and on the critical care unit.     Molina Matias MD  Pulmonary Disease and Critical Care Medicine  [facility]

## 2022-09-03 NOTE — BRIEF OP NOTE
[facility]    Brief Operative Note    Pre-operative diagnosis: Ventricular hemorrhage (H) [I61.5]  Post-operative diagnosis Same as pre-operative diagnosis    Procedure: Procedure(s):  RIGHT FRONTAL VENTRICULOSTOMY  Surgeon: Surgeon(s) and Role:     * Evaristo Quintero MD - Primary  Anesthesia: General   Estimated Blood Loss: Less than 10 ml    Drains: External Ventricular Drain  Specimens:   ID Type Source Tests Collected by Time Destination   A :  Cerebrospinal fluid Brain CELL COUNT WITH DIFFERENTIAL CSF, GLUCOSE CSF, PROTEIN TOTAL CSF Evaristo Quintero MD 9/3/2022 11:37 AM      Findings:   Densely bloody csf.  Complications: None.  Implants: * No implants in log *    Evaristo Quintero MD

## 2022-09-03 NOTE — PROVIDER NOTIFICATION
[facility] ED RESPIRATORY NOTE        Date of Admission: 9/3/2022    Date of Intubation (most recent): 9/3/22    Reason for Mechanical Ventilation: AIRWAY PROTECTION    Number of Days on Mechanical Ventilation: 1    Met Criteria for Spontaneous Breathing Trial: No    Reason for No Spontaneous Breathing Trial: VENT DAY 1     Significant Events Today: INTUBATION    ABG Results:   Recent Labs   Lab 09/03/22  0532   PH 7.43       Current Vent Settings: Vent Mode: CMV/AC  (Continuous Mandatory Ventilation/ Assist Control)  FiO2 (%): 100 %  Resp Rate (Set): 20 breaths/min  Tidal Volume (Set, mL): 500 mL  PEEP (cm H2O): 8 cmH2O  Resp: 20    Plan:     PT INTUBATED WITH 8.0ETT 25CM AT THE GUMS. CONTINUE TO MONITOR AND ADJUST VENT SETTINGS APPROPRIATELY.  RT FOLLOWING.     CLAUDIA CHAVEZ, RT on 9/3/2022 at 6:00 AM

## 2022-09-03 NOTE — ED PROVIDER NOTES
History     Chief Complaint:  Altered Mental Status       HPI   Toney Denton is a 71 year old male who presents with unresponsiveness.  He has been at a TCU recovering after being treated for a bacterial infection.  He was due to be discharged later today.  He had awoken and asked to go to the bathroom, which he was able to do with assistance around 0445.  He required additional assistance and seemed weaker returning to the bed, but was alert and conversant.  The nurse then checked on him about 30 minutes later and found him unresponsive.  Pt is unable to provide any history at this time.      Allergies:  Spironolactone  Sulindac  Latex     Medications:    acetaminophen (TYLENOL) 325 MG tablet  albuterol (PROAIR HFA/PROVENTIL HFA/VENTOLIN HFA) 108 (90 Base) MCG/ACT inhaler  amiodarone (PACERONE) 400 MG tablet  atorvastatin (LIPITOR) 80 MG tablet  bumetanide (BUMEX) 2 MG tablet  bumetanide (BUMEX) 2 MG tablet  bumetanide (BUMEX) 2 MG tablet  DULoxetine (CYMBALTA) 30 MG capsule  empagliflozin (JARDIANCE) 10 MG TABS tablet  lisinopril (ZESTRIL) 2.5 MG tablet  loperamide (IMODIUM A-D) 2 MG tablet  loratadine (CLARITIN) 10 MG tablet  metoprolol succinate ER (TOPROL XL) 100 MG 24 hr tablet  nitroGLYcerin (NITROSTAT) 0.4 MG sublingual tablet  oxyCODONE (ROXICODONE) 5 MG tablet  potassium chloride ER (KLOR-CON M) 20 MEQ CR tablet  Rivaroxaban ANTICOAGULANT 15 & 20 MG TBPK Starter Therapy Pack  senna-docusate (SENOKOT-S/PERICOLACE) 8.6-50 MG tablet  tamsulosin (FLOMAX) 0.4 MG capsule  umeclidinium-vilanterol (ANORO ELLIPTA) 62.5-25 MCG/INH oral inhaler        Past Medical History:    Past Medical History:   Diagnosis Date     Benign essential hypertension      Chronic atrial fibrillation (H)      COPD (chronic obstructive pulmonary disease) (H)      Hyperlipidemia LDL goal <100      Ischemic cardiomyopathy      Mitral valve regurgitation      Tricuspid regurgitation        Patient Active Problem List    Diagnosis Date  Noted     COPD, severe (H) 09/02/2022     Priority: Medium     Chronic systolic (congestive) heart failure (H) 09/02/2022     Priority: Medium     Coronary artery disease of native artery of native heart with stable angina pectoris (H) 09/02/2022     Priority: Medium     Persistent atrial fibrillation (H) 09/02/2022     Priority: Medium     Elevated troponin 07/29/2022     Priority: Medium     Bilateral leg edema 07/29/2022     Priority: Medium     Elevated brain natriuretic peptide (BNP) level 07/29/2022     Priority: Medium     Acute renal failure, unspecified acute renal failure type (H) 07/29/2022     Priority: Medium     Hemorrhagic shock (H) 06/18/2022     Priority: Medium     Gastrointestinal hemorrhage, unspecified gastrointestinal hemorrhage type 06/18/2022     Priority: Medium     Anemia, unspecified type 06/18/2022     Priority: Medium     Fall, initial encounter 01/23/2022     Priority: Medium     Closed nondisplaced intertrochanteric fracture of left femur, initial encounter (H) 01/23/2022     Priority: Medium        Past Surgical History:    Past Surgical History:   Procedure Laterality Date     CABG GARTH QUAL ACT PERFORM       MITRAL VALVE REPLACEMENT       OPEN REDUCTION INTERNAL FIXATION HIP NAILING Left 1/24/2022    Procedure: Open reduction and internal fixation of the left hip using Synthes 3-hole 130 degree dynamic compression hip screw;  Surgeon: Sharlene Arias MD;  Location: [facility] OR     TRICUSPID VALVULOPLASTY          Family History:    family history includes Diabetes in his brother and father.    Social History:   reports that he has quit smoking. His smoking use included cigars. He has never used smokeless tobacco. He reports current alcohol use.    PCP: Kelley Stoddard     Review of Systems   Unable to perform ROS: Patient unresponsive         Physical Exam     Patient Vitals for the past 24 hrs:   BP Pulse Resp SpO2   09/03/22 0545 (!) 160/101 80 -- 90 %   09/03/22 0532 (!) 150/106 101 20  94 %        Physical Exam    General: Unresponsive  Head: No signs of trauma.   Mouth/Throat: Oropharynx is clear and moist.   Eyes: Conjunctivae are normal. Pupils are equal, round, and reactive to light.   CV: Normal rate and regular rhythm.    Resp: Shallow rapid breathing. Breath sounds clear.  GI: Soft.  Normal bowel sounds.    MSK: No deformities  Neuro: unresponsive to painful and verbal stimuli  Skin: Skin is warm and dry. No rash noted.       Emergency Department Course     ECG   ECG results from 07/29/22   EKG 12-lead, tracing only     Value    Systolic Blood Pressure     Diastolic Blood Pressure     Ventricular Rate 70    Atrial Rate 39    KS Interval     QRS Duration 172        QTc 511    P Axis     R AXIS -71    T Axis 73    Interpretation ECG      Wide QRS rhythm  Left axis deviation  Non-specific intra-ventricular conduction block  Abnormal ECG  When compared with ECG of 17-JUN-2022 22:21,  Wide QRS rhythm has replaced Electronic ventricular pacemaker  Confirmed by - EMERGENCY ROOM, PHYSICIAN ([id number]),  ARGENIS MEJIA ([id number]) on 8/1/2022 6:47:06 AM     EKG 12-lead, tracing only     Value    Systolic Blood Pressure     Diastolic Blood Pressure     Ventricular Rate 70    Atrial Rate 0    KS Interval     QRS Duration 216        QTc 574    P Axis     R AXIS 223    T Axis 200    Interpretation ECG      A- Ventricular-paced rhythm  Abnormal ECG    Confirmed by MD TINSLEY MICHAEL [id number]),  SUSAN PARSONS ([id number]) on 8/10/2022 7:36:43 AM          Imaging:    Head CT w/o contrast   Preliminary Result   Abnormal   IMPRESSION:   1.  Large volume/extensive intraventricular hyperdense hemorrhage with ventricular dilation involving the lateral and third ventricles, and to a lesser extent the fourth ventricle.      2.  There is midline supratentorial hemorrhage interposed between the frontal horns of the lateral ventricles, probable surrounding vasogenic edema and/or associated ischemia.  Suspect primary intraparenchymal hemorrhage and secondary decompression into    the ventricular system.      3.  There is a relative paucity of subarachnoid hemorrhage involving sulci of the supratentorial and infratentorial level, with predominantly intraparenchymal and intraventricular hemorrhage noted.      4.  Additional details and description above.         [Critical Result: Hemorrhage].         Dr. Sorenson was contacted by me on 09/03/2022 at 7:06 AM and verbalized understanding of the critical result.             XR Chest Port 1 View   Final Result   IMPRESSION:    1. An endotracheal tube is present with distal tip in the trachea, approximately 1.0 cm proximal to the blair.   2. An enteric drainage tube is present with distal tip not visualized, but at least to the stomach.   3. No convincing evidence of acute cardiopulmonary disease.          CTA Head Neck with Contrast    (Results Pending)      All imaging results were discussed with the patient's wife who voiced understanding of the findings.    Laboratory:  Labs Ordered and Resulted from Time of ED Arrival to Time of ED Departure   COMPREHENSIVE METABOLIC PANEL - Abnormal       Result Value    Sodium 139      Potassium 2.7 (*)     Chloride 108      Carbon Dioxide (CO2) 22      Anion Gap 9      Urea Nitrogen 12      Creatinine 0.48 (*)     Calcium 7.1 (*)     Glucose 127 (*)     Alkaline Phosphatase 82      AST 17      ALT 18      Protein Total 6.0 (*)     Albumin 2.4 (*)     Bilirubin Total 0.4      GFR Estimate >90     INR - Abnormal    INR 2.85 (*)    ROUTINE UA WITH MICROSCOPIC REFLEX TO CULTURE - Abnormal    Color Urine Light Yellow      Appearance Urine Clear      Glucose Urine >=1000 (*)     Bilirubin Urine Negative      Ketones Urine Negative      Specific Gravity Urine 1.015      Blood Urine Negative      pH Urine 6.5      Protein Albumin Urine Negative      Urobilinogen Urine Normal      Nitrite Urine Negative      Leukocyte Esterase Urine  Negative      RBC Urine <1      WBC Urine <1     CBC WITH PLATELETS AND DIFFERENTIAL - Abnormal    WBC Count 10.0      RBC Count 3.48 (*)     Hemoglobin 10.2 (*)     Hematocrit 31.5 (*)     MCV 91      MCH 29.3      MCHC 32.4      RDW 14.2      Platelet Count 232      % Neutrophils 79      % Lymphocytes 10      % Monocytes 8      % Eosinophils 2      % Basophils 0      % Immature Granulocytes 1      NRBCs per 100 WBC 0      Absolute Neutrophils 8.0      Absolute Lymphocytes 1.0      Absolute Monocytes 0.8      Absolute Eosinophils 0.2      Absolute Basophils 0.0      Absolute Immature Granulocytes 0.1      Absolute NRBCs 0.0     ISTAT GASES LACTATE VENOUS POCT - Abnormal    Lactic Acid POCT 1.1      Bicarbonate Venous POCT 28      O2 Sat, Venous POCT 90 (*)     pCO2V Venous POCT 43      pH Venous POCT 7.43      pO2 Venous POCT 58 (*)    ISTAT GASES ELECTROLYTES VENOUS POCT - Abnormal    CPB Applied No      Hematocrit POCT 35 (*)     Bicarbonate Venous POCT 29 (*)     Hemoglobin POCT 11.9 (*)     Potassium POCT 3.6      Sodium POCT 134      pCO2 Venous POCT 42      pH Venous POCT 7.45 (*)     pO2 Venous POCT 61 (*)     O2 Sat, Venous POCT 92 (*)    MAGNESIUM - Abnormal    Magnesium 1.3 (*)    TROPONIN I - Normal    Troponin I High Sensitivity 23     INFLUENZA A/B & SARS-COV2 PCR MULTIPLEX - Normal    Influenza A PCR Negative      Influenza B PCR Negative      RSV PCR Negative      SARS CoV2 PCR Negative     BLOOD CULTURE   BLOOD CULTURE        Procedures:  [facility]    -Intubation    Date/Time: 9/3/2022 6:27 AM  Performed by: Garrison Sorenson MD  Authorized by: Garrison Sorenson MD     Risks, benefits and alternatives discussed.      PRE-PROCEDURE DETAILS     Patient status:  Unresponsive    Mallampati score:  I    Pretreatment medications:  None    Paralytics:  Rocuronium      PROCEDURE DETAILS     Preoxygenation:  Bag valve mask    Intubation method:  Oral    Oral intubation  technique:  Video-assisted    Laryngoscope blade:  Mac 4    Tube size (mm):  8.0    Tube type:  Cuffed    Number of attempts:  1    Cricoid pressure: yes      Tube visualized through cords: yes      PLACEMENT ASSESSMENT     ETT to lip:  25    Tube secured with:  ETT hensley    Breath sounds:  Equal and absent over the epigastrium    Placement verification: chest rise, condensation, CXR verification, direct visualization, equal breath sounds, ETCO2 detector and tube exhalation      CXR findings:  ETT in proper place    PROCEDURE    Patient Tolerance:  Patient tolerated the procedure well with no immediate complications      Interventions:  Medications   propofol (DIPRIVAN) infusion (15 mcg/kg/min × 90.8 kg Intravenous New Bag 9/3/22 0623)   prothrombin 4 factor complex concentrate (KCENTRA) infusion 4,425 Units (has no administration in time range)   niCARdipine 40 mg in 200 mL NS (CARDENE) infusion (has no administration in time range)   iopamidol (ISOVUE-370) solution 75 mL (75 mLs Intravenous Given 9/3/22 0618)   Saline Flush (90 mLs Intravenous Given 9/3/22 0620)        Emergency Department Course:  Past medical records, nursing notes, and vitals reviewed.  I performed an exam of the patient and obtained history, as documented above.    I spoke with nursing home nurse  I spoke with Ynes Gaytan with NSG  I spoke with Dr. Maciel with ICU    I rechecked the patient. Findings and plan explained to the Patient's wife. Patient was admitted.    Impression & Plan      Medical Decision Making:  Pt presents due to unresponsiveness.  He has been at a TCU recovering after bactermia.  He apparently woke up around 0445 wanting to go to the bathroom and was able to be assisted there.  He required some increased assistance to return to bed, but was still alert and otherwise reportedly well.  The nursing home nurse checked on him 30 minutes later and found him unresponsive.  On arrival to the ER he was unresponsive to voice or  painful stimuli.  He was not moving anything on exam and had rapid shallow breathing.  He was intubated for airway protection.  CT scan was obtained which showed blood in the bilateral ventricles.  He is on xeralto for a subclavian DVT.  I did speak with NSG and ICU.  Pt was given Kcentra in the ER.  I did inform the pt's wife of the pt's findings and condition.  Nicardipine ordered to keep BP less than 150    Critical Care time:  was 60 minutes for this patient excluding procedures.    Diagnosis:    ICD-10-CM    1. Ventricular hemorrhage (H)  I61.5    2. Unresponsive  R41.89           9/3/2022   Garrison Sorenson MD Bergenstal, John A, MD  09/03/22 0754

## 2022-09-03 NOTE — ANESTHESIA CARE TRANSFER NOTE
Patient: Toney Denton    Procedure: Procedure(s):  RIGHT FRONTAL VENTRICULOSTOMY       Diagnosis: Ventricular hemorrhage (H) [I61.5]  Diagnosis Additional Information: No value filed.    Anesthesia Type:   General     Note:    Oropharynx: endotracheal tube in place  Level of Consciousness: iatrogenic sedation      Independent Airway: airway patency not satisfactory and stable  Dentition: dentition unchanged      Patient transferred to: PACU  Comments: Patient connected to SpO2, EKG, and arterial blood pressure transport monitors and accompanied by CRNA to ICU room. Patient ventilated by CRNA with ambu via ETT with O2 at 10 liters per minute during transport.     On arrival to ICU, endotracheal tube position unchanged, equal, bilateral breath sounds auscultated in ICU room, patient placed on ICU ventilator by respiratory therapist, teeth and oral mucosa intact and unchanged at handoff of care. At anesthesia handoff of care, clinical monitors and alarms on and functioning, report on patient's clinical status given to ICU RN, report on patient's clinical status given to intensivist, ICU staff questions answered.   Handoff Report: Identifed the Patient, Identified the Reponsible Provider, Reviewed the pertinent medical history, Discussed the surgical course, Reviewed Intra-OP anesthesia mangement and issues during anesthesia, Set expectations for post-procedure period and Allowed opportunity for questions and acknowledgement of understanding      Vitals:  Vitals Value Taken Time   /82 09/03/22 1159   Temp     Pulse 77 09/03/22 1202   Resp 18 09/03/22 1202   SpO2 99 % 09/03/22 1202   Vitals shown include unvalidated device data.    Electronically Signed By: PASHA Gomes CRNA  September 3, 2022  12:04 PM

## 2022-09-04 NOTE — PLAN OF CARE
1200 - 1900 RN Shift Summary    Patient admitted from OR after ventriculostomy placement. No output from ventriculostomy, small amount of sanguinous fluid noted. Neurosurgery notified about possible clotting / occlusion / no output of ventriculostomy. Patient unresponsive until paralytic wore off after surgery. Following clearing of sedation patient withdraws to pain in lower extremities, posturing in upper extremities; no gag noted, has weak cough with suctioning of ETT; pupils dilated, unequal, fixed. Neurosurgery promptly notified about all changes in patient status. Cm H2O changed from 10 to 5. Patient started draining small amount of CSF, clear, yellow - approximately 5mL q 1 hour. What appears like sanguinous drainage slightly extending under dressing at ventriculostomy site, dressing marked. Manitoland 2% sodium given per neurocrit. Patient started putting out very large amount of pale / light urine. ICU MD notified, no new orders at this time. Spouse and son at bedside during the day; Updated about patient's condition and prognosis. All questions answered and concerns addressed. Goals of care to be discussed among family members.

## 2022-09-04 NOTE — PROGRESS NOTES
[facility]    Stroke Progress Note    Interval EventsCardiac arrest with resuscitation at 0745 today  Comfort care measures per family decision    HPI Summary  Toney Denton is a 71 year old male with known multiple risk factors of CAD s/p CABG, ischemic cardiomyopathy EF 20 to 25%, COPD, HTN, bioprosthetic MVR. Patient is on Xarelto for recent subclavian DVT.  He was found to be altered this morning after a fall with no head trauma.   CT head initial was obtained which showed Large Extensive IVH with hydrocephalus and extension to third and fourth ventricles with midline supratentorial hemorrhage between frontal horns and vasogenic edema in addition to SAH.  Patient was still having reactive pupils and withdrawal of extremities to painful stimuli. He received K centra for reversal. He was then taken to surgery for EVD insertion. CT head repeated which showed EVD and no significant change. Vessel images were not showing dilatation, spot sign, dissection. After that the patient was reporte dto have worsening neurological exam with loss of cranial stem reflexes including pupil reaction.        Stroke neurology was paged at 5945 regarding routine ICU consult and we were not aware of patient in the hospital before that. According to nursing pupil exam change with loss of reactivity happened at 1445.     EVD was noted to be clotted and discussion has been held for thrombolytics which was declined upon discussion with neurosurgery team at [facility].     On my immediate exam upon consultation, no brain stem reflexes were present except for cough. No limb reaction to painful stimuli.     Overnight, ICPs remained elevated at 60-70s. No EVD output was obtained. Na level was ranging 156-157 on hypertonic. Overnight lost all brain stem reflexes. Cardiac arrest this early morning with resuscitation achieved. Following neurological exam with loss of brain stem reflexes and motor response. Patient made  DNR then comfort care per family decision. Patient extubated and passed away.    Imaging Review:     CT head:  1.  Large volume/extensive intraventricular hyperdense hemorrhage with ventricular dilation involving the lateral and third ventricles, and to a lesser extent the fourth ventricle.     2.  There is midline supratentorial hemorrhage interposed between the frontal horns of the lateral ventricles, probable surrounding vasogenic edema and/or associated ischemia. Suspect primary intraparenchymal hemorrhage and secondary decompression into   the ventricular system.        HEAD CTA:   1.  No evidence for aneurysm/ruptured aneurysm, large vessel occlusion or dissection. Mild stenoses of the cavernous ICA segment. Reduced caliber of the right ICA related to right ICA origin stenosis. Dural venous sinus pattern of enhancement is   satisfactory.     NECK CTA:  1.  Very high-grade right ICA origin stenosis. Mild narrowing left ECA and ICA.    Impression    Large Extensive IVH with hydrocephalus and extension to third and fourth ventricles with midline supratentorial hemorrhage between frontal horns and vasogenic edema in addition to SAH. Patient lost all brain stem reflexes over night.      Plan  Patient   ______________________________________________________       Medications   Scheduled Meds    amiodarone  400 mg Oral Daily     ceFAZolin  1 g Intravenous Q8H     chlorhexidine  15 mL Mouth/Throat Q12H     desmopressin  1 mcg Subcutaneous BID     insulin aspart  1-6 Units Subcutaneous Q4H     pantoprazole  40 mg Per Feeding Tube QAM AC    Or     pantoprazole  40 mg Intravenous QAM AC       Infusion Meds    - MEDICATION INSTRUCTIONS -       midazolam Stopped (22 0740)     norepinephrine Stopped (22 1335)     phenylephrine Stopped (22 133)     sodium chloride Stopped (22 133)       PRN Meds  acetaminophen **OR** acetaminophen, glucose **OR** dextrose **OR** glucagon, diazepam **OR**  LORazepam **OR** LORazepam, flumazenil, glycopyrrolate, HYDROmorphone, CK total **AND** Triglycerides **AND** - MEDICATION INSTRUCTIONS - **AND** Notify Physician, morphine, naloxone, naloxone, ondansetron **OR** ondansetron, polyethylene glycol, senna-docusate **OR** senna-docusate       PHYSICAL EXAMINATION  Temp:  [96  F (35.6  C)-98.2  F (36.8  C)] 96  F (35.6  C)  Pulse:  [] 69  Resp:  [5-30] 15  BP: ()/(36-97) 117/63  FiO2 (%):  [40 %-100 %] 100 %  SpO2:  [92 %-100 %] 98 %      Neurologic done off sedation  Mental Status:   No brain stem reflexes   Cranial Nerves: No brain stem reflexes  Motor: No response to pain  Sensory:  No response  Coordination:cant be performed  Station/Gait:  Cant be performed  Imaging  I personally reviewed all imaging; relevant findings per HPI.     Lab Results Data   CBC  Recent Labs   Lab 09/03/22  0535 09/03/22  0533 08/31/22  0630   WBC 10.0  --  6.7   RBC 3.48*  --  3.21*   HGB 10.2* 11.9* 9.5*   HCT 31.5* 35* 29.9*     --  271     Basic Metabolic Panel    Recent Labs   Lab 09/04/22  1120 09/04/22  0939 09/04/22  0556 09/04/22  0517 09/04/22  0203 09/03/22  1219 09/03/22  0535 09/03/22  0533 08/31/22  0630   *  --   --  157* 157*  156*   < > 139 134 131*   POTASSIUM  --   --   --   --  3.3*  --  2.7* 3.6 3.9   CHLORIDE  --   --   --   --  121*  --  108  --  95   CO2  --   --   --   --  30  --  22  --  27   BUN  --   --   --   --  11  --  12  --  14   CR  --   --   --   --  0.72  --  0.48*  --  0.72  0.71   GLC  --  108* 106*  --  128*   < > 127*  --  75   THOMAS  --   --   --   --  10.3*  --  7.1*  --  9.5    < > = values in this interval not displayed.     Liver Panel  Recent Labs   Lab 09/03/22  0535 08/31/22  0630   PROTTOTAL 6.0*  --    ALBUMIN 2.4*  --    BILITOTAL 0.4  --    ALKPHOS 82  --    AST 17 25   ALT 18  --      INR    Recent Labs   Lab Test 09/03/22  1729 09/03/22  0959 09/03/22  0903   INR 1.66* 1.98* 1.98*      Lipid Profile  No lab  results found.  A1C    Recent Labs   Lab Test 09/03/22  0535   A1C 5.8*     Troponin    Recent Labs   Lab 09/03/22  0535   TROPONINIS 23

## 2022-09-04 NOTE — PROGRESS NOTES
On arrival to shift, patient unresponsive, ventri in place ICPs 30-60s, having large urine outputs, on 2% saline, withdraws in lowers extremities, pupils fixed, dilated, and unequal, left pupil not round, no cough on suctioning. Throughout shift sodiums high, at first check 2% continued per neurocrit, stopped after second check Had short run of VT overnight, intensivest notifed and labs obtained. Around 4am urine output began dropping, BP in 90-80s systolic, ICPs remained high, intensivist notifed, levo started to maintain CPPs of 60s, patient paralyzed and versed started as well, DDAVP given. Train of four still 4/4, patient no longer withdrawing in LE, will continue to titrate. Low output from EVD, very dampened waveform. CPPs of 60s not obtained prior to shift ending, continuing to titrate meds.

## 2022-09-04 NOTE — PROGRESS NOTES
[facility]  Neurosurgery Daily Progress Note    Assessment & Plan   Procedure(s):  RIGHT FRONTAL VENTRICULOSTOMY   -1 Day Post-Op  Vtach this morning, code status changed to DNR. Worsening neurologic exam, minimal output from EVD. Met with family at bedside. Likely planning for comfort cares this afternoon.     Discussed with Dr. Ingrid Gaytan, CNP  [facility] Neurosurgery  [facility]  [address]  [place] [address]  Tel [phone]  Pager [phone]    Interval History   Worsening neurologic exam     Physical Exam   Temp: 96.8  F (36  C) Temp src: Bladder BP: 110/58 Pulse: 74   Resp: 15 SpO2: 99 % O2 Device: Mechanical Ventilator    Vitals:    09/03/22 1215 09/04/22 0200   Weight: 90.8 kg (200 lb 1.3 oz) 84.6 kg (186 lb 8.2 oz)     Vital Signs with Ranges  Temp:  [96.1  F (35.6  C)-98.2  F (36.8  C)] 96.8  F (36  C)  Pulse:  [] 74  Resp:  [5-30] 15  BP: ()/(36-99) 110/58  FiO2 (%):  [40 %-50 %] 40 %  SpO2:  [92 %-100 %] 99 %  I/O last 3 completed shifts:  In: 1520 [I.V.:1460; NG/GT:60]  Out: 7456 [Urine:6371; Emesis/NG output:1050; Other:35]    EVD intact   Intubated, off sedation  Pupils round, equal, fixed  No cough, gag, or corneal reflex  No movement in BUE/BLE     Medications     - MEDICATION INSTRUCTIONS -       midazolam Stopped (09/04/22 0740)     norepinephrine 0.1 mcg/kg/min (09/04/22 0911)     phenylephrine 0.2 mcg/kg/min (09/04/22 0800)     sodium chloride 75 mL/hr at 09/04/22 0800        amiodarone  400 mg Oral Daily     ceFAZolin  1 g Intravenous Q8H     chlorhexidine  15 mL Mouth/Throat Q12H     desmopressin  1 mcg Subcutaneous BID     insulin aspart  1-6 Units Subcutaneous Q4H     pantoprazole  40 mg Per Feeding Tube QAM AC    Or     pantoprazole  40 mg Intravenous QAM AC       Ynes Gaytan CNP  [facility] Neurosurgery   [facility]  [address]  [place] [address]  Tel [phone]  Pager [phone]

## 2022-09-04 NOTE — PROGRESS NOTES
"[facility] SERVICES  SPIRITUAL ASSESSMENT Progress Note  [facility] ICU     REFERRAL SOURCE: Follow Up    I spent time with pt Toney's family at bedside today (spouse, Trish and daughters, Smitha and Adele). Toney is intubated and not able to participate in the visit.    -Family spent time processing the events of the last 24 hours and their grief over Toney's condition. We spent time discerning goals of care and they share they are considering comfort measures but it is difficult to make this decision \"I am hoping for a miracle.\" I invited them to consider what Toney would say if he could see himself in his current condition and they share he \"would not want this...and always said he didn't want to be a 'vegetable.'\" Family is in agreement they will likely move to comfort measures but have a few more people they need to talk with including Toney's other son. They shared they would like to know more about what comfort measures would look like so I walked them through that from an emotional/spiritual perspective.    -Family also engaged in some life review, sharing about Toney. They acknowledge their shock at how quickly everything has happened and name the need for time to process all of this. I invited them to consider spending individual time with Toney to begin saying goodbyes.    I spent time providing grief support, engaging goals of care discernment, facilitating life review and reflective listening that affirmed emotions, experiences and meaning.     PLAN: [facility] will continue to remain available, pt was anointed yesterday which family expresses was important to them. They identify no additional ritual needs at this time but a desire to talk with other family members about decision making.     Ryann Newman  Associate    Phone: [phone]  [facility] On Call Pager: [phone]    "

## 2022-09-04 NOTE — PROGRESS NOTES
[facility] ED RESPIRATORY NOTE           Date of Admission: 9/3/2022     Date of Intubation (most recent): 9/3/22     Reason for Mechanical Ventilation: AIRWAY PROTECTION     Number of Days on Mechanical Ventilation: 2     Met Criteria for Spontaneous Breathing Trial: No     Reason for No Spontaneous Breathing Trial: Per MD     Significant Events Today: None overnight     ABG Results:   Recent Labs   Lab 09/04/22  0039 09/03/22  0532   PH 7.52* 7.43   PCO2 41  --    PO2 125*  --    HCO3 33*  --    O2PER 40  --      Vent Mode: CMV/AC  (Continuous Mandatory Ventilation/ Assist Control)  FiO2 (%): 40 %  Resp Rate (Set): 16 breaths/min  Tidal Volume (Set, mL): 470 mL  PEEP (cm H2O): 5 cmH2O  Resp: 11    GUILLE Vera, RRT

## 2022-09-04 NOTE — CODE/RAPID RESPONSE
Critical Care Code Blue Note    Alerted by RN that patient had lost pulse, CPR started 0745. Initial rhythm ventricular tachycardia. 1mg epinephrine given 0748. 1 amp Ca chloride given 0748. 1 amp NaHCO3 given 0749. Pulse check 0751 w/ ROSC. Remained in Vtach, shocked 0751. Briefly in sinus rhythm, but quickly back in Vtach, shocked 0752. Again, briefly remained in sinus, but returned to Vtach. Given 300mg amiodarone bolus 0755. Again shocked at 0755. 0756 given 2g Mg. 0757 given 100mg lidocaine. Pt returned to sinus rhythm w/ ventricular pacing.     Remained hypotensive w/ MAPs in the 50s. Vasopressor changed from norepinephrine to phenylephrine.     Called wife and discussed what had transpired. She and her children are coming to the hospital. Code status changed to DNR.     At 0832 pt had brief run of Vtach. His ICD fired and he returned to sinus rhythm w/ ventricular pacing.    35 minutes of critical care time.      Molina Matias MD  Pulmonary Disease and Critical Care Medicine  [facility]

## 2022-09-04 NOTE — DEATH PRONOUNCEMENT
MD DEATH PRONOUNCEMENT    Called to pronounce Toney Denton dead.    Physical Exam: Spontaneous respirations absent, Breath sounds absent, Carotid pulse absent and Heart sounds absent    Patient was pronounced dead at 02.40:PM, 2022.    Preliminary Cause of Death: Intraventricular hemmorhage    Active Problems:    Ventricular hemorrhage (H)    Unresponsive       Infectious disease present?: NO    Communicable disease present? (examples: HIV, chicken pox, TB, Ebola, CJD) :  NO    Multi-drug resistant organism present? (example: MRSA): NO    Please consider an autopsy if any of the following exist:  NO Unexpected or unexplained death during or following any dental, medical, or surgical diagnostic treatment procedures.   NO Death of mother at or up to seven days after delivery.     NO All  and pediatric deaths.     NO Death where the cause is sufficiently obscure to delay completion of the death certificate.   NO Deaths in which autopsy would confirm a suspected illness/condition that would affect surviving family members or recipients of transplanted organs.     The following deaths must be reported to the 's Office:  NO A death that may be due entirely or in part to any factors other than natural disease (recent surgery, recent trauma, suspected abuse/neglect).   NO A death that may be an accident, suicide, or homicide.     NO Any sudden, unexpected death in which there is no prior history of significant heart disease or any other condition associated with sudden death.   NO A death under suspicious, unusual, or unexpected circumstances.    NO Any death which is apparently due to natural causes but in which the  does not have a personal physician familiar with the patient s medical history, social, or environmental situation or the circumstances of the terminal event.   NO Any death apparently due to Sudden Infant Death Syndrome.     NO Deaths that occur during, in association  with, or as consequences of a diagnostic, therapeutic, or anesthetic procedure.   NO Any death in which a fracture of a major bone has occurred within the past (6) six months.   NO A death of persons note seen by their physician within 120 days of demise.     NO Any death in which the  was an inmate of a public institution or was in the custody of Law Enforcement personnel.   NO  All unexpected deaths of children   NO Solid organ donors   NO Unidentified bodies   NO Deaths of persons whose bodies are to be cremated or otherwise disposed of so that the bodies will later be unavailable for examination;   NO Deaths unattended by a physician outside of a licensed healthcare facility or licensed residential hospice program   NO Deaths occurring within 24 hours of arrival to a health care facility if death is unexpected.    NO Deaths associated with the decedent s employment.   NO Deaths attributed to acts of terrorism.   NO Any death in which there is uncertainty as to whether it is a medical examiner s care should be discussed with the medical investigator.        Body disposition: Autopsy was discussed with family member:  Spouse and Family members in person.  Permission for autopsy was declined.  Body released to the  home.    Rosalba Maciel MD  Pulmonary, Critical Care and Sleep Medicine  [facility]  Pager: [phone]

## 2022-09-04 NOTE — CODE/RAPID RESPONSE
Patient in V-Tach without a pulse at 0745. CPR for 12 minutes, returned to paced rhythm with a pulse. Medication given documented in code record and ICU MD note. Permanent's implantable cardioverter-defibrillator did not discharge during code blue. Patient briefly in V-Tach multiple times after code blue, each time shocked by his ICD and converted to 100% paced rhythm.

## 2022-09-04 NOTE — PROGRESS NOTES
Contacted by nursing due to new development of hypotension. MAPS 60, ICP 30, CPP 30. Patient is a 70 yo male with a very large IVH with poor prognosis being managed by Neurocritical care and Neurology and s/p Ventriculostomy placement by Neurosurgery. Concern due to drop in CPP to < 60. To reduce ICP paralyzed patient, will consider pentobarb coma and will discuss urgently w/ Neurosurgery/NeuroCrit and Neurology regarding decompressive craniotomy given severely elevated ICPs and decreased CPP. To increase MAP Norepi initiated. Na elevated to 156 due to DI and hypertonic saline. There was initial concern for central DI; however, after discussions between the critical care teams and neurology, neurology wanted patient's Na's elevated to help treat cerebral edema so 2% Na was initiated. Will discuss with neurocritical care; however, I d/tami the hypertonic saline and initiated management of DI with 1 bid of desmopressin as Na is now slightly greater than 145-155 range.    Ivan Nixon

## 2022-09-04 NOTE — PLAN OF CARE
RN End of Shift Summary    Patient proceeded to comfort care at 1330, per family's wishes. Family at bedside, patient passed away peacefully. All family's questions and concerns addressed, emotional support provided. ANS contact number given to family, for when they decide about  home.

## 2022-09-04 NOTE — PROGRESS NOTES
ICU Progress Note    Date of Service: 09/04/22    A/P:  71M subclavian DVT (on rivaroxaban), CAD s/p CABG, ICM (EF 20-25%), CRT-D placement, COPD, HTN, MVR who was admitted 9/3/2022 after being found unresponsive at TCU. Found to have large intraventricular hemorrhage now s/p EVD. Course c/b VT/VF arrest and worsening neurologic exam.     I have personally reviewed the daily labs, imaging studies, cultures and discussed the case with referring physician and consulting physicians.     Neuro  # Intraventricular hemorrhage - s/p EVD placement  - neurosurgery following  - no plan for further surgical intervention  - neurocrit following  - palliative care c/s   - goal Na 140-145  - plan for likely comfort cares later this afternoon     Pulmonary  # Acute hypoxemic respiratory failure w/ inability to protect airway  - vent settings below    Cardiac  # Shock - multifactorial; obstructive 2/2 positive pressure ventilation, possibly neurogenic, vasoplegia in the setting of sedation   # Ventricular tachycardia - s/p arrest requiring CPR, debrillation x 3, amiodarone, and lidocaine  - MAP > 65  - norepinephrine gtt, wean as able  - phenylephrine gtt  - continue home amiodarone  - pt has ICD in place which will need to be deactivated prior to comfort care    Renal  # Idiopathic hypernatremia  # Diabetes insipidus   - monitor UOP, Cr, I/O  - electrolyte replacement protocols   - DDAVP BID    GI  # No acute issues   - NPO    Heme/Onc  # Anemia - in the setting of chronic and critical illness   - monitor CBC    ID  # EVD prophylaxis  - continue cefazolin     Endo  # DM2 w/ hyperglycemia   - monitor BG  - sliding scale insulin     PPX  1. DVT: SCDs   2. VAP: HOB 30 degrees, chlorhexidine rinse  3. Stress Ulcer: PPI  4. Restraints: Nonviolent soft two point restraints required and necessary for patient safety and continued cares and good effect as patient continues to pull at necessary lines, tubes despite education and distraction.  "Will readdress daily.   5. Wound care - per unit routine   6. Feeding - NPO  7. Family updated at bedside.    Interval Hx:  See code note for details.     Unable to obtain ROS 2/2 sedation/intubation.     /58   Pulse 69   Temp 96.8  F (36  C)   Resp 16   Ht 1.715 m (5' 7.52\")   Wt 84.6 kg (186 lb 8.2 oz)   SpO2 100%   BMI 28.76 kg/m    Gen: lying in bed, unresponsive  Neuro: pupils fixed and dilated, does not w/d to pain, no cough, no gag  HEENT: anicteric  Card: RRR  Pulm: clear b/l  Abd: soft  MSK: no edema, no acute joint abnormality  Skin: no obvious rash     Vent Mode: CMV/AC  (Continuous Mandatory Ventilation/ Assist Control)  FiO2 (%): 40 %  Resp Rate (Set): 16 breaths/min  Tidal Volume (Set, mL): 470 mL  PEEP (cm H2O): 5 cmH2O  Resp: 16        Intake/Output Summary (Last 24 hours) at 9/4/2022 1050  Last data filed at 9/4/2022 1000  Gross per 24 hour   Intake 2122.58 ml   Output 7545 ml   Net -5422.42 ml       Labs: reviewed    Imaging: reviewed    Billing: Patient is critically ill. Total critical care time today, excluding procedures, was 45 minutes.    Molina Matias MD  Pulmonary Disease and Critical Care Medicine   [facility]      "

## 2022-09-04 NOTE — DISCHARGE SUMMARY
Discharge/Death summary    Admit date: 9/3/2022    Discharge/Death date and time:  12:40 pm 2022    Admitting Physician: Molina Matias MD    Discharge Physician: Rosalba Maciel MD    Admission Diagnoses: Intraventricular hemorrhage    Discharge Diagnoses: Intraventricular hemorrhage     Admission Condition: Critical    Discharged Condition:     Indication for Admission: Intraventricular hemorrhage     Hospital Course:   Toney Denton is an anticoagulated (on Xarelto for recent subclavian DVT) 71 year old man with complex PMH including CAD s/p CABG, ischemic cardiomyopathy last EF 20-25% 22, s/p biventricular CRT-D placement, COPD, HTN, bioprosthetic MVR, who presents from his TCU after being found unresponsive morning of admission, CT scan in ED with extensive intraventricular hemorrhage.   Xarelto reversed in the ED with Kcentra. Pre-op exam by neurosurgery, in ED, notable for minimally responsive pupils, absent corneal and gag reflexes, positive cough reflex, extending to central stimulus. Taken for ventriculostomy/EVD placement by neurosurgery, admitted to the ICU intubated and sedated.  Pt had progressive decline in neurologic exam. Day of admission, ICP > 60 for several hours. Discussed w/ neurocritical care and neurosurgery, no further interventions were feasible and comfort care recommended.   Morning of , pt had VT/VF arrest requiring ~5 minutes of CPR, debrillation x 3, amiodarone, and lidocaine to maintain sinus rhythm w/ ventricular pacing. Discussed with family, pt was made DNR and ultimately transitioned to comfort care.   Patient was extubated to room air and support including pressors were turned off per family request.   at 12:40 pm 2022 in presence of family.    Discharge time - 30 minutes    Rosalba Maciel MD  Pulmonary, Critical Care and Sleep Medicine  [facility]  Pager: [phone]

## 2022-09-04 NOTE — PROGRESS NOTES
ICU Update  Discussed with family and they have decided to transition to comfort care as earlier discussed during rounds and updated by my colleague- Dr. Matias.  All concerns and questions addressed. Provided  services and emotional support.    Rosalba Maciel MD  Pulmonary, Critical Care and Sleep Medicine  [facility]  Pager: [phone]

## 2022-09-04 NOTE — PROGRESS NOTES
Patient compassionately extubated to room air at 1340.    Respiratory will no longer follow.     Pool Callahan, RT on 9/4/2022 at 1:42 PM

## 2022-09-05 NOTE — OP NOTE
Location: Main or  Attending Surgeon: Evaristo Quintero MD  Preprocedure diagnosis: Hydrocephalus from intraventricular hemorrhage  Postprocedure diagnosis: Same  Procedure:  1.  Right frontal ventriculostomy placement  Indications: Patient is a 71-year-old male with altered mental status and declining neurologic exam.  On imaging he has a very large intraventricular hemorrhage with hydrocephalus.  Both ventricles display significant amount of blood.  After discussion with the family even with a poor prognosis they would like to proceed with the ventriculostomy placement to see if he will improve at all with his neurologic exam.  Procedure details:  Patient was brought to the main operating room, he remained intubated and was kept on the transport bed.  A right frontal Kocher's point incision was marked out.  He is cleaned and prepped in sterile fashion.  Local anesthetic was injected following timeout.  Using a 10 blade incision was made to the skin.  We then used monopolar cautery to dissect down to the skull and then identified the coronal suture.  A bur hole was then placed anterior to the coronal suture using high-speed drill and perforating bit.  We then opened the dura using a 15 blade and made a corticectomy using bipolar cautery.  We then passed the catheter down 6 cm towards the foramen of Childers.  We did encounter some bloody CSF.  There is no significant flow so this was removed and then passed slightly more medially.  We did encounter bloody CSF once again with slightly improved flow however this was sluggish.  We passed 1 more time getting the same resolved however this was flowing easier than before.  Given the significant amount of blood in the ventricles this was capped as our final placement.  We then tunneled the catheter through the skin and secured it using a 3-0 nylon suture.  The galea was then closed using 3-0 Vicryl sutures and the skin was closed using staples.  All counts were correct at the  end the procedure.  Patient tolerated procedure well.  He was transferred to the ICU in  critical condition.  I was present for the entire procedure.    Evaristo Quintero MD

## 2022-09-07 ENCOUNTER — LAB REQUISITION (OUTPATIENT)
Dept: LAB | Facility: CLINIC | Age: 72
End: 2022-09-07
Payer: COMMERCIAL

## 2022-09-07 DIAGNOSIS — B99.9 UNSPECIFIED INFECTIOUS DISEASE: ICD-10-CM

## 2022-09-07 LAB
ATRIAL RATE - MUSE: 61 BPM
DIASTOLIC BLOOD PRESSURE - MUSE: NORMAL MMHG
INTERPRETATION ECG - MUSE: NORMAL
P AXIS - MUSE: NORMAL DEGREES
PR INTERVAL - MUSE: NORMAL MS
QRS DURATION - MUSE: 168 MS
QT - MUSE: 462 MS
QTC - MUSE: 555 MS
R AXIS - MUSE: -61 DEGREES
SYSTOLIC BLOOD PRESSURE - MUSE: NORMAL MMHG
T AXIS - MUSE: 154 DEGREES
VENTRICULAR RATE- MUSE: 87 BPM

## 2022-09-08 LAB
BACTERIA BLD CULT: NO GROWTH
BACTERIA BLD CULT: NO GROWTH

## (undated) DEVICE — PERFORATOR 11/7MM MINI 200-243DGR-1

## (undated) DEVICE — DRAPE LEGGINGS 8421

## (undated) DEVICE — DRAPE C-ARM 60X42" 1013

## (undated) DEVICE — LINEN FULL SHEET 5511

## (undated) DEVICE — SPONGE SURGIFOAM 100 1974

## (undated) DEVICE — Device

## (undated) DEVICE — GLOVE PROTEXIS BLUE W/NEU-THERA 6.5  2D73EB65

## (undated) DEVICE — DRSG AQUACEL AG HYDROFIBER  3.5X10" 422605

## (undated) DEVICE — LINEN TOWEL PACK X5 5464

## (undated) DEVICE — DRSG GAUZE 4X4" 8044

## (undated) DEVICE — LINEN DRAPE 54X72" 5467

## (undated) DEVICE — PACK NEURO SNE15SNFSA

## (undated) DEVICE — STPL SKIN 35W 6.9MM  PXW35

## (undated) DEVICE — BAG CLEAR TRASH 1.3M 39X33" P4040C

## (undated) DEVICE — MANIFOLD NEPTUNE 4 PORT 700-20

## (undated) DEVICE — SOL WATER IRRIG 1000ML BOTTLE 2F7114

## (undated) DEVICE — BLADE CLIPPER 4412A

## (undated) DEVICE — PACK HIP NAILING SOP32HPFC4

## (undated) DEVICE — GLOVE PROTEXIS BLUE W/NEU-THERA 8.0  2D73EB80

## (undated) DEVICE — CATH VENTRICLEAR II EVD ANTI IMPREG 50318

## (undated) DEVICE — ESU ELEC BLADE 2.75" COATED/INSULATED E1455

## (undated) DEVICE — DRILL BIT 3.2X145MM  310.31

## (undated) DEVICE — ESU GROUND PAD UNIVERSAL W/O CORD

## (undated) DEVICE — DRSG XEROFORM 1X8"

## (undated) DEVICE — GLOVE PROTEXIS POWDER FREE SMT 6.5  2D72PT65X

## (undated) DEVICE — ESU GROUND PAD ADULT W/CORD E7507

## (undated) DEVICE — GLOVE PROTEXIS POWDER FREE 8.0 ORTHOPEDIC 2D73ET80

## (undated) DEVICE — DRAIN SYSTEM CSF EXTERNAL DRAINAGE VENTRIC/LUMBAR 46914

## (undated) DEVICE — LINEN HALF SHEET 5512

## (undated) RX ORDER — GINSENG 100 MG
CAPSULE ORAL
Status: DISPENSED
Start: 2022-01-01

## (undated) RX ORDER — CEFAZOLIN SODIUM/WATER 2 G/20 ML
SYRINGE (ML) INTRAVENOUS
Status: DISPENSED
Start: 2022-01-01

## (undated) RX ORDER — PROPOFOL 10 MG/ML
INJECTION, EMULSION INTRAVENOUS
Status: DISPENSED
Start: 2022-01-01

## (undated) RX ORDER — GLYCOPYRROLATE 0.2 MG/ML
INJECTION INTRAMUSCULAR; INTRAVENOUS
Status: DISPENSED
Start: 2022-01-01

## (undated) RX ORDER — TRANEXAMIC ACID 10 MG/ML
INJECTION, SOLUTION INTRAVENOUS
Status: DISPENSED
Start: 2022-01-01

## (undated) RX ORDER — DEXAMETHASONE SODIUM PHOSPHATE 4 MG/ML
INJECTION, SOLUTION INTRA-ARTICULAR; INTRALESIONAL; INTRAMUSCULAR; INTRAVENOUS; SOFT TISSUE
Status: DISPENSED
Start: 2022-01-01

## (undated) RX ORDER — FENTANYL CITRATE 50 UG/ML
INJECTION, SOLUTION INTRAMUSCULAR; INTRAVENOUS
Status: DISPENSED
Start: 2022-01-01

## (undated) RX ORDER — BUPIVACAINE HYDROCHLORIDE AND EPINEPHRINE 5; 5 MG/ML; UG/ML
INJECTION, SOLUTION EPIDURAL; INTRACAUDAL; PERINEURAL
Status: DISPENSED
Start: 2022-01-01

## (undated) RX ORDER — OXYCODONE HYDROCHLORIDE 5 MG/1
TABLET ORAL
Status: DISPENSED
Start: 2022-01-01

## (undated) RX ORDER — ETOMIDATE 2 MG/ML
INJECTION INTRAVENOUS
Status: DISPENSED
Start: 2022-01-01

## (undated) RX ORDER — LIDOCAINE HYDROCHLORIDE 10 MG/ML
INJECTION, SOLUTION EPIDURAL; INFILTRATION; INTRACAUDAL; PERINEURAL
Status: DISPENSED
Start: 2022-01-01

## (undated) RX ORDER — ONDANSETRON 2 MG/ML
INJECTION INTRAMUSCULAR; INTRAVENOUS
Status: DISPENSED
Start: 2022-01-01